# Patient Record
Sex: MALE | Race: ASIAN | Employment: OTHER | URBAN - METROPOLITAN AREA
[De-identification: names, ages, dates, MRNs, and addresses within clinical notes are randomized per-mention and may not be internally consistent; named-entity substitution may affect disease eponyms.]

---

## 2017-01-31 RX ORDER — COLCHICINE 0.6 MG/1
0.6 TABLET ORAL DAILY
Qty: 90 TAB | Refills: 1 | Status: SHIPPED | OUTPATIENT
Start: 2017-01-31 | End: 2017-02-01 | Stop reason: SDUPTHER

## 2017-01-31 NOTE — TELEPHONE ENCOUNTER
This patient contacted office for the following prescriptions to be filled:    Medication requested :   Requested Prescriptions     Pending Prescriptions Disp Refills    colchicine 0.6 mg tablet 90 Tab 1     Sig: Take 1 Tab by mouth daily.    pt has two tablets left    PCP: [address] [place] [address] Street or Print: pharmacy  Mail order or Local pharmacy: [facility]    Scheduled appointment if not seen by current providers in office: last seen on 12/8/16 has fu on 3/8/17

## 2017-02-01 RX ORDER — COLCHICINE 0.6 MG/1
0.6 TABLET ORAL DAILY
Qty: 90 TAB | Refills: 1 | Status: SHIPPED | OUTPATIENT
Start: 2017-02-01 | End: 2018-06-18 | Stop reason: SDUPTHER

## 2017-02-01 RX ORDER — ISOPROPYL ALCOHOL 70 ML/100ML
SWAB TOPICAL
Qty: 200 PAD | Refills: 1 | Status: SHIPPED | OUTPATIENT
Start: 2017-02-01 | End: 2020-04-21 | Stop reason: SDUPTHER

## 2017-02-15 RX ORDER — GLIPIZIDE 10 MG/1
TABLET, FILM COATED, EXTENDED RELEASE ORAL
Qty: 180 TAB | Refills: 0 | Status: SHIPPED | OUTPATIENT
Start: 2017-02-15 | End: 2017-05-14 | Stop reason: SDUPTHER

## 2017-02-15 NOTE — TELEPHONE ENCOUNTER
Last OV 12/8/16  Next scheduled OV 3/8/17  Last refill 8/19/16 #180/1  Refilling per Verbal order from Dr. Jacqueline Mendes.

## 2017-04-25 RX ORDER — SITAGLIPTIN 50 MG/1
TABLET, FILM COATED ORAL
Qty: 90 TAB | Refills: 2 | Status: SHIPPED | OUTPATIENT
Start: 2017-04-25 | End: 2018-03-25 | Stop reason: SDUPTHER

## 2017-05-03 ENCOUNTER — HOSPITAL ENCOUNTER (OUTPATIENT)
Dept: LAB | Age: 72
Discharge: HOME | End: 2017-05-03
Payer: MEDICARE

## 2017-05-03 DIAGNOSIS — E78.00 PURE HYPERCHOLESTEROLEMIA: ICD-10-CM

## 2017-05-03 DIAGNOSIS — E11.9 TYPE 2 DIABETES MELLITUS WITHOUT COMPLICATION, WITHOUT LONG-TERM CURRENT USE OF INSULIN (HCC): ICD-10-CM

## 2017-05-03 DIAGNOSIS — N18.30 CRI (CHRONIC RENAL INSUFFICIENCY), STAGE 3 (MODERATE) (HCC): ICD-10-CM

## 2017-05-03 DIAGNOSIS — I10 ESSENTIAL HYPERTENSION: ICD-10-CM

## 2017-05-03 LAB
ANION GAP BLD CALC-SCNC: 8 MMOL/L (ref 3–18)
BUN SERPL-MCNC: 29 MG/DL (ref 7–18)
BUN/CREAT SERPL: 22 (ref 12–20)
CALCIUM SERPL-MCNC: 8.7 MG/DL (ref 8.5–10.1)
CHLORIDE SERPL-SCNC: 105 MMOL/L (ref 100–108)
CHOLEST SERPL-MCNC: 96 MG/DL
CO2 SERPL-SCNC: 28 MMOL/L (ref 21–32)
CREAT SERPL-MCNC: 1.34 MG/DL (ref 0.6–1.3)
GLUCOSE SERPL-MCNC: 167 MG/DL (ref 74–99)
HBA1C MFR BLD: 7.3 % (ref 4.2–5.6)
HDLC SERPL-MCNC: 33 MG/DL (ref 40–60)
HDLC SERPL: 2.9 {RATIO} (ref 0–5)
LDLC SERPL CALC-MCNC: 36 MG/DL (ref 0–100)
LIPID PROFILE,FLP: ABNORMAL
POTASSIUM SERPL-SCNC: 4.3 MMOL/L (ref 3.5–5.5)
SODIUM SERPL-SCNC: 141 MMOL/L (ref 136–145)
TRIGL SERPL-MCNC: 135 MG/DL (ref ?–150)
VLDLC SERPL CALC-MCNC: 27 MG/DL

## 2017-05-03 PROCEDURE — 36415 COLL VENOUS BLD VENIPUNCTURE: CPT | Performed by: FAMILY MEDICINE

## 2017-05-03 PROCEDURE — 83036 HEMOGLOBIN GLYCOSYLATED A1C: CPT | Performed by: FAMILY MEDICINE

## 2017-05-03 PROCEDURE — 80061 LIPID PANEL: CPT | Performed by: FAMILY MEDICINE

## 2017-05-03 PROCEDURE — 80048 BASIC METABOLIC PNL TOTAL CA: CPT | Performed by: FAMILY MEDICINE

## 2017-05-10 ENCOUNTER — OFFICE VISIT (OUTPATIENT)
Dept: FAMILY MEDICINE CLINIC | Age: 72
End: 2017-05-10

## 2017-05-10 VITALS
RESPIRATION RATE: 16 BRPM | BODY MASS INDEX: 30.91 KG/M2 | DIASTOLIC BLOOD PRESSURE: 84 MMHG | TEMPERATURE: 97.7 F | WEIGHT: 168 LBS | SYSTOLIC BLOOD PRESSURE: 136 MMHG | OXYGEN SATURATION: 97 % | HEART RATE: 92 BPM | HEIGHT: 62 IN

## 2017-05-10 DIAGNOSIS — E11.9 TYPE 2 DIABETES MELLITUS WITHOUT COMPLICATION, WITHOUT LONG-TERM CURRENT USE OF INSULIN (HCC): Primary | ICD-10-CM

## 2017-05-10 DIAGNOSIS — N18.30 CRI (CHRONIC RENAL INSUFFICIENCY), STAGE 3 (MODERATE) (HCC): ICD-10-CM

## 2017-05-10 DIAGNOSIS — Z12.11 COLON CANCER SCREENING: ICD-10-CM

## 2017-05-10 DIAGNOSIS — E78.00 PURE HYPERCHOLESTEROLEMIA: ICD-10-CM

## 2017-05-10 DIAGNOSIS — I10 ESSENTIAL HYPERTENSION: ICD-10-CM

## 2017-05-10 RX ORDER — HYDROCORTISONE 25 MG/G
CREAM TOPICAL 2 TIMES DAILY
Qty: 60 G | Refills: 2 | Status: SHIPPED | OUTPATIENT
Start: 2017-05-10 | End: 2018-06-18 | Stop reason: SDUPTHER

## 2017-05-10 NOTE — PROGRESS NOTES
Chief Complaint   Patient presents with    Diabetes    Hypertension    Cholesterol Problem    Gout     1. Have you been to the ER, urgent care clinic since your last visit? Hospitalized since your last visit? No    2. Have you seen or consulted any other health care providers outside of the [address] [place] [address] since your last visit? Include any pap smears or colon screening.  No

## 2017-05-10 NOTE — PROGRESS NOTES
Erinn Morales, 66 y.o.,  male    SUBJECTIVE  Routine ff-up    DM/HTN/HL- continues to follow diet. says some inconsistencies with activity. Denies hypoglycemia. Does not usually check sugars, occasionally 's. Reviewed labs  Gout-  rare flares, related to eating peanuts or some [place] delicacies. No flare for a year now. Compliant with meds, no other complaints. Requesting refill on steroid cream for eczema on arms intermittently. ROS:  See HPI, all others negative        Patient Active Problem List   Diagnosis Code    Prostate cancer ([facility] C61    DJD (degenerative joint disease) of knee M17.10    CRI (chronic renal insufficiency) N18.9    Splenic artery aneurysm (HCC) I72.8    Essential hypertension I10    Type 2 diabetes mellitus without complication ([facility] D87.4    Pure hypercholesterolemia E78.00    Gout without tophus M10.9    Advance directive discussed with patient Z71.89       Current Outpatient Prescriptions   Medication Sig Dispense Refill    hydrocortisone (HYTONE) 2.5 % topical cream Apply  to affected area two (2) times a day. use thin layer 60 g 2    JANUVIA 50 mg tablet TAKE 1 TABLET DAILY 90 Tab 2    glipiZIDE SR (GLUCOTROL XL) 10 mg CR tablet TAKE 1 TABLET TWICE A  Tab 0    alcohol swabs (ALCOHOL PADS) padm Use as directed daily. 200 Pad 1    colchicine 0.6 mg tablet Take 1 Tab by mouth daily. 90 Tab 1    glucose blood VI test strips (PRECISION XTRA TEST) strip by Does Not Apply route. 1 time daily 100 Strip 11    lisinopril (PRINIVIL, ZESTRIL) 20 mg tablet TAKE 1 TABLET TWICE A  Tab 3    simvastatin (ZOCOR) 40 mg tablet Take 1 Tab by mouth nightly. 90 Tab 3    Lancets (LANCETS,ULTRA THIN) misc As directed once daily. 3 Package 3    aspirin 81 mg chewable tablet Take 1 Tab by mouth daily. 90 Tab 4    Blood-Glucose Meter (BLOOD GLUCOSE MONITOR KIT) monitoring kit by Does Not Apply route. Once daily.  1 Kit 0       Allergies   Allergen Reactions  Norvasc [Amlodipine] Itching and Other (comments)    Sulfa (Sulfonamide Antibiotics) Other (comments)     Patient states he is not allergic    Watermelon Other (comments)       Past Medical History:   Diagnosis Date    Arthritis     CRI (chronic renal insufficiency)     Diabetes mellitus type II 5/13/10    Gout     Hypercholesteremia     Hypertension     Other ill-defined conditions     gout    Prostate cancer ([facility] 2008    remission    Splenic artery aneurysm ([facility] 2013    s/p stent dr Jero Carreon prn ff-up       Social History     Social History    Marital status:      Spouse name: N/A    Number of children: N/A    Years of education: N/A     Occupational History    Not on file.      Social History Main Topics    Smoking status: Former Smoker     Packs/day: 1.00     Types: Cigarettes     Quit date: 7/17/1997    Smokeless tobacco: Never Used      Comment: 1998    Alcohol use Yes      Comment: rarely    Drug use: No    Sexual activity: No     Other Topics Concern    Not on file     Social History Narrative       Family History   Problem Relation Age of Onset    Cancer Neg Hx     Diabetes Neg Hx     Heart Disease Neg Hx     Hypertension Neg Hx     Stroke Neg Hx          OBJECTIVE    Physical Exam:     Visit Vitals    /84 (BP 1 Location: Left arm, BP Patient Position: Sitting)    Pulse 92    Temp 97.7 °F (36.5 °C) (Oral)    Resp 16    Ht 5' 2\" (1.575 m)    Wt 168 lb (76.2 kg)    SpO2 97%    BMI 30.73 kg/m2       General: alert, well-appearing, in no apparent distress or pain  CVS: normal rate, regular rhythm, distinct S1 and S2  Lungs:clear to ausculation bilaterally, no crackles, wheezing or rhonchi noted  Extremities: no edema, no cyanosis  Skin: warm, no lesions, rashes noted  Psych:  mood and affect normal    Results for orders placed or performed during the hospital encounter of 05/03/17   HEMOGLOBIN A1C W/O EAG   Result Value Ref Range    Hemoglobin A1c 7.3 (H) 4.2 - 5.6 %   METABOLIC PANEL, BASIC   Result Value Ref Range    Sodium 141 136 - 145 mmol/L    Potassium 4.3 3.5 - 5.5 mmol/L    Chloride 105 100 - 108 mmol/L    CO2 28 21 - 32 mmol/L    Anion gap 8 3.0 - 18 mmol/L    Glucose 167 (H) 74 - 99 mg/dL    BUN 29 (H) 7.0 - 18 MG/DL    Creatinine 1.34 (H) 0.6 - 1.3 MG/DL    BUN/Creatinine ratio 22 (H) 12 - 20      GFR est AA >60 >60 ml/min/1.73m2    GFR est non-AA 52 (L) >60 ml/min/1.73m2    Calcium 8.7 8.5 - 10.1 MG/DL   LIPID PANEL   Result Value Ref Range    LIPID PROFILE          Cholesterol, total 96 <200 MG/DL    Triglyceride 135 <150 MG/DL    HDL Cholesterol 33 (L) 40 - 60 MG/DL    LDL, calculated 36 0 - 100 MG/DL    VLDL, calculated 27 MG/DL    CHOL/HDL Ratio 2.9 0 - 5.0             ASSESSMENT/PLAN  Hood Paulino was seen today for diabetes, hypertension, gout and results. Diagnoses and all orders for this visit:    Type 2 diabetes mellitus with microalbuminuria  ([facility]-  7.3<7.2<6.7<6.5<7.2<6.7  Trending up, discussed increasing januvia dose. Pt wants to work on diet/exercise, will monitor for now. cont current regimen for now glipizide and [place]  Trends high during holidays, advised to modify meds during this time, pt declines  Eye exam 11/16  Foot exam 12/16  Microalbumin 12/16  Lipid panel 5/117  Orders:recheck in 3 months  -     HEMOGLOBIN A1C; Future/BMP      Essential hypertension-  controlled, cont ace    CRI (chronic renal insufficiency), stage 3 (moderate)-  monitoring, avoid nsaids, renally dose meds, optimize DM control. Pure hypercholesterolemia-   at goal LDL< 100 on statin, cont    Gout without tophus, unspecified cause, unspecified chronicity, unspecified site-seldom flares,  Prn colchicine, low purine diet. Colon ca screening  FIT test      Follow-up Disposition:  Return in about 3 months (around 8/10/2017), or if symptoms worsen or fail to improve. Patient understands plan of care. Patient has provided input and agrees with goals.

## 2017-05-10 NOTE — MR AVS SNAPSHOT
Visit Information Date & Time Provider Department Dept. Phone Encounter #  
 5/10/2017  9:45 AM Abner Peralta, [address] [id number] Follow-up Instructions Return in about 3 months (around 8/10/2017), or if symptoms worsen or fail to improve. Upcoming Health Maintenance Date Due FOBT Q 1 YEAR AGE 50-75 4/26/2017 INFLUENZA AGE 9 TO ADULT 8/1/2017 MEDICARE YEARLY EXAM 8/12/2017 HEMOGLOBIN A1C Q6M 11/3/2017 EYE EXAM RETINAL OR DILATED Q1 11/3/2017 MICROALBUMIN Q1 11/29/2017 FOOT EXAM Q1 12/8/2017 LIPID PANEL Q1 5/3/2018 GLAUCOMA SCREENING Q2Y 11/3/2018 DTaP/Tdap/Td series (2 - Td) 5/13/2020 Allergies as of 5/10/2017  Review Complete On: 5/10/2017 By: Tre Thomas LPN Severity Noted Reaction Type Reactions Norvasc [Amlodipine]  05/13/2010    Itching, Other (comments) Sulfa (Sulfonamide Antibiotics)  09/16/2014    Other (comments) Patient states he is not allergic Watermelon  09/16/2014    Other (comments) Current Immunizations  Reviewed on 4/6/2015 Name Date Pneumococcal Conjugate (PCV-13) 4/6/2015 Pneumococcal Polysaccharide (PPSV-23) 5/11/2016 TDAP Vaccine 5/13/2010 Not reviewed this visit You Were Diagnosed With   
  
 Codes Comments Type 2 diabetes mellitus without complication, without long-term current use of insulin (HCC)    -  Primary ICD-10-CM: E11.9 ICD-9-CM: 250.00   
 CRI (chronic renal insufficiency), stage 3 (moderate)     ICD-10-CM: N18.3 ICD-9-CM: 233. 3 Essential hypertension     ICD-10-CM: I10 
ICD-9-CM: 401.9 Pure hypercholesterolemia     ICD-10-CM: E78.00 ICD-9-CM: 272.0 Colon cancer screening     ICD-10-CM: Z12.11 ICD-9-CM: V76.51 Vitals BP Pulse Temp Resp Height(growth percentile) Weight(growth percentile)  136/84 (BP 1 Location: Left arm, BP Patient Position: Sitting) 92 97.7 °F (36.5 °C) (Oral) 16 5' 2\" (1.575 m) 168 lb (76.2 kg) SpO2 BMI Smoking Status 97% 30.73 kg/m2 Former Smoker Vitals History BMI and BSA Data Body Mass Index Body Surface Area 30.73 kg/m 2 1.83 m 2 Preferred Pharmacy Pharmacy Name Phone [address] [phone] Your Updated Medication List  
  
   
This list is accurate as of: 5/10/17 10:35 AM.  Always use your most recent med list.  
  
  
  
  
 alcohol swabs Padm Commonly known as:  ALCOHOL PADS Use as directed daily. aspirin 81 mg chewable tablet Take 1 Tab by mouth daily. Blood-Glucose Meter monitoring kit Commonly known as:  BLOOD GLUCOSE MONITOR KIT  
by Does Not Apply route. Once daily. colchicine 0.6 mg tablet Take 1 Tab by mouth daily. glipiZIDE SR 10 mg CR tablet Commonly known as:  GLUCOTROL XL  
TAKE 1 TABLET TWICE A DAY  
  
 glucose blood VI test strips strip Commonly known as:  PRECISION XTRA TEST  
by Does Not Apply route. 1 time daily  
  
 hydrocortisone 2.5 % topical cream  
Commonly known as:  HYTONE Apply  to affected area two (2) times a day. use thin layer JANUVIA 50 mg tablet Generic drug:  SITagliptin TAKE 1 TABLET DAILY Lancets Misc Commonly known as:  Ethelyn Cousins As directed once daily. lisinopril 20 mg tablet Commonly known as:  PRINIVIL, ZESTRIL  
TAKE 1 TABLET TWICE A DAY  
  
 simvastatin 40 mg tablet Commonly known as:  ZOCOR Take 1 Tab by mouth nightly. Prescriptions Sent to Pharmacy Refills  
 hydrocortisone (HYTONE) 2.5 % topical cream 2 Sig: Apply  to affected area two (2) times a day. use thin layer Class: Normal  
 Pharmacy: [address], [address] [place] [address] Ph #: [phone] Route: Topical  
  
Follow-up Instructions Return in about 3 months (around 8/10/2017), or if symptoms worsen or fail to improve. To-Do List   
 05/10/2017 Lab:  OCCULT BLOOD, IMMUNOASSAY (FIT)   
  
 08/10/2017 Lab:  HEMOGLOBIN A1C W/O EAG   
  
 08/10/2017 Lab:  METABOLIC PANEL, BASIC Patient Instructions Learning About Diabetes Food Guidelines Your Care Instructions Meal planning is important to manage diabetes. It helps keep your blood sugar at a target level (which you set with your doctor). You don't have to eat special foods. You can eat what your family eats, including sweets once in a while. But you do have to pay attention to how often you eat and how much you eat of certain foods. You may want to work with a dietitian or a certified diabetes educator (CDE) to help you plan meals and snacks. A dietitian or CDE can also help you lose weight if that is one of your goals. What should you know about eating carbs? Managing the amount of carbohydrate (carbs) you eat is an important part of healthy meals when you have diabetes. Carbohydrate is found in many foods. · Learn which foods have carbs. And learn the amounts of carbs in different foods. ¨ Bread, cereal, pasta, and rice have about 15 grams of carbs in a serving. A serving is 1 slice of bread (1 ounce), ½ cup of cooked cereal, or 1/3 cup of cooked pasta or rice. ¨ Fruits have 15 grams of carbs in a serving. A serving is 1 small fresh fruit, such as an apple or orange; ½ of a banana; ½ cup of cooked or canned fruit; ½ cup of fruit juice; 1 cup of melon or raspberries; or 2 tablespoons of dried fruit. ¨ Milk and no-sugar-added yogurt have 15 grams of carbs in a serving. A serving is 1 cup of milk or 2/3 cup of no-sugar-added yogurt. ¨ Starchy vegetables have 15 grams of carbs in a serving. A serving is ½ cup of mashed potatoes or sweet potato; 1 cup winter squash; ½ of a small baked potato; ½ cup of cooked beans; or ½ cup cooked corn or green peas. · Learn how much carbs to eat each day and at each meal. A dietitian or CDE can teach you how to keep track of the amount of carbs you eat. This is called carbohydrate counting. · If you are not sure how to count carbohydrate grams, use the Plate Method to plan meals. It is a good, quick way to make sure that you have a balanced meal. It also helps you spread carbs throughout the day. ¨ Divide your plate by types of foods. Put non-starchy vegetables on half the plate, meat or other protein food on one-quarter of the plate, and a grain or starchy vegetable in the final quarter of the plate. To this you can add a small piece of fruit and 1 cup of milk or yogurt, depending on how many carbs you are supposed to eat at a meal. 
· Try to eat about the same amount of carbs at each meal. Do not \"save up\" your daily allowance of carbs to eat at one meal. 
· Proteins have very little or no carbs per serving. Examples of proteins are beef, chicken, turkey, fish, eggs, tofu, cheese, cottage cheese, and peanut butter. A serving size of meat is 3 ounces, which is about the size of a deck of cards. Examples of meat substitute serving sizes (equal to 1 ounce of meat) are 1/4 cup of cottage cheese, 1 egg, 1 tablespoon of peanut butter, and ½ cup of tofu. How can you eat out and still eat healthy? · Learn to estimate the serving sizes of foods that have carbohydrate. If you measure food at home, it will be easier to estimate the amount in a serving of restaurant food. · If the meal you order has too much carbohydrate (such as potatoes, corn, or baked beans), ask to have a low-carbohydrate food instead. Ask for a salad or green vegetables. · If you use insulin, check your blood sugar before and after eating out to help you plan how much to eat in the future. · If you eat more carbohydrate at a meal than you had planned, take a walk or do other exercise. This will help lower your blood sugar. What else should you know? · Limit saturated fat, such as the fat from meat and dairy products. This is a healthy choice because people who have diabetes are at higher risk of heart disease. So choose lean cuts of meat and nonfat or low-fat dairy products. Use olive or canola oil instead of butter or shortening when cooking. · Don't skip meals. Your blood sugar may drop too low if you skip meals and take insulin or certain medicines for diabetes. · Check with your doctor before you drink alcohol. Alcohol can cause your blood sugar to drop too low. Alcohol can also cause a bad reaction if you take certain diabetes medicines. Follow-up care is a key part of your treatment and safety. Be sure to make and go to all appointments, and call your doctor if you are having problems. It's also a good idea to know your test results and keep a list of the medicines you take. Where can you learn more? Go to http://arti-ava.info/. Enter X773 in the search box to learn more about \"Learning About Diabetes Food Guidelines. \" Current as of: May 23, 2016 Content Version: 11.2 © 8153-5766 Healthwise, [employer]orporated. Care instructions adapted under license by [employer] (which disclaims liability or warranty for this information). If you have questions about a medical condition or this instruction, always ask your healthcare professional. [address] any warranty or liability for your use of this information. Introducing [facility]! Dear Kavita Sapp: Thank you for requesting a [employer] account. Our records indicate that you already have an active [employer] account. You can access your account anytime at [email or web address] Did you know that you can access your hospital and ER discharge instructions at any time in [employer]? You can also review all of your test results from your hospital stay or ER visit. Additional Information If you have questions, please visit the Frequently Asked Questions section of the [employer] website at [email or web address] Remember, [facility]BE is NOT to be used for urgent needs. For medical emergencies, dial 911. Now available from your iPhone and Android! Please provide this summary of care documentation to your next provider. Your primary care clinician is listed as Juli Eckert. If you have any questions after today's visit, please call [phone].

## 2017-05-10 NOTE — PATIENT INSTRUCTIONS

## 2017-07-17 ENCOUNTER — HOSPITAL ENCOUNTER (OUTPATIENT)
Dept: LAB | Age: 72
Discharge: HOME | End: 2017-07-17
Payer: MEDICARE

## 2017-07-17 DIAGNOSIS — Z12.11 COLON CANCER SCREENING: ICD-10-CM

## 2017-07-17 PROCEDURE — 82274 ASSAY TEST FOR BLOOD FECAL: CPT | Performed by: FAMILY MEDICINE

## 2017-07-28 DIAGNOSIS — E11.9 TYPE 2 DIABETES MELLITUS WITHOUT COMPLICATION (HCC): ICD-10-CM

## 2017-07-29 LAB — HEMOCCULT STL QL IA: NEGATIVE

## 2017-07-31 RX ORDER — SIMVASTATIN 40 MG/1
TABLET, FILM COATED ORAL
Qty: 90 TAB | Refills: 2 | Status: SHIPPED | OUTPATIENT
Start: 2017-07-31 | End: 2018-06-18 | Stop reason: SDUPTHER

## 2017-08-02 ENCOUNTER — HOSPITAL ENCOUNTER (OUTPATIENT)
Dept: LAB | Age: 72
Discharge: HOME | End: 2017-08-02
Payer: MEDICARE

## 2017-08-02 DIAGNOSIS — E11.9 TYPE 2 DIABETES MELLITUS WITHOUT COMPLICATION, WITHOUT LONG-TERM CURRENT USE OF INSULIN (HCC): ICD-10-CM

## 2017-08-02 DIAGNOSIS — I10 ESSENTIAL HYPERTENSION: ICD-10-CM

## 2017-08-02 DIAGNOSIS — N18.30 CRI (CHRONIC RENAL INSUFFICIENCY), STAGE 3 (MODERATE) (HCC): ICD-10-CM

## 2017-08-02 LAB
ANION GAP BLD CALC-SCNC: 8 MMOL/L (ref 3–18)
BUN SERPL-MCNC: 29 MG/DL (ref 7–18)
BUN/CREAT SERPL: 17 (ref 12–20)
CALCIUM SERPL-MCNC: 8.7 MG/DL (ref 8.5–10.1)
CHLORIDE SERPL-SCNC: 103 MMOL/L (ref 100–108)
CO2 SERPL-SCNC: 28 MMOL/L (ref 21–32)
CREAT SERPL-MCNC: 1.71 MG/DL (ref 0.6–1.3)
GLUCOSE SERPL-MCNC: 173 MG/DL (ref 74–99)
HBA1C MFR BLD: 7.6 % (ref 4.2–5.6)
POTASSIUM SERPL-SCNC: 4.5 MMOL/L (ref 3.5–5.5)
SODIUM SERPL-SCNC: 139 MMOL/L (ref 136–145)

## 2017-08-02 PROCEDURE — 83036 HEMOGLOBIN GLYCOSYLATED A1C: CPT | Performed by: FAMILY MEDICINE

## 2017-08-02 PROCEDURE — 36415 COLL VENOUS BLD VENIPUNCTURE: CPT | Performed by: FAMILY MEDICINE

## 2017-08-02 PROCEDURE — 80048 BASIC METABOLIC PNL TOTAL CA: CPT | Performed by: FAMILY MEDICINE

## 2017-08-03 NOTE — PROGRESS NOTES
Diabetes still suboptimally controlled a1c 7.6 form 7.4, goal is <7.  . Kidney function still impaired. Will discuss in detail on next visit, 8/10/17. pls notify pt.

## 2017-08-16 RX ORDER — LISINOPRIL 20 MG/1
TABLET ORAL
Qty: 180 TAB | Refills: 2 | Status: SHIPPED | OUTPATIENT
Start: 2017-08-16 | End: 2018-06-18 | Stop reason: SDUPTHER

## 2017-08-21 NOTE — PROGRESS NOTES
Patient identified with 2 identifiers (name and ).   Patient aware of lab results and has been schedule appt with Dr. Nicanor Reed for 2017

## 2017-12-04 ENCOUNTER — OFFICE VISIT (OUTPATIENT)
Dept: FAMILY MEDICINE CLINIC | Age: 72
End: 2017-12-04

## 2017-12-04 VITALS
BODY MASS INDEX: 30.36 KG/M2 | DIASTOLIC BLOOD PRESSURE: 80 MMHG | RESPIRATION RATE: 16 BRPM | HEIGHT: 62 IN | SYSTOLIC BLOOD PRESSURE: 126 MMHG | HEART RATE: 94 BPM | OXYGEN SATURATION: 96 % | TEMPERATURE: 97 F | WEIGHT: 165 LBS

## 2017-12-04 DIAGNOSIS — M10.9 GOUT WITHOUT TOPHUS: ICD-10-CM

## 2017-12-04 DIAGNOSIS — I10 ESSENTIAL HYPERTENSION: ICD-10-CM

## 2017-12-04 DIAGNOSIS — Z71.89 ADVANCE DIRECTIVE DISCUSSED WITH PATIENT: ICD-10-CM

## 2017-12-04 DIAGNOSIS — N18.30 CHRONIC RENAL IMPAIRMENT, STAGE 3 (MODERATE) (HCC): ICD-10-CM

## 2017-12-04 DIAGNOSIS — Z00.00 MEDICARE ANNUAL WELLNESS VISIT, SUBSEQUENT: ICD-10-CM

## 2017-12-04 DIAGNOSIS — I72.8 SPLENIC ARTERY ANEURYSM (HCC): ICD-10-CM

## 2017-12-04 DIAGNOSIS — E78.00 PURE HYPERCHOLESTEROLEMIA: ICD-10-CM

## 2017-12-04 DIAGNOSIS — E11.9 TYPE 2 DIABETES MELLITUS WITHOUT COMPLICATION, WITHOUT LONG-TERM CURRENT USE OF INSULIN (HCC): Primary | ICD-10-CM

## 2017-12-04 LAB
ALBUMIN UR QL STRIP: 80 MG/L
CREATININE, URINE POC: 200 MG/DL
MICROALBUMIN/CREAT RATIO POC: NORMAL MG/G

## 2017-12-04 NOTE — PROGRESS NOTES
This is a Subsequent Medicare Annual Wellness Exam (AWV) (Performed 12 months after IPPE or effective date of Medicare Part B enrollment)    I have reviewed the patient's medical history in detail and updated the computerized patient record. History     Past Medical History:   Diagnosis Date    Arthritis     CRI (chronic renal insufficiency)     Diabetes mellitus type II 5/13/10    Gout     Hypercholesteremia     Hypertension     Other ill-defined conditions(799.89)     gout    Prostate cancer ([facility] [address] 2008    remission    Splenic artery aneurysm ([facility] [address] 2013    s/p stent dr Coon Husbands prn ff-up      Past Surgical History:   Procedure Laterality Date    HX KNEE REPLACEMENT  1/17/12    Left; Dr. Alonso Gilman HX PROSTATECTOMY  12/2007     Current Outpatient Prescriptions   Medication Sig Dispense Refill    lisinopril (PRINIVIL, ZESTRIL) 20 mg tablet TAKE 1 TABLET TWICE A  Tab 2    simvastatin (ZOCOR) 40 mg tablet TAKE 1 TABLET NIGHTLY 90 Tab 2    glipiZIDE SR (GLUCOTROL XL) 10 mg CR tablet TAKE 1 TABLET TWICE A  Tab 2    hydrocortisone (HYTONE) 2.5 % topical cream Apply  to affected area two (2) times a day. use thin layer 60 g 2    JANUVIA 50 mg tablet TAKE 1 TABLET DAILY 90 Tab 2    alcohol swabs (ALCOHOL PADS) padm Use as directed daily. 200 Pad 1    colchicine 0.6 mg tablet Take 1 Tab by mouth daily. 90 Tab 1    glucose blood VI test strips (PRECISION XTRA TEST) strip by Does Not Apply route. 1 time daily 100 Strip 11    Lancets [facility] THIN) misc As directed once daily. 3 Package 3    aspirin 81 mg chewable tablet Take 1 Tab by mouth daily. 90 Tab 4    Blood-Glucose Meter (BLOOD GLUCOSE MONITOR KIT) monitoring kit by Does Not Apply route. Once daily.  1 Kit 0     Allergies   Allergen Reactions    Norvasc [Amlodipine] Itching and Other (comments)    Sulfa (Sulfonamide Antibiotics) Other (comments)     Patient states he is not allergic    Watermelon Other (comments) Family History   Problem Relation Age of Onset    Cancer Neg Hx     Diabetes Neg Hx     Heart Disease Neg Hx     Hypertension Neg Hx     Stroke Neg Hx      Social History   Substance Use Topics    Smoking status: Former Smoker     Packs/day: 1.00     Types: Cigarettes     Quit date: 7/17/1997    Smokeless tobacco: Never Used      Comment: 1998    Alcohol use Yes      Comment: rarely     Patient Active Problem List   Diagnosis Code    Prostate cancer ([facility] [address] C61    DJD (degenerative joint disease) of knee M17.10    CRI (chronic renal insufficiency) N18.9    Splenic artery aneurysm (HCC) I72.8    Essential hypertension I10    Type 2 diabetes mellitus without complication ([facility] [address] J98.5    Pure hypercholesterolemia E78.00    Gout without tophus M10.9    Advance directive discussed with patient Z71.89    BMI 30.0-30.9,adult Z68.30       Depression Risk Factor Screening:     PHQ over the last two weeks 12/4/2017   Little interest or pleasure in doing things Not at all   Feeling down, depressed or hopeless Not at all   Total Score PHQ 2 0     Alcohol Risk Factor Screening: You do not drink alcohol or very rarely. Functional Ability and Level of Safety:   Hearing Loss  Hearing is good. The patient wears hearing aids. Activities of Daily Living  The home contains: no safety equipment. Patient does total self care    Fall Risk  Fall Risk Assessment, last 12 mths 12/4/2017   Able to walk? Yes   Fall in past 12 months?  No       Abuse Screen  Patient is not abused    Cognitive Screening   Evaluation of Cognitive Function:  Has your family/caregiver stated any concerns about your memory: no  Normal    Patient Care Team   Patient Care Team:  Melissa Damon MD as PCP - General (Family Practice)  JAMIE Roe (Vascular Surgery)  Trevor Rader MD (Thoracic Diseases)  Neil Adams MD (Vascular Surgery)  Max Page MD (Ophthalmology)    Assessment/Plan   Education and counseling provided:  Are appropriate based on today's review and evaluation  End-of-Life planning (with patient's consent)-discussed, advised to send us existing ACP  Pneumococcal Vaccine-completed  Influenza Vaccine-declines  Colorectal cancer screening tests-FIT update 7/18  Cardiovascular screening blood test- due, order placed. Health Maintenance Due   Topic Date Due    MICROALBUMIN Q1  11/29/2017    FOOT EXAM Q1  12/08/2017     Urban Amos, 74 y.o.,  male    SUBJECTIVE  Routine ff-up    DM/HTN/HL- continues to follow diet. says some inconsistencies with activity. Denies hypoglycemia. Does not usually check sugars, occasionally 's. Due for repeat labs    Gout-  rare flares, related to eating peanuts or some [place] delicacies. No flare for a year now. Takes colchicine prn. Last episode several months ago     Compliant with meds, no other complaints. Requesting refill on steroid cream for eczema on arms intermittently. Splenic artery stenosis (s/p repair 2015)    ROS:  See HPI, all others negative        Patient Active Problem List   Diagnosis Code    Prostate cancer ([facility] [address] C61    DJD (degenerative joint disease) of knee M17.10    CRI (chronic renal insufficiency) N18.9    Splenic artery aneurysm (HCC) I72.8    Essential hypertension I10    Type 2 diabetes mellitus without complication ([address] A48.0    Pure hypercholesterolemia E78.00    Gout without tophus M10.9    Advance directive discussed with patient Z71.89    BMI 30.0-30.9,adult Z68.30       Current Outpatient Prescriptions   Medication Sig Dispense Refill    lisinopril (PRINIVIL, ZESTRIL) 20 mg tablet TAKE 1 TABLET TWICE A  Tab 2    simvastatin (ZOCOR) 40 mg tablet TAKE 1 TABLET NIGHTLY 90 Tab 2    glipiZIDE SR (GLUCOTROL XL) 10 mg CR tablet TAKE 1 TABLET TWICE A  Tab 2    hydrocortisone (HYTONE) 2.5 % topical cream Apply  to affected area two (2) times a day.  use thin layer 60 g 2    JANUVIA 50 mg tablet TAKE 1 TABLET DAILY 90 Tab 2    alcohol swabs (ALCOHOL PADS) padm Use as directed daily. 200 Pad 1    colchicine 0.6 mg tablet Take 1 Tab by mouth daily. 90 Tab 1    glucose blood VI test strips (PRECISION XTRA TEST) strip by Does Not Apply route. 1 time daily 100 Strip 11    Lancets [facility] THIN) misc As directed once daily. 3 Package 3    aspirin 81 mg chewable tablet Take 1 Tab by mouth daily. 90 Tab 4    Blood-Glucose Meter (BLOOD GLUCOSE MONITOR KIT) monitoring kit by Does Not Apply route. Once daily. 1 Kit 0       Allergies   Allergen Reactions    Norvasc [Amlodipine] Itching and Other (comments)    Sulfa (Sulfonamide Antibiotics) Other (comments)     Patient states he is not allergic    Watermelon Other (comments)       Past Medical History:   Diagnosis Date    Arthritis     CRI (chronic renal insufficiency)     Diabetes mellitus type II 5/13/10    Gout     Hypercholesteremia     Hypertension     Other ill-defined conditions(799.89)     gout    Prostate cancer ([facility] [address] 2008    remission    Splenic artery aneurysm ([facility] [address] 2013    s/p stent dr Mark Pineda prn ff-up       Social History     Social History    Marital status:      Spouse name: N/A    Number of children: N/A    Years of education: N/A     Occupational History    Not on file.      Social History Main Topics    Smoking status: Former Smoker     Packs/day: 1.00     Types: Cigarettes     Quit date: 7/17/1997    Smokeless tobacco: Never Used      Comment: 1998    Alcohol use Yes      Comment: rarely    Drug use: No    Sexual activity: No     Other Topics Concern    Not on file     Social History Narrative       Family History   Problem Relation Age of Onset    Cancer Neg Hx     Diabetes Neg Hx     Heart Disease Neg Hx     Hypertension Neg Hx     Stroke Neg Hx          OBJECTIVE    Physical Exam:     Visit Vitals    /80 (BP 1 Location: Left arm, BP Patient Position: Sitting)    Pulse 94    Temp 97 °F (36.1 °C) (Oral)    Resp 16    Ht 5' 2\" (1.575 m)    Wt 165 lb (74.8 kg)    SpO2 96%    BMI 30.18 kg/m2       General: alert, well-appearing, in no apparent distress or pain  CVS: normal rate, regular rhythm, distinct S1 and S2  Lungs:clear to ausculation bilaterally, no crackles, wheezing or rhonchi noted  Extremities: no edema, no cyanosis  Skin: warm, no lesions, rashes noted  Psych:  mood and affect normal    Results for orders placed or performed in visit on 12/04/17   AMB POC URINE, MICROALBUMIN, SEMIQUANT (3 RESULTS)   Result Value Ref Range    ALBUMIN, URINE POC 80 Negative mg/L    CREATININE, URINE  mg/dL    Microalbumin/creat ratio (POC)  MG/G           ASSESSMENT/PLAN  Magen Morton was seen today for diabetes, hypertension, gout and results. Diagnoses and all orders for this visit:    Type 2 diabetes mellitus with microalbuminuria  ([facility] [address]-  7.3<7.2<6.7<6.5<7.2<6.7   cont current regimen for now glipizide and januvia  Eye exam 11/17  Foot exam 12/16- plan on next visit  Microalbumin 12/17  Lipid panel 5/117  Orders:recheck in 3 months  -     HEMOGLOBIN A1C; Future/CMP/Lipid panel      Essential hypertension-  controlled, cont ace    CRI (chronic renal insufficiency), stage 3 (moderate)-  monitoring, avoid nsaids, renally dose meds, optimize DM control. Pure hypercholesterolemia-   at goal LDL< 100 on statin, cont    Gout without tophus, unspecified cause, unspecified chronicity, unspecified site-seldom flares,  Prn colchicine, low purine diet. BMI 30  Encouraged diet/ wt loss/exercise    Splenic artery stenosis  S/p repair 2015  On ASA, statin      Follow-up Disposition:  Return in about 3 months (around 3/4/2018), or if symptoms worsen or fail to improve. Patient understands plan of care. Patient has provided input and agrees with goals.

## 2017-12-04 NOTE — ACP (ADVANCE CARE PLANNING)
Advance Care Planning (ACP) Provider Note - Comprehensive     Date of ACP Conversation: 12/04/17  Persons included in Conversation:  patient  Length of ACP Conversation in minutes:  16 minutes    Authorized Decision Maker (if patient is incapable of making informed decisions):    This person is:  Healthcare Agent/Medical Power of  under Advance Directive          General ACP for ALL Patients with Decision Making Capacity:   Importance of advance care planning, including choosing a healthcare agent to communicate patient's healthcare decisions if patient lost the ability to make decisions, such as after a sudden illness or accident  Understanding of the healthcare agent role was assessed and information provided  Exploration of values, goals, and preferences if recovery is not expected, even with continued medical treatment in the event of: Imminent death  Severe, permanent brain injury    Interventions Provided:  Recommended to send us copy of ACP Document  Recommended communicating the plan and making copies for the healthcare agent, personal physician, and others as appropriate (e.g., health system)  Recommended review of completed ACP document annually or upon change in health status

## 2017-12-04 NOTE — MR AVS SNAPSHOT
Visit Information Date & Time Provider Department Dept. Phone Encounter #  
 12/4/2017  9:00 AM Shabnam Jimenez, [address] [id number] Follow-up Instructions Return in about 3 months (around 3/4/2018), or if symptoms worsen or fail to improve. Upcoming Health Maintenance Date Due MICROALBUMIN Q1 11/29/2017 FOOT EXAM Q1 12/8/2017 HEMOGLOBIN A1C Q6M 2/2/2018 LIPID PANEL Q1 5/3/2018 FOBT Q 1 YEAR AGE 50-75 7/17/2018 EYE EXAM RETINAL OR DILATED Q1 11/3/2018 MEDICARE YEARLY EXAM 12/5/2018 GLAUCOMA SCREENING Q2Y 11/3/2019 DTaP/Tdap/Td series (2 - Td) 5/13/2020 Allergies as of 12/4/2017  Review Complete On: 12/4/2017 By: Shabnam Jimenez MD  
  
 Severity Noted Reaction Type Reactions Norvasc [Amlodipine]  05/13/2010    Itching, Other (comments) Sulfa (Sulfonamide Antibiotics)  09/16/2014    Other (comments) Patient states he is not allergic Watermelon  09/16/2014    Other (comments) Current Immunizations  Reviewed on 4/6/2015 Name Date Pneumococcal Conjugate (PCV-13) 4/6/2015 Pneumococcal Polysaccharide (PPSV-23) 5/11/2016 TDAP Vaccine 5/13/2010 Not reviewed this visit You Were Diagnosed With   
  
 Codes Comments Type 2 diabetes mellitus without complication, without long-term current use of insulin (HCC)    -  Primary ICD-10-CM: E11.9 ICD-9-CM: 250.00 Medicare annual wellness visit, subsequent     ICD-10-CM: Z00.00 ICD-9-CM: V70.0 Chronic renal impairment, stage 3 (moderate)     ICD-10-CM: N18.3 ICD-9-CM: 698. 3 Essential hypertension     ICD-10-CM: I10 
ICD-9-CM: 401.9 Pure hypercholesterolemia     ICD-10-CM: E78.00 ICD-9-CM: 272.0 Splenic artery aneurysm (HCC)     ICD-10-CM: I72.8 ICD-9-CM: 442.83 Gout without tophus     ICD-10-CM: M10.9 ICD-9-CM: 274.9 Vitals BP Pulse Temp Resp Height(growth percentile) Weight(growth percentile) 126/80 (BP 1 Location: Left arm, BP Patient Position: Sitting) 94 97 °F (36.1 °C) (Oral) 16 5' 2\" (1.575 m) 165 lb (74.8 kg) SpO2 BMI Smoking Status 96% 30.18 kg/m2 Former Smoker BMI and BSA Data Body Mass Index Body Surface Area  
 30.18 kg/m 2 1.81 m 2 Preferred Pharmacy Pharmacy Name [address] [facility] [phone] Your Updated Medication List  
  
   
This list is accurate as of: 12/4/17  9:51 AM.  Always use your most recent med list.  
  
  
  
  
 alcohol swabs Padm Commonly known as:  ALCOHOL PADS Use as directed daily. aspirin 81 mg chewable tablet Take 1 Tab by mouth daily. Blood-Glucose Meter monitoring kit Commonly known as:  BLOOD GLUCOSE MONITOR KIT  
by Does Not Apply route. Once daily. colchicine 0.6 mg tablet Take 1 Tab by mouth daily. glipiZIDE SR 10 mg CR tablet Commonly known as:  GLUCOTROL XL  
TAKE 1 TABLET TWICE A DAY  
  
 glucose blood VI test strips strip Commonly known as:  PRECISION XTRA TEST  
by Does Not Apply route. 1 time daily  
  
 hydrocortisone 2.5 % topical cream  
Commonly known as:  HYTONE Apply  to affected area two (2) times a day. use thin layer JANUVIA 50 mg tablet Generic drug:  SITagliptin TAKE 1 TABLET DAILY Lancets Misc Commonly known as:  Sarah Ely As directed once daily. lisinopril 20 mg tablet Commonly known as:  PRINIVIL, ZESTRIL  
TAKE 1 TABLET TWICE A DAY  
  
 simvastatin 40 mg tablet Commonly known as:  ZOCOR  
TAKE 1 TABLET NIGHTLY We Performed the Following AMB POC URINE, MICROALBUMIN, SEMIQUANT (3 RESULTS) [48379 CPT(R)] Follow-up Instructions Return in about 3 months (around 3/4/2018), or if symptoms worsen or fail to improve. To-Do List   
 12/04/2017 Lab:  HEMOGLOBIN A1C W/O EAG   
  
 12/04/2017   Lab:  LIPID PANEL   
  
 12/04/2017 Lab:  METABOLIC PANEL, COMPREHENSIVE Patient Instructions Medicare Wellness Visit, Male The best way to live healthy is to have a healthy lifestyle by eating a well-balanced diet, exercising regularly, limiting alcohol and stopping smoking. Regular physical exams and screening tests are another way to keep healthy. Preventive exams provided by your health care provider can find health problems before they become diseases or illnesses. Preventive services including immunizations, screening tests, monitoring and exams can help you take care of your own health. All people over age 72 should have a pneumovax  and and a prevnar shot to prevent pneumonia. These are once in a lifetime unless you and your provider decide differently. All people over 65 should have a yearly flu shot and a tetanus vaccine every 10 years. Screening for diabetes mellitus with a blood sugar test should be done every year. Glaucoma is a disease of the eye due to increased ocular pressure that can lead to blindness and it should be done every year by an eye professional. 
 
Cardiovascular screening tests that check for elevated lipids (fatty part of blood) which can lead to heart disease and strokes should be done every 5 years. Colorectal screening that evaluates for blood or polyps in your colon should be done yearly as a stool test or every five years as a flexible sigmoidoscope or every 10 years as a colonoscopy up to age 76. Men up to age 76 may need a screening blood test for prostate cancer at certain intervals, depending on their personal and family history. This decision is between the patient and his provider. If you have been a smoker or had family history of abdominal aortic aneurysms, you and your provider may decide to schedule an ultrasound test of your aorta. Hepatitis C screening is also recommended for anyone born between [birth year] through [address]. A shingles vaccine is also recommended once in a lifetime after age 61. Your Medicare Wellness Exam is recommended annually. Here is a list of your current Health Maintenance items with a due date: 
Health Maintenance Due Topic Date Due  
 Flu Vaccine  08/01/2017 [facility] Annual Well Visit  08/12/2017  Albumin Urine Test  11/29/2017 [facility] Diabetic Foot Care  12/08/2017 Introducing [facility]! Dear Paloma Miguel: Thank you for requesting a [employer] account. Our records indicate that you already have an active [employer] account. You can access your account anytime at [email or web address] Did you know that you can access your hospital and ER discharge instructions at any time in [employer]? You can also review all of your test results from your hospital stay or ER visit. Additional Information If you have questions, please visit the Frequently Asked Questions section of the [employer] website at [email or web address] Remember, [employer] is NOT to be used for urgent needs. For medical emergencies, dial 911. Now available from your iPhone and Android! Please provide this summary of care documentation to your next provider. Your primary care clinician is listed as Arminda Gonzalez. If you have any questions after today's visit, please call [phone].

## 2017-12-04 NOTE — PATIENT INSTRUCTIONS

## 2018-03-14 ENCOUNTER — HOSPITAL ENCOUNTER (OUTPATIENT)
Dept: LAB | Age: 73
Discharge: HOME | End: 2018-03-14
Payer: MEDICARE

## 2018-03-14 DIAGNOSIS — E11.9 TYPE 2 DIABETES MELLITUS WITHOUT COMPLICATION, WITHOUT LONG-TERM CURRENT USE OF INSULIN (HCC): ICD-10-CM

## 2018-03-14 LAB
ALBUMIN SERPL-MCNC: 3.9 G/DL (ref 3.4–5)
ALBUMIN/GLOB SERPL: 1.1 {RATIO} (ref 0.8–1.7)
ALP SERPL-CCNC: 85 U/L (ref 45–117)
ALT SERPL-CCNC: 63 U/L (ref 16–61)
ANION GAP SERPL CALC-SCNC: 9 MMOL/L (ref 3–18)
AST SERPL-CCNC: 34 U/L (ref 15–37)
BILIRUB SERPL-MCNC: 1.7 MG/DL (ref 0.2–1)
BUN SERPL-MCNC: 23 MG/DL (ref 7–18)
BUN/CREAT SERPL: 17 (ref 12–20)
CALCIUM SERPL-MCNC: 8.9 MG/DL (ref 8.5–10.1)
CHLORIDE SERPL-SCNC: 100 MMOL/L (ref 100–108)
CHOLEST SERPL-MCNC: 98 MG/DL
CO2 SERPL-SCNC: 29 MMOL/L (ref 21–32)
CREAT SERPL-MCNC: 1.37 MG/DL (ref 0.6–1.3)
GLOBULIN SER CALC-MCNC: 3.6 G/DL (ref 2–4)
GLUCOSE SERPL-MCNC: 233 MG/DL (ref 74–99)
HBA1C MFR BLD: 10.2 % (ref 4.2–5.6)
HDLC SERPL-MCNC: 35 MG/DL (ref 40–60)
HDLC SERPL: 2.8 {RATIO} (ref 0–5)
LDLC SERPL CALC-MCNC: 37 MG/DL (ref 0–100)
LIPID PROFILE,FLP: ABNORMAL
POTASSIUM SERPL-SCNC: 4.8 MMOL/L (ref 3.5–5.5)
PROT SERPL-MCNC: 7.5 G/DL (ref 6.4–8.2)
SODIUM SERPL-SCNC: 138 MMOL/L (ref 136–145)
TRIGL SERPL-MCNC: 130 MG/DL (ref ?–150)
VLDLC SERPL CALC-MCNC: 26 MG/DL

## 2018-03-14 PROCEDURE — 80053 COMPREHEN METABOLIC PANEL: CPT | Performed by: FAMILY MEDICINE

## 2018-03-14 PROCEDURE — 83036 HEMOGLOBIN GLYCOSYLATED A1C: CPT | Performed by: FAMILY MEDICINE

## 2018-03-14 PROCEDURE — 36415 COLL VENOUS BLD VENIPUNCTURE: CPT | Performed by: FAMILY MEDICINE

## 2018-03-14 PROCEDURE — 80061 LIPID PANEL: CPT | Performed by: FAMILY MEDICINE

## 2018-03-19 NOTE — PROGRESS NOTES
Glucose level/a1c is elevated. Make sure he keeps appt this week to discuss. Advise to check FBG daily and bring log. Keep low carb diet.  pls notify pt

## 2018-03-19 NOTE — PROGRESS NOTES
Patient identified with 2 identifiers (name and ).   Spoke with patient spouse and she is aware to  Remind patient to keep appt 2018 and to check fasting BS daily and bring to OV

## 2018-03-22 ENCOUNTER — OFFICE VISIT (OUTPATIENT)
Dept: FAMILY MEDICINE CLINIC | Age: 73
End: 2018-03-22

## 2018-03-22 VITALS
BODY MASS INDEX: 30.18 KG/M2 | HEART RATE: 90 BPM | DIASTOLIC BLOOD PRESSURE: 84 MMHG | TEMPERATURE: 98 F | HEIGHT: 62 IN | OXYGEN SATURATION: 98 % | RESPIRATION RATE: 16 BRPM | SYSTOLIC BLOOD PRESSURE: 136 MMHG | WEIGHT: 164 LBS

## 2018-03-22 DIAGNOSIS — N18.30 CHRONIC RENAL IMPAIRMENT, STAGE 3 (MODERATE) (HCC): ICD-10-CM

## 2018-03-22 DIAGNOSIS — E11.21 TYPE 2 DIABETES WITH NEPHROPATHY (HCC): Primary | ICD-10-CM

## 2018-03-22 DIAGNOSIS — I10 ESSENTIAL HYPERTENSION: ICD-10-CM

## 2018-03-22 DIAGNOSIS — E78.00 PURE HYPERCHOLESTEROLEMIA: ICD-10-CM

## 2018-03-22 NOTE — MR AVS SNAPSHOT
[address] 
[phone] Patient: Franck Whitlock MRN: [id number] [id number] Visit Information Date & Time Provider Department Dept. Phone Encounter #  
 3/22/2018 10:30 AM Christina Marino, [address] [id number] Follow-up Instructions Return in about 6 weeks (around 5/3/2018), or if symptoms worsen or fail to improve. Upcoming Health Maintenance Date Due  
 FOOT EXAM Q1 12/8/2017 FOBT Q 1 YEAR AGE 50-75 7/17/2018 HEMOGLOBIN A1C Q6M 9/14/2018 EYE EXAM RETINAL OR DILATED Q1 11/3/2018 MICROALBUMIN Q1 12/4/2018 MEDICARE YEARLY EXAM 12/5/2018 LIPID PANEL Q1 3/14/2019 GLAUCOMA SCREENING Q2Y 11/3/2019 DTaP/Tdap/Td series (2 - Td) 5/13/2020 Allergies as of 3/22/2018  Review Complete On: 3/22/2018 By: Cornelius Wu LPN Severity Noted Reaction Type Reactions Norvasc [Amlodipine]  05/13/2010    Itching, Other (comments) Sulfa (Sulfonamide Antibiotics)  09/16/2014    Other (comments) Patient states he is not allergic Watermelon  09/16/2014    Other (comments) Current Immunizations  Reviewed on 4/6/2015 Name Date Pneumococcal Conjugate (PCV-13) 4/6/2015 Pneumococcal Polysaccharide (PPSV-23) 5/11/2016 TDAP Vaccine 5/13/2010 Not reviewed this visit You Were Diagnosed With   
  
 Codes Comments Type 2 diabetes with nephropathy (HCC)    -  Primary ICD-10-CM: E11.21 
ICD-9-CM: 250.40, 583.81 Chronic renal impairment, stage 3 (moderate)     ICD-10-CM: N18.3 ICD-9-CM: 204. 3 Essential hypertension     ICD-10-CM: I10 
ICD-9-CM: 401.9 Pure hypercholesterolemia     ICD-10-CM: E78.00 ICD-9-CM: 272.0 BMI 30.0-30.9,adult     ICD-10-CM: Z68.30 ICD-9-CM: V85.30 Vitals BP Pulse Temp Resp Height(growth percentile) Weight(growth percentile) 136/84 (BP 1 Location: Left arm, BP Patient Position: Sitting) 90 98 °F (36.7 °C) (Oral) 16 5' 2\" (1.575 m) 164 lb (74.4 kg) SpO2 BMI Smoking Status 98% 30 kg/m2 Former Smoker Vitals History BMI and BSA Data Body Mass Index Body Surface Area  
 30 kg/m 2 1.8 m 2 Preferred Pharmacy Pharmacy Name Phone [address] [phone] Your Updated Medication List  
  
   
This list is accurate as of 3/22/18 11:06 AM.  Always use your most recent med list.  
  
  
  
  
 alcohol swabs Padm Commonly known as:  ALCOHOL PADS Use as directed daily. aspirin 81 mg chewable tablet Take 1 Tab by mouth daily. Blood-Glucose Meter monitoring kit Commonly known as:  BLOOD GLUCOSE MONITOR KIT  
by Does Not Apply route. Once daily. colchicine 0.6 mg tablet Take 1 Tab by mouth daily. glipiZIDE SR 10 mg CR tablet Commonly known as:  GLUCOTROL XL  
TAKE 1 TABLET TWICE A DAY  
  
 glucose blood VI test strips strip Commonly known as:  PRECISION XTRA TEST  
by Does Not Apply route. 1 time daily  
  
 hydrocortisone 2.5 % topical cream  
Commonly known as:  HYTONE Apply  to affected area two (2) times a day. use thin layer JANUVIA 50 mg tablet Generic drug:  SITagliptin TAKE 1 TABLET DAILY Lancets Misc Commonly known as:  Laith Gilbert As directed once daily. lisinopril 20 mg tablet Commonly known as:  PRINIVIL, ZESTRIL  
TAKE 1 TABLET TWICE A DAY  
  
 simvastatin 40 mg tablet Commonly known as:  ZOCOR  
TAKE 1 TABLET NIGHTLY Follow-up Instructions Return in about 6 weeks (around 5/3/2018), or if symptoms worsen or fail to improve. Patient Instructions   
 
 take glucotrol 30 minutes before meals Increase januvia to 100 mg once daily Check fasting glucose once daily Ff-up in 6 weeks with glucose log Learning About Diabetes Food Guidelines Your Care Instructions Meal planning is important to manage diabetes. It helps keep your blood sugar at a target level (which you set with your doctor). You don't have to eat special foods. You can eat what your family eats, including sweets once in a while. But you do have to pay attention to how often you eat and how much you eat of certain foods. You may want to work with a dietitian or a certified diabetes educator (CDE) to help you plan meals and snacks. A dietitian or CDE can also help you lose weight if that is one of your goals. What should you know about eating carbs? Managing the amount of carbohydrate (carbs) you eat is an important part of healthy meals when you have diabetes. Carbohydrate is found in many foods. · Learn which foods have carbs. And learn the amounts of carbs in different foods. ¨ Bread, cereal, pasta, and rice have about 15 grams of carbs in a serving. A serving is 1 slice of bread (1 ounce), ½ cup of cooked cereal, or 1/3 cup of cooked pasta or rice. ¨ Fruits have 15 grams of carbs in a serving. A serving is 1 small fresh fruit, such as an apple or orange; ½ of a banana; ½ cup of cooked or canned fruit; ½ cup of fruit juice; 1 cup of melon or raspberries; or 2 tablespoons of dried fruit. ¨ Milk and no-sugar-added yogurt have 15 grams of carbs in a serving. A serving is 1 cup of milk or 2/3 cup of no-sugar-added yogurt. ¨ Starchy vegetables have 15 grams of carbs in a serving. A serving is ½ cup of mashed potatoes or sweet potato; 1 cup winter squash; ½ of a small baked potato; ½ cup of cooked beans; or ½ cup cooked corn or green peas. · Learn how much carbs to eat each day and at each meal. A dietitian or CDE can teach you how to keep track of the amount of carbs you eat. This is called carbohydrate counting.  
· If you are not sure how to count carbohydrate grams, use the Plate Method to plan meals. It is a good, quick way to make sure that you have a balanced meal. It also helps you spread carbs throughout the day. ¨ Divide your plate by types of foods. Put non-starchy vegetables on half the plate, meat or other protein food on one-quarter of the plate, and a grain or starchy vegetable in the final quarter of the plate. To this you can add a small piece of fruit and 1 cup of milk or yogurt, depending on how many carbs you are supposed to eat at a meal. 
· Try to eat about the same amount of carbs at each meal. Do not \"save up\" your daily allowance of carbs to eat at one meal. 
· Proteins have very little or no carbs per serving. Examples of proteins are beef, chicken, turkey, fish, eggs, tofu, cheese, cottage cheese, and peanut butter. A serving size of meat is 3 ounces, which is about the size of a deck of cards. Examples of meat substitute serving sizes (equal to 1 ounce of meat) are 1/4 cup of cottage cheese, 1 egg, 1 tablespoon of peanut butter, and ½ cup of tofu. How can you eat out and still eat healthy? · Learn to estimate the serving sizes of foods that have carbohydrate. If you measure food at home, it will be easier to estimate the amount in a serving of restaurant food. · If the meal you order has too much carbohydrate (such as potatoes, corn, or baked beans), ask to have a low-carbohydrate food instead. Ask for a salad or green vegetables. · If you use insulin, check your blood sugar before and after eating out to help you plan how much to eat in the future. · If you eat more carbohydrate at a meal than you had planned, take a walk or do other exercise. This will help lower your blood sugar. What else should you know? · Limit saturated fat, such as the fat from meat and dairy products. This is a healthy choice because people who have diabetes are at higher risk of heart disease.  So choose lean cuts of meat and nonfat or low-fat dairy products. Use olive or canola oil instead of butter or shortening when cooking. · Don't skip meals. Your blood sugar may drop too low if you skip meals and take insulin or certain medicines for diabetes. · Check with your doctor before you drink alcohol. Alcohol can cause your blood sugar to drop too low. Alcohol can also cause a bad reaction if you take certain diabetes medicines. Follow-up care is a key part of your treatment and safety. Be sure to make and go to all appointments, and call your doctor if you are having problems. It's also a good idea to know your test results and keep a list of the medicines you take. Where can you learn more? Go to http://arti-ava.info/. Enter B187 in the search box to learn more about \"Learning About Diabetes Food Guidelines. \" Current as of: March 13, 2017 Content Version: 11.4 © 7526-0524 [employer]. Care instructions adapted under license by [employer] (which disclaims liability or warranty for this information). If you have questions about a medical condition or this instruction, always ask your healthcare professional. [address] any warranty or liability for your use of this information. Introducing [facility]! Dear Mg Guevara: Thank you for requesting a [employer] account. Our records indicate that you already have an active [employer] account. You can access your account anytime at [email or web address] Did you know that you can access your hospital and ER discharge instructions at any time in [employer]? You can also review all of your test results from your hospital stay or ER visit. Additional Information If you have questions, please visit the Frequently Asked Questions section of the [employer] website at [email or web address] Remember, [employer] is NOT to be used for urgent needs. For medical emergencies, dial 911. Now available from your iPhone and Android! Please provide this summary of care documentation to your next provider. Your primary care clinician is listed as Orlie Gilford. If you have any questions after today's visit, please call [phone].

## 2018-03-22 NOTE — PROGRESS NOTES
1. Have you been to the ER, urgent care clinic since your last visit? Hospitalized since your last visit? No    2. Have you seen or consulted any other health care providers outside of the [address] [place] [address] since your last visit? Include any pap smears or colon screening.  No

## 2018-03-22 NOTE — PROGRESS NOTES
Una Robb, 74 y.o.,  male    SUBJECTIVE  Routine ff-up    DM w/ nephropathy- says off  His usual daily walking,and consuming more rice. 's. Mentions occasional hypoglycemia, he takes glucotrol after meals and works in his yard after. He is off metformin due to CKD. HTN/HL- taking medications, denies cp, sob. Gout-  rare flares, related to eating peanuts or some German delicacies. No flare for a year now. Takes colchicine prn. Splenic artery stenosis (s/p repair 2015)    ROS:  See HPI, all others negative        Patient Active Problem List   Diagnosis Code    Prostate cancer ([facility] [address] C61    DJD (degenerative joint disease) of knee M17.10    CRI (chronic renal insufficiency) N18.9    Splenic artery aneurysm (HCC) I72.8    Essential hypertension I10    Type 2 diabetes mellitus without complication ([facility] [address] I80.8    Pure hypercholesterolemia E78.00    Gout without tophus M10.9    Advance directive discussed with patient Z71.89    BMI 30.0-30.9,adult Z68.30    Type 2 diabetes with nephropathy (HCC) E11.21       Current Outpatient Prescriptions   Medication Sig Dispense Refill    lisinopril (PRINIVIL, ZESTRIL) 20 mg tablet TAKE 1 TABLET TWICE A  Tab 2    simvastatin (ZOCOR) 40 mg tablet TAKE 1 TABLET NIGHTLY 90 Tab 2    glipiZIDE SR (GLUCOTROL XL) 10 mg CR tablet TAKE 1 TABLET TWICE A  Tab 2    hydrocortisone (HYTONE) 2.5 % topical cream Apply  to affected area two (2) times a day. use thin layer 60 g 2    JANUVIA 50 mg tablet TAKE 1 TABLET DAILY 90 Tab 2    alcohol swabs (ALCOHOL PADS) padm Use as directed daily. 200 Pad 1    colchicine 0.6 mg tablet Take 1 Tab by mouth daily. 90 Tab 1    glucose blood VI test strips (PRECISION XTRA TEST) strip by Does Not Apply route. 1 time daily 100 Strip 11    Lancets [facility] THIN) misc As directed once daily. 3 Package 3    aspirin 81 mg chewable tablet Take 1 Tab by mouth daily.  90 Tab 4    Blood-Glucose Meter (BLOOD GLUCOSE MONITOR KIT) monitoring kit by Does Not Apply route. Once daily. 1 Kit 0       Allergies   Allergen Reactions    Norvasc [Amlodipine] Itching and Other (comments)    Sulfa (Sulfonamide Antibiotics) Other (comments)     Patient states he is not allergic    Watermelon Other (comments)       Past Medical History:   Diagnosis Date    Arthritis     CRI (chronic renal insufficiency)     Diabetes mellitus type II 5/13/10    Gout     Hypercholesteremia     Hypertension     Other ill-defined conditions(799.89)     gout    Prostate cancer ([facility] 2008    remission    Splenic artery aneurysm ([facility] 2013    s/p stent dr Aga Knapp prn ff-up       Social History     Social History    Marital status:      Spouse name: N/A    Number of children: N/A    Years of education: N/A     Occupational History    Not on file.      Social History Main Topics    Smoking status: Former Smoker     Packs/day: 1.00     Types: Cigarettes     Quit date: 7/17/1997    Smokeless tobacco: Never Used      Comment: 1998    Alcohol use Yes      Comment: rarely    Drug use: No    Sexual activity: No     Other Topics Concern    Not on file     Social History Narrative       Family History   Problem Relation Age of Onset    Cancer Neg Hx     Diabetes Neg Hx     Heart Disease Neg Hx     Hypertension Neg Hx     Stroke Neg Hx          OBJECTIVE    Physical Exam:     Visit Vitals    /84 (BP 1 Location: Left arm, BP Patient Position: Sitting)    Pulse 90    Temp 98 °F (36.7 °C) (Oral)    Resp 16    Ht 5' 2\" (1.575 m)    Wt 164 lb (74.4 kg)    SpO2 98%    BMI 30 kg/m2       General: alert, well-appearing, in no apparent distress or pain  CVS: normal rate, regular rhythm, distinct S1 and S2  Lungs:clear to ausculation bilaterally, no crackles, wheezing or rhonchi noted  Extremities: no edema, no cyanosis feet: no lesions, normal monofilament  Skin: warm, no lesions, rashes noted  Psych:  mood and affect normal    Results for orders placed or performed during the hospital encounter of 39/57/60   METABOLIC PANEL, COMPREHENSIVE   Result Value Ref Range    Sodium 138 136 - 145 mmol/L    Potassium 4.8 3.5 - 5.5 mmol/L    Chloride 100 100 - 108 mmol/L    CO2 29 21 - 32 mmol/L    Anion gap 9 3.0 - 18 mmol/L    Glucose 233 (H) 74 - 99 mg/dL    BUN 23 (H) 7.0 - 18 MG/DL    Creatinine 1.37 (H) 0.6 - 1.3 MG/DL    BUN/Creatinine ratio 17 12 - 20      GFR est AA >60 >60 ml/min/1.73m2    GFR est non-AA 51 (L) >60 ml/min/1.73m2    Calcium 8.9 8.5 - 10.1 MG/DL    Bilirubin, total 1.7 (H) 0.2 - 1.0 MG/DL    ALT (SGPT) 63 (H) 16 - 61 U/L    AST (SGOT) 34 15 - 37 U/L    Alk. phosphatase 85 45 - 117 U/L    Protein, total 7.5 6.4 - 8.2 g/dL    Albumin 3.9 3.4 - 5.0 g/dL    Globulin 3.6 2.0 - 4.0 g/dL    A-G Ratio 1.1 0.8 - 1.7     LIPID PANEL   Result Value Ref Range    LIPID PROFILE          Cholesterol, total 98 <200 MG/DL    Triglyceride 130 <150 MG/DL    HDL Cholesterol 35 (L) 40 - 60 MG/DL    LDL, calculated 37 0 - 100 MG/DL    VLDL, calculated 26 MG/DL    CHOL/HDL Ratio 2.8 0 - 5.0     HEMOGLOBIN A1C W/O EAG   Result Value Ref Range    Hemoglobin A1c 10.2 (H) 4.2 - 5.6 %           ASSESSMENT/PLAN  Mamie Lauren was seen today for diabetes, hypertension, gout and results. Diagnoses and all orders for this visit:    Type 2 diabetes mellitus with microalbuminuria  ([address]-  10<7.6<7.3<7.2<6.7<6.5<7.2<6.7   Poor control, stricter diet, to resume daily walking  Advised to take glucotrol 30 mins prior to meals to avoid hypoglycemia  Increase januvia to 100 mg (2 tabs) OD. GFR >50 currently  Eye exam 11/17  Foot exam 3/18  Microalbumin 12/17  Lipid panel 3/18  Plan to order rpt a1c/ bmp on next visit  Ff-up in 6 weeks with FBG log    Essential hypertension-  controlled, cont ace    CRI (chronic renal insufficiency), stage 3 (moderate)-  monitoring, avoid nsaids, renally dose meds, optimize DM control.     Pure hypercholesterolemia-   at goal LDL< 100 on statin, cont    Gout without tophus, unspecified cause, unspecified chronicity, unspecified site-seldom flares,  Prn colchicine, low purine diet. BMI 30  Encouraged diet/ wt loss/exercise    Splenic artery stenosis  S/p repair 2015  On ASA, statin      Follow-up Disposition:  Return in about 6 weeks (around 5/3/2018), or if symptoms worsen or fail to improve. Patient understands plan of care. Patient has provided input and agrees with goals.

## 2018-03-22 NOTE — PATIENT INSTRUCTIONS
take glucotrol 30 minutes before meals  Increase januvia to 100 mg once daily  Check fasting glucose once daily  Ff-up in 6 weeks with glucose log  Learning About Diabetes Food Guidelines  Your Care Instructions    Meal planning is important to manage diabetes. It helps keep your blood sugar at a target level (which you set with your doctor). You don't have to eat special foods. You can eat what your family eats, including sweets once in a while. But you do have to pay attention to how often you eat and how much you eat of certain foods. You may want to work with a dietitian or a certified diabetes educator (CDE) to help you plan meals and snacks. A dietitian or CDE can also help you lose weight if that is one of your goals. What should you know about eating carbs? Managing the amount of carbohydrate (carbs) you eat is an important part of healthy meals when you have diabetes. Carbohydrate is found in many foods. · Learn which foods have carbs. And learn the amounts of carbs in different foods. ¨ Bread, cereal, pasta, and rice have about 15 grams of carbs in a serving. A serving is 1 slice of bread (1 ounce), ½ cup of cooked cereal, or 1/3 cup of cooked pasta or rice. ¨ Fruits have 15 grams of carbs in a serving. A serving is 1 small fresh fruit, such as an apple or orange; ½ of a banana; ½ cup of cooked or canned fruit; ½ cup of fruit juice; 1 cup of melon or raspberries; or 2 tablespoons of dried fruit. ¨ Milk and no-sugar-added yogurt have 15 grams of carbs in a serving. A serving is 1 cup of milk or 2/3 cup of no-sugar-added yogurt. ¨ Starchy vegetables have 15 grams of carbs in a serving. A serving is ½ cup of mashed potatoes or sweet potato; 1 cup winter squash; ½ of a small baked potato; ½ cup of cooked beans; or ½ cup cooked corn or green peas. · Learn how much carbs to eat each day and at each meal. A dietitian or CDE can teach you how to keep track of the amount of carbs you eat.  This is called carbohydrate counting. · If you are not sure how to count carbohydrate grams, use the Plate Method to plan meals. It is a good, quick way to make sure that you have a balanced meal. It also helps you spread carbs throughout the day. ¨ Divide your plate by types of foods. Put non-starchy vegetables on half the plate, meat or other protein food on one-quarter of the plate, and a grain or starchy vegetable in the final quarter of the plate. To this you can add a small piece of fruit and 1 cup of milk or yogurt, depending on how many carbs you are supposed to eat at a meal.  · Try to eat about the same amount of carbs at each meal. Do not \"save up\" your daily allowance of carbs to eat at one meal.  · Proteins have very little or no carbs per serving. Examples of proteins are beef, chicken, turkey, fish, eggs, tofu, cheese, cottage cheese, and peanut butter. A serving size of meat is 3 ounces, which is about the size of a deck of cards. Examples of meat substitute serving sizes (equal to 1 ounce of meat) are 1/4 cup of cottage cheese, 1 egg, 1 tablespoon of peanut butter, and ½ cup of tofu. How can you eat out and still eat healthy? · Learn to estimate the serving sizes of foods that have carbohydrate. If you measure food at home, it will be easier to estimate the amount in a serving of restaurant food. · If the meal you order has too much carbohydrate (such as potatoes, corn, or baked beans), ask to have a low-carbohydrate food instead. Ask for a salad or green vegetables. · If you use insulin, check your blood sugar before and after eating out to help you plan how much to eat in the future. · If you eat more carbohydrate at a meal than you had planned, take a walk or do other exercise. This will help lower your blood sugar. What else should you know? · Limit saturated fat, such as the fat from meat and dairy products.  This is a healthy choice because people who have diabetes are at higher risk of heart disease. So choose lean cuts of meat and nonfat or low-fat dairy products. Use olive or canola oil instead of butter or shortening when cooking. · Don't skip meals. Your blood sugar may drop too low if you skip meals and take insulin or certain medicines for diabetes. · Check with your doctor before you drink alcohol. Alcohol can cause your blood sugar to drop too low. Alcohol can also cause a bad reaction if you take certain diabetes medicines. Follow-up care is a key part of your treatment and safety. Be sure to make and go to all appointments, and call your doctor if you are having problems. It's also a good idea to know your test results and keep a list of the medicines you take. Where can you learn more? Go to http://arti-ava.info/. Enter R081 in the search box to learn more about \"Learning About Diabetes Food Guidelines. \"  Current as of: March 13, 2017  Content Version: 11.4  © 3141-6755 [employer], [employer]. Care instructions adapted under license by [employer] (which disclaims liability or warranty for this information). If you have questions about a medical condition or this instruction, always ask your healthcare professional. [address] any warranty or liability for your use of this information.

## 2018-03-26 RX ORDER — SITAGLIPTIN 50 MG/1
TABLET, FILM COATED ORAL
Qty: 90 TAB | Refills: 2 | Status: SHIPPED | OUTPATIENT
Start: 2018-03-26 | End: 2018-05-10 | Stop reason: DRUGHIGH

## 2018-03-26 RX ORDER — GLIPIZIDE 10 MG/1
TABLET, FILM COATED, EXTENDED RELEASE ORAL
Qty: 180 TAB | Refills: 2 | Status: SHIPPED | OUTPATIENT
Start: 2018-03-26 | End: 2018-06-18 | Stop reason: SDUPTHER

## 2018-03-29 RX ORDER — BLOOD SUGAR DIAGNOSTIC
STRIP MISCELLANEOUS
Qty: 100 STRIP | Refills: 11 | Status: SHIPPED | OUTPATIENT
Start: 2018-03-29 | End: 2018-06-18 | Stop reason: SDUPTHER

## 2018-05-10 ENCOUNTER — OFFICE VISIT (OUTPATIENT)
Dept: FAMILY MEDICINE CLINIC | Age: 73
End: 2018-05-10

## 2018-05-10 VITALS
BODY MASS INDEX: 30.18 KG/M2 | RESPIRATION RATE: 15 BRPM | DIASTOLIC BLOOD PRESSURE: 80 MMHG | TEMPERATURE: 98.2 F | HEIGHT: 62 IN | OXYGEN SATURATION: 97 % | HEART RATE: 96 BPM | SYSTOLIC BLOOD PRESSURE: 130 MMHG | WEIGHT: 164 LBS

## 2018-05-10 DIAGNOSIS — I10 ESSENTIAL HYPERTENSION: ICD-10-CM

## 2018-05-10 DIAGNOSIS — N18.30 CHRONIC RENAL IMPAIRMENT, STAGE 3 (MODERATE) (HCC): ICD-10-CM

## 2018-05-10 DIAGNOSIS — E11.21 TYPE 2 DIABETES WITH NEPHROPATHY (HCC): Primary | ICD-10-CM

## 2018-05-10 DIAGNOSIS — E78.00 PURE HYPERCHOLESTEROLEMIA: ICD-10-CM

## 2018-05-10 RX ORDER — PIOGLITAZONEHYDROCHLORIDE 15 MG/1
TABLET ORAL
Qty: 90 TAB | Refills: 0 | Status: SHIPPED | OUTPATIENT
Start: 2018-05-10 | End: 2018-06-18 | Stop reason: SDUPTHER

## 2018-05-10 NOTE — PROGRESS NOTES
Chief Complaint   Patient presents with    Diabetes    Hypertension    Gout    Other     chronic renal impairment       1. Have you been to the ER, urgent care clinic since your last visit? Hospitalized since your last visit? No    2. Have you seen or consulted any other health care providers outside of the [facility] since your last visit? Include any pap smears or colon screening.  No

## 2018-05-10 NOTE — PROGRESS NOTES
Tisha Bond, 74 y.o.,  male    SUBJECTIVE  Routine ff-up    DM w/ nephropathy- reveiwed -180's. From 200's, increased januvia dose to 100 mg. No longer with hypoglycemia with taking glucotrol earlier. He is off metformin due to renal function (CKD 3). HTN/HL- taking medications, denies cp, sob. Gout-  rare flares, related to eating peanuts or some [place] delicacies. No flare for a year now. Takes colchicine prn. Splenic artery stenosis (s/p repair 2015)    ROS:  See HPI, all others negative        Patient Active Problem List   Diagnosis Code    Prostate cancer ([facility] C61    DJD (degenerative joint disease) of knee M17.10    CRI (chronic renal insufficiency) N18.9    Splenic artery aneurysm (HCC) I72.8    Essential hypertension I10    Type 2 diabetes mellitus without complication ([facility] B42.8    Pure hypercholesterolemia E78.00    Gout without tophus M10.9    Advance directive discussed with patient Z71.89    BMI 30.0-30.9,adult Z68.30    Type 2 diabetes with nephropathy (HCC) E11.21       Current Outpatient Prescriptions   Medication Sig Dispense Refill    pioglitazone (ACTOS) 15 mg tablet Take one tablet once daily 90 Tab 0    SITagliptin (JANUVIA) 100 mg tablet Take 1 Tab by mouth daily. 90 Tab 0    PRECISION XTRA TEST strip USE TO TEST DAILY 100 Strip 11    glipiZIDE SR (GLUCOTROL XL) 10 mg CR tablet TAKE 1 TABLET TWICE A  Tab 2    lisinopril (PRINIVIL, ZESTRIL) 20 mg tablet TAKE 1 TABLET TWICE A  Tab 2    simvastatin (ZOCOR) 40 mg tablet TAKE 1 TABLET NIGHTLY 90 Tab 2    hydrocortisone (HYTONE) 2.5 % topical cream Apply  to affected area two (2) times a day. use thin layer 60 g 2    alcohol swabs (ALCOHOL PADS) padm Use as directed daily. 200 Pad 1    colchicine 0.6 mg tablet Take 1 Tab by mouth daily. 90 Tab 1    Lancets (LANCETS,ULTRA THIN) misc As directed once daily. 3 Package 3    aspirin 81 mg chewable tablet Take 1 Tab by mouth daily.  90 Tab 4    Blood-Glucose Meter (BLOOD GLUCOSE MONITOR KIT) monitoring kit by Does Not Apply route. Once daily. 1 Kit 0       Allergies   Allergen Reactions    Norvasc [Amlodipine] Itching and Other (comments)    Sulfa (Sulfonamide Antibiotics) Other (comments)     Patient states he is not allergic    Watermelon Other (comments)       Past Medical History:   Diagnosis Date    Arthritis     CRI (chronic renal insufficiency)     Diabetes mellitus type II 5/13/10    Gout     Hypercholesteremia     Hypertension     Other ill-defined conditions(799.89)     gout    Prostate cancer ([facility] 2008    remission    Splenic artery aneurysm ([facility] 2013    s/p stent dr Son Letters prn ff-up       Social History     Social History    Marital status:      Spouse name: N/A    Number of children: N/A    Years of education: N/A     Occupational History    Not on file.      Social History Main Topics    Smoking status: Former Smoker     Packs/day: 1.00     Types: Cigarettes     Quit date: 7/17/1997    Smokeless tobacco: Never Used      Comment: 1998    Alcohol use Yes      Comment: rarely    Drug use: No    Sexual activity: No     Other Topics Concern    Not on file     Social History Narrative       Family History   Problem Relation Age of Onset    Cancer Neg Hx     Diabetes Neg Hx     Heart Disease Neg Hx     Hypertension Neg Hx     Stroke Neg Hx          OBJECTIVE    Physical Exam:     Visit Vitals    /80 (BP 1 Location: Left arm, BP Patient Position: Sitting)    Pulse 96    Temp 98.2 °F (36.8 °C) (Oral)    Resp 15    Ht 5' 2\" (1.575 m)    Wt 164 lb (74.4 kg)    SpO2 97%    BMI 30 kg/m2       General: alert, well-appearing, in no apparent distress or pain  CVS: normal rate, regular rhythm, distinct S1 and S2  Lungs:clear to ausculation bilaterally, no crackles, wheezing or rhonchi noted  Skin: warm, no lesions, rashes noted  Psych:  mood and affect normal    Results for orders placed or performed during the hospital encounter of 70/69/06   METABOLIC PANEL, COMPREHENSIVE   Result Value Ref Range    Sodium 138 136 - 145 mmol/L    Potassium 4.8 3.5 - 5.5 mmol/L    Chloride 100 100 - 108 mmol/L    CO2 29 21 - 32 mmol/L    Anion gap 9 3.0 - 18 mmol/L    Glucose 233 (H) 74 - 99 mg/dL    BUN 23 (H) 7.0 - 18 MG/DL    Creatinine 1.37 (H) 0.6 - 1.3 MG/DL    BUN/Creatinine ratio 17 12 - 20      GFR est AA >60 >60 ml/min/1.73m2    GFR est non-AA 51 (L) >60 ml/min/1.73m2    Calcium 8.9 8.5 - 10.1 MG/DL    Bilirubin, total 1.7 (H) 0.2 - 1.0 MG/DL    ALT (SGPT) 63 (H) 16 - 61 U/L    AST (SGOT) 34 15 - 37 U/L    Alk. phosphatase 85 45 - 117 U/L    Protein, total 7.5 6.4 - 8.2 g/dL    Albumin 3.9 3.4 - 5.0 g/dL    Globulin 3.6 2.0 - 4.0 g/dL    A-G Ratio 1.1 0.8 - 1.7     LIPID PANEL   Result Value Ref Range    LIPID PROFILE          Cholesterol, total 98 <200 MG/DL    Triglyceride 130 <150 MG/DL    HDL Cholesterol 35 (L) 40 - 60 MG/DL    LDL, calculated 37 0 - 100 MG/DL    VLDL, calculated 26 MG/DL    CHOL/HDL Ratio 2.8 0 - 5.0     HEMOGLOBIN A1C W/O EAG   Result Value Ref Range    Hemoglobin A1c 10.2 (H) 4.2 - 5.6 %           ASSESSMENT/PLAN  Joanne Benavidez was seen today for diabetes, hypertension, gout and results. Diagnoses and all orders for this visit:    Type 2 diabetes mellitus with microalbuminuria  ([facility]-  10<7.6<7.3<7.2<6.7<6.5<7.2<6.7   Poor control, stricter diet, to resume daily walking  Some improvement with januvia 100 mg  Discussed options of starting insulin, pt declines, will try pioglitazone  cont glucotrol 30 mins prior to meals to avoid hypoglycemia   GFR >50 currently  Eye exam 11/17  Foot exam 3/18  Microalbumin 12/17  Lipid panel 3/18  r rpt a1c/ bmp in 6 weeks prior to next visit  Cont  FBG log    Essential hypertension-  controlled, cont ace    CRI (chronic renal insufficiency), stage 3 (moderate)-  monitoring, avoid nsaids, renally dose meds, optimize DM control.     Pure hypercholesterolemia-   at goal LDL< 100 on statin, cont    Gout without tophus, unspecified cause, unspecified chronicity, unspecified site-seldom flares,  Prn colchicine, low purine diet. BMI 30  Encouraged diet/ wt loss/exercise    Splenic artery stenosis  S/p repair 2015  On ASA, statin      Follow-up Disposition:  Return in about 6 weeks (around 6/21/2018), or if symptoms worsen or fail to improve. Patient understands plan of care. Patient has provided input and agrees with goals.

## 2018-05-10 NOTE — PATIENT INSTRUCTIONS

## 2018-05-10 NOTE — MR AVS SNAPSHOT
[address] [address] [address] [place] [address] 
[phone] Patient: Jillian Cheung MRN: [id number] OIZ:[id number] Visit Information Date & Time Provider Department Dept. Phone Encounter #  
 5/10/2018  9:30 AM Gaetano Faustin, [address] [id number] Follow-up Instructions Return in about 6 weeks (around 6/21/2018), or if symptoms worsen or fail to improve. Upcoming Health Maintenance Date Due FOBT Q 1 YEAR AGE 50-75 7/17/2018 Influenza Age 5 to Adult 8/1/2018 HEMOGLOBIN A1C Q6M 9/14/2018 EYE EXAM RETINAL OR DILATED Q1 11/3/2018 MICROALBUMIN Q1 12/4/2018 MEDICARE YEARLY EXAM 12/5/2018 LIPID PANEL Q1 3/14/2019 FOOT EXAM Q1 3/22/2019 GLAUCOMA SCREENING Q2Y 11/3/2019 DTaP/Tdap/Td series (2 - Td) 5/13/2020 Allergies as of 5/10/2018  Review Complete On: 5/10/2018 By: Briana Lee LPN Severity Noted Reaction Type Reactions Norvasc [Amlodipine]  05/13/2010    Itching, Other (comments) Sulfa (Sulfonamide Antibiotics)  09/16/2014    Other (comments) Patient states he is not allergic Watermelon  09/16/2014    Other (comments) Current Immunizations  Reviewed on 4/6/2015 Name Date Pneumococcal Conjugate (PCV-13) 4/6/2015 Pneumococcal Polysaccharide (PPSV-23) 5/11/2016 TDAP Vaccine 5/13/2010 Not reviewed this visit You Were Diagnosed With   
  
 Codes Comments Type 2 diabetes with nephropathy (HCC)    -  Primary ICD-10-CM: E11.21 
ICD-9-CM: 250.40, 583.81 Chronic renal impairment, stage 3 (moderate)     ICD-10-CM: N18.3 ICD-9-CM: 102. 3 Pure hypercholesterolemia     ICD-10-CM: E78.00 ICD-9-CM: 272.0 Essential hypertension     ICD-10-CM: I10 
ICD-9-CM: 401.9 Vitals BP Pulse Temp Resp Height(growth percentile) Weight(growth percentile) 130/80 (BP 1 Location: Left arm, BP Patient Position: Sitting) 96 98.2 °F (36.8 °C) (Oral) 15 5' 2\" (1.575 m) 164 lb (74.4 kg) SpO2 BMI Smoking Status 97% 30 kg/m2 Former Smoker Vitals History BMI and BSA Data Body Mass Index Body Surface Area  
 30 kg/m 2 1.8 m 2 Preferred Pharmacy Pharmacy Name Phone Jermain Banegas, [facility] [phone] Your Updated Medication List  
  
   
This list is accurate as of 5/10/18  9:54 AM.  Always use your most recent med list.  
  
  
  
  
 alcohol swabs Padm Commonly known as:  ALCOHOL PADS Use as directed daily. aspirin 81 mg chewable tablet Take 1 Tab by mouth daily. Blood-Glucose Meter monitoring kit Commonly known as:  BLOOD GLUCOSE MONITOR KIT  
by Does Not Apply route. Once daily. colchicine 0.6 mg tablet Take 1 Tab by mouth daily. glipiZIDE SR 10 mg CR tablet Commonly known as:  GLUCOTROL XL  
TAKE 1 TABLET TWICE A DAY  
  
 hydrocortisone 2.5 % topical cream  
Commonly known as:  HYTONE Apply  to affected area two (2) times a day. use thin layer Lancets Misc Commonly known as:  Benjamen Boyle As directed once daily. lisinopril 20 mg tablet Commonly known as:  PRINIVIL, ZESTRIL  
TAKE 1 TABLET TWICE A DAY pioglitazone 15 mg tablet Commonly known as:  ACTOS Take one tablet once daily PRECISION XTRA TEST strip Generic drug:  glucose blood VI test strips USE TO TEST DAILY  
  
 simvastatin 40 mg tablet Commonly known as:  ZOCOR  
TAKE 1 TABLET NIGHTLY SITagliptin 100 mg tablet Commonly known as:  Ale Darner Take 1 Tab by mouth daily. Prescriptions Printed Refills  
 pioglitazone (ACTOS) 15 mg tablet 0 Sig: Take one tablet once daily Class: Print SITagliptin (JANUVIA) 100 mg tablet 0 Sig: Take 1 Tab by mouth daily. Class: Print  Route: Oral  
  
 Follow-up Instructions Return in about 6 weeks (around 6/21/2018), or if symptoms worsen or fail to improve. To-Do List   
 05/10/2018 Lab:  HEMOGLOBIN A1C W/O EAG   
  
 05/10/2018 Lab:  METABOLIC PANEL, BASIC Patient Instructions Learning About Diabetes Food Guidelines Your Care Instructions Meal planning is important to manage diabetes. It helps keep your blood sugar at a target level (which you set with your doctor). You don't have to eat special foods. You can eat what your family eats, including sweets once in a while. But you do have to pay attention to how often you eat and how much you eat of certain foods. You may want to work with a dietitian or a certified diabetes educator (CDE) to help you plan meals and snacks. A dietitian or CDE can also help you lose weight if that is one of your goals. What should you know about eating carbs? Managing the amount of carbohydrate (carbs) you eat is an important part of healthy meals when you have diabetes. Carbohydrate is found in many foods. · Learn which foods have carbs. And learn the amounts of carbs in different foods. ¨ Bread, cereal, pasta, and rice have about 15 grams of carbs in a serving. A serving is 1 slice of bread (1 ounce), ½ cup of cooked cereal, or 1/3 cup of cooked pasta or rice. ¨ Fruits have 15 grams of carbs in a serving. A serving is 1 small fresh fruit, such as an apple or orange; ½ of a banana; ½ cup of cooked or canned fruit; ½ cup of fruit juice; 1 cup of melon or raspberries; or 2 tablespoons of dried fruit. ¨ Milk and no-sugar-added yogurt have 15 grams of carbs in a serving. A serving is 1 cup of milk or 2/3 cup of no-sugar-added yogurt. ¨ Starchy vegetables have 15 grams of carbs in a serving. A serving is ½ cup of mashed potatoes or sweet potato; 1 cup winter squash; ½ of a small baked potato; ½ cup of cooked beans; or ½ cup cooked corn or green peas. · Learn how much carbs to eat each day and at each meal. A dietitian or CDE can teach you how to keep track of the amount of carbs you eat. This is called carbohydrate counting. · If you are not sure how to count carbohydrate grams, use the Plate Method to plan meals. It is a good, quick way to make sure that you have a balanced meal. It also helps you spread carbs throughout the day. ¨ Divide your plate by types of foods. Put non-starchy vegetables on half the plate, meat or other protein food on one-quarter of the plate, and a grain or starchy vegetable in the final quarter of the plate. To this you can add a small piece of fruit and 1 cup of milk or yogurt, depending on how many carbs you are supposed to eat at a meal. 
· Try to eat about the same amount of carbs at each meal. Do not \"save up\" your daily allowance of carbs to eat at one meal. 
· Proteins have very little or no carbs per serving. Examples of proteins are beef, chicken, turkey, fish, eggs, tofu, cheese, cottage cheese, and peanut butter. A serving size of meat is 3 ounces, which is about the size of a deck of cards. Examples of meat substitute serving sizes (equal to 1 ounce of meat) are 1/4 cup of cottage cheese, 1 egg, 1 tablespoon of peanut butter, and ½ cup of tofu. How can you eat out and still eat healthy? · Learn to estimate the serving sizes of foods that have carbohydrate. If you measure food at home, it will be easier to estimate the amount in a serving of restaurant food. · If the meal you order has too much carbohydrate (such as potatoes, corn, or baked beans), ask to have a low-carbohydrate food instead. Ask for a salad or green vegetables. · If you use insulin, check your blood sugar before and after eating out to help you plan how much to eat in the future. · If you eat more carbohydrate at a meal than you had planned, take a walk or do other exercise. This will help lower your blood sugar. What else should you know? · Limit saturated fat, such as the fat from meat and dairy products. This is a healthy choice because people who have diabetes are at higher risk of heart disease. So choose lean cuts of meat and nonfat or low-fat dairy products. Use olive or canola oil instead of butter or shortening when cooking. · Don't skip meals. Your blood sugar may drop too low if you skip meals and take insulin or certain medicines for diabetes. · Check with your doctor before you drink alcohol. Alcohol can cause your blood sugar to drop too low. Alcohol can also cause a bad reaction if you take certain diabetes medicines. Follow-up care is a key part of your treatment and safety. Be sure to make and go to all appointments, and call your doctor if you are having problems. It's also a good idea to know your test results and keep a list of the medicines you take. Where can you learn more? Go to http://arti-ava.info/. Enter W000 in the search box to learn more about \"Learning About Diabetes Food Guidelines. \" Current as of: March 13, 2017 Content Version: 11.4 © 0367-7355 [employer]. Care instructions adapted under license by [employer] (which disclaims liability or warranty for this information). If you have questions about a medical condition or this instruction, always ask your healthcare professional. [address] any warranty or liability for your use of this information. Introducing [facility] & HEALTH SERVICES! Dear Norma Melendrez: Thank you for requesting a [employer] account. Our records indicate that you already have an active [employer] account. You can access your account anytime at [email or web address] Did you know that you can access your hospital and ER discharge instructions at any time in [employer]? You can also review all of your test results from your hospital stay or ER visit. Additional Information If you have questions, please visit the Frequently Asked Questions section of the [employer] website at [email or web address] Remember, [employer] is NOT to be used for urgent needs. For medical emergencies, dial 911. Now available from your iPhone and Android! Please provide this summary of care documentation to your next provider. Your primary care clinician is listed as Lediy Lozaon. If you have any questions after today's visit, please call [phone].

## 2018-06-14 ENCOUNTER — HOSPITAL ENCOUNTER (OUTPATIENT)
Dept: LAB | Age: 73
Discharge: HOME | End: 2018-06-14
Payer: MEDICARE

## 2018-06-14 DIAGNOSIS — I10 ESSENTIAL HYPERTENSION: ICD-10-CM

## 2018-06-14 DIAGNOSIS — E11.21 TYPE 2 DIABETES WITH NEPHROPATHY (HCC): ICD-10-CM

## 2018-06-14 DIAGNOSIS — N18.30 CHRONIC RENAL IMPAIRMENT, STAGE 3 (MODERATE) (HCC): ICD-10-CM

## 2018-06-14 LAB
ANION GAP SERPL CALC-SCNC: 6 MMOL/L (ref 3–18)
BUN SERPL-MCNC: 30 MG/DL (ref 7–18)
BUN/CREAT SERPL: 21 (ref 12–20)
CALCIUM SERPL-MCNC: 8.5 MG/DL (ref 8.5–10.1)
CHLORIDE SERPL-SCNC: 106 MMOL/L (ref 100–108)
CO2 SERPL-SCNC: 29 MMOL/L (ref 21–32)
CREAT SERPL-MCNC: 1.41 MG/DL (ref 0.6–1.3)
GLUCOSE SERPL-MCNC: 144 MG/DL (ref 74–99)
HBA1C MFR BLD: 7.4 % (ref 4.2–5.6)
POTASSIUM SERPL-SCNC: 4.2 MMOL/L (ref 3.5–5.5)
SODIUM SERPL-SCNC: 141 MMOL/L (ref 136–145)

## 2018-06-14 PROCEDURE — 36415 COLL VENOUS BLD VENIPUNCTURE: CPT | Performed by: FAMILY MEDICINE

## 2018-06-14 PROCEDURE — 83036 HEMOGLOBIN GLYCOSYLATED A1C: CPT | Performed by: FAMILY MEDICINE

## 2018-06-14 PROCEDURE — 80048 BASIC METABOLIC PNL TOTAL CA: CPT | Performed by: FAMILY MEDICINE

## 2018-06-18 DIAGNOSIS — E11.9 TYPE 2 DIABETES MELLITUS WITHOUT COMPLICATION (HCC): ICD-10-CM

## 2018-06-19 RX ORDER — LISINOPRIL 20 MG/1
20 TABLET ORAL DAILY
Qty: 180 TAB | Refills: 2 | Status: SHIPPED | OUTPATIENT
Start: 2018-06-19 | End: 2018-07-03 | Stop reason: SDUPTHER

## 2018-06-19 RX ORDER — LANCETS
EACH MISCELLANEOUS
Qty: 3 PACKAGE | Refills: 3 | Status: SHIPPED | OUTPATIENT
Start: 2018-06-19 | End: 2020-04-21 | Stop reason: SDUPTHER

## 2018-06-19 RX ORDER — COLCHICINE 0.6 MG/1
0.6 TABLET ORAL DAILY
Qty: 90 TAB | Refills: 1 | Status: SHIPPED | OUTPATIENT
Start: 2018-06-19 | End: 2020-09-15 | Stop reason: SDUPTHER

## 2018-06-19 RX ORDER — HYDROCORTISONE 25 MG/G
CREAM TOPICAL 2 TIMES DAILY
Qty: 60 G | Refills: 2 | Status: SHIPPED | OUTPATIENT
Start: 2018-06-19 | End: 2020-04-21 | Stop reason: SDUPTHER

## 2018-06-19 RX ORDER — SIMVASTATIN 40 MG/1
40 TABLET, FILM COATED ORAL
Qty: 90 TAB | Refills: 2 | Status: SHIPPED | OUTPATIENT
Start: 2018-06-19 | End: 2019-04-04 | Stop reason: SDUPTHER

## 2018-06-19 RX ORDER — GLIPIZIDE 10 MG/1
10 TABLET, FILM COATED, EXTENDED RELEASE ORAL DAILY
Qty: 180 TAB | Refills: 2 | Status: SHIPPED | OUTPATIENT
Start: 2018-06-19 | End: 2019-08-30 | Stop reason: SDUPTHER

## 2018-06-19 RX ORDER — PIOGLITAZONEHYDROCHLORIDE 15 MG/1
TABLET ORAL
Qty: 90 TAB | Refills: 0 | Status: SHIPPED | OUTPATIENT
Start: 2018-06-19 | End: 2018-10-11 | Stop reason: SDUPTHER

## 2018-06-19 NOTE — TELEPHONE ENCOUNTER
From: Eduin Samuel  To: Maria Alejandra Santana MD  Sent: 6/18/2018 5:54 PM EDT  Subject: Medication Renewal Request    Original authorizing provider: MD Negrito Agustin. Kiana would like a refill of the following medications:  Lancets (LANCETS,ULTRA THIN) misc Maria Alejandra Santana MD]  colchicine 0.6 mg tablet Maria Alejandra Santana MD]  hydrocortisone (HYTONE) 2.5 % topical cream Maria Alejandra Santana MD]  simvastatin (ZOCOR) 40 mg tablet Maria Alejandra Santana MD]  lisinopril (PRINIVIL, ZESTRIL) 20 mg tablet Maria Alejandra Santana MD]  glipiZIDE SR (GLUCOTROL XL) 10 mg CR tablet Maria Alejandra Santana MD]  PRECISION XTRA TEST strip Maria Alejandra Santana MD]  pioglitazone (ACTOS) 15 mg tablet Maria Alejandra Santana MD]    Preferred pharmacy: [facility] [address] [place]    Comment:  I respectfully request paper prescription. .. \"DO NOT SEND THESE PRESCRIPTION TO [facility]\". . I discontinue the service of this company. . Many thanks, Josefina Merino

## 2018-06-21 ENCOUNTER — OFFICE VISIT (OUTPATIENT)
Dept: FAMILY MEDICINE CLINIC | Age: 73
End: 2018-06-21

## 2018-06-21 VITALS
BODY MASS INDEX: 30.36 KG/M2 | SYSTOLIC BLOOD PRESSURE: 132 MMHG | OXYGEN SATURATION: 98 % | HEART RATE: 78 BPM | HEIGHT: 62 IN | RESPIRATION RATE: 16 BRPM | WEIGHT: 165 LBS | DIASTOLIC BLOOD PRESSURE: 80 MMHG | TEMPERATURE: 97.8 F

## 2018-06-21 DIAGNOSIS — Z85.46 HISTORY OF PROSTATE CANCER: ICD-10-CM

## 2018-06-21 DIAGNOSIS — N18.30 CHRONIC RENAL IMPAIRMENT, STAGE 3 (MODERATE) (HCC): ICD-10-CM

## 2018-06-21 DIAGNOSIS — E78.00 PURE HYPERCHOLESTEROLEMIA: ICD-10-CM

## 2018-06-21 DIAGNOSIS — M10.9 GOUT WITHOUT TOPHUS: ICD-10-CM

## 2018-06-21 DIAGNOSIS — I10 ESSENTIAL HYPERTENSION: ICD-10-CM

## 2018-06-21 DIAGNOSIS — Z12.11 COLON CANCER SCREENING: ICD-10-CM

## 2018-06-21 DIAGNOSIS — E11.21 TYPE 2 DIABETES WITH NEPHROPATHY (HCC): Primary | ICD-10-CM

## 2018-06-21 NOTE — PATIENT INSTRUCTIONS

## 2018-06-21 NOTE — PROGRESS NOTES
Clovis Pride, 74 y.o.,  male    SUBJECTIVE  Routine ff-up    DM w/ nephropathy- reveiwed 's. Added actos last month. Improved diet, cut down on rice consumption, resumed daily walks. Taking [place] and [place], JENKINS. He is off metformin due to renal function (CKD 3). HTN/HL- taking medications, denies cp, sob. Gout-  rare flares, related to eating peanuts or some [place] delicacies. No flare for a year now. Takes colchicine prn. Splenic artery stenosis (s/p repair 2015)    H/o prostate ca s/p prostatectomy 2007- no longer seeing urologist      ROS:  See HPI, all others negative        Patient Active Problem List   Diagnosis Code    Prostate cancer ([facility] C61    DJD (degenerative joint disease) of knee M17.10    CRI (chronic renal insufficiency) N18.9    Splenic artery aneurysm (HCC) I72.8    Essential hypertension I10    Pure hypercholesterolemia E78.00    Gout without tophus M10.9    Advance directive discussed with patient Z71.89    BMI 30.0-30.9,adult Z68.30    Type 2 diabetes with nephropathy ([facility] E11.21       Current Outpatient Prescriptions   Medication Sig Dispense Refill    Lancets ([address]) misc As directed once daily. 3 Package 3    colchicine 0.6 mg tablet Take 1 Tab by mouth daily. 90 Tab 1    hydrocortisone (HYTONE) 2.5 % topical cream Apply  to affected area two (2) times a day. use thin layer 60 g 2    simvastatin (ZOCOR) 40 mg tablet Take 1 Tab by mouth nightly. 90 Tab 2    lisinopril (PRINIVIL, ZESTRIL) 20 mg tablet Take 1 Tab by mouth daily. 180 Tab 2    glipiZIDE SR (GLUCOTROL XL) 10 mg CR tablet Take 1 Tab by mouth daily. 180 Tab 2    glucose blood VI test strips (PRECISION XTRA TEST) strip Check glucose level once daily 100 Strip 11    pioglitazone (ACTOS) 15 mg tablet Take one tablet once daily 90 Tab 0    SITagliptin (JANUVIA) 100 mg tablet Take 1 Tab by mouth daily. 90 Tab 0    alcohol swabs (ALCOHOL PADS) padm Use as directed daily.  200 Pad 1    aspirin 81 mg chewable tablet Take 1 Tab by mouth daily. 90 Tab 4    Blood-Glucose Meter (BLOOD GLUCOSE MONITOR KIT) monitoring kit by Does Not Apply route. Once daily. 1 Kit 0       Allergies   Allergen Reactions    Norvasc [Amlodipine] Itching and Other (comments)    Sulfa (Sulfonamide Antibiotics) Other (comments)     Patient states he is not allergic    Watermelon Other (comments)       Past Medical History:   Diagnosis Date    Arthritis     CRI (chronic renal insufficiency)     Diabetes mellitus type II 5/13/10    Gout     Hypercholesteremia     Hypertension     Other ill-defined conditions(799.89)     gout    Prostate cancer ([facility] 2008    remission    Splenic artery aneurysm ([facility] 2013    s/p stent dr Amos Rooney prn ff-up       Social History     Social History    Marital status:      Spouse name: N/A    Number of children: N/A    Years of education: N/A     Occupational History    Not on file.      Social History Main Topics    Smoking status: Former Smoker     Packs/day: 1.00     Types: Cigarettes     Quit date: 7/17/1997    Smokeless tobacco: Never Used      Comment: 1998    Alcohol use Yes      Comment: rarely    Drug use: No    Sexual activity: No     Other Topics Concern    Not on file     Social History Narrative       Family History   Problem Relation Age of Onset    Cancer Neg Hx     Diabetes Neg Hx     Heart Disease Neg Hx     Hypertension Neg Hx     Stroke Neg Hx          OBJECTIVE    Physical Exam:     Visit Vitals    /80 (BP 1 Location: Left arm, BP Patient Position: Sitting)    Pulse 78    Temp 97.8 °F (36.6 °C) (Oral)    Resp 16    Ht 5' 2\" (1.575 m)    Wt 165 lb (74.8 kg)    SpO2 98%    BMI 30.18 kg/m2       General: alert, well-appearing, in no apparent distress or pain  CVS: normal rate, regular rhythm, distinct S1 and S2  Lungs:clear to ausculation bilaterally, no crackles, wheezing or rhonchi noted  Skin: warm, no lesions, rashes noted  Psych: mood and affect normal    Results for orders placed or performed during the hospital encounter of 06/14/18   HEMOGLOBIN A1C W/O EAG   Result Value Ref Range    Hemoglobin A1c 7.4 (H) 4.2 - 5.6 %   METABOLIC PANEL, BASIC   Result Value Ref Range    Sodium 141 136 - 145 mmol/L    Potassium 4.2 3.5 - 5.5 mmol/L    Chloride 106 100 - 108 mmol/L    CO2 29 21 - 32 mmol/L    Anion gap 6 3.0 - 18 mmol/L    Glucose 144 (H) 74 - 99 mg/dL    BUN 30 (H) 7.0 - 18 MG/DL    Creatinine 1.41 (H) 0.6 - 1.3 MG/DL    BUN/Creatinine ratio 21 (H) 12 - 20      GFR est AA 60 (L) >60 ml/min/1.73m2    GFR est non-AA 49 (L) >60 ml/min/1.73m2    Calcium 8.5 8.5 - 10.1 MG/DL           ASSESSMENT/PLAN  Joanne Benavidez was seen today for diabetes, hypertension, gout and results. Diagnoses and all orders for this visit:    Type 2 diabetes mellitus with microalbuminuria  ([facility] [address]-  7.4<10<7.6<7.3<7.2<6.7<6.5<7.2<6.7   Improved control,   Cont januvia 100 mg, pioglitazone  cont glucotrol 30 mins prior to meals to avoid hypoglycemia   GFR >50 currently  Eye exam 11/17  Foot exam 3/18  Microalbumin 12/17  Lipid panel 3/18    Check cmp/lipid panel/a1c/microalbumin in 3 months prior to next visit    Essential hypertension-  controlled, cont ace    CRI (chronic renal insufficiency), stage 3 (moderate)-  monitoring, avoid nsaids, renally dose meds, optimize DM control. Pure hypercholesterolemia-   at goal LDL< 100 on statin, cont    Gout without tophus, unspecified cause, unspecified chronicity, unspecified site-seldom flares,  Prn colchicine, low purine diet. BMI 30  Encouraged diet/ wt loss/exercise    Splenic artery stenosis  S/p repair 2015  On ASA, statin    History of prostate ca  PSA check    Colon ca screen  Discussed options, prefers FIT, provided    Follow-up Disposition:  Return in about 3 months (around 9/21/2018), or if symptoms worsen or fail to improve. Patient understands plan of care. Patient has provided input and agrees with goals.

## 2018-06-21 NOTE — MR AVS SNAPSHOT
[address] 
[phone] Patient: April Arguello MRN: [id number] ISP:[id number] Visit Information Date & Time Provider Department Dept. Phone Encounter #  
 6/21/2018  9:30 AM Joshuachandni Maldonado, [address] [place] [address] [id number] Follow-up Instructions Return in about 3 months (around 9/21/2018), or if symptoms worsen or fail to improve. Upcoming Health Maintenance Date Due FOBT Q 1 YEAR AGE 50-75 7/17/2018 Influenza Age 5 to Adult 8/1/2018 EYE EXAM RETINAL OR DILATED Q1 11/3/2018 MICROALBUMIN Q1 12/4/2018 MEDICARE YEARLY EXAM 12/5/2018 HEMOGLOBIN A1C Q6M 12/14/2018 LIPID PANEL Q1 3/14/2019 FOOT EXAM Q1 3/22/2019 GLAUCOMA SCREENING Q2Y 11/3/2019 DTaP/Tdap/Td series (2 - Td) 5/13/2020 Allergies as of 6/21/2018  Review Complete On: 6/21/2018 By: Calra Kiser LPN Severity Noted Reaction Type Reactions Norvasc [Amlodipine]  05/13/2010    Itching, Other (comments) Sulfa (Sulfonamide Antibiotics)  09/16/2014    Other (comments) Patient states he is not allergic Watermelon  09/16/2014    Other (comments) Current Immunizations  Reviewed on 4/6/2015 Name Date Pneumococcal Conjugate (PCV-13) 4/6/2015 Pneumococcal Polysaccharide (PPSV-23) 5/11/2016 TDAP Vaccine 5/13/2010 Not reviewed this visit You Were Diagnosed With   
  
 Codes Comments Type 2 diabetes with nephropathy (HCC)    -  Primary ICD-10-CM: E11.21 
ICD-9-CM: 250.40, 583.81 Chronic renal impairment, stage 3 (moderate)     ICD-10-CM: N18.3 ICD-9-CM: 038. 3 Essential hypertension     ICD-10-CM: I10 
ICD-9-CM: 401.9 Pure hypercholesterolemia     ICD-10-CM: E78.00 ICD-9-CM: 272.0 Gout without tophus     ICD-10-CM: M10.9 ICD-9-CM: 274.9 History of prostate cancer     ICD-10-CM: Z85.46 
ICD-9-CM: V10.46 Vitals BP Pulse Temp Resp Height(growth percentile) Weight(growth percentile) 132/80 (BP 1 Location: Left arm, BP Patient Position: Sitting) 78 97.8 °F (36.6 °C) (Oral) 16 5' 2\" (1.575 m) 165 lb (74.8 kg) SpO2 BMI Smoking Status 98% 30.18 kg/m2 Former Smoker BMI and BSA Data Body Mass Index Body Surface Area  
 30.18 kg/m 2 1.81 m 2 Preferred Pharmacy Pharmacy Name Phone Jermain Banegas, [facility] [phone] Your Updated Medication List  
  
   
This list is accurate as of 6/21/18 10:01 AM.  Always use your most recent med list.  
  
  
  
  
 alcohol swabs Padm Commonly known as:  ALCOHOL PADS Use as directed daily. aspirin 81 mg chewable tablet Take 1 Tab by mouth daily. Blood-Glucose Meter monitoring kit Commonly known as:  BLOOD GLUCOSE MONITOR KIT  
by Does Not Apply route. Once daily. colchicine 0.6 mg tablet Take 1 Tab by mouth daily. glipiZIDE SR 10 mg CR tablet Commonly known as:  GLUCOTROL XL Take 1 Tab by mouth daily. glucose blood VI test strips strip Commonly known as:  PRECISION XTRA TEST Check glucose level once daily  
  
 hydrocortisone 2.5 % topical cream  
Commonly known as:  HYTONE Apply  to affected area two (2) times a day. use thin layer Lancets Misc Commonly known as:  Julia Astudillo As directed once daily. lisinopril 20 mg tablet Commonly known as:  Rebekah Flight Take 1 Tab by mouth daily. pioglitazone 15 mg tablet Commonly known as:  ACTOS Take one tablet once daily  
  
 simvastatin 40 mg tablet Commonly known as:  ZOCOR Take 1 Tab by mouth nightly. SITagliptin 100 mg tablet Commonly known as:  Nikki Dill Take 1 Tab by mouth daily. Follow-up Instructions Return in about 3 months (around 9/21/2018), or if symptoms worsen or fail to improve. To-Do List   
 06/21/2018 Lab:  HEMOGLOBIN A1C W/O EAG   
  
 06/21/2018 Lab:  LIPID PANEL   
  
 06/21/2018 Lab:  METABOLIC PANEL, COMPREHENSIVE   
  
 06/21/2018 Lab:  MICROALBUMIN, UR, RAND W/ MICROALB/CREAT RATIO   
  
 06/21/2018 Lab:  PSA, DIAGNOSTIC (PROSTATE SPECIFIC AG) Patient Instructions Learning About Diabetes Food Guidelines Your Care Instructions Meal planning is important to manage diabetes. It helps keep your blood sugar at a target level (which you set with your doctor). You don't have to eat special foods. You can eat what your family eats, including sweets once in a while. But you do have to pay attention to how often you eat and how much you eat of certain foods. You may want to work with a dietitian or a certified diabetes educator (CDE) to help you plan meals and snacks. A dietitian or CDE can also help you lose weight if that is one of your goals. What should you know about eating carbs? Managing the amount of carbohydrate (carbs) you eat is an important part of healthy meals when you have diabetes. Carbohydrate is found in many foods. · Learn which foods have carbs. And learn the amounts of carbs in different foods. ¨ Bread, cereal, pasta, and rice have about 15 grams of carbs in a serving. A serving is 1 slice of bread (1 ounce), ½ cup of cooked cereal, or 1/3 cup of cooked pasta or rice. ¨ Fruits have 15 grams of carbs in a serving. A serving is 1 small fresh fruit, such as an apple or orange; ½ of a banana; ½ cup of cooked or canned fruit; ½ cup of fruit juice; 1 cup of melon or raspberries; or 2 tablespoons of dried fruit. ¨ Milk and no-sugar-added yogurt have 15 grams of carbs in a serving. A serving is 1 cup of milk or 2/3 cup of no-sugar-added yogurt. ¨ Starchy vegetables have 15 grams of carbs in a serving.  A serving is ½ cup of mashed potatoes or sweet potato; 1 cup winter squash; ½ of a small baked potato; ½ cup of cooked beans; or ½ cup cooked corn or green peas. · Learn how much carbs to eat each day and at each meal. A dietitian or CDE can teach you how to keep track of the amount of carbs you eat. This is called carbohydrate counting. · If you are not sure how to count carbohydrate grams, use the Plate Method to plan meals. It is a good, quick way to make sure that you have a balanced meal. It also helps you spread carbs throughout the day. ¨ Divide your plate by types of foods. Put non-starchy vegetables on half the plate, meat or other protein food on one-quarter of the plate, and a grain or starchy vegetable in the final quarter of the plate. To this you can add a small piece of fruit and 1 cup of milk or yogurt, depending on how many carbs you are supposed to eat at a meal. 
· Try to eat about the same amount of carbs at each meal. Do not \"save up\" your daily allowance of carbs to eat at one meal. 
· Proteins have very little or no carbs per serving. Examples of proteins are beef, chicken, turkey, fish, eggs, tofu, cheese, cottage cheese, and peanut butter. A serving size of meat is 3 ounces, which is about the size of a deck of cards. Examples of meat substitute serving sizes (equal to 1 ounce of meat) are 1/4 cup of cottage cheese, 1 egg, 1 tablespoon of peanut butter, and ½ cup of tofu. How can you eat out and still eat healthy? · Learn to estimate the serving sizes of foods that have carbohydrate. If you measure food at home, it will be easier to estimate the amount in a serving of restaurant food. · If the meal you order has too much carbohydrate (such as potatoes, corn, or baked beans), ask to have a low-carbohydrate food instead. Ask for a salad or green vegetables. · If you use insulin, check your blood sugar before and after eating out to help you plan how much to eat in the future.  
· If you eat more carbohydrate at a meal than you had planned, take a walk or do other exercise. This will help lower your blood sugar. What else should you know? · Limit saturated fat, such as the fat from meat and dairy products. This is a healthy choice because people who have diabetes are at higher risk of heart disease. So choose lean cuts of meat and nonfat or low-fat dairy products. Use olive or canola oil instead of butter or shortening when cooking. · Don't skip meals. Your blood sugar may drop too low if you skip meals and take insulin or certain medicines for diabetes. · Check with your doctor before you drink alcohol. Alcohol can cause your blood sugar to drop too low. Alcohol can also cause a bad reaction if you take certain diabetes medicines. Follow-up care is a key part of your treatment and safety. Be sure to make and go to all appointments, and call your doctor if you are having problems. It's also a good idea to know your test results and keep a list of the medicines you take. Where can you learn more? Go to http://arti-ava.info/. Enter T216 in the search box to learn more about \"Learning About Diabetes Food Guidelines. \" Current as of: March 13, 2017 Content Version: 11.4 © 5793-7790 [employer]. Care instructions adapted under license by [employer] (which disclaims liability or warranty for this information). If you have questions about a medical condition or this instruction, always ask your healthcare professional. [place] [address] any warranty or liability for your use of this information. Introducing [facility]! Dear Nikolay Seymour: Thank you for requesting a [email or web address] account. Our records indicate that you already have an active [email or web address] account. You can access your account anytime at [email or web address] Did you know that you can access your hospital and ER discharge instructions at any time in [email or web address]?   You can also review all of your test results from your hospital stay or ER visit. Additional Information If you have questions, please visit the Frequently Asked Questions section of the [employer] website at [email or web address] Remember, [employer] is NOT to be used for urgent needs. For medical emergencies, dial 911. Now available from your iPhone and Android! Please provide this summary of care documentation to your next provider. Your primary care clinician is listed as Reji Green. If you have any questions after today's visit, please call [phone].

## 2018-06-21 NOTE — PROGRESS NOTES
1. Have you been to the ER, urgent care clinic since your last visit? Hospitalized since your last visit? No    2. Have you seen or consulted any other health care providers outside of the [address] [place] [address] since your last visit? Include any pap smears or colon screening.  No

## 2018-06-22 RX ORDER — INSULIN PUMP SYRINGE, 3 ML
EACH MISCELLANEOUS
Qty: 1 KIT | Refills: 0 | Status: SHIPPED | OUTPATIENT
Start: 2018-06-22

## 2018-06-22 NOTE — TELEPHONE ENCOUNTER
Patient  Needs a new order for Free Style monitor.  [facility] no longer covers itsDapperra

## 2018-07-03 RX ORDER — LISINOPRIL 20 MG/1
20 TABLET ORAL 2 TIMES DAILY
Qty: 180 TAB | Refills: 2 | Status: SHIPPED | OUTPATIENT
Start: 2018-07-03 | End: 2019-03-21 | Stop reason: SDUPTHER

## 2018-07-06 NOTE — TELEPHONE ENCOUNTER
All pharmacy have been removed from patients chart. Patient is no longer using [facility] pharmacy. All prescriptions should be printed for patient.

## 2018-10-03 ENCOUNTER — HOSPITAL ENCOUNTER (OUTPATIENT)
Dept: LAB | Age: 73
Discharge: HOME | End: 2018-10-03
Payer: MEDICARE

## 2018-10-03 DIAGNOSIS — E11.21 TYPE 2 DIABETES WITH NEPHROPATHY (HCC): ICD-10-CM

## 2018-10-03 DIAGNOSIS — Z85.46 HISTORY OF PROSTATE CANCER: ICD-10-CM

## 2018-10-03 LAB
ALBUMIN SERPL-MCNC: 3.7 G/DL (ref 3.4–5)
ALBUMIN/GLOB SERPL: 1.1 {RATIO} (ref 0.8–1.7)
ALP SERPL-CCNC: 49 U/L (ref 45–117)
ALT SERPL-CCNC: 28 U/L (ref 16–61)
ANION GAP SERPL CALC-SCNC: 7 MMOL/L (ref 3–18)
AST SERPL-CCNC: 18 U/L (ref 15–37)
BILIRUB SERPL-MCNC: 1.3 MG/DL (ref 0.2–1)
BUN SERPL-MCNC: 25 MG/DL (ref 7–18)
BUN/CREAT SERPL: 19 (ref 12–20)
CALCIUM SERPL-MCNC: 8.3 MG/DL (ref 8.5–10.1)
CHLORIDE SERPL-SCNC: 106 MMOL/L (ref 100–108)
CHOLEST SERPL-MCNC: 97 MG/DL
CO2 SERPL-SCNC: 29 MMOL/L (ref 21–32)
CREAT SERPL-MCNC: 1.3 MG/DL (ref 0.6–1.3)
CREAT UR-MCNC: 76.4 MG/DL (ref 30–125)
GLOBULIN SER CALC-MCNC: 3.3 G/DL (ref 2–4)
GLUCOSE SERPL-MCNC: 124 MG/DL (ref 74–99)
HBA1C MFR BLD: 6.6 % (ref 4.2–5.6)
HDLC SERPL-MCNC: 41 MG/DL (ref 40–60)
HDLC SERPL: 2.4 {RATIO} (ref 0–5)
LDLC SERPL CALC-MCNC: 35 MG/DL (ref 0–100)
LIPID PROFILE,FLP: NORMAL
MICROALBUMIN UR-MCNC: 6.92 MG/DL (ref 0–3)
MICROALBUMIN/CREAT UR-RTO: 91 MG/G (ref 0–30)
POTASSIUM SERPL-SCNC: 4.1 MMOL/L (ref 3.5–5.5)
PROT SERPL-MCNC: 7 G/DL (ref 6.4–8.2)
PSA SERPL-MCNC: 0.6 NG/ML (ref 0–4)
SODIUM SERPL-SCNC: 142 MMOL/L (ref 136–145)
TRIGL SERPL-MCNC: 105 MG/DL (ref ?–150)
VLDLC SERPL CALC-MCNC: 21 MG/DL

## 2018-10-03 PROCEDURE — 83036 HEMOGLOBIN GLYCOSYLATED A1C: CPT | Performed by: FAMILY MEDICINE

## 2018-10-03 PROCEDURE — 80061 LIPID PANEL: CPT | Performed by: FAMILY MEDICINE

## 2018-10-03 PROCEDURE — 36415 COLL VENOUS BLD VENIPUNCTURE: CPT | Performed by: FAMILY MEDICINE

## 2018-10-03 PROCEDURE — 84153 ASSAY OF PSA TOTAL: CPT | Performed by: FAMILY MEDICINE

## 2018-10-03 PROCEDURE — 80053 COMPREHEN METABOLIC PANEL: CPT | Performed by: FAMILY MEDICINE

## 2018-10-03 PROCEDURE — 82043 UR ALBUMIN QUANTITATIVE: CPT | Performed by: FAMILY MEDICINE

## 2018-10-11 ENCOUNTER — OFFICE VISIT (OUTPATIENT)
Dept: FAMILY MEDICINE CLINIC | Age: 73
End: 2018-10-11

## 2018-10-11 VITALS
RESPIRATION RATE: 16 BRPM | OXYGEN SATURATION: 97 % | HEART RATE: 78 BPM | DIASTOLIC BLOOD PRESSURE: 74 MMHG | BODY MASS INDEX: 31.1 KG/M2 | HEIGHT: 62 IN | SYSTOLIC BLOOD PRESSURE: 126 MMHG | WEIGHT: 169 LBS | TEMPERATURE: 97.1 F

## 2018-10-11 DIAGNOSIS — N18.30 CHRONIC RENAL IMPAIRMENT, STAGE 3 (MODERATE) (HCC): ICD-10-CM

## 2018-10-11 DIAGNOSIS — I10 ESSENTIAL HYPERTENSION: ICD-10-CM

## 2018-10-11 DIAGNOSIS — E11.21 TYPE 2 DIABETES WITH NEPHROPATHY (HCC): Primary | ICD-10-CM

## 2018-10-11 DIAGNOSIS — E78.00 PURE HYPERCHOLESTEROLEMIA: ICD-10-CM

## 2018-10-11 DIAGNOSIS — M10.9 GOUT WITHOUT TOPHUS: ICD-10-CM

## 2018-10-11 RX ORDER — PIOGLITAZONEHYDROCHLORIDE 15 MG/1
TABLET ORAL
Qty: 90 TAB | Refills: 3 | Status: SHIPPED | OUTPATIENT
Start: 2018-10-11 | End: 2019-10-22 | Stop reason: SDUPTHER

## 2018-10-11 NOTE — PROGRESS NOTES
Tiago Abad, 74 y.o.,  male    SUBJECTIVE  Routine ff-up    DM w/ nephropathy- reports -140's. Taking actos, [place] and [place] and JENKINS. He is off metformin due to renal function (CKD 3). HTN/HL- taking medications, denies cp, sob. Gout-  rare flares, related to eating peanuts or some [place] delicacies. No flare for a year now. Takes colchicine prn. Splenic artery stenosis (s/p repair 2015)    H/o prostate ca s/p prostatectomy 2007- no longer seeing urologist    ROS:  See HPI, all others negative        Patient Active Problem List   Diagnosis Code    Prostate cancer ([facility] C61    DJD (degenerative joint disease) of knee M17.10    CRI (chronic renal insufficiency) N18.9    Splenic artery aneurysm (HCC) I72.8    Essential hypertension I10    Pure hypercholesterolemia E78.00    Gout without tophus M10.9    Advance directive discussed with patient Z71.89    BMI 30.0-30.9,adult Z68.30    Type 2 diabetes with nephropathy (HCC) E11.21       Current Outpatient Prescriptions   Medication Sig Dispense Refill    SITagliptin (JANUVIA) 100 mg tablet Take 1 Tab by mouth daily. 90 Tab 3    pioglitazone (ACTOS) 15 mg tablet Take one tablet once daily 90 Tab 3    lisinopril (PRINIVIL, ZESTRIL) 20 mg tablet Take 1 Tab by mouth two (2) times a day. 180 Tab 2    Blood-Glucose Meter monitoring kit Free Style monitoring Kit. Check glucose fasting daily. 1 Kit 0    glucose blood VI test strips (ASCENSIA AUTODISC VI, ONE TOUCH ULTRA TEST VI) strip Free Style. Check fasting glucose daily 100 Strip 3    Lancets [facility] THIN[facility] As directed once daily. 3 Package 3    colchicine 0.6 mg tablet Take 1 Tab by mouth daily. 90 Tab 1    hydrocortisone (HYTONE) 2.5 % topical cream Apply  to affected area two (2) times a day. use thin layer 60 g 2    simvastatin (ZOCOR) 40 mg tablet Take 1 Tab by mouth nightly. 90 Tab 2    glipiZIDE SR (GLUCOTROL XL) 10 mg CR tablet Take 1 Tab by mouth daily.  180 Tab 2    glucose blood VI test strips (PRECISION XTRA TEST) strip Check glucose level once daily 100 Strip 11    alcohol swabs (ALCOHOL PADS) padm Use as directed daily. 200 Pad 1    aspirin 81 mg chewable tablet Take 1 Tab by mouth daily. 90 Tab 4    Blood-Glucose Meter (BLOOD GLUCOSE MONITOR KIT) monitoring kit by Does Not Apply route. Once daily. 1 Kit 0       Allergies   Allergen Reactions    Norvasc [Amlodipine] Itching and Other (comments)    Sulfa (Sulfonamide Antibiotics) Other (comments)     Patient states he is not allergic    Watermelon Other (comments)       Past Medical History:   Diagnosis Date    Arthritis     CRI (chronic renal insufficiency)     Diabetes mellitus type II 5/13/10    Gout     Hypercholesteremia     Hypertension     Other ill-defined conditions(799.89)     gout    Prostate cancer ([facility] [address] [facility] 2008    remission    Splenic artery aneurysm ([facility] 2013    s/p stent dr Kaylyn Buckley prn ff-up       Social History     Social History    Marital status:      Spouse name: N/A    Number of children: N/A    Years of education: N/A     Occupational History    Not on file.      Social History Main Topics    Smoking status: Former Smoker     Packs/day: 1.00     Types: Cigarettes     Quit date: 7/17/1997    Smokeless tobacco: Never Used      Comment: 1998    Alcohol use Yes      Comment: rarely    Drug use: No    Sexual activity: No     Other Topics Concern    Not on file     Social History Narrative       Family History   Problem Relation Age of Onset    Cancer Neg Hx     Diabetes Neg Hx     Heart Disease Neg Hx     Hypertension Neg Hx     Stroke Neg Hx          OBJECTIVE    Physical Exam:     Visit Vitals    /74 (BP 1 Location: Left arm, BP Patient Position: Sitting)    Pulse 78    Temp 97.1 °F (36.2 °C) (Oral)    Resp 16    Ht 5' 2\" (1.575 m)    Wt 169 lb (76.7 kg)    SpO2 97%    BMI 30.91 kg/m2       General: alert, well-appearing, in no apparent distress or pain  CVS: normal rate, regular rhythm, distinct S1 and S2  Lungs:clear to ausculation bilaterally, no crackles, wheezing or rhonchi noted  Abdomen: soft, NT, ND. Skin: warm, no lesions, rashes noted  Psych:  mood and affect normal    Results for orders placed or performed during the hospital encounter of 21/58/10   METABOLIC PANEL, COMPREHENSIVE   Result Value Ref Range    Sodium 142 136 - 145 mmol/L    Potassium 4.1 3.5 - 5.5 mmol/L    Chloride 106 100 - 108 mmol/L    CO2 29 21 - 32 mmol/L    Anion gap 7 3.0 - 18 mmol/L    Glucose 124 (H) 74 - 99 mg/dL    BUN 25 (H) 7.0 - 18 MG/DL    Creatinine 1.30 0.6 - 1.3 MG/DL    BUN/Creatinine ratio 19 12 - 20      GFR est AA >60 >60 ml/min/1.73m2    GFR est non-AA 54 (L) >60 ml/min/1.73m2    Calcium 8.3 (L) 8.5 - 10.1 MG/DL    Bilirubin, total 1.3 (H) 0.2 - 1.0 MG/DL    ALT (SGPT) 28 16 - 61 U/L    AST (SGOT) 18 15 - 37 U/L    Alk. phosphatase 49 45 - 117 U/L    Protein, total 7.0 6.4 - 8.2 g/dL    Albumin 3.7 3.4 - 5.0 g/dL    Globulin 3.3 2.0 - 4.0 g/dL    A-G Ratio 1.1 0.8 - 1.7     LIPID PANEL   Result Value Ref Range    LIPID PROFILE          Cholesterol, total 97 <200 MG/DL    Triglyceride 105 <150 MG/DL    HDL Cholesterol 41 40 - 60 MG/DL    LDL, calculated 35 0 - 100 MG/DL    VLDL, calculated 21 MG/DL    CHOL/HDL Ratio 2.4 0 - 5.0     MICROALBUMIN, UR, RAND W/ MICROALB/CREAT RATIO   Result Value Ref Range    Microalbumin,urine random 6.92 (H) 0 - 3.0 MG/DL    Creatinine, urine 76.40 30 - 125 mg/dL    Microalbumin/Creat ratio (mg/g creat) 91 (H) 0 - 30 mg/g   HEMOGLOBIN A1C W/O EAG   Result Value Ref Range    Hemoglobin A1c 6.6 (H) 4.2 - 5.6 %   PSA, DIAGNOSTIC (PROSTATE SPECIFIC AG)   Result Value Ref Range    Prostate Specific Ag 0.6 0.0 - 4.0 ng/mL           ASSESSMENT/PLAN  Umang Cassidy was seen today for diabetes, hypertension, gout and results.     Diagnoses and all orders for this visit:    Type 2 diabetes mellitus with [facility]albumin[facility]-  7.4<10<7.6<7.3<7.2<6.7<6.5<7.2<6.7 >6.6  controlled  Cont januvia 100 mg, pioglitazone  cont glucotrol 30 mins prior to meals to avoid hypoglycemia   GFR >50 currently  Eye exam 11/17  Foot exam 3/18  Microalbumin 1018  Lipid panel 10/18  Check bmp/a1c in 3 months or sooner prn    Essential hypertension-  controlled, cont ace    CRI (chronic renal insufficiency), stage 3 (moderate)-  monitoring, avoid nsaids, renally dose meds, optimize DM control. Pure hypercholesterolemia-   at goal LDL< 100 on statin, cont    Gout without tophus, unspecified cause, unspecified chronicity, unspecified site-seldom flares,  Prn colchicine, low purine diet. BMI 30  Encouraged diet/ wt loss/exercise    Splenic artery stenosis  S/p repair 2015  On ASA, statin    Reminded to send FIT test for CRCS    Follow-up Disposition:  Return in about 3 months (around 1/11/2019), or if symptoms worsen or fail to improve. Patient understands plan of care. Patient has provided input and agrees with goals.

## 2018-10-11 NOTE — MR AVS SNAPSHOT
[facility] [address] 
[phone] Patient: Alem Narvaez MRN: [id number]:[id number] Visit Information Date & Time Provider Department Dept. Phone Encounter #  
 10/11/2018  1:45 PM Rick Simbalazara, [address] [place] [address] Road [id number] Follow-up Instructions Return in about 3 months (around 1/11/2019), or if symptoms worsen or fail to improve. Upcoming Health Maintenance Date Due Shingrix Vaccine Age 50> (1 of 2) 9/30/1993 FOBT Q 1 YEAR AGE 50-75 7/17/2018 EYE EXAM RETINAL OR DILATED Q1 11/3/2018 Influenza Age 5 to Adult 9/1/2019* MEDICARE YEARLY EXAM 12/5/2018 FOOT EXAM Q1 3/22/2019 HEMOGLOBIN A1C Q6M 4/3/2019 MICROALBUMIN Q1 10/3/2019 LIPID PANEL Q1 10/3/2019 GLAUCOMA SCREENING Q2Y 11/3/2019 DTaP/Tdap/Td series (2 - Td) 5/13/2020 *Topic was postponed. The date shown is not the original due date. Allergies as of 10/11/2018  Review Complete On: 10/11/2018 By: Abbie Barker LPN Severity Noted Reaction Type Reactions Norvasc [Amlodipine]  05/13/2010    Itching, Other (comments) Sulfa (Sulfonamide Antibiotics)  09/16/2014    Other (comments) Patient states he is not allergic Watermelon  09/16/2014    Other (comments) Current Immunizations  Reviewed on 4/6/2015 Name Date Pneumococcal Conjugate (PCV-13) 4/6/2015 Pneumococcal Polysaccharide (PPSV-23) 5/11/2016 TDAP Vaccine 5/13/2010 Not reviewed this visit You Were Diagnosed With   
  
 Codes Comments Type 2 diabetes with nephropathy (HCC)    -  Primary ICD-10-CM: E11.21 
ICD-9-CM: 250.40, 583.81 Chronic renal impairment, stage 3 (moderate) (HCC)     ICD-10-CM: N18.3 ICD-9-CM: 384. 3 Essential hypertension     ICD-10-CM: I10 
ICD-9-CM: 401.9 Pure hypercholesterolemia     ICD-10-CM: E78.00 ICD-9-CM: 272.0 Gout without tophus     ICD-10-CM: M10.9 ICD-9-CM: 274.9 Vitals BP Pulse Temp Resp Height(growth percentile) Weight(growth percentile) 126/74 (BP 1 Location: Left arm, BP Patient Position: Sitting) 78 97.1 °F (36.2 °C) (Oral) 16 5' 2\" (1.575 m) 169 lb (76.7 kg) SpO2 BMI Smoking Status 97% 30.91 kg/m2 Former Smoker Vitals History BMI and BSA Data Body Mass Index Body Surface Area 30.91 kg/m 2 1.83 m 2 Preferred Pharmacy Pharmacy Name Phone Jermain Banegas, [facility] [phone] Your Updated Medication List  
  
   
This list is accurate as of 10/11/18  2:31 PM.  Always use your most recent med list.  
  
  
  
  
 alcohol swabs Padm Commonly known as:  ALCOHOL PADS Use as directed daily. aspirin 81 mg chewable tablet Take 1 Tab by mouth daily. * Blood-Glucose Meter monitoring kit Commonly known as:  BLOOD GLUCOSE MONITOR KIT  
by Does Not Apply route. Once daily. * Blood-Glucose Meter monitoring kit Free Style monitoring Kit. Check glucose fasting daily. colchicine 0.6 mg tablet Take 1 Tab by mouth daily. glipiZIDE SR 10 mg CR tablet Commonly known as:  GLUCOTROL XL Take 1 Tab by mouth daily. * glucose blood VI test strips strip Commonly known as:  PRECISION XTRA TEST Check glucose level once daily * glucose blood VI test strips strip Commonly known as:  ASCENSIA AUTODISC VI, ONE TOUCH ULTRA TEST VI Free Style. Check fasting glucose daily  
  
 hydrocortisone 2.5 % topical cream  
Commonly known as:  HYTONE Apply  to affected area two (2) times a day. use thin layer Lancets Misc Commonly known as:  Jennifer Pontiff As directed once daily. lisinopril 20 mg tablet Commonly known as:  Laveda Buys Take 1 Tab by mouth two (2) times a day. pioglitazone 15 mg tablet Commonly known as:  ACTOS Take one tablet once daily  
  
 simvastatin 40 mg tablet Commonly known as:  ZOCOR Take 1 Tab by mouth nightly. SITagliptin 100 mg tablet Commonly known as:  Roise Kee Take 1 Tab by mouth daily. * Notice: This list has 4 medication(s) that are the same as other medications prescribed for you. Read the directions carefully, and ask your doctor or other care provider to review them with you. Prescriptions Printed Refills SITagliptin (JANUVIA) 100 mg tablet 3 Sig: Take 1 Tab by mouth daily. Class: Print Route: Oral  
 pioglitazone (ACTOS) 15 mg tablet 3 Sig: Take one tablet once daily Class: Print Follow-up Instructions Return in about 3 months (around 1/11/2019), or if symptoms worsen or fail to improve. To-Do List   
 01/11/2019 Lab:  HEMOGLOBIN A1C W/O EAG   
  
 01/11/2019 Lab:  METABOLIC PANEL, BASIC Patient Instructions Learning About Diabetes Food Guidelines Your Care Instructions Meal planning is important to manage diabetes. It helps keep your blood sugar at a target level (which you set with your doctor). You don't have to eat special foods. You can eat what your family eats, including sweets once in a while. But you do have to pay attention to how often you eat and how much you eat of certain foods. You may want to work with a dietitian or a certified diabetes educator (CDE) to help you plan meals and snacks. A dietitian or CDE can also help you lose weight if that is one of your goals. What should you know about eating carbs? Managing the amount of carbohydrate (carbs) you eat is an important part of healthy meals when you have diabetes. Carbohydrate is found in many foods. · Learn which foods have carbs. And learn the amounts of carbs in different foods. ¨ Bread, cereal, pasta, and rice have about 15 grams of carbs in a serving.  A serving is 1 slice of bread (1 ounce), ½ cup of cooked cereal, or 1/3 cup of cooked pasta or rice. ¨ Fruits have 15 grams of carbs in a serving. A serving is 1 small fresh fruit, such as an apple or orange; ½ of a banana; ½ cup of cooked or canned fruit; ½ cup of fruit juice; 1 cup of melon or raspberries; or 2 tablespoons of dried fruit. ¨ Milk and no-sugar-added yogurt have 15 grams of carbs in a serving. A serving is 1 cup of milk or 2/3 cup of no-sugar-added yogurt. ¨ Starchy vegetables have 15 grams of carbs in a serving. A serving is ½ cup of mashed potatoes or sweet potato; 1 cup winter squash; ½ of a small baked potato; ½ cup of cooked beans; or ½ cup cooked corn or green peas. · Learn how much carbs to eat each day and at each meal. A dietitian or CDE can teach you how to keep track of the amount of carbs you eat. This is called carbohydrate counting. · If you are not sure how to count carbohydrate grams, use the Plate Method to plan meals. It is a good, quick way to make sure that you have a balanced meal. It also helps you spread carbs throughout the day. ¨ Divide your plate by types of foods. Put non-starchy vegetables on half the plate, meat or other protein food on one-quarter of the plate, and a grain or starchy vegetable in the final quarter of the plate. To this you can add a small piece of fruit and 1 cup of milk or yogurt, depending on how many carbs you are supposed to eat at a meal. 
· Try to eat about the same amount of carbs at each meal. Do not \"save up\" your daily allowance of carbs to eat at one meal. 
· Proteins have very little or no carbs per serving. Examples of proteins are beef, chicken, turkey, fish, eggs, tofu, cheese, cottage cheese, and peanut butter. A serving size of meat is 3 ounces, which is about the size of a deck of cards. Examples of meat substitute serving sizes (equal to 1 ounce of meat) are 1/4 cup of cottage cheese, 1 egg, 1 tablespoon of peanut butter, and ½ cup of tofu. How can you eat out and still eat healthy? · Learn to estimate the serving sizes of foods that have carbohydrate. If you measure food at home, it will be easier to estimate the amount in a serving of restaurant food. · If the meal you order has too much carbohydrate (such as potatoes, corn, or baked beans), ask to have a low-carbohydrate food instead. Ask for a salad or green vegetables. · If you use insulin, check your blood sugar before and after eating out to help you plan how much to eat in the future. · If you eat more carbohydrate at a meal than you had planned, take a walk or do other exercise. This will help lower your blood sugar. What else should you know? · Limit saturated fat, such as the fat from meat and dairy products. This is a healthy choice because people who have diabetes are at higher risk of heart disease. So choose lean cuts of meat and nonfat or low-fat dairy products. Use olive or canola oil instead of butter or shortening when cooking. · Don't skip meals. Your blood sugar may drop too low if you skip meals and take insulin or certain medicines for diabetes. · Check with your doctor before you drink alcohol. Alcohol can cause your blood sugar to drop too low. Alcohol can also cause a bad reaction if you take certain diabetes medicines. Follow-up care is a key part of your treatment and safety. Be sure to make and go to all appointments, and call your doctor if you are having problems. It's also a good idea to know your test results and keep a list of the medicines you take. Where can you learn more? Go to http://arti-ava.info/. Enter H070 in the search box to learn more about \"Learning About Diabetes Food Guidelines. \" Current as of: December 7, 2017 Content Version: 11.8 © 7636-3459 [employer]. Care instructions adapted under license by [employer] (which disclaims liability or warranty for this information).  If you have questions about a medical condition or this instruction, always ask your healthcare professional. [address] any warranty or liability for your use of this information. Introducing [facility]! Dear Fayette Kehr: Thank you for requesting a [employer] account. Our records indicate that you already have an active [employer] account. You can access your account anytime at [email or web address] [email or web address] Did you know that you can access your hospital and ER discharge instructions at any time in [employer]? You can also review all of your test results from your hospital stay or ER visit. Additional Information If you have questions, please visit the Frequently Asked Questions section of the [employer] website at [email or web address]/. Remember, [employer] is NOT to be used for urgent needs. For medical emergencies, dial 911. Now available from your iPhone and Android! Please provide this summary of care documentation to your next provider. Your primary care clinician is listed as Joanne Brooks. If you have any questions after today's visit, please call [phone].

## 2018-10-11 NOTE — PROGRESS NOTES
1. Have you been to the ER, urgent care clinic since your last visit? Hospitalized since your last visit? No    2. Have you seen or consulted any other health care providers outside of the [address] [place] [address] since your last visit? Include any pap smears or colon screening.  No

## 2018-10-11 NOTE — PATIENT INSTRUCTIONS

## 2018-12-19 ENCOUNTER — HOSPITAL ENCOUNTER (OUTPATIENT)
Dept: LAB | Age: 73
Discharge: HOME | End: 2018-12-19
Payer: MEDICARE

## 2018-12-19 DIAGNOSIS — Z12.11 COLON CANCER SCREENING: ICD-10-CM

## 2018-12-19 PROCEDURE — 82274 ASSAY TEST FOR BLOOD FECAL: CPT

## 2018-12-28 LAB — HEMOCCULT STL QL IA: NEGATIVE

## 2019-01-22 ENCOUNTER — HOSPITAL ENCOUNTER (OUTPATIENT)
Dept: LAB | Age: 74
Discharge: HOME | End: 2019-01-22
Payer: MEDICARE

## 2019-01-22 DIAGNOSIS — E11.21 TYPE 2 DIABETES WITH NEPHROPATHY (HCC): ICD-10-CM

## 2019-01-22 LAB
ANION GAP SERPL CALC-SCNC: 6 MMOL/L (ref 3–18)
BUN SERPL-MCNC: 25 MG/DL (ref 7–18)
BUN/CREAT SERPL: 20 (ref 12–20)
CALCIUM SERPL-MCNC: 8.6 MG/DL (ref 8.5–10.1)
CHLORIDE SERPL-SCNC: 104 MMOL/L (ref 100–108)
CO2 SERPL-SCNC: 31 MMOL/L (ref 21–32)
CREAT SERPL-MCNC: 1.28 MG/DL (ref 0.6–1.3)
GLUCOSE SERPL-MCNC: 169 MG/DL (ref 74–99)
HBA1C MFR BLD: 7.4 % (ref 4.2–5.6)
POTASSIUM SERPL-SCNC: 4.2 MMOL/L (ref 3.5–5.5)
SODIUM SERPL-SCNC: 141 MMOL/L (ref 136–145)

## 2019-01-22 PROCEDURE — 83036 HEMOGLOBIN GLYCOSYLATED A1C: CPT

## 2019-01-22 PROCEDURE — 80048 BASIC METABOLIC PNL TOTAL CA: CPT

## 2019-01-22 PROCEDURE — 36415 COLL VENOUS BLD VENIPUNCTURE: CPT

## 2019-01-29 ENCOUNTER — OFFICE VISIT (OUTPATIENT)
Dept: FAMILY MEDICINE CLINIC | Age: 74
End: 2019-01-29

## 2019-01-29 VITALS
HEIGHT: 62 IN | SYSTOLIC BLOOD PRESSURE: 134 MMHG | OXYGEN SATURATION: 94 % | WEIGHT: 176.6 LBS | RESPIRATION RATE: 17 BRPM | TEMPERATURE: 97.4 F | DIASTOLIC BLOOD PRESSURE: 82 MMHG | BODY MASS INDEX: 32.5 KG/M2 | HEART RATE: 102 BPM

## 2019-01-29 DIAGNOSIS — M10.9 GOUT WITHOUT TOPHUS: ICD-10-CM

## 2019-01-29 DIAGNOSIS — E78.00 PURE HYPERCHOLESTEROLEMIA: ICD-10-CM

## 2019-01-29 DIAGNOSIS — E11.21 TYPE 2 DIABETES WITH NEPHROPATHY (HCC): ICD-10-CM

## 2019-01-29 DIAGNOSIS — Z71.89 ADVANCE CARE PLANNING: ICD-10-CM

## 2019-01-29 DIAGNOSIS — Z00.00 MEDICARE ANNUAL WELLNESS VISIT, SUBSEQUENT: Primary | ICD-10-CM

## 2019-01-29 DIAGNOSIS — N18.30 CHRONIC RENAL IMPAIRMENT, STAGE 3 (MODERATE) (HCC): ICD-10-CM

## 2019-01-29 DIAGNOSIS — I10 ESSENTIAL HYPERTENSION: ICD-10-CM

## 2019-01-29 NOTE — PROGRESS NOTES
1. Have you been to the ER, urgent care clinic since your last visit? Hospitalized since your last visit? No    2. Have you seen or consulted any other health care providers outside of the [address] [place] [address] since your last visit? Include any pap smears or colon screening. No        This is the Subsequent Medicare Annual Wellness Exam, performed 12 months or more after the Initial AWV or the last Subsequent AWV    I have reviewed the patient's medical history in detail and updated the computerized patient record. History     Past Medical History:   Diagnosis Date    Arthritis     CRI (chronic renal insufficiency)     Diabetes mellitus type II 5/13/10    Gout     Hypercholesteremia     Hypertension     Other ill-defined conditions(799.89)     gout    Prostate cancer ([facility] [address] 2008    remission    Splenic artery aneurysm [facility]) 2013    s/p stent dr Edgardo Claude prn ff-up      Past Surgical History:   Procedure Laterality Date    HX KNEE REPLACEMENT  1/17/12    Left; Dr. Nadira Ayala HX PROSTATECTOMY  12/2007     Current Outpatient Medications   Medication Sig Dispense Refill    SITagliptin (JANUVIA) 100 mg tablet Take 1 Tab by mouth daily. 90 Tab 3    pioglitazone (ACTOS) 15 mg tablet Take one tablet once daily 90 Tab 3    lisinopril (PRINIVIL, ZESTRIL) 20 mg tablet Take 1 Tab by mouth two (2) times a day. 180 Tab 2    Blood-Glucose Meter monitoring kit Free Style monitoring Kit. Check glucose fasting daily. 1 Kit 0    glucose blood VI test strips (ASCENSIA AUTODISC VI, ONE TOUCH ULTRA TEST VI) strip Free Style. Check fasting glucose daily 100 Strip 3    Lancets [facility] THIN) mis As directed once daily. 3 Package 3    colchicine 0.6 mg tablet Take 1 Tab by mouth daily. 90 Tab 1    hydrocortisone (HYTONE) 2.5 % topical cream Apply  to affected area two (2) times a day. use thin layer 60 g 2    simvastatin (ZOCOR) 40 mg tablet Take 1 Tab by mouth nightly.  90 Tab 2    glipiZIDE SR (GLUCOTROL XL) 10 mg CR tablet Take 1 Tab by mouth daily. 180 Tab 2    glucose blood VI test strips (PRECISION XTRA TEST) strip Check glucose level once daily 100 Strip 11    alcohol swabs (ALCOHOL PADS) padm Use as directed daily. 200 Pad 1    aspirin 81 mg chewable tablet Take 1 Tab by mouth daily. 90 Tab 4    Blood-Glucose Meter (BLOOD GLUCOSE MONITOR KIT) monitoring kit by Does Not Apply route. Once daily. 1 Kit 0     Allergies   Allergen Reactions    Norvasc [Amlodipine] Itching and Other (comments)    Sulfa (Sulfonamide Antibiotics) Other (comments)     Patient states he is not allergic    Watermelon Other (comments)     Family History   Problem Relation Age of Onset    Cancer Neg Hx     Diabetes Neg Hx     Heart Disease Neg Hx     Hypertension Neg Hx     Stroke Neg Hx      Social History     Tobacco Use    Smoking status: Former Smoker     Packs/day: 1.00     Types: Cigarettes     Last attempt to quit: 1997     Years since quittin.5    Smokeless tobacco: Never Used    Tobacco comment:    Substance Use Topics    Alcohol use: Yes     Comment: rarely     Patient Active Problem List   Diagnosis Code    [facility]    DJD (degenerative joint disease) of knee M17.10    CRI (chronic renal insufficiency) N18.9    Splenic artery aneurysm (HCC) I72.8    Essential hypertension I10    Pure hypercholesterolemia E78.00    Gout without tophus M10.9    Advance directive discussed with patient Z71.89    BMI 30.0-30.9,adult Z68.30    Type 2 diabetes with nephropathy (HCC) E11.21       Depression Risk Factor Screening:     PHQ over the last two weeks 10/11/2018   Little interest or pleasure in doing things Not at all   Feeling down, depressed, irritable, or hopeless Not at all   Total Score PHQ 2 0     Alcohol Risk Factor Screening: You do not drink alcohol or very rarely. Functional Ability and Level of Safety:   Hearing Loss  Hearing is good.  The patient wears hearing aids.    Activities of Daily Living  The home contains: no safety equipment. Patient does total self care    Fall Risk  Fall Risk Assessment, last 12 mths 10/11/2018   Able to walk? Yes   Fall in past 12 months? No       Abuse Screen  Patient is not abused    Cognitive Screening   Evaluation of Cognitive Function:  Has your family/caregiver stated any concerns about your memory: no  Normal    Patient Care Team   Patient Care Team:  Kurt Rodriguez MD as PCP - General (Family Practice)  Adam Triplett (Vascular Surgery)  Kiya Pimentel MD (Thoracic Diseases)  Amy Merida MD (Vascular Surgery)  Juan Carlos Guevara MD (Ophthalmology)    Assessment/Plan   Education and counseling provided:  Are appropriate based on today's review and evaluation  End-of-Life planning (with patient's consent)-discussed, provided form. Says may have existing  Pneumococcal Vaccine- 13 and 23 completed  Colorectal cancer screening tests- FIT test 12/18  Cardiovascular screening blood test- utd  Screening for glaucoma- 11/18  PSA- 10./18    Diagnoses and all orders for this visit:    1. Medicare annual wellness visit, subsequent        Health Maintenance Due   Topic Date Due    Shingrix Vaccine Age 49> (1 of 2) 09/30/1993    MEDICARE YEARLY EXAM  12/05/2018     Carmen Pizano, 74 y.o.,  male    SUBJECTIVE  Routine ff-up    DM w/ nephropathy-   Taking actos, [place] and [place] and JENKINS. He is off metformin due to renal function (CKD 3). He has gained weight, some dietary inconsistencies over the holidays. HTN/HL- taking medications, denies cp, sob. Gout-  rare flares, related to eating peanuts or some [place] delicacies. No flare for a year now. Takes colchicine prn.     Splenic artery stenosis (s/p repair 2015)    H/o prostate ca s/p prostatectomy 2007- no longer seeing urologist    ROS:  See HPI, all others negative        Patient Active Problem List   Diagnosis Code    Prostate cancer ([facility] [address] C61    DJD (degenerative joint disease) of knee M17.10    CRI (chronic renal insufficiency) N18.9    Splenic artery aneurysm (HCC) I72.8    Essential hypertension I10    Pure hypercholesterolemia E78.00    Gout without tophus M10.9    Advance directive discussed with patient Z71.89    BMI 30.0-30.9,adult Z68.30    Type 2 diabetes with nephropathy (HCC) E11.21       Current Outpatient Medications   Medication Sig Dispense Refill    SITagliptin (JANUVIA) 100 mg tablet Take 1 Tab by mouth daily. 90 Tab 3    pioglitazone (ACTOS) 15 mg tablet Take one tablet once daily 90 Tab 3    lisinopril (PRINIVIL, ZESTRIL) 20 mg tablet Take 1 Tab by mouth two (2) times a day. 180 Tab 2    Blood-Glucose Meter monitoring kit Free Style monitoring Kit. Check glucose fasting daily. 1 Kit 0    glucose blood VI test strips (ASCENSIA AUTODISC VI, ONE TOUCH ULTRA TEST VI) strip Free Style. Check fasting glucose daily 100 Strip 3    Lancets [facility] THIN) misc As directed once daily. 3 Package 3    colchicine 0.6 mg tablet Take 1 Tab by mouth daily. 90 Tab 1    hydrocortisone (HYTONE) 2.5 % topical cream Apply  to affected area two (2) times a day. use thin layer 60 g 2    simvastatin (ZOCOR) 40 mg tablet Take 1 Tab by mouth nightly. 90 Tab 2    glipiZIDE SR (GLUCOTROL XL) 10 mg CR tablet Take 1 Tab by mouth daily. 180 Tab 2    glucose blood VI test strips (PRECISION XTRA TEST) strip Check glucose level once daily 100 Strip 11    alcohol swabs (ALCOHOL PADS) padm Use as directed daily. 200 Pad 1    aspirin 81 mg chewable tablet Take 1 Tab by mouth daily. 90 Tab 4    Blood-Glucose Meter (BLOOD GLUCOSE MONITOR KIT) monitoring kit by Does Not Apply route. Once daily.  1 Kit 0       Allergies   Allergen Reactions    Norvasc [Amlodipine] Itching and Other (comments)    Sulfa (Sulfonamide Antibiotics) Other (comments)     Patient states he is not allergic    Watermelon Other (comments)       Past Medical History:   Diagnosis Date    Arthritis     CRI (chronic renal insufficiency)     Diabetes mellitus type II 5/13/10    Gout     Hypercholesteremia     Hypertension     Other ill-defined conditions(799.89)     gout    Prostate cancer ([facility] 2008    remission    Splenic artery aneurysm ([facility] 2013    s/p stent dr Kathalene Olszewski prn ff-up       Social History     Socioeconomic History    Marital status:      Spouse name: Not on file    Number of children: Not on file    Years of education: Not on file    Highest education level: Not on file   Social Needs    Financial resource strain: Not on file    Food insecurity - worry: Not on file    Food insecurity - inability: Not on file   [employer] needs - medical: Not on file   [employer] needs - non-medical: Not on file   Occupational History    Not on file   Tobacco Use    Smoking status: Former Smoker     Packs/day: 1.00     Types: Cigarettes     Last attempt to quit: 1997     Years since quittin.5    Smokeless tobacco: Never Used    Tobacco comment:    Substance and Sexual Activity    Alcohol use: Yes     Comment: rarely    Drug use: No    Sexual activity: No   Other Topics Concern    Not on file   Social History Narrative    Not on file       Family History   Problem Relation Age of Onset    Cancer Neg Hx     Diabetes Neg Hx     Heart Disease Neg Hx     Hypertension Neg Hx     Stroke Neg Hx          OBJECTIVE    Physical Exam:     Visit Vitals  /82 (BP 1 Location: Left arm, BP Patient Position: Sitting)   Pulse (!) 102   Temp 97.4 °F (36.3 °C) (Oral)   Resp 17   Ht 5' 2\" (1.575 m)   Wt 176 lb 9.6 oz (80.1 kg)   SpO2 94%   BMI 32.30 kg/m²       General: alert, well-appearing, in no apparent distress or pain  CVS: normal rate, regular rhythm, distinct S1 and S2  Lungs:clear to ausculation bilaterally, no crackles, wheezing or rhonchi noted  Abdomen: soft, NT, ND.    Skin: warm, no lesions, rashes noted  Psych:  mood and affect normal    Results for orders placed or performed during the hospital encounter of 94/04/92   METABOLIC PANEL, BASIC   Result Value Ref Range    Sodium 141 136 - 145 mmol/L    Potassium 4.2 3.5 - 5.5 mmol/L    Chloride 104 100 - 108 mmol/L    CO2 31 21 - 32 mmol/L    Anion gap 6 3.0 - 18 mmol/L    Glucose 169 (H) 74 - 99 mg/dL    BUN 25 (H) 7.0 - 18 MG/DL    Creatinine 1.28 0.6 - 1.3 MG/DL    BUN/Creatinine ratio 20 12 - 20      GFR est AA >60 >60 ml/min/1.73m2    GFR est non-AA 55 (L) >60 ml/min/1.73m2    Calcium 8.6 8.5 - 10.1 MG/DL   HEMOGLOBIN A1C W/O EAG   Result Value Ref Range    Hemoglobin A1c 7.4 (H) 4.2 - 5.6 %           ASSESSMENT/PLAN  Joanne Benavidez was seen today for diabetes, hypertension, gout and results. Diagnoses and all orders for this visit:    Type 2 diabetes mellitus with microalbuminuria  ([facility] [address]-  7.6<7.4<10<7.6<7.3<7.2<6.7<6.5<7.2<6.7 >6.6  Cont januvia 100 mg  cont glucotrol 30 mins prior to meals to avoid hypoglycemia  actos 15 mg, consider increasing dose. Pt wants to work on weight loss/dietary changes   GFR >50 currently  Check bmp/a1c in 3 months or sooner prn    Essential hypertension-  controlled, cont ace    CRI (chronic renal insufficiency), stage 3 (moderate)-  monitoring, avoid nsaids, renally dose meds, optimize DM control. Pure hypercholesterolemia-   at goal LDL< 100 on statin, cont    Gout without tophus, unspecified cause, unspecified chronicity, unspecified site-seldom flares,  Prn colchicine, low purine diet. BMI 30  Encouraged diet/ wt loss/exercise    Splenic artery stenosis  S/p repair 2015  On ASA, statin    Follow-up Disposition:  Return in about 3 months (around 4/29/2019), or if symptoms worsen or fail to improve. Patient understands plan of care. Patient has provided input and agrees with goals.

## 2019-01-29 NOTE — ACP (ADVANCE CARE PLANNING)
Advance Care Planning (ACP) Provider Note - Comprehensive     Date of ACP Conversation: 01/29/19  Persons included in Conversation:  patient  Length of ACP Conversation in minutes:  16 minutes    Authorized Decision Maker (if patient is incapable of making informed decisions): This person is:  Has yet to delegate, thinking wife and one of children        General ACP for ALL Patients with Decision Making Capacity:   Importance of advance care planning, including choosing a healthcare agent to communicate patient's healthcare decisions if patient lost the ability to make decisions, such as after a sudden illness or accident  Understanding of the healthcare agent role was assessed and information provided  Exploration of values, goals, and preferences if recovery is not expected, even with continued medical treatment in the event of: Imminent death  Severe, permanent brain injury  \"In these circumstances, what matters most to you? \"  Other: still considering, leaning towards comfort care    Interventions Provided:  Recommended completion of Advance Directive form after review of ACP materials and conversation with prospective healthcare agent   Recommended communicating the plan and making copies for the healthcare agent, personal physician, and others as appropriate (e.g., health system)  Recommended review of completed ACP document annually or upon change in health status

## 2019-01-29 NOTE — PATIENT INSTRUCTIONS
Medicare Wellness Visit, Male    The best way to live healthy is to have a lifestyle where you eat a well-balanced diet, exercise regularly, limit alcohol use, and quit all forms of tobacco/nicotine, if applicable. Regular preventive services are another way to keep healthy. Preventive services (vaccines, screening tests, monitoring & exams) can help personalize your care plan, which helps you manage your own care. Screening tests can find health problems at the earliest stages, when they are easiest to treat. [facility] follows the current, evidence-based guidelines published by the [facility] ([facility]) when recommending preventive services for our patients. Because we follow these guidelines, sometimes recommendations change over time as research supports it. (For example, a prostate screening blood test is no longer routinely recommended for men with no symptoms.)  Of course, you and your doctor may decide to screen more often for some diseases, based on your risk and co-morbidities (chronic disease you are already diagnosed with). Preventive services for you include:  - Medicare offers their members a free annual wellness visit, which is time for you and your primary care provider to discuss and plan for your preventive service needs. Take advantage of this benefit every year!  -All adults over age 72 should receive the recommended pneumonia vaccines. Current USPSTF guidelines recommend a series of two vaccines for the best pneumonia protection.   -All adults should have a flu vaccine yearly and an ECG.  All adults age 61 and older should receive a shingles vaccine once in their lifetime.    -All adults age 38-68 who are overweight should have a diabetes screening test once every three years.   -Other screening tests & preventive services for persons with diabetes include: an eye exam to screen for diabetic retinopathy, a kidney function test, a foot exam, and stricter control over your cholesterol.   -Cardiovascular screening for adults with routine risk involves an electrocardiogram (ECG) at intervals determined by the provider.   -Colorectal cancer screening should be done for adults age 54-65 with no increased risk factors for colorectal cancer. There are a number of acceptable methods of screening for this type of cancer. Each test has its own benefits and drawbacks. Discuss with your provider what is most appropriate for you during your annual wellness visit. The different tests include: colonoscopy (considered the best screening method), a fecal occult blood test, a fecal DNA test, and sigmoidoscopy.  -All adults born between [facility] should be screened once for Hepatitis C.  -An Abdominal Aortic Aneurysm (AAA) Screening is recommended for men age 73-68 who has ever smoked in their lifetime.      Here is a list of your current Health Maintenance items (your personalized list of preventive services) with a due date:  Health Maintenance Due   Topic Date Due    Shingles Vaccine (1 of 2) 09/30/1993    Annual Well Visit  12/05/2018

## 2019-03-21 NOTE — TELEPHONE ENCOUNTER
This patient contacted office for the following prescriptions to be filled:    Medication requested :   Requested Prescriptions     Pending Prescriptions Disp Refills    lisinopril (PRINIVIL, ZESTRIL) 20 mg tablet 180 Tab 2     Sig: Take 1 Tab by mouth two (2) times a day. PCP:  [address] [place] [address] or Print: Print    Mail order or [address]    Scheduled appointment if not seen by current providers in office:  [address] [place] [address] 1/29/2019 f/u 4/30/2019 pt doesn't want his RX to go to [facility] anymore. His wife has an appt tomorrow and he would like her to pick it up then. Please advise.

## 2019-03-22 RX ORDER — LISINOPRIL 20 MG/1
20 TABLET ORAL 2 TIMES DAILY
Qty: 180 TAB | Refills: 2 | Status: SHIPPED | OUTPATIENT
Start: 2019-03-22 | End: 2019-08-30 | Stop reason: SDUPTHER

## 2019-04-04 DIAGNOSIS — E11.9 TYPE 2 DIABETES MELLITUS WITHOUT COMPLICATION (HCC): ICD-10-CM

## 2019-04-04 RX ORDER — SIMVASTATIN 40 MG/1
40 TABLET, FILM COATED ORAL
Qty: 90 TAB | Refills: 3 | Status: SHIPPED | OUTPATIENT
Start: 2019-04-04 | End: 2020-04-21 | Stop reason: SDUPTHER

## 2019-04-04 NOTE — TELEPHONE ENCOUNTER
This patient contacted office for the following prescriptions to be filled:    Medication requested :   Requested Prescriptions     Pending Prescriptions Disp Refills    simvastatin (ZOCOR) 40 mg tablet 90 Tab 2     Sig: Take 1 Tab by mouth nightly.      PCP: jose maria   Pharmacy or Print: print  Mail order or Local pharmacy     Scheduled appointment if not seen by current providers in office: lov 1/29/19 ucv 4/30/19

## 2019-05-22 ENCOUNTER — HOSPITAL ENCOUNTER (OUTPATIENT)
Dept: LAB | Age: 74
Discharge: HOME | End: 2019-05-22
Payer: MEDICARE

## 2019-05-22 DIAGNOSIS — E11.21 TYPE 2 DIABETES WITH NEPHROPATHY (HCC): ICD-10-CM

## 2019-05-22 LAB
ANION GAP SERPL CALC-SCNC: 4 MMOL/L (ref 3–18)
BUN SERPL-MCNC: 22 MG/DL (ref 7–18)
BUN/CREAT SERPL: 17 (ref 12–20)
CALCIUM SERPL-MCNC: 8.6 MG/DL (ref 8.5–10.1)
CHLORIDE SERPL-SCNC: 106 MMOL/L (ref 100–108)
CO2 SERPL-SCNC: 27 MMOL/L (ref 21–32)
CREAT SERPL-MCNC: 1.28 MG/DL (ref 0.6–1.3)
GLUCOSE SERPL-MCNC: 155 MG/DL (ref 74–99)
HBA1C MFR BLD: 7.1 % (ref 4.2–5.6)
POTASSIUM SERPL-SCNC: 4.3 MMOL/L (ref 3.5–5.5)
SODIUM SERPL-SCNC: 137 MMOL/L (ref 136–145)

## 2019-05-22 PROCEDURE — 83036 HEMOGLOBIN GLYCOSYLATED A1C: CPT

## 2019-05-22 PROCEDURE — 80048 BASIC METABOLIC PNL TOTAL CA: CPT

## 2019-05-22 PROCEDURE — 36415 COLL VENOUS BLD VENIPUNCTURE: CPT

## 2019-05-30 ENCOUNTER — OFFICE VISIT (OUTPATIENT)
Dept: FAMILY MEDICINE CLINIC | Age: 74
End: 2019-05-30

## 2019-05-30 VITALS
HEIGHT: 62 IN | TEMPERATURE: 98 F | WEIGHT: 173 LBS | OXYGEN SATURATION: 98 % | SYSTOLIC BLOOD PRESSURE: 132 MMHG | BODY MASS INDEX: 31.83 KG/M2 | RESPIRATION RATE: 16 BRPM | HEART RATE: 87 BPM | DIASTOLIC BLOOD PRESSURE: 82 MMHG

## 2019-05-30 DIAGNOSIS — L72.0 EPIDERMOID CYST OF FINGER OF LEFT HAND: ICD-10-CM

## 2019-05-30 DIAGNOSIS — N18.30 CHRONIC RENAL IMPAIRMENT, STAGE 3 (MODERATE) (HCC): ICD-10-CM

## 2019-05-30 DIAGNOSIS — M10.9 GOUT WITHOUT TOPHUS: ICD-10-CM

## 2019-05-30 DIAGNOSIS — E78.00 PURE HYPERCHOLESTEROLEMIA: ICD-10-CM

## 2019-05-30 DIAGNOSIS — I72.8 SPLENIC ARTERY ANEURYSM (HCC): ICD-10-CM

## 2019-05-30 DIAGNOSIS — E11.21 TYPE 2 DIABETES WITH NEPHROPATHY (HCC): Primary | ICD-10-CM

## 2019-05-30 DIAGNOSIS — I10 ESSENTIAL HYPERTENSION: ICD-10-CM

## 2019-05-30 NOTE — PROGRESS NOTES
Theodore Tee, 74 y.o.,  male    SUBJECTIVE  Routine ff-up    DM w/ nephropathy-   Taking actos, [place] and [place] and JENKINS. He is off metformin due to renal function (CKD 3). He reports improved diet, lost 3 lbs since last visit    HTN/HL- taking medications, denies cp, sob. Gout-  rare flares, related to eating peanuts or some [place] delicacies. no recent flare ups, colchicine prn effective    Splenic artery stenosis (s/p repair 2015)    C/o painful expanding cyst on L thumb ongoing for several months. No discharge. ROS:  See HPI, all others negative        Patient Active Problem List   Diagnosis Code    Prostate cancer ([facility] [address] C61    DJD (degenerative joint disease) of knee M17.10    CRI (chronic renal insufficiency) N18.9    Splenic artery aneurysm (HCC) I72.8    Essential hypertension I10    Pure hypercholesterolemia E78.00    Gout without tophus M10.9    Advance directive discussed with patient Z71.89    BMI 30.0-30.9,adult Z68.30    Type 2 diabetes with nephropathy (HCC) E11.21       Current Outpatient Medications   Medication Sig Dispense Refill    simvastatin (ZOCOR) 40 mg tablet Take 1 Tab by mouth nightly. 90 Tab 3    lisinopril (PRINIVIL, ZESTRIL) 20 mg tablet Take 1 Tab by mouth two (2) times a day. 180 Tab 2    SITagliptin (JANUVIA) 100 mg tablet Take 1 Tab by mouth daily. 90 Tab 3    pioglitazone (ACTOS) 15 mg tablet Take one tablet once daily 90 Tab 3    Blood-Glucose Meter monitoring kit Free Style monitoring Kit. Check glucose fasting daily. 1 Kit 0    glucose blood VI test strips (ASCENSIA AUTODISC VI, ONE TOUCH ULTRA TEST VI) strip Free Style. Check fasting glucose daily 100 Strip 3    Lancets [facility] THIN) misc As directed once daily. 3 Package 3    colchicine 0.6 mg tablet Take 1 Tab by mouth daily. 90 Tab 1    hydrocortisone (HYTONE) 2.5 % topical cream Apply  to affected area two (2) times a day.  use thin layer 60 g 2    glipiZIDE SR (GLUCOTROL XL) 10 mg CR tablet Take 1 Tab by mouth daily. 180 Tab 2    glucose blood VI test strips (PRECISION XTRA TEST) strip Check glucose level once daily 100 Strip 11    alcohol swabs (ALCOHOL PADS) padm Use as directed daily. 200 Pad 1    aspirin 81 mg chewable tablet Take 1 Tab by mouth daily. 90 Tab 4    Blood-Glucose Meter (BLOOD GLUCOSE MONITOR KIT) monitoring kit by Does Not Apply route. Once daily.  1 Kit 0       Allergies   Allergen Reactions    Norvasc [Amlodipine] Itching and Other (comments)    Sulfa (Sulfonamide Antibiotics) Other (comments)     Patient states he is not allergic    Watermelon Other (comments)       Past Medical History:   Diagnosis Date    Arthritis     CRI (chronic renal insufficiency)     Diabetes mellitus type II 5/13/10    Gout     Hypercholesteremia     Hypertension     Other ill-defined conditions(799.89)     gout    Prostate cancer ([facility] [address]     remission    Splenic artery aneurysm ([facility] [address] 2013    s/p stent dr Bolton All prn ff-up       Social History     Socioeconomic History    Marital status:      Spouse name: Not on file    Number of children: Not on file    Years of education: Not on file    Highest education level: Not on file   Occupational History    Not on file   Social Needs    Financial resource strain: Not on file    Food insecurity:     Worry: Not on file     Inability: Not on file    Transportation needs:     Medical: Not on file     Non-medical: Not on file   Tobacco Use    Smoking status: Former Smoker     Packs/day: 1.00     Types: Cigarettes     Last attempt to quit: 1997     Years since quittin.8    Smokeless tobacco: Never Used    Tobacco comment:    Substance and Sexual Activity    Alcohol use: Yes     Comment: rarely    Drug use: No    Sexual activity: Never   Lifestyle    Physical activity:     Days per week: Not on file     Minutes per session: Not on file    Stress: Not on file   Relationships    Social connections:     Talks on phone: Not on file     Gets together: Not on file     Attends [employer] service: Not on file     Active member of club or organization: Not on file     Attends meetings of clubs or organizations: Not on file     Relationship status: Not on file    Intimate partner violence:     Fear of current or ex partner: Not on file     Emotionally abused: Not on file     Physically abused: Not on file     Forced sexual activity: Not on file   Other Topics Concern    Not on file   Social History Narrative    Not on file       Family History   Problem Relation Age of Onset    Cancer Neg Hx     Diabetes Neg Hx     Heart Disease Neg Hx     Hypertension Neg Hx     Stroke Neg Hx          OBJECTIVE    Physical Exam:     Visit Vitals  /82 (BP 1 Location: Left arm, BP Patient Position: Sitting)   Pulse 87   Temp 98 °F (36.7 °C) (Oral)   Resp 16   Ht 5' 2\" (1.575 m)   Wt 173 lb (78.5 kg)   SpO2 98%   BMI 31.64 kg/m²       General: alert, well-appearing, in no apparent distress or pain  CVS: normal rate, regular rhythm, distinct S1 and S2  Lungs:clear to ausculation bilaterally, no crackles, wheezing or rhonchi noted  Abdomen: soft, NT, ND.    Feet: no lesions normal monofilament  Skin: + non tender L thumb smooth superficial cyst  Psych:  mood and affect normal    Results for orders placed or performed during the hospital encounter of 05/22/19   HEMOGLOBIN A1C W/O EAG   Result Value Ref Range    Hemoglobin A1c 7.1 (H) 4.2 - 5.6 %   METABOLIC PANEL, BASIC   Result Value Ref Range    Sodium 137 136 - 145 mmol/L    Potassium 4.3 3.5 - 5.5 mmol/L    Chloride 106 100 - 108 mmol/L    CO2 27 21 - 32 mmol/L    Anion gap 4 3.0 - 18 mmol/L    Glucose 155 (H) 74 - 99 mg/dL    BUN 22 (H) 7.0 - 18 MG/DL    Creatinine 1.28 0.6 - 1.3 MG/DL    BUN/Creatinine ratio 17 12 - 20      GFR est AA >60 >60 ml/min/1.73m2    GFR est non-AA 55 (L) >60 ml/min/1.73m2    Calcium 8.6 8.5 - 10.1 MG/DL           ASSESSMENT/PLAN  Yecenia Samantha was seen today for diabetes, hypertension, gout and results. Diagnoses and all orders for this visit:    Type 2 diabetes mellitus with microalbuminuria  ([facility] [address]-  7.6<7.4<10<7.6<7.3<7.2<6.7<6.5<7.2<6.7 >6.6>7.4>7. 1  Cont januvia, actos, glucotrol   GFR >50 currently  Check Cmp/a1c in 3 months or sooner prn    Essential hypertension-  controlled, cont ace    CRI (chronic renal insufficiency), stage 3 (moderate)-  monitoring, avoid nsaids, renally dose meds, optimize DM control. Pure hypercholesterolemia-   at goal LDL< 100 on statin, cont    Gout without tophus, unspecified cause, unspecified chronicity, unspecified site-seldom flares,  Prn colchicine, low purine diet. Epidermoid cyst of finger of left hand  Referral to dr. Binta Lazaro    BMI 30  Encouraged diet/ wt loss/exercise    Splenic artery aneursysm  S/p repair 2015  On ASA, statin    Follow-up and Dispositions    · Return in about 3 months (around 8/30/2019), or if symptoms worsen or fail to improve. Patient understands plan of care. Patient has provided input and agrees with goals.

## 2019-05-30 NOTE — PATIENT INSTRUCTIONS
DASH Diet: Care Instructions  Your Care Instructions    The DASH diet is an eating plan that can help lower your blood pressure. DASH stands for Dietary Approaches to Stop Hypertension. Hypertension is high blood pressure. The DASH diet focuses on eating foods that are high in calcium, potassium, and magnesium. These nutrients can lower blood pressure. The foods that are highest in these nutrients are fruits, vegetables, low-fat dairy products, nuts, seeds, and legumes. But taking calcium, potassium, and magnesium supplements instead of eating foods that are high in those nutrients does not have the same effect. The DASH diet also includes whole grains, fish, and poultry. The DASH diet is one of several lifestyle changes your doctor may recommend to lower your high blood pressure. Your doctor may also want you to decrease the amount of sodium in your diet. Lowering sodium while following the DASH diet can lower blood pressure even further than just the DASH diet alone. Follow-up care is a key part of your treatment and safety. Be sure to make and go to all appointments, and call your doctor if you are having problems. It's also a good idea to know your test results and keep a list of the medicines you take. How can you care for yourself at home? Following the DASH diet  · Eat 4 to 5 servings of fruit each day. A serving is 1 medium-sized piece of fruit, ½ cup chopped or canned fruit, 1/4 cup dried fruit, or 4 ounces (½ cup) of fruit juice. Choose fruit more often than fruit juice. · Eat 4 to 5 servings of vegetables each day. A serving is 1 cup of lettuce or raw leafy vegetables, ½ cup of chopped or cooked vegetables, or 4 ounces (½ cup) of vegetable juice. Choose vegetables more often than vegetable juice. · Get 2 to 3 servings of low-fat and fat-free dairy each day. A serving is 8 ounces of milk, 1 cup of yogurt, or 1 ½ ounces of cheese. · Eat 6 to 8 servings of grains each day.  A serving is 1 slice of bread, 1 ounce of dry cereal, or ½ cup of cooked rice, pasta, or cooked cereal. Try to choose whole-grain products as much as possible. · Limit lean meat, poultry, and fish to 2 servings each day. A serving is 3 ounces, about the size of a deck of cards. · Eat 4 to 5 servings of nuts, seeds, and legumes (cooked dried beans, lentils, and split peas) each week. A serving is 1/3 cup of nuts, 2 tablespoons of seeds, or ½ cup of cooked beans or peas. · Limit fats and oils to 2 to 3 servings each day. A serving is 1 teaspoon of vegetable oil or 2 tablespoons of salad dressing. · Limit sweets and added sugars to 5 servings or less a week. A serving is 1 tablespoon jelly or jam, ½ cup sorbet, or 1 cup of lemonade. · Eat less than 2,300 milligrams (mg) of sodium a day. If you limit your sodium to 1,500 mg a day, you can lower your blood pressure even more. Tips for success  · Start small. Do not try to make dramatic changes to your diet all at once. You might feel that you are missing out on your favorite foods and then be more likely to not follow the plan. Make small changes, and stick with them. Once those changes become habit, add a few more changes. · Try some of the following:  ? Make it a goal to eat a fruit or vegetable at every meal and at snacks. This will make it easy to get the recommended amount of fruits and vegetables each day. ? Try yogurt topped with fruit and nuts for a snack or healthy dessert. ? Add lettuce, tomato, cucumber, and onion to sandwiches. ? Combine a ready-made pizza crust with low-fat mozzarella cheese and lots of vegetable toppings. Try using tomatoes, squash, spinach, broccoli, carrots, cauliflower, and onions. ? Have a variety of cut-up vegetables with a low-fat dip as an appetizer instead of chips and dip. ? Sprinkle sunflower seeds or chopped almonds over salads. Or try adding chopped walnuts or almonds to cooked vegetables.   ? Try some vegetarian meals using beans and peas. Add garbanzo or kidney beans to salads. Make burritos and tacos with mashed graves beans or black beans. Where can you learn more? Go to http://arti-ava.info/. Enter V875 in the search box to learn more about \"DASH Diet: Care Instructions. \"  Current as of: July 22, 2018  Content Version: 11.9  © 3310-2151 [email or web address]. Care instructions adapted under license by [employer] (which disclaims liability or warranty for this information). If you have questions about a medical condition or this instruction, always ask your healthcare professional. [address] any warranty or liability for your use of this information.

## 2019-05-30 NOTE — PROGRESS NOTES
1. Have you been to the ER, urgent care clinic since your last visit? Hospitalized since your last visit? No    2. Have you seen or consulted any other health care providers outside of the [address] [place] [address] since your last visit? Include any pap smears or colon screening.  No

## 2019-06-13 ENCOUNTER — OFFICE VISIT (OUTPATIENT)
Dept: ORTHOPEDIC SURGERY | Age: 74
End: 2019-06-13

## 2019-06-13 VITALS
SYSTOLIC BLOOD PRESSURE: 132 MMHG | HEART RATE: 99 BPM | HEIGHT: 62 IN | TEMPERATURE: 97.3 F | WEIGHT: 175.2 LBS | BODY MASS INDEX: 32.24 KG/M2 | RESPIRATION RATE: 16 BRPM | OXYGEN SATURATION: 98 % | DIASTOLIC BLOOD PRESSURE: 80 MMHG

## 2019-06-13 DIAGNOSIS — R22.32 MASS OF LEFT FINGER: Primary | ICD-10-CM

## 2019-06-13 NOTE — PROGRESS NOTES
Garrett Manning is a 74 y.o. male right handed retiree. Worker's Compensation and legal considerations: not known. Vitals:    06/13/19 1313   BP: 132/80   Pulse: 99   Resp: 16   Temp: 97.3 °F (36.3 °C)   TempSrc: Oral   SpO2: 98%   Weight: 175 lb 3.2 oz (79.5 kg)   Height: 5' 2\" (1.575 m)   PainSc:   0 - No pain   PainLoc: Finger           Chief Complaint   Patient presents with    Hand Pain     Left thumb cyst pain when touched          HPI: He denies any specific injuries. Patient comes in today with complaints of left thumb mass that is tender to palpation. Date of onset: Indeterminate    Injury: No    Prior Treatment:  No    Numbness/ Tingling: No    ROS: Review of Systems - General ROS: negative  Respiratory ROS: no cough, shortness of breath, or wheezing  Cardiovascular ROS: no chest pain or dyspnea on exertion  Musculoskeletal ROS: positive for - pain in thumb - left  Neurological ROS: negative  Dermatological ROS: negative    Past Medical History:   Diagnosis Date    Arthritis     CRI (chronic renal insufficiency)     Diabetes mellitus type II 5/13/10    Gout     Hypercholesteremia     Hypertension     Other ill-defined conditions(799.89)     gout    Prostate cancer ([facility] [address] 2008    remission    Splenic artery aneurysm ([facility] [address] 2013    s/p stent dr Monica Rollins prn ff-up       Past Surgical History:   Procedure Laterality Date    HX KNEE REPLACEMENT  1/17/12    Left; Dr. Anne Onofre HX PROSTATECTOMY  12/2007       Current Outpatient Medications   Medication Sig Dispense Refill    simvastatin (ZOCOR) 40 mg tablet Take 1 Tab by mouth nightly. 90 Tab 3    lisinopril (PRINIVIL, ZESTRIL) 20 mg tablet Take 1 Tab by mouth two (2) times a day. 180 Tab 2    SITagliptin (JANUVIA) 100 mg tablet Take 1 Tab by mouth daily. 90 Tab 3    pioglitazone (ACTOS) 15 mg tablet Take one tablet once daily 90 Tab 3    Blood-Glucose Meter monitoring kit Free Style monitoring Kit. Check glucose fasting daily.  1 Kit 0  glucose blood VI test strips (ASCENSIA AUTODISC VI, ONE TOUCH ULTRA TEST VI) strip Free Style. Check fasting glucose daily 100 Strip 3    Lancets [facility] THIN) misc As directed once daily. 3 Package 3    colchicine 0.6 mg tablet Take 1 Tab by mouth daily. 90 Tab 1    hydrocortisone (HYTONE) 2.5 % topical cream Apply  to affected area two (2) times a day. use thin layer 60 g 2    glipiZIDE SR (GLUCOTROL XL) 10 mg CR tablet Take 1 Tab by mouth daily. 180 Tab 2    glucose blood VI test strips (PRECISION XTRA TEST) strip Check glucose level once daily 100 Strip 11    alcohol swabs (ALCOHOL PADS) padm Use as directed daily. 200 Pad 1    aspirin 81 mg chewable tablet Take 1 Tab by mouth daily. 90 Tab 4    Blood-Glucose Meter (BLOOD GLUCOSE MONITOR KIT) monitoring kit by Does Not Apply route. Once daily. 1 Kit 0       Allergies   Allergen Reactions    Norvasc [Amlodipine] Itching and Other (comments)    Sulfa (Sulfonamide Antibiotics) Other (comments)     Patient states he is not allergic    Watermelon Other (comments)         PE:     Left Thumb: There is a 5 mm mass located at the pulp of the thumb. It is mobile semifirm and mildly tender to palpation. Imaging:     Plain films of the left thumb shows mild to moderate degenerative changes with a volar osteophyte. There is no osseous abnormality in the location of the mass. ICD-10-CM ICD-9-CM    1.  Mass of left finger R22.32 782.2 AMB POC XRAY, FINGER(S), 2+ VIEWS       Plan:     Plan for excision of mass from left thumb    Medical clearance and labs per anesthesia protocol    Follow-up for H&P and consent      Plan was reviewed with patient, who verbalized agreement and understanding of the plan

## 2019-06-19 DIAGNOSIS — Z01.818 PREOP EXAMINATION: Primary | ICD-10-CM

## 2019-06-21 ENCOUNTER — HOSPITAL ENCOUNTER (OUTPATIENT)
Dept: LAB | Age: 74
Discharge: HOME | End: 2019-06-21
Payer: MEDICARE

## 2019-06-21 DIAGNOSIS — Z01.818 PREOP EXAMINATION: ICD-10-CM

## 2019-06-21 LAB
ALBUMIN SERPL-MCNC: 3.7 G/DL (ref 3.4–5)
ALBUMIN/GLOB SERPL: 1.3 {RATIO} (ref 0.8–1.7)
ALP SERPL-CCNC: 57 U/L (ref 45–117)
ALT SERPL-CCNC: 36 U/L (ref 16–61)
ANION GAP SERPL CALC-SCNC: 4 MMOL/L (ref 3–18)
AST SERPL-CCNC: 19 U/L (ref 15–37)
ATRIAL RATE: 84 BPM
BASOPHILS # BLD: 0 K/UL (ref 0–0.1)
BASOPHILS NFR BLD: 0 % (ref 0–2)
BILIRUB SERPL-MCNC: 1.1 MG/DL (ref 0.2–1)
BUN SERPL-MCNC: 25 MG/DL (ref 7–18)
BUN/CREAT SERPL: 16 (ref 12–20)
CALCIUM SERPL-MCNC: 8.7 MG/DL (ref 8.5–10.1)
CALCULATED P AXIS, ECG09: 38 DEGREES
CALCULATED R AXIS, ECG10: 32 DEGREES
CALCULATED T AXIS, ECG11: 46 DEGREES
CHLORIDE SERPL-SCNC: 106 MMOL/L (ref 100–108)
CO2 SERPL-SCNC: 28 MMOL/L (ref 21–32)
CREAT SERPL-MCNC: 1.53 MG/DL (ref 0.6–1.3)
DIAGNOSIS, 93000: NORMAL
DIFFERENTIAL METHOD BLD: ABNORMAL
EOSINOPHIL # BLD: 0.4 K/UL (ref 0–0.4)
EOSINOPHIL NFR BLD: 7 % (ref 0–5)
ERYTHROCYTE [DISTWIDTH] IN BLOOD BY AUTOMATED COUNT: 12.9 % (ref 11.6–14.5)
GLOBULIN SER CALC-MCNC: 2.9 G/DL (ref 2–4)
GLUCOSE SERPL-MCNC: 228 MG/DL (ref 74–99)
HCT VFR BLD AUTO: 45.7 % (ref 36–48)
HGB BLD-MCNC: 15.6 G/DL (ref 13–16)
LYMPHOCYTES # BLD: 1.5 K/UL (ref 0.9–3.6)
LYMPHOCYTES NFR BLD: 29 % (ref 21–52)
MCH RBC QN AUTO: 31.1 PG (ref 24–34)
MCHC RBC AUTO-ENTMCNC: 34.1 G/DL (ref 31–37)
MCV RBC AUTO: 91 FL (ref 74–97)
MONOCYTES # BLD: 0.3 K/UL (ref 0.05–1.2)
MONOCYTES NFR BLD: 7 % (ref 3–10)
NEUTS SEG # BLD: 2.9 K/UL (ref 1.8–8)
NEUTS SEG NFR BLD: 57 % (ref 40–73)
P-R INTERVAL, ECG05: 150 MS
PLATELET # BLD AUTO: 172 K/UL (ref 135–420)
PMV BLD AUTO: 10.9 FL (ref 9.2–11.8)
POTASSIUM SERPL-SCNC: 4.1 MMOL/L (ref 3.5–5.5)
PROT SERPL-MCNC: 6.6 G/DL (ref 6.4–8.2)
Q-T INTERVAL, ECG07: 362 MS
QRS DURATION, ECG06: 76 MS
QTC CALCULATION (BEZET), ECG08: 427 MS
RBC # BLD AUTO: 5.02 M/UL (ref 4.7–5.5)
SODIUM SERPL-SCNC: 138 MMOL/L (ref 136–145)
VENTRICULAR RATE, ECG03: 84 BPM
WBC # BLD AUTO: 5.1 K/UL (ref 4.6–13.2)

## 2019-06-21 PROCEDURE — 93005 ELECTROCARDIOGRAM TRACING: CPT

## 2019-06-21 PROCEDURE — 85025 COMPLETE CBC W/AUTO DIFF WBC: CPT

## 2019-06-21 PROCEDURE — 80053 COMPREHEN METABOLIC PANEL: CPT

## 2019-06-21 PROCEDURE — 36415 COLL VENOUS BLD VENIPUNCTURE: CPT

## 2019-06-27 ENCOUNTER — OFFICE VISIT (OUTPATIENT)
Dept: ORTHOPEDIC SURGERY | Age: 74
End: 2019-06-27

## 2019-06-27 VITALS
TEMPERATURE: 98.1 F | SYSTOLIC BLOOD PRESSURE: 124 MMHG | BODY MASS INDEX: 32.2 KG/M2 | DIASTOLIC BLOOD PRESSURE: 80 MMHG | WEIGHT: 175 LBS | HEIGHT: 62 IN | HEART RATE: 99 BPM | OXYGEN SATURATION: 93 %

## 2019-06-27 DIAGNOSIS — R22.32 MASS OF LEFT FINGER: Primary | ICD-10-CM

## 2019-06-27 NOTE — LETTER
Patient: Sloan Moody PROCEDURE: Excision Left Thumb Mass PRE OPERATIVE INSTRUCTIONS: 
Five (5) days prior to surgery STOP taking any hormonal medications, aspirin and/or anti-inflammatory medications. If you are taking blood thinner medication (such as Coumadin, Plavix, Heparin or others) you will need special instructions from the prescribing physician. Surgery Date: 7/15/19  Time: 2:00pm 
Report to Neeraj Rico on the First Floor Admitting at: 12:30pm 
 
THE DAY OF SURGERY:  
      1. Do not eat, chew gum or drink anything after Midnight prior to the date of your surgery. 2. Take your regular medications with small sips of water unless otherwise instructed. (This means blood pressure and/or heart medicine) If you are insulin dependent, bring your insulin with you, unless otherwise instructed. 3. Bring a list of your medications and the dosage to the hospital including  vitamins. 4. Do not wear nail polish, make-up, jewelry, perfumes or Skin Creams. 5. Do not bring valuables or money to the hospital. 
      6. Must have a responsible adult to accompany you and stay during your surgery so they may drive you home following your surgery and stay with you 24 hours after surgery. Post op visit  appointment is scheduled with Dr. Rachael Crews   
   on 7/29/19 @ 10:10am at the [facility] office. Ting Beckman, Surgery Scheduler  [phone]

## 2019-06-27 NOTE — LETTER
[facility] Request Form for the Operating Room at [facility] Fax to [phone]  Telephone: [phone] or [phone] To be completed by Physician Office: 
 
Date: 2019    Requested by: Glen Odonnell Phone No: [phone]  Fax No:  [phone] Surgery Date: 7/15/19   Requested Time: Third Case Surgeon: Dr. Pérez Tripp, DO  Assistant/2nd Surgeon: Geeta REYES 
 
CPT CODE Procedure 99870 Excision Mass Left Thumb ICD10 code(s): Left Thumb Mass R22.32 Patient Information: 
 
Name: Maurice Martinez SSN: [id number]  : 1945   Male/Female: male Home Phone No: [phone] (home) Primary Insurance: Medicare  Insurance Policy Number: [id number] Allergies: Allergies Allergen Reactions  Norvasc [Amlodipine] Itching and Other (comments)  Sulfa (Sulfonamide Antibiotics) Other (comments) Patient states he is not allergic  Watermelon Other (comments) Admission:  Outpatient Anesthesia Type General, Local, MAC Comments/Special Equipment and/or : Hand Table

## 2019-06-27 NOTE — PROGRESS NOTES
Chasity Jacinto is a 74 y.o. male right handed retiree. Worker's Compensation and legal considerations: not known. Vitals:    06/27/19 1100   BP: 124/80   Pulse: 99   Temp: 98.1 °F (36.7 °C)   TempSrc: Oral   SpO2: 93%   Weight: 175 lb (79.4 kg)   Height: 5' 2\" (1.575 m)   PainSc:   0 - No pain   PainLoc: Finger           Chief Complaint   Patient presents with    Thumb Pain     left       HPI: Patient comes in today for preoperative history and physical regarding his left thumb mass excision that is scheduled for my review [address] on July 9. He is concerned about going under general anesthesia and would prefer not to be fully put to sleep. He would also like to have the surgery at [facility] surgery center. Initial HPI: He denies any specific injuries. Patient comes in today with complaints of left thumb mass that is tender to palpation. Date of onset: Indeterminate    Injury: No    Prior Treatment:  No    Numbness/ Tingling: No    ROS: Review of Systems - General ROS: negative  Respiratory ROS: no cough, shortness of breath, or wheezing  Cardiovascular ROS: no chest pain or dyspnea on exertion  Musculoskeletal ROS: positive for - pain in thumb - left  Neurological ROS: negative  Dermatological ROS: negative    Past Medical History:   Diagnosis Date    Arthritis     CRI (chronic renal insufficiency)     Diabetes mellitus type II 5/13/10    Gout     Hypercholesteremia     Hypertension     Other ill-defined conditions(799.89)     gout    Prostate cancer ([facility] [address] 2008    remission    Splenic artery aneurysm ([facility] [address] 2013    s/p stent dr Coon Husbands prn ff-up       Past Surgical History:   Procedure Laterality Date    HX KNEE REPLACEMENT  1/17/12    Left; Dr. Alonso Gilman HX PROSTATECTOMY  12/2007       Current Outpatient Medications   Medication Sig Dispense Refill    simvastatin (ZOCOR) 40 mg tablet Take 1 Tab by mouth nightly.  90 Tab 3    lisinopril (PRINIVIL, ZESTRIL) 20 mg tablet Take 1 Tab by mouth two (2) times a day. 180 Tab 2    SITagliptin (JANUVIA) 100 mg tablet Take 1 Tab by mouth daily. 90 Tab 3    pioglitazone (ACTOS) 15 mg tablet Take one tablet once daily 90 Tab 3    Blood-Glucose Meter monitoring kit Free Style monitoring Kit. Check glucose fasting daily. 1 Kit 0    glucose blood VI test strips (ASCENSIA AUTODISC VI, ONE TOUCH ULTRA TEST VI) strip Free Style. Check fasting glucose daily 100 Strip 3    Lancets [facility] THIN) misc As directed once daily. 3 Package 3    colchicine 0.6 mg tablet Take 1 Tab by mouth daily. 90 Tab 1    hydrocortisone (HYTONE) 2.5 % topical cream Apply  to affected area two (2) times a day. use thin layer 60 g 2    glipiZIDE SR (GLUCOTROL XL) 10 mg CR tablet Take 1 Tab by mouth daily. 180 Tab 2    glucose blood VI test strips (PRECISION XTRA TEST) strip Check glucose level once daily 100 Strip 11    alcohol swabs (ALCOHOL PADS) padm Use as directed daily. 200 Pad 1    aspirin 81 mg chewable tablet Take 1 Tab by mouth daily. 90 Tab 4    Blood-Glucose Meter (BLOOD GLUCOSE MONITOR KIT) monitoring kit by Does Not Apply route. Once daily. 1 Kit 0       Allergies   Allergen Reactions    Norvasc [Amlodipine] Itching and Other (comments)    Sulfa (Sulfonamide Antibiotics) Other (comments)     Patient states he is not allergic    Watermelon Other (comments)         PE:     Left Thumb: There is a 5 mm mass located at the pulp of the thumb. It is mobile semifirm and mildly tender to palpation. Imaging:     Plain films of the left thumb shows mild to moderate degenerative changes with a volar osteophyte. There is no osseous abnormality in the location of the mass. ICD-10-CM ICD-9-CM    1. Mass of left finger R22.32 782.2        Plan:     Plan for excision of mass from left thumb, will reschedule to [facility] on July 15.     Medical clearance pending    Follow-up for postop      Plan was reviewed with patient, who verbalized agreement and understanding of the plan

## 2019-07-02 ENCOUNTER — TELEPHONE (OUTPATIENT)
Dept: FAMILY MEDICINE CLINIC | Age: 74
End: 2019-07-02

## 2019-07-02 NOTE — TELEPHONE ENCOUNTER
Pt is having a left thumb mass removal on July 15,  2019 at 2:30PM with Dr. Madelyn Sow at [facility]. Benita Coffey wants to know if the labs the pt had drawn on 6-21-19 can be used for a pre operation clearance.  Benita Coffey with [facility] can be reached at [phone]

## 2019-07-02 NOTE — TELEPHONE ENCOUNTER
Patient had pre op labs and EKG done 06/21/2019. Please advise.  Patients las office visit 05/30/2019 with Dr. Reinaldo Basilio

## 2019-07-02 NOTE — PROGRESS NOTES
Ortho office requesting clearance, pt was seen for routine visit 5/30/2019 without complaints  He has upcoming finger mass excision  Reviewed labs/EKG-wnl  Hold glipizide if pt is NPO  Hold ASA 5-7 days prior to procedure  Low risk for procedure

## 2019-07-15 ENCOUNTER — HOSPITAL ENCOUNTER (OUTPATIENT)
Dept: LAB | Age: 74
Discharge: HOME | End: 2019-07-15
Payer: MEDICARE

## 2019-07-15 PROCEDURE — 88304 TISSUE EXAM BY PATHOLOGIST: CPT

## 2019-07-16 ENCOUNTER — TELEPHONE (OUTPATIENT)
Dept: ORTHOPEDIC SURGERY | Age: 74
End: 2019-07-16

## 2019-07-16 NOTE — TELEPHONE ENCOUNTER
Post op patient called in requesting to know who is going to change his dressing between now and his next appt on 07/29/19. Patient can be reached at [phone].

## 2019-07-17 NOTE — TELEPHONE ENCOUNTER
Patient aware to keep dressing on until follow up as long as its clean and dry, I explained if it gets dirty or comes off he needs to cover with bandaids and always keep dry, as per Dr. Rocio Tomlin.

## 2019-07-29 ENCOUNTER — OFFICE VISIT (OUTPATIENT)
Dept: ORTHOPEDIC SURGERY | Age: 74
End: 2019-07-29

## 2019-07-29 VITALS
HEIGHT: 62 IN | HEART RATE: 80 BPM | WEIGHT: 175 LBS | DIASTOLIC BLOOD PRESSURE: 89 MMHG | RESPIRATION RATE: 11 BRPM | OXYGEN SATURATION: 96 % | SYSTOLIC BLOOD PRESSURE: 140 MMHG | BODY MASS INDEX: 32.2 KG/M2 | TEMPERATURE: 96.9 F

## 2019-07-29 DIAGNOSIS — L72.0 EPIDERMAL INCLUSION CYST: Primary | ICD-10-CM

## 2019-07-29 DIAGNOSIS — R22.32 MASS OF LEFT FINGER: ICD-10-CM

## 2019-07-29 NOTE — PROGRESS NOTES
Kurt Patricia is a 74 y.o. male right handed retiree. Worker's Compensation and legal considerations: not known. Vitals:    07/29/19 1004   BP: 140/89   Pulse: 80   Resp: 11   Temp: 96.9 °F (36.1 °C)   TempSrc: Oral   SpO2: 96%   Weight: 175 lb (79.4 kg)   Height: 5' 2\" (1.575 m)   PainSc:   0 - No pain   PainLoc: Finger           Chief Complaint   Patient presents with    Thumb Pain     LEFT THUMB PAIN       HPI: Patient comes in today for her first postoperative appointment 2 weeks status post left thumb mass excision. He reports he is having minimal pain and doing well. He denies any numbness or tingling. Preop HPI: Patient comes in today for preoperative history and physical regarding his left thumb mass excision that is scheduled for my review [address] on July 9. He is concerned about going under general anesthesia and would prefer not to be fully put to sleep. He would also like to have the surgery at [facility] surgery center. Initial HPI: He denies any specific injuries. Patient comes in today with complaints of left thumb mass that is tender to palpation.     Date of onset: Indeterminate    Injury: No    Prior Treatment:  No    Numbness/ Tingling: No    ROS: Review of Systems - General ROS: negative  Respiratory ROS: no cough, shortness of breath, or wheezing  Cardiovascular ROS: no chest pain or dyspnea on exertion  Musculoskeletal ROS: positive for - pain in thumb - left  Neurological ROS: negative  Dermatological ROS: negative    Past Medical History:   Diagnosis Date    Arthritis     CRI (chronic renal insufficiency)     Diabetes mellitus type II 5/13/10    Gout     Hypercholesteremia     Hypertension     Other ill-defined conditions(799.89)     gout    Prostate cancer ([facility] [address] 2008    remission    Splenic artery aneurysm ([facility] [address] 2013    s/p stent dr Nataly Guzmán prn ff-up       Past Surgical History:   Procedure Laterality Date    HX KNEE REPLACEMENT  1/17/12    Left;  Hallie Si HX PROSTATECTOMY  12/2007       Current Outpatient Medications   Medication Sig Dispense Refill    simvastatin (ZOCOR) 40 mg tablet Take 1 Tab by mouth nightly. 90 Tab 3    lisinopril (PRINIVIL, ZESTRIL) 20 mg tablet Take 1 Tab by mouth two (2) times a day. 180 Tab 2    SITagliptin (JANUVIA) 100 mg tablet Take 1 Tab by mouth daily. 90 Tab 3    pioglitazone (ACTOS) 15 mg tablet Take one tablet once daily 90 Tab 3    Blood-Glucose Meter monitoring kit Free Style monitoring Kit. Check glucose fasting daily. 1 Kit 0    glucose blood VI test strips (ASCENSIA AUTODISC VI, ONE TOUCH ULTRA TEST VI) strip Free Style. Check fasting glucose daily 100 Strip 3    Lancets [facility] THIN) misc As directed once daily. 3 Package 3    colchicine 0.6 mg tablet Take 1 Tab by mouth daily. 90 Tab 1    hydrocortisone (HYTONE) 2.5 % topical cream Apply  to affected area two (2) times a day. use thin layer 60 g 2    glipiZIDE SR (GLUCOTROL XL) 10 mg CR tablet Take 1 Tab by mouth daily. 180 Tab 2    glucose blood VI test strips (PRECISION XTRA TEST) strip Check glucose level once daily 100 Strip 11    alcohol swabs (ALCOHOL PADS) padm Use as directed daily. 200 Pad 1    aspirin 81 mg chewable tablet Take 1 Tab by mouth daily. 90 Tab 4    Blood-Glucose Meter (BLOOD GLUCOSE MONITOR KIT) monitoring kit by Does Not Apply route. Once daily. 1 Kit 0       Allergies   Allergen Reactions    Norvasc [Amlodipine] Itching and Other (comments)    Sulfa (Sulfonamide Antibiotics) Other (comments)     Patient states he is not allergic    Watermelon Other (comments)         PE:     Left Thumb: Incision clean dry and intact no evidence of breakdown erythema drainage or other signs of infection. The thumb is minimally tender to palpation and sensation is intact distally. Cap refill is brisk. Pathology:  BENIGN EPIDERMAL INCLUSION CYST.      Imaging:     Plain films of the left thumb shows mild to moderate degenerative changes with a volar osteophyte. There is no osseous abnormality in the location of the mass. ICD-10-CM ICD-9-CM    1. Epidermal inclusion cyst L72.0 706.2    2. Mass of left finger R22.32 782.2        Plan:     I have instructed the patient on scar massage and range of motion exercises.     Follow-up in 4 weeks for reevaluation    Plan was reviewed with patient, who verbalized agreement and understanding of the plan

## 2019-07-29 NOTE — PROGRESS NOTES
1. Have you been to the ER, urgent care clinic since your last visit? Hospitalized since your last visit? No    2. Have you seen or consulted any other health care providers outside of the [address] [place] [address] since your last visit? Include any pap smears or colon screening.  No

## 2019-08-02 ENCOUNTER — OFFICE VISIT (OUTPATIENT)
Dept: FAMILY MEDICINE CLINIC | Age: 74
End: 2019-08-02

## 2019-08-02 VITALS
HEART RATE: 87 BPM | BODY MASS INDEX: 31.91 KG/M2 | DIASTOLIC BLOOD PRESSURE: 76 MMHG | TEMPERATURE: 98.6 F | OXYGEN SATURATION: 98 % | RESPIRATION RATE: 16 BRPM | SYSTOLIC BLOOD PRESSURE: 122 MMHG | WEIGHT: 173.4 LBS | HEIGHT: 62 IN

## 2019-08-02 DIAGNOSIS — E11.21 TYPE 2 DIABETES WITH NEPHROPATHY (HCC): ICD-10-CM

## 2019-08-02 DIAGNOSIS — I10 ESSENTIAL HYPERTENSION: ICD-10-CM

## 2019-08-02 DIAGNOSIS — N18.30 CHRONIC RENAL IMPAIRMENT, STAGE 3 (MODERATE) (HCC): ICD-10-CM

## 2019-08-02 DIAGNOSIS — J20.9 ACUTE BRONCHITIS, UNSPECIFIED ORGANISM: Primary | ICD-10-CM

## 2019-08-02 RX ORDER — BENZONATATE 100 MG/1
100 CAPSULE ORAL
Qty: 30 CAP | Refills: 0 | Status: SHIPPED | OUTPATIENT
Start: 2019-08-02 | End: 2019-08-09

## 2019-08-02 RX ORDER — DOXYCYCLINE 100 MG/1
100 TABLET ORAL 2 TIMES DAILY
Qty: 20 TAB | Refills: 0 | Status: SHIPPED | OUTPATIENT
Start: 2019-08-02 | End: 2019-08-12

## 2019-08-02 NOTE — PROGRESS NOTES
1. Have you been to the ER, urgent care clinic since your last visit? Hospitalized since your last visit? No    2. Have you seen or consulted any other health care providers outside of the [address] [place] [address] since your last visit? Include any pap smears or colon screening.  No    Chief Complaint   Patient presents with    Cough     x 1 week - feels like it's stuck

## 2019-08-02 NOTE — PATIENT INSTRUCTIONS
Bronchitis: Care Instructions  Your Care Instructions    Bronchitis is inflammation of the bronchial tubes, which carry air to the lungs. The tubes swell and produce mucus, or phlegm. The mucus and inflamed bronchial tubes make you cough. You may have trouble breathing. Most cases of bronchitis are caused by viruses like those that cause colds. Antibiotics usually do not help and they may be harmful. Bronchitis usually develops rapidly and lasts about 2 to 3 weeks in otherwise healthy people. Follow-up care is a key part of your treatment and safety. Be sure to make and go to all appointments, and call your doctor if you are having problems. It's also a good idea to know your test results and keep a list of the medicines you take. How can you care for yourself at home? · Take all medicines exactly as prescribed. Call your doctor if you think you are having a problem with your medicine. · Get some extra rest.  · Take an over-the-counter pain medicine, such as acetaminophen (Tylenol), ibuprofen (Advil, Motrin), or naproxen (Aleve) to reduce fever and relieve body aches. Read and follow all instructions on the label. · Do not take two or more pain medicines at the same time unless the doctor told you to. Many pain medicines have acetaminophen, which is Tylenol. Too much acetaminophen (Tylenol) can be harmful. · Take an over-the-counter cough medicine that contains dextromethorphan to help quiet a dry, hacking cough so that you can sleep. Avoid cough medicines that have more than one active ingredient. Read and follow all instructions on the label. · Breathe moist air from a humidifier, hot shower, or sink filled with hot water. The heat and moisture will thin mucus so you can cough it out. · Do not smoke. Smoking can make bronchitis worse. If you need help quitting, talk to your doctor about stop-smoking programs and medicines. These can increase your chances of quitting for good.   When should you call for help? Call 911 anytime you think you may need emergency care. For example, call if:    · You have severe trouble breathing.    Call your doctor now or seek immediate medical care if:    · You have new or worse trouble breathing.     · You cough up dark brown or bloody mucus (sputum).     · You have a new or higher fever.     · You have a new rash.    Watch closely for changes in your health, and be sure to contact your doctor if:    · You cough more deeply or more often, especially if you notice more mucus or a change in the color of your mucus.     · You are not getting better as expected. Where can you learn more? Go to http://arti-ava.info/. Enter H333 in the search box to learn more about \"Bronchitis: Care Instructions. \"  Current as of: September 5, 2018  Content Version: 12.1  © 6114-9906 [employer]. Care instructions adapted under license by [employer] (which disclaims liability or warranty for this information). If you have questions about a medical condition or this instruction, always ask your healthcare professional. [address] any warranty or liability for your use of this information.

## 2019-08-02 NOTE — PROGRESS NOTES
Deanna Tillman, 74 y.o.,  male    SUBJECTIVE  Cough x 1 week    C/o chest congestion, productive cough but difficult to expectorate. No fever, sob or wheezing, nasal congestion, sinus pressure. says gradually worsening for the past week. Has tried mucinex without much improvement. He has DM that is fairly well controlled, a1c 7.1 in May, he also has HTN and CKD stage 3. He is non smoker. He has few med intolerances. No known sick contacts    Recent hand cyst excision, recovered well. ROS:  See HPI, all others negative        Patient Active Problem List   Diagnosis Code    Prostate cancer ([facility] [address] C61    DJD (degenerative joint disease) of knee M17.10    CRI (chronic renal insufficiency) N18.9    Splenic artery aneurysm (HCC) I72.8    Essential hypertension I10    Pure hypercholesterolemia E78.00    Gout without tophus M10.9    Advance directive discussed with patient Z71.89    BMI 30.0-30.9,adult Z68.30    Type 2 diabetes with nephropathy (HCC) E11.21       Current Outpatient Medications   Medication Sig Dispense Refill    doxycycline (ADOXA) 100 mg tablet Take 1 Tab by mouth two (2) times a day for 10 days. 20 Tab 0    benzonatate (TESSALON PERLES) 100 mg capsule Take 1 Cap by mouth three (3) times daily as needed for Cough for up to 7 days. 30 Cap 0    simvastatin (ZOCOR) 40 mg tablet Take 1 Tab by mouth nightly. 90 Tab 3    lisinopril (PRINIVIL, ZESTRIL) 20 mg tablet Take 1 Tab by mouth two (2) times a day. 180 Tab 2    SITagliptin (JANUVIA) 100 mg tablet Take 1 Tab by mouth daily. 90 Tab 3    pioglitazone (ACTOS) 15 mg tablet Take one tablet once daily 90 Tab 3    Blood-Glucose Meter monitoring kit Free Style monitoring Kit. Check glucose fasting daily. 1 Kit 0    glucose blood VI test strips (ASCENSIA AUTODISC VI, ONE TOUCH ULTRA TEST VI) strip Free Style. Check fasting glucose daily 100 Strip 3    Lancets [facility] THIN) [facility] As directed once daily.  3 Package 3    colchicine 0.6 mg tablet Take 1 Tab by mouth daily. 90 Tab 1    hydrocortisone (HYTONE) 2.5 % topical cream Apply  to affected area two (2) times a day. use thin layer 60 g 2    glipiZIDE SR (GLUCOTROL XL) 10 mg CR tablet Take 1 Tab by mouth daily. 180 Tab 2    glucose blood VI test strips (PRECISION XTRA TEST) strip Check glucose level once daily 100 Strip 11    alcohol swabs (ALCOHOL PADS) padm Use as directed daily. 200 Pad 1    aspirin 81 mg chewable tablet Take 1 Tab by mouth daily. 90 Tab 4    Blood-Glucose Meter (BLOOD GLUCOSE MONITOR KIT) monitoring kit by Does Not Apply route. Once daily.  1 Kit 0       Allergies   Allergen Reactions    Azithromycin Palpitations    Norvasc [Amlodipine] Itching and Other (comments)    Watermelon Other (comments)       Past Medical History:   Diagnosis Date    Arthritis     CRI (chronic renal insufficiency)     Diabetes mellitus type II 5/13/10    Gout     Hypercholesteremia     Hypertension     Other ill-defined conditions(799.89)     gout    Prostate cancer ([facility] [address]     remission    Splenic artery aneurysm ([facility] [address] 2013    s/p stent dr Bonilla Machado prn ff-up       Social History     Socioeconomic History    Marital status:      Spouse name: Not on file    Number of children: Not on file    Years of education: Not on file    Highest education level: Not on file   Occupational History    Not on file   Social Needs    Financial resource strain: Not on file    Food insecurity:     Worry: Not on file     Inability: Not on file    Transportation needs:     Medical: Not on file     Non-medical: Not on file   Tobacco Use    Smoking status: Former Smoker     Packs/day: 1.00     Types: Cigarettes     Last attempt to quit: 1997     Years since quittin.0    Smokeless tobacco: Never Used    Tobacco comment:    Substance and Sexual Activity    Alcohol use: Yes     Comment: rarely    Drug use: No    Sexual activity: Never   Lifestyle    Physical activity:     Days per week: Not on file     Minutes per session: Not on file    Stress: Not on file   Relationships    Social connections:     Talks on phone: Not on file     Gets together: Not on file     Attends Restorationism service: Not on file     Active member of club or organization: Not on file     Attends meetings of clubs or organizations: Not on file     Relationship status: Not on file    Intimate partner violence:     Fear of current or ex partner: Not on file     Emotionally abused: Not on file     Physically abused: Not on file     Forced sexual activity: Not on file   Other Topics Concern    Not on file   Social History Narrative    Not on file       Family History   Problem Relation Age of Onset    Cancer Neg Hx     Diabetes Neg Hx     Heart Disease Neg Hx     Hypertension Neg Hx     Stroke Neg Hx          OBJECTIVE    Physical Exam:     Visit Vitals  /76 (BP 1 Location: Left arm, BP Patient Position: Sitting)   Pulse 87   Temp 98.6 °F (37 °C) (Oral)   Resp 16   Ht 5' 2\" (1.575 m)   Wt 173 lb 6.4 oz (78.7 kg)   SpO2 98%   BMI 31.72 kg/m²       General: alert, mildly ill-appearing in no apparent distress or pain  Head: atraumatic. Non-tender maxillary and frontal sinuses  Eyes: Lids with no discharge, no matting, conjunctivae clear and non injected, full EOMs, PERLLA  Ears: pinna non-tender, external auditory canal patent, TM intact  Mouth/throat:tonsils non enlarged, pharynx non erythematous and no lesion, nasal mucosa normal  Neck: supple, no adenopathy palpated  CVS: normal rate, regular rhythm, distinct S1 and S2  Lungs:clear to ausculation bilaterally, no crackles, wheezing or rhonchi noted  Psych:  mood and affect normal        ASSESSMENT/PLAN  Diagnoses and all orders for this visit:    1. Acute bronchitis, unspecified organism  -     doxycycline (ADOXA) 100 mg tablet; Take 1 Tab by mouth two (2) times a day for 10 days. -     benzonatate (TESSALON PERLES) 100 mg capsule;  Take 1 Cap by mouth three (3) times daily as needed for Cough for up to 7 days. 2. Type 2 diabetes with nephropathy ([facility] [address]  Fairly well controlled, a1c 7.1 in May    3. Essential hypertension  Controlled    4. Chronic renal impairment, stage 3 (moderate) ([facility])  Stable      Follow-up and Dispositions    · Return if symptoms worsen or fail to improve, for keep appointment end of august.       Patient understands plan of care. Patient has provided input and agrees with goals.

## 2019-08-23 ENCOUNTER — HOSPITAL ENCOUNTER (OUTPATIENT)
Dept: LAB | Age: 74
Discharge: HOME | End: 2019-08-23
Payer: MEDICARE

## 2019-08-23 DIAGNOSIS — E11.21 TYPE 2 DIABETES WITH NEPHROPATHY (HCC): ICD-10-CM

## 2019-08-23 LAB
ALBUMIN SERPL-MCNC: 3.6 G/DL (ref 3.4–5)
ALBUMIN/GLOB SERPL: 1.1 {RATIO} (ref 0.8–1.7)
ALP SERPL-CCNC: 55 U/L (ref 45–117)
ALT SERPL-CCNC: 36 U/L (ref 16–61)
ANION GAP SERPL CALC-SCNC: 5 MMOL/L (ref 3–18)
AST SERPL-CCNC: 19 U/L (ref 10–38)
BILIRUB SERPL-MCNC: 1.6 MG/DL (ref 0.2–1)
BUN SERPL-MCNC: 28 MG/DL (ref 7–18)
BUN/CREAT SERPL: 21 (ref 12–20)
CALCIUM SERPL-MCNC: 8.7 MG/DL (ref 8.5–10.1)
CHLORIDE SERPL-SCNC: 107 MMOL/L (ref 100–111)
CO2 SERPL-SCNC: 28 MMOL/L (ref 21–32)
CREAT SERPL-MCNC: 1.36 MG/DL (ref 0.6–1.3)
GLOBULIN SER CALC-MCNC: 3.2 G/DL (ref 2–4)
GLUCOSE SERPL-MCNC: 166 MG/DL (ref 74–99)
HBA1C MFR BLD: 7.5 % (ref 4.2–5.6)
POTASSIUM SERPL-SCNC: 3.9 MMOL/L (ref 3.5–5.5)
PROT SERPL-MCNC: 6.8 G/DL (ref 6.4–8.2)
SODIUM SERPL-SCNC: 140 MMOL/L (ref 136–145)

## 2019-08-23 PROCEDURE — 80053 COMPREHEN METABOLIC PANEL: CPT

## 2019-08-23 PROCEDURE — 36415 COLL VENOUS BLD VENIPUNCTURE: CPT

## 2019-08-23 PROCEDURE — 83036 HEMOGLOBIN GLYCOSYLATED A1C: CPT

## 2019-08-26 ENCOUNTER — OFFICE VISIT (OUTPATIENT)
Dept: ORTHOPEDIC SURGERY | Age: 74
End: 2019-08-26

## 2019-08-26 VITALS
WEIGHT: 172 LBS | OXYGEN SATURATION: 97 % | HEIGHT: 62 IN | TEMPERATURE: 97.6 F | HEART RATE: 93 BPM | BODY MASS INDEX: 31.65 KG/M2 | SYSTOLIC BLOOD PRESSURE: 136 MMHG | DIASTOLIC BLOOD PRESSURE: 79 MMHG

## 2019-08-26 DIAGNOSIS — L72.0 EPIDERMAL INCLUSION CYST: Primary | ICD-10-CM

## 2019-08-26 NOTE — PROGRESS NOTES
Matthew Salamanca is a 74 y.o. male right handed retiree. Worker's Compensation and legal considerations: not known. Vitals:    08/26/19 0946   BP: 136/79   Pulse: 93   Temp: 97.6 °F (36.4 °C)   TempSrc: Oral   SpO2: 97%   Weight: 172 lb (78 kg)   Height: 5' 2\" (1.575 m)   PainSc:   0 - No pain   PainLoc: Hand           Chief Complaint   Patient presents with    Hand Pain     LEFT THUMB       HPI: Patient comes in today for his second postoperative week status post left thumb epidermal inclusion cyst excision. 1st PO HPI: Patient comes in today for her first postoperative appointment 2 weeks status post left thumb mass excision. He reports he is having minimal pain and doing well. He denies any numbness or tingling. Preop HPI: Patient comes in today for preoperative history and physical regarding his left thumb mass excision that is scheduled for my review [address] on July 9. He is concerned about going under general anesthesia and would prefer not to be fully put to sleep. He would also like to have the surgery at [facility] surgery center. Initial HPI: He denies any specific injuries. Patient comes in today with complaints of left thumb mass that is tender to palpation.     Date of onset: Indeterminate    Injury: No    Prior Treatment:  No    Numbness/ Tingling: No    ROS: Review of Systems - General ROS: negative  Respiratory ROS: no cough, shortness of breath, or wheezing  Cardiovascular ROS: no chest pain or dyspnea on exertion  Musculoskeletal ROS: positive for - pain in thumb - left  Neurological ROS: negative  Dermatological ROS: negative    Past Medical History:   Diagnosis Date    Arthritis     CRI (chronic renal insufficiency)     Diabetes mellitus type II 5/13/10    Gout     Hypercholesteremia     Hypertension     Other ill-defined conditions(799.89)     gout    Prostate cancer ([facility] [address] [facility] 2008    remission    Splenic artery aneurysm ([facility] 2013    s/p stent dr Khris Grant prn ff-up Past Surgical History:   Procedure Laterality Date    HX KNEE REPLACEMENT  1/17/12    Left; Dr. Abernathy Chimera HX PROSTATECTOMY  12/2007       Current Outpatient Medications   Medication Sig Dispense Refill    simvastatin (ZOCOR) 40 mg tablet Take 1 Tab by mouth nightly. 90 Tab 3    lisinopril (PRINIVIL, ZESTRIL) 20 mg tablet Take 1 Tab by mouth two (2) times a day. 180 Tab 2    SITagliptin (JANUVIA) 100 mg tablet Take 1 Tab by mouth daily. 90 Tab 3    pioglitazone (ACTOS) 15 mg tablet Take one tablet once daily 90 Tab 3    Blood-Glucose Meter monitoring kit Free Style monitoring Kit. Check glucose fasting daily. 1 Kit 0    glucose blood VI test strips (ASCENSIA AUTODISC VI, ONE TOUCH ULTRA TEST VI) strip Free Style. Check fasting glucose daily 100 Strip 3    Lancets [facility] THIN) misc As directed once daily. 3 Package 3    colchicine 0.6 mg tablet Take 1 Tab by mouth daily. 90 Tab 1    hydrocortisone (HYTONE) 2.5 % topical cream Apply  to affected area two (2) times a day. use thin layer 60 g 2    glipiZIDE SR (GLUCOTROL XL) 10 mg CR tablet Take 1 Tab by mouth daily. 180 Tab 2    glucose blood VI test strips (PRECISION XTRA TEST) strip Check glucose level once daily 100 Strip 11    alcohol swabs (ALCOHOL PADS) padm Use as directed daily. 200 Pad 1    aspirin 81 mg chewable tablet Take 1 Tab by mouth daily. 90 Tab 4    Blood-Glucose Meter (BLOOD GLUCOSE MONITOR KIT) monitoring kit by Does Not Apply route. Once daily. 1 Kit 0       Allergies   Allergen Reactions    Azithromycin Palpitations    Norvasc [Amlodipine] Itching and Other (comments)    Watermelon Other (comments)         PE:     Left Thumb: Incision has healed and there is no tenderness to palpation. Range of motion is full. Sensation is intact distally and cap refill is brisk. Pathology:  BENIGN EPIDERMAL INCLUSION CYST.      Imaging:     Plain films of the left thumb shows mild to moderate degenerative changes with a volar osteophyte. There is no osseous abnormality in the location of the mass. ICD-10-CM ICD-9-CM    1.  Epidermal inclusion cyst L72.0 706.2        Plan:     Continue scar massage and follow-up PRN    Plan was reviewed with patient, who verbalized agreement and understanding of the plan

## 2019-08-30 ENCOUNTER — OFFICE VISIT (OUTPATIENT)
Dept: FAMILY MEDICINE CLINIC | Age: 74
End: 2019-08-30

## 2019-08-30 VITALS
HEIGHT: 62 IN | HEART RATE: 93 BPM | RESPIRATION RATE: 16 BRPM | TEMPERATURE: 98.2 F | WEIGHT: 174.4 LBS | OXYGEN SATURATION: 97 % | BODY MASS INDEX: 32.09 KG/M2 | DIASTOLIC BLOOD PRESSURE: 70 MMHG | SYSTOLIC BLOOD PRESSURE: 116 MMHG

## 2019-08-30 DIAGNOSIS — I72.8 SPLENIC ARTERY ANEURYSM (HCC): ICD-10-CM

## 2019-08-30 DIAGNOSIS — I10 ESSENTIAL HYPERTENSION: ICD-10-CM

## 2019-08-30 DIAGNOSIS — N18.30 CHRONIC RENAL IMPAIRMENT, STAGE 3 (MODERATE) (HCC): ICD-10-CM

## 2019-08-30 DIAGNOSIS — M10.9 GOUT WITHOUT TOPHUS: ICD-10-CM

## 2019-08-30 DIAGNOSIS — E11.21 TYPE 2 DIABETES WITH NEPHROPATHY (HCC): Primary | ICD-10-CM

## 2019-08-30 DIAGNOSIS — E78.00 PURE HYPERCHOLESTEROLEMIA: ICD-10-CM

## 2019-08-30 RX ORDER — LISINOPRIL 20 MG/1
20 TABLET ORAL 2 TIMES DAILY
Qty: 180 TAB | Refills: 2 | Status: SHIPPED | OUTPATIENT
Start: 2019-08-30 | End: 2019-12-07 | Stop reason: SDUPTHER

## 2019-08-30 RX ORDER — GLIPIZIDE 10 MG/1
10 TABLET, FILM COATED, EXTENDED RELEASE ORAL DAILY
Qty: 180 TAB | Refills: 2 | Status: SHIPPED | OUTPATIENT
Start: 2019-08-30 | End: 2020-08-06 | Stop reason: SDUPTHER

## 2019-08-30 NOTE — PROGRESS NOTES
1. Have you been to the ER, urgent care clinic since your last visit? Hospitalized since your last visit? No    2. Have you seen or consulted any other health care providers outside of the [address] [place] [address] since your last visit? Include any pap smears or colon screening.  No    Chief Complaint   Patient presents with    Diabetes    Hypertension    Other     CRI

## 2019-08-30 NOTE — PROGRESS NOTES
Lula Walker, 74 y.o.,  male    SUBJECTIVE  Routine ff-up    DM w/ nephropathy-   Taking actos, [place] and [place] and JENKINS. He is off metformin due to renal function (CKD 3). Reports -140's. Reports isolated reading of 200's during his hand surgery. He is less active the past few months he says. HTN/HL- taking medications, denies cp, sob. Gout-  rare flares, related to eating peanuts or some [place] delicacies. no recent flare ups, colchicine prn effective    Splenic artery stenosis (s/p repair 2015)    Hand cyst excision 7/19- benign epidermal inclusion cyst on pathology    ROS:  See HPI, all others negative        Patient Active Problem List   Diagnosis Code    Prostate cancer ([facility] C61    DJD (degenerative joint disease) of knee M17.10    CRI (chronic renal insufficiency) N18.9    Splenic artery aneurysm (HCC) I72.8    Essential hypertension I10    Pure hypercholesterolemia E78.00    Gout without tophus M10.9    Advance directive discussed with patient Z71.89    BMI 30.0-30.9,adult Z68.30    Type 2 diabetes with nephropathy (HCC) E11.21       Current Outpatient Medications   Medication Sig Dispense Refill    glipiZIDE SR (GLUCOTROL XL) 10 mg CR tablet Take 1 Tab by mouth daily. 180 Tab 2    lisinopril (PRINIVIL, ZESTRIL) 20 mg tablet Take 1 Tab by mouth two (2) times a day. 180 Tab 2    simvastatin (ZOCOR) 40 mg tablet Take 1 Tab by mouth nightly. 90 Tab 3    SITagliptin (JANUVIA) 100 mg tablet Take 1 Tab by mouth daily. 90 Tab 3    pioglitazone (ACTOS) 15 mg tablet Take one tablet once daily 90 Tab 3    Blood-Glucose Meter monitoring kit Free Style monitoring Kit. Check glucose fasting daily. 1 Kit 0    glucose blood VI test strips (ASCENSIA AUTODISC VI, ONE TOUCH ULTRA TEST VI) strip Free Style. Check fasting glucose daily 100 Strip 3    Lancets [facility] THIN) mis As directed once daily. 3 Package 3    colchicine 0.6 mg tablet Take 1 Tab by mouth daily.  90 Tab 1    hydrocortisone (HYTONE) 2.5 % topical cream Apply  to affected area two (2) times a day. use thin layer 60 g 2    glucose blood VI test strips (PRECISION XTRA TEST) strip Check glucose level once daily 100 Strip 11    alcohol swabs (ALCOHOL PADS) padm Use as directed daily. 200 Pad 1    aspirin 81 mg chewable tablet Take 1 Tab by mouth daily. 90 Tab 4    Blood-Glucose Meter (BLOOD GLUCOSE MONITOR KIT) monitoring kit by Does Not Apply route. Once daily.  1 Kit 0       Allergies   Allergen Reactions    Azithromycin Palpitations    Norvasc [Amlodipine] Itching and Other (comments)    Watermelon Other (comments)       Past Medical History:   Diagnosis Date    Arthritis     CRI (chronic renal insufficiency)     Diabetes mellitus type II 5/13/10    Gout     Hypercholesteremia     Hypertension     Other ill-defined conditions(799.89)     gout    Prostate cancer ([facility] [address]     remission    Spl[facility] artery aneurysm ([facility] 2013    s/p stent dr Sameer Deleon prn ff-up       Social History     Socioeconomic History    Marital status:      Spouse name: Not on file    Number of children: Not on file    Years of education: Not on file    Highest education level: Not on file   Occupational History    Not on file   Social Needs    Financial resource strain: Not on file    Food insecurity:     Worry: Not on file     Inability: Not on file    Transportation needs:     Medical: Not on file     Non-medical: Not on file   Tobacco Use    Smoking status: Former Smoker     Packs/day: 1.00     Types: Cigarettes     Last attempt to quit: 1997     Years since quittin.1    Smokeless tobacco: Never Used    Tobacco comment:    Substance and Sexual Activity    Alcohol use: Yes     Comment: rarely    Drug use: No    Sexual activity: Never   Lifestyle    Physical activity:     Days per week: Not on file     Minutes per session: Not on file    Stress: Not on file   Relationships    Social connections: Talks on phone: Not on file     Gets together: Not on file     Attends [employer] service: Not on file     Active member of club or organization: Not on file     Attends meetings of clubs or organizations: Not on file     Relationship status: Not on file    Intimate partner violence:     Fear of current or ex partner: Not on file     Emotionally abused: Not on file     Physically abused: Not on file     Forced sexual activity: Not on file   Other Topics Concern    Not on file   Social History Narrative    Not on file       Family History   Problem Relation Age of Onset    Cancer Neg Hx     Diabetes Neg Hx     Heart Disease Neg Hx     Hypertension Neg Hx     Stroke Neg Hx          OBJECTIVE    Physical Exam:     Visit Vitals  /70 (BP 1 Location: Left arm, BP Patient Position: Sitting)   Pulse 93   Temp 98.2 °F (36.8 °C) (Oral)   Resp 16   Ht 5' 2\" (1.575 m)   Wt 174 lb 6.4 oz (79.1 kg)   SpO2 97%   BMI 31.90 kg/m²       General: alert, well-appearing, in no apparent distress or pain  CVS: normal rate, regular rhythm, distinct S1 and S2  Lungs:clear to ausculation bilaterally, no crackles, wheezing or rhonchi noted  Abdomen: soft, NT, ND. Psych:  mood and affect normal    Results for orders placed or performed during the hospital encounter of 13/55/88   METABOLIC PANEL, COMPREHENSIVE   Result Value Ref Range    Sodium 140 136 - 145 mmol/L    Potassium 3.9 3.5 - 5.5 mmol/L    Chloride 107 100 - 111 mmol/L    CO2 28 21 - 32 mmol/L    Anion gap 5 3.0 - 18 mmol/L    Glucose 166 (H) 74 - 99 mg/dL    BUN 28 (H) 7.0 - 18 MG/DL    Creatinine 1.36 (H) 0.6 - 1.3 MG/DL    BUN/Creatinine ratio 21 (H) 12 - 20      GFR est AA >60 >60 ml/min/1.73m2    GFR est non-AA 51 (L) >60 ml/min/1.73m2    Calcium 8.7 8.5 - 10.1 MG/DL    Bilirubin, total 1.6 (H) 0.2 - 1.0 MG/DL    ALT (SGPT) 36 16 - 61 U/L    AST (SGOT) 19 10 - 38 U/L    Alk.  phosphatase 55 45 - 117 U/L    Protein, total 6.8 6.4 - 8.2 g/dL    Albumin 3.6 3.4 - 5.0 g/dL    Globulin 3.2 2.0 - 4.0 g/dL    A-G Ratio 1.1 0.8 - 1.7     HEMOGLOBIN A1C W/O EAG   Result Value Ref Range    Hemoglobin A1c 7.5 (H) 4.2 - 5.6 %           ASSESSMENT/PLAN  Jael Jackson was seen today for diabetes, hypertension, gout and results. Diagnoses and all orders for this visit:    Type 2 diabetes mellitus with [facility]albuminuria  [facility]-  7.6<7.4<10<7.6<7.3<7.2<6.7<6.5<7.2<6.7 >6.6>7.4>7.1>7.5  suboptimal   Discussed options of increasing actos dose to 30 mg, he is hesitant and wants to work on lifestyle changes first, I am agreeable to this, but advised to call if FBG trends up and will increase actos dose then. Cont januvia, actos, glucotrol   GFR >50 currently  Check Cmp/a1c/lipid panel/ in 3 months or sooner prn    Essential hypertension-  controlled, cont ace    CRI (chronic renal insufficiency), stage 3 (moderate)-  monitoring, avoid nsaids, renally dose meds, optimize DM control. Pure hypercholesterolemia-   at goal LDL< 100 on statin, cont    Gout without tophus, unspecified cause, unspecified chronicity, unspecified site-seldom flares,  Prn colchicine, low purine diet. BMI 31  Encouraged diet/ wt loss/exercise    Splenic artery aneursysm  S/p repair 2015  On ASA, statin    Follow-up and Dispositions    · Return in about 3 months (around 11/30/2019), or if symptoms worsen or fail to improve, for routine chronic illness care, fasting labs a week prior to your next visit. Patient understands plan of care. Patient has provided input and agrees with goals.

## 2019-08-30 NOTE — PATIENT INSTRUCTIONS

## 2019-10-24 RX ORDER — PIOGLITAZONEHYDROCHLORIDE 15 MG/1
TABLET ORAL
Qty: 90 TAB | Refills: 3 | Status: SHIPPED | OUTPATIENT
Start: 2019-10-24 | End: 2020-04-21 | Stop reason: SDUPTHER

## 2019-12-09 RX ORDER — LISINOPRIL 20 MG/1
20 TABLET ORAL 2 TIMES DAILY
Qty: 180 TAB | Refills: 3 | Status: SHIPPED | OUTPATIENT
Start: 2019-12-09 | End: 2020-06-13 | Stop reason: SDUPTHER

## 2020-02-06 ENCOUNTER — OFFICE VISIT (OUTPATIENT)
Dept: FAMILY MEDICINE CLINIC | Age: 75
End: 2020-02-06

## 2020-02-06 VITALS
SYSTOLIC BLOOD PRESSURE: 118 MMHG | HEIGHT: 62 IN | OXYGEN SATURATION: 97 % | RESPIRATION RATE: 16 BRPM | WEIGHT: 176 LBS | TEMPERATURE: 97.1 F | BODY MASS INDEX: 32.39 KG/M2 | HEART RATE: 97 BPM | DIASTOLIC BLOOD PRESSURE: 78 MMHG

## 2020-02-06 DIAGNOSIS — R50.9 FEVER, UNSPECIFIED FEVER CAUSE: ICD-10-CM

## 2020-02-06 DIAGNOSIS — J11.1 INFLUENZA: Primary | ICD-10-CM

## 2020-02-06 DIAGNOSIS — N18.30 CHRONIC RENAL IMPAIRMENT, STAGE 3 (MODERATE) (HCC): ICD-10-CM

## 2020-02-06 DIAGNOSIS — I10 ESSENTIAL HYPERTENSION: ICD-10-CM

## 2020-02-06 DIAGNOSIS — E11.21 TYPE 2 DIABETES WITH NEPHROPATHY (HCC): ICD-10-CM

## 2020-02-06 LAB
FLUAV+FLUBV AG NOSE QL IA.RAPID: NEGATIVE POS/NEG
FLUAV+FLUBV AG NOSE QL IA.RAPID: POSITIVE POS/NEG
VALID INTERNAL CONTROL?: YES

## 2020-02-06 RX ORDER — OSELTAMIVIR PHOSPHATE 75 MG/1
75 CAPSULE ORAL 2 TIMES DAILY
Qty: 10 CAP | Refills: 0 | Status: SHIPPED | OUTPATIENT
Start: 2020-02-06 | End: 2020-02-11

## 2020-02-06 NOTE — PROGRESS NOTES
1. Have you been to the ER, urgent care clinic since your last visit? Hospitalized since your last visit? No    2. Have you seen or consulted any other health care providers outside of the [address] [place] [address] since your last visit? Include any pap smears or colon screening.  No

## 2020-02-06 NOTE — PROGRESS NOTES
Subjective:   Tonia Sow is a 66 y.o. male who present complaining of flu-like symptoms: fevers, chills, myalgias, congestion, sore throat and cough for 1 days. He denies dyspnea or wheezing. Smoking status: non-smoker. Flu vaccine status: not vaccinated this season. Relevant PMH: DM.    Review of Systems  Pertinent items are noted in HPI. Past Medical History:   Diagnosis Date    Arthritis     CRI (chronic renal insufficiency)     Diabetes mellitus type II 5/13/10    Gout     Hypercholesteremia     Hypertension     Other ill-defined conditions(799.89)     gout    Prostate cancer ([facility] [address] 2008    remission    Splenic artery aneurysm ([facility] [address]     s/p stent dr Bolton All prn ff-up     Past Surgical History:   Procedure Laterality Date    HX KNEE REPLACEMENT  12    Left; Dr. Grisel Grewal HX PROSTATECTOMY  2007     Family History   Problem Relation Age of Onset    Cancer Neg Hx     Diabetes Neg Hx     Heart Disease Neg Hx     Hypertension Neg Hx     Stroke Neg Hx      Social History     Tobacco Use    Smoking status: Former Smoker     Packs/day: 1.00     Types: Cigarettes     Last attempt to quit: 1997     Years since quittin.5    Smokeless tobacco: Never Used    Tobacco comment:    Substance Use Topics    Alcohol use: Yes     Comment: rarely       Objective:     Visit Vitals  /78 (BP 1 Location: Left arm, BP Patient Position: Sitting)   Pulse 97   Temp 97.1 °F (36.2 °C) (Oral)   Resp 16   Ht 5' 2\" (1.575 m)   Wt 176 lb (79.8 kg)   SpO2 97%   BMI 32.19 kg/m²       Appears moderately ill but not toxic; temperature as noted in vitals. Ears normal.   Throat and pharynx normal.    Neck supple. No adenopathy in the neck. Sinuses non tender. The chest is clear.   Results for orders placed or performed in visit on 20   AMB POC RAPID INFLUENZA TEST   Result Value Ref Range    VALID INTERNAL CONTROL POC Yes     Influenza A Ag POC Positive Negative Pos/Neg Influenza B Ag POC Negative Negative Pos/Neg       Assessment/Plan:   Diagnoses and all orders for this visit:    1. Influenza    Influenza very likely from clinical presentation and seasonal pattern  Considerations for specific influenza anti-viral therapy: symptoms present < 48 hours, antiviral therapy is indicated  Symptomatic therapy suggested: rest, increase fluids, gargle prn for sore throat, bland diet and call prn if symptoms persist or worsen. Call or return to clinic prn if these symptoms worsen or fail to improve as anticipated. reviewed diet, exercise and weight control. 2. Fever, unspecified fever cause  -     AMB POC RAPID INFLUENZA TEST    3. Type 2 diabetes with nephropathy (HCC)  Controlled  Cont actos, glipizde, januvia    4. Essential hypertension  Controlled  Cont lisinopril    5. Chronic renal impairment, stage 3 (moderate) (HCC)    Other orders  -     oseltamivir (TAMIFLU) 75 mg capsule; Take 1 Cap by mouth two (2) times a day for 5 days. Keep appt in march or sooner prn    Patient/guardian understands plan of care. Patient has provided input and agrees with goals. Future labs to be discussed on next visit.

## 2020-02-06 NOTE — PATIENT INSTRUCTIONS
Influenza (Flu): Care Instructions  Your Care Instructions    Influenza (flu) is an infection in the lungs and breathing passages. It is caused by the influenza virus. There are different strains, or types, of the flu virus from year to year. Unlike the common cold, the flu comes on suddenly and the symptoms, such as a cough, congestion, fever, chills, fatigue, aches, and pains, are more severe. These symptoms may last up to 10 days. Although the flu can make you feel very sick, it usually doesn't cause serious health problems. Home treatment is usually all you need for flu symptoms. But your doctor may prescribe antiviral medicine to prevent other health problems, such as pneumonia, from developing. Older people and those who have a long-term health condition, such as lung disease, are most at risk for having pneumonia or other health problems. Follow-up care is a key part of your treatment and safety. Be sure to make and go to all appointments, and call your doctor if you are having problems. It's also a good idea to know your test results and keep a list of the medicines you take. How can you care for yourself at home? · Get plenty of rest.  · Drink plenty of fluids, enough so that your urine is light yellow or clear like water. If you have kidney, heart, or liver disease and have to limit fluids, talk with your doctor before you increase the amount of fluids you drink. · Take an over-the-counter pain medicine if needed, such as acetaminophen (Tylenol), ibuprofen (Advil, Motrin), or naproxen (Aleve), to relieve fever, headache, and muscle aches. Read and follow all instructions on the label. No one younger than 20 should take aspirin. It has been linked to Reye syndrome, a serious illness. · Do not smoke. Smoking can make the flu worse. If you need help quitting, talk to your doctor about stop-smoking programs and medicines. These can increase your chances of quitting for good.   · Breathe moist air from a hot shower or from a sink filled with hot water to help clear a stuffy nose. · Before you use cough and cold medicines, check the label. These medicines may not be safe for young children or for people with certain health problems. · If the skin around your nose and lips becomes sore, put some petroleum jelly on the area. · To ease coughing:  ? Drink fluids to soothe a scratchy throat. ? Suck on cough drops or plain hard candy. ? Take an over-the-counter cough medicine that contains dextromethorphan to help you get some sleep. Read and follow all instructions on the label. ? Raise your head at night with an extra pillow. This may help you rest if coughing keeps you awake. · Take any prescribed medicine exactly as directed. Call your doctor if you think you are having a problem with your medicine. To avoid spreading the flu  · Wash your hands regularly, and keep your hands away from your face. · Stay home from school, work, and other public places until you are feeling better and your fever has been gone for at least 24 hours. The fever needs to have gone away on its own without the help of medicine. · Ask people living with you to talk to their doctors about preventing the flu. They may get antiviral medicine to keep from getting the flu from you. · To prevent the flu in the future, get a flu vaccine every fall. Encourage people living with you to get the vaccine. · Cover your mouth when you cough or sneeze. When should you call for help? Call 911 anytime you think you may need emergency care.  For example, call if:    · You have severe trouble breathing.    Call your doctor now or seek immediate medical care if:    · You have new or worse trouble breathing.     · You seem to be getting much sicker.     · You feel very sleepy or confused.     · You have a new or higher fever.     · You get a new rash.    Watch closely for changes in your health, and be sure to contact your doctor if:    · You begin to get better and then get worse.     · You are not getting better after 1 week. Where can you learn more? Go to http://arti-ava.info/. Enter A579 in the search box to learn more about \"Influenza (Flu): Care Instructions. \"  Current as of: June 9, 2019  Content Version: 12.2  © 0034-0863 [employer]. Care instructions adapted under license by [employer] (which disclaims liability or warranty for this information). If you have questions about a medical condition or this instruction, always ask your healthcare professional. [place] [address] any warranty or liability for your use of this information.

## 2020-02-28 ENCOUNTER — HOSPITAL ENCOUNTER (OUTPATIENT)
Dept: LAB | Age: 75
Discharge: HOME | End: 2020-02-28
Payer: MEDICARE

## 2020-02-28 DIAGNOSIS — E11.21 TYPE 2 DIABETES WITH NEPHROPATHY (HCC): ICD-10-CM

## 2020-02-28 LAB
ALBUMIN SERPL-MCNC: 3.6 G/DL (ref 3.4–5)
ALBUMIN/GLOB SERPL: 1 {RATIO} (ref 0.8–1.7)
ALP SERPL-CCNC: 58 U/L (ref 45–117)
ALT SERPL-CCNC: 42 U/L (ref 16–61)
ANION GAP SERPL CALC-SCNC: 7 MMOL/L (ref 3–18)
AST SERPL-CCNC: 22 U/L (ref 10–38)
BILIRUB SERPL-MCNC: 1.4 MG/DL (ref 0.2–1)
BUN SERPL-MCNC: 26 MG/DL (ref 7–18)
BUN/CREAT SERPL: 18 (ref 12–20)
CALCIUM SERPL-MCNC: 9 MG/DL (ref 8.5–10.1)
CHLORIDE SERPL-SCNC: 108 MMOL/L (ref 100–111)
CHOLEST SERPL-MCNC: 113 MG/DL
CO2 SERPL-SCNC: 25 MMOL/L (ref 21–32)
CREAT SERPL-MCNC: 1.42 MG/DL (ref 0.6–1.3)
CREAT UR-MCNC: 98 MG/DL (ref 30–125)
GLOBULIN SER CALC-MCNC: 3.6 G/DL (ref 2–4)
GLUCOSE SERPL-MCNC: 185 MG/DL (ref 74–99)
HBA1C MFR BLD: 7.9 % (ref 4.2–5.6)
HDLC SERPL-MCNC: 36 MG/DL (ref 40–60)
HDLC SERPL: 3.1 {RATIO} (ref 0–5)
LDLC SERPL CALC-MCNC: 47 MG/DL (ref 0–100)
LIPID PROFILE,FLP: ABNORMAL
MICROALBUMIN UR-MCNC: 9.21 MG/DL (ref 0–3)
MICROALBUMIN/CREAT UR-RTO: 94 MG/G (ref 0–30)
POTASSIUM SERPL-SCNC: 4.1 MMOL/L (ref 3.5–5.5)
PROT SERPL-MCNC: 7.2 G/DL (ref 6.4–8.2)
SODIUM SERPL-SCNC: 140 MMOL/L (ref 136–145)
TRIGL SERPL-MCNC: 150 MG/DL (ref ?–150)
VLDLC SERPL CALC-MCNC: 30 MG/DL

## 2020-02-28 PROCEDURE — 36415 COLL VENOUS BLD VENIPUNCTURE: CPT

## 2020-02-28 PROCEDURE — 80053 COMPREHEN METABOLIC PANEL: CPT

## 2020-02-28 PROCEDURE — 80061 LIPID PANEL: CPT

## 2020-02-28 PROCEDURE — 83036 HEMOGLOBIN GLYCOSYLATED A1C: CPT

## 2020-02-28 PROCEDURE — 82043 UR ALBUMIN QUANTITATIVE: CPT

## 2020-03-05 ENCOUNTER — HOSPITAL ENCOUNTER (OUTPATIENT)
Dept: LAB | Age: 75
Discharge: HOME | End: 2020-03-05
Payer: MEDICARE

## 2020-03-05 ENCOUNTER — OFFICE VISIT (OUTPATIENT)
Dept: FAMILY MEDICINE CLINIC | Age: 75
End: 2020-03-05

## 2020-03-05 VITALS
TEMPERATURE: 98 F | HEART RATE: 85 BPM | BODY MASS INDEX: 31.47 KG/M2 | WEIGHT: 171 LBS | SYSTOLIC BLOOD PRESSURE: 130 MMHG | OXYGEN SATURATION: 97 % | DIASTOLIC BLOOD PRESSURE: 82 MMHG | RESPIRATION RATE: 16 BRPM | HEIGHT: 62 IN

## 2020-03-05 DIAGNOSIS — E11.21 TYPE 2 DIABETES WITH NEPHROPATHY (HCC): Primary | ICD-10-CM

## 2020-03-05 DIAGNOSIS — Z86.19 HISTORY OF HEPATITIS: ICD-10-CM

## 2020-03-05 DIAGNOSIS — N18.30 CHRONIC RENAL IMPAIRMENT, STAGE 3 (MODERATE) (HCC): ICD-10-CM

## 2020-03-05 DIAGNOSIS — E11.21 TYPE 2 DIABETES WITH NEPHROPATHY (HCC): ICD-10-CM

## 2020-03-05 DIAGNOSIS — Z12.11 COLON CANCER SCREENING: ICD-10-CM

## 2020-03-05 DIAGNOSIS — I72.8 SPLENIC ARTERY ANEURYSM (HCC): ICD-10-CM

## 2020-03-05 DIAGNOSIS — Z71.89 ADVANCE DIRECTIVE DISCUSSED WITH PATIENT: ICD-10-CM

## 2020-03-05 DIAGNOSIS — Z11.59 ENCOUNTER FOR SCREENING FOR OTHER VIRAL DISEASES: ICD-10-CM

## 2020-03-05 DIAGNOSIS — Z00.00 MEDICARE ANNUAL WELLNESS VISIT, SUBSEQUENT: ICD-10-CM

## 2020-03-05 DIAGNOSIS — I10 ESSENTIAL HYPERTENSION: ICD-10-CM

## 2020-03-05 DIAGNOSIS — M10.9 GOUT WITHOUT TOPHUS: ICD-10-CM

## 2020-03-05 DIAGNOSIS — E78.00 PURE HYPERCHOLESTEROLEMIA: ICD-10-CM

## 2020-03-05 LAB
ALBUMIN SERPL-MCNC: 3.7 G/DL (ref 3.4–5)
ALBUMIN/GLOB SERPL: 1 {RATIO} (ref 0.8–1.7)
ALP SERPL-CCNC: 60 U/L (ref 45–117)
ALT SERPL-CCNC: 40 U/L (ref 16–61)
ANION GAP SERPL CALC-SCNC: 4 MMOL/L (ref 3–18)
AST SERPL-CCNC: 21 U/L (ref 10–38)
BILIRUB SERPL-MCNC: 1.2 MG/DL (ref 0.2–1)
BUN SERPL-MCNC: 26 MG/DL (ref 7–18)
BUN/CREAT SERPL: 20 (ref 12–20)
CALCIUM SERPL-MCNC: 8.8 MG/DL (ref 8.5–10.1)
CHLORIDE SERPL-SCNC: 107 MMOL/L (ref 100–111)
CO2 SERPL-SCNC: 28 MMOL/L (ref 21–32)
CREAT SERPL-MCNC: 1.27 MG/DL (ref 0.6–1.3)
GLOBULIN SER CALC-MCNC: 3.6 G/DL (ref 2–4)
GLUCOSE SERPL-MCNC: 102 MG/DL (ref 74–99)
HBA1C MFR BLD: 8.3 % (ref 4.2–5.6)
POTASSIUM SERPL-SCNC: 4.3 MMOL/L (ref 3.5–5.5)
PROT SERPL-MCNC: 7.3 G/DL (ref 6.4–8.2)
SODIUM SERPL-SCNC: 139 MMOL/L (ref 136–145)

## 2020-03-05 PROCEDURE — 86803 HEPATITIS C AB TEST: CPT

## 2020-03-05 PROCEDURE — 80053 COMPREHEN METABOLIC PANEL: CPT

## 2020-03-05 PROCEDURE — 86709 HEPATITIS A IGM ANTIBODY: CPT

## 2020-03-05 PROCEDURE — 86704 HEP B CORE ANTIBODY TOTAL: CPT

## 2020-03-05 PROCEDURE — 86706 HEP B SURFACE ANTIBODY: CPT

## 2020-03-05 PROCEDURE — 87340 HEPATITIS B SURFACE AG IA: CPT

## 2020-03-05 PROCEDURE — 83036 HEMOGLOBIN GLYCOSYLATED A1C: CPT

## 2020-03-05 PROCEDURE — 36415 COLL VENOUS BLD VENIPUNCTURE: CPT

## 2020-03-05 NOTE — PROGRESS NOTES
1. Have you been to the ER, urgent care clinic since your last visit? Hospitalized since your last visit? No    2. Have you seen or consulted any other health care providers outside of the [address] [place] [address] since your last visit? Include any pap smears or colon screening. No    This is the Subsequent Medicare Annual Wellness Exam, performed 12 months or more after the Initial AWV or the last Subsequent AWV    I have reviewed the patient's medical history in detail and updated the computerized patient record. 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.9    Advance directive discussed with patient Z71.89    BMI 30.0-30.9,adult Z68.30    Type 2 diabetes with nephropathy ([facility] E11.21     Past Medical History:   Diagnosis Date    Arthritis     CRI (chronic renal insufficiency)     Diabetes mellitus type II 5/13/10    Gout     Hypercholesteremia     Hypertension     Other ill-defined conditions(799.89)     gout    Prostate cancer ([facility] 2008    remission    [facility]    s/p stent dr Patricia Desai prn ff-up      Past Surgical History:   Procedure Laterality Date    HX KNEE REPLACEMENT  1/17/12    Left; Dr. Cisco Mireles HX PROSTATECTOMY  12/2007     Current Outpatient Medications   Medication Sig Dispense Refill    SITagliptin (JANUVIA) 100 mg tablet Take 1 Tab by mouth daily. 90 Tab 3    lisinopril (PRINIVIL, ZESTRIL) 20 mg tablet Take 1 Tab by mouth two (2) times a day. 180 Tab 3    pioglitazone (ACTOS) 15 mg tablet Take one tablet once daily 90 Tab 3    glipiZIDE SR (GLUCOTROL XL) 10 mg CR tablet Take 1 Tab by mouth daily. 180 Tab 2    simvastatin (ZOCOR) 40 mg tablet Take 1 Tab by mouth nightly.  80 Tab 3    Blood-Glucose Meter monitoring kit Free Style monitoring Kit. Check glucose fasting daily. 1 Kit 0    glucose blood VI test strips (ASCENSIA AUTODISC VI, ONE TOUCH ULTRA TEST VI) strip Free Style. Check fasting glucose daily 100 Strip 3    Lancets [facility] THIN) misc As directed once daily. 3 Package 3    colchicine 0.6 mg tablet Take 1 Tab by mouth daily. 90 Tab 1    hydrocortisone (HYTONE) 2.5 % topical cream Apply  to affected area two (2) times a day. use thin layer 60 g 2    glucose blood VI test strips (PRECISION XTRA TEST) strip Check glucose level once daily 100 Strip 11    alcohol swabs (ALCOHOL PADS) padm Use as directed daily. 200 Pad 1    aspirin 81 mg chewable tablet Take 1 Tab by mouth daily. 90 Tab 4    Blood-Glucose Meter (BLOOD GLUCOSE MONITOR KIT) monitoring kit by Does Not Apply route. Once daily. 1 Kit 0     Allergies   Allergen Reactions    Azithromycin Palpitations    Norvasc [Amlodipine] Itching and Other (comments)    Watermelon Other (comments)       Family History   Problem Relation Age of Onset    Cancer Neg Hx     Diabetes Neg Hx     Heart Disease Neg Hx     Hypertension Neg Hx     Stroke Neg Hx      Social History     Tobacco Use    Smoking status: Former Smoker     Packs/day: 1.00     Types: Cigarettes     Last attempt to quit: 1997     Years since quittin.6    Smokeless tobacco: Never Used    Tobacco comment:    Substance Use Topics    Alcohol use: Yes     Comment: rarely       Depression Risk Factor Screening:     3 most recent PHQ Screens 3/5/2020   Little interest or pleasure in doing things Not at all   Feeling down, depressed, irritable, or hopeless Not at all   Total Score PHQ 2 0       Alcohol Risk Factor Screening (MALE > 65):    Do you average more 1 drink per night or more than 7 drinks a week: No    In the past three months have you have had more than 4 drinks containing alcohol on one occasion: No      Functional Ability and Level of Safety:   Hearing: Hearing is good. The patient wears hearing aids. Activities of Daily Living: The home contains: no safety equipment. Patient does total self care    Ambulation: with no difficulty    Fall Risk:  Fall Risk Assessment, last 12 mths 3/5/2020   Able to walk? Yes   Fall in past 12 months? No       Abuse Screen:  Patient is not abused    Cognitive Screening   Has your family/caregiver stated any concerns about your memory: no      Patient Care Team   Patient Care Team:  Sil Soares MD as PCP - General (Family Practice)  Sil Soares MD as PCP - [facility] Provider  JAMIE Meza (Vascular Surgery)  Jamal Nesbitt MD (Thoracic Diseases)  Jeri Moy MD (Vascular Surgery)  William Honeycutt MD (Ophthalmology)    Assessment/Plan   Education and counseling provided:  Are appropriate based on today's review and evaluation  End-of-Life planning (with patient's consent)- says he has existing record, to send us copy  Pneumococcal Vaccine- 13/23 completed  Influenza Vaccine - pt declines  Prostate cancer screening tests (PSA, covered annually) 2/2020  Colorectal cancer screening tests- due, discussed and agreed to continue checking  Cardiovascular screening blood test- 2/2020  Screening for glaucoma- 11/2018    Diagnoses and all orders for this visit:    1. Medicare annual wellness visit, subsequent        Health Maintenance Due   Topic Date Due    Shingrix Vaccine Age 49> (1 of 2) 09/30/1993    Medicare Yearly Exam  01/30/2020     Niurka Webb, 66 y.o.,  male    SUBJECTIVE  Routine ff-up    DM w/ nephropathy-  's-180's. He admits to dietary  Inconsistency. Taking actos, [place] and [place] and JENKINS. He is off metformin due to renal function (CKD 3). HTN/HL- taking medications, denies cp, sob. Gout-  rare flares, related to eating peanuts or some Citizen of the [place] delicacies. no recent flare ups, colchicine prn effective    Splenic artery stenosis (s/p repair 2015)    Requesting viral hepatitis screen. He reports in the past was told he was positive for unrecalled type of viral hep during blood donation screening. He denies any symptoms, no pain, jaundice, fever. 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 90 Tab 3    lisinopril (PRINIVIL, ZESTRIL) 20 mg tablet Take 1 Tab by mouth two (2) times a day. 180 Tab 3    pioglitazone (ACTOS) 15 mg tablet Take one tablet once daily 90 Tab 3    glipiZIDE SR (GLUCOTROL XL) 10 mg CR tablet Take 1 Tab by mouth daily. 180 Tab 2    simvastatin (ZOCOR) 40 mg tablet Take 1 Tab by mouth nightly. 90 Tab 3    Blood-Glucose Meter monitoring kit Free Style monitoring Kit. Check glucose fasting daily. 1 Kit 0    glucose blood VI test strips (ASCENSIA AUTODISC VI, ONE TOUCH ULTRA TEST VI) strip Free Style. Check fasting glucose daily 100 Strip 3    Lancets [facility] THIN) misc As directed once daily. 3 Package 3    colchicine 0.6 mg tablet Take 1 Tab by mouth daily. 90 Tab 1    hydrocortisone (HYTONE) 2.5 % topical cream Apply  to affected area two (2) times a day. use thin layer 60 g 2    glucose blood VI test strips (PRECISION XTRA TEST) strip Check glucose level once daily 100 Strip 11    alcohol swabs (ALCOHOL PADS) padm Use as directed daily. 200 Pad 1    aspirin 81 mg chewable tablet Take 1 Tab by mouth daily.  90 Tab 4    Blood-Glucose Meter (BLOOD GLUCOSE MONITOR KIT) monitoring kit by Does Not Apply route. Once daily.  1 Kit 0       Allergies   Allergen Reactions    Azithromycin Palpitations    Norvasc [Amlodipine] Itching and Other (comments)    Watermelon Other (comments)       Past Medical History:   Diagnosis Date    Arthritis     CRI (chronic renal insufficiency)     Diabetes mellitus type II 5/13/10    Gout     Hypercholesteremia     Hypertension     Other ill-defined conditions(799.89)     gout    Prostate cancer ([facility] 2008    remission    Spl[facility] artery aneurysm ([facility] 2013    s/p stent dr Wilton Maki prn ff-up       Social History     Socioeconomic History    Marital status:      Spouse name: Not on file    Number of children: Not on file    Years of education: Not on file    Highest education level: Not on file   Occupational History    Not on file   Social Needs    Financial resource strain: Not on file    Food insecurity:     Worry: Not on file     Inability: Not on file    Transportation needs:     Medical: Not on file     Non-medical: Not on file   Tobacco Use    Smoking status: Former Smoker     Packs/day: 1.00     Types: Cigarettes     Last attempt to quit: 1997     Years since quittin.6    Smokeless tobacco: Never Used    Tobacco comment:    Substance and Sexual Activity    Alcohol use: Yes     Comment: rarely    Drug use: No    Sexual activity: Never   Lifestyle    Physical activity:     Days per week: Not on file     Minutes per session: Not on file    Stress: Not on file   Relationships    Social connections:     Talks on phone: Not on file     Gets together: Not on file     Attends Jain service: Not on file     Active member of club or organization: Not on file     Attends meetings of clubs or organizations: Not on file     Relationship status: Not on file    Intimate partner violence:     Fear of current or ex partner: Not on file     Emotionally abused: Not on file     Physically abused: Not on file     Forced sexual activity: Not on file   Other Topics Concern    Not on file   Social History Narrative    Not on file       Family History   Problem Relation Age of Onset    Cancer Neg Hx     Diabetes Neg Hx     Heart Disease Neg Hx     Hypertension Neg Hx     Stroke Neg Hx          OBJECTIVE    Physical Exam:     Visit Vitals  /82 (BP 1 Location: Left arm, BP Patient Position: Sitting)   Pulse 85   Temp 98 °F (36.7 °C) (Oral)   Resp 16   Ht 5' 2\" (1.575 m)   Wt 171 lb (77.6 kg)   SpO2 97%   BMI 31.28 kg/m²       General: alert, well-appearing, in no apparent distress or pain  CVS: normal rate, regular rhythm, distinct S1 and S2  Lungs:clear to ausculation bilaterally, no crackles, wheezing or rhonchi noted  Abdomen: soft, NT, ND  Extremities: no edema  Psych:  mood and affect normal    Results for orders placed or performed during the hospital encounter of 02/28/20   HEMOGLOBIN A1C W/O EAG   Result Value Ref Range    Hemoglobin A1c 7.9 (H) 4.2 - 5.6 %   MICROALBUMIN, UR, RAND W/ MICROALB/CREAT RATIO   Result Value Ref Range    Microalbumin,urine random 9.21 (H) 0 - 3.0 MG/DL    Creatinine, urine 98.00 30 - 125 mg/dL    Microalbumin/Creat ratio (mg/g creat) 94 (H) 0 - 30 mg/g   METABOLIC PANEL, COMPREHENSIVE   Result Value Ref Range    Sodium 140 136 - 145 mmol/L    Potassium 4.1 3.5 - 5.5 mmol/L    Chloride 108 100 - 111 mmol/L    CO2 25 21 - 32 mmol/L    Anion gap 7 3.0 - 18 mmol/L    Glucose 185 (H) 74 - 99 mg/dL    BUN 26 (H) 7.0 - 18 MG/DL    Creatinine 1.42 (H) 0.6 - 1.3 MG/DL    BUN/Creatinine ratio 18 12 - 20      GFR est AA 59 (L) >60 ml/min/1.73m2    GFR est non-AA 48 (L) >60 ml/min/1.73m2    Calcium 9.0 8.5 - 10.1 MG/DL    Bilirubin, total 1.4 (H) 0.2 - 1.0 MG/DL    ALT (SGPT) 42 16 - 61 U/L    AST (SGOT) 22 10 - 38 U/L    Alk.  phosphatase 58 45 - 117 U/L    Protein, total 7.2 6.4 - 8.2 g/dL    Albumin 3.6 3.4 - 5.0 g/dL    Globulin 3.6 2.0 - 4.0 g/dL    A-G Ratio 1.0 0.8 - 1.7     LIPID PANEL   Result Value Ref Range    LIPID PROFILE          Cholesterol, total 113 <200 MG/DL    Triglyceride 150 (H) <150 MG/DL    HDL Cholesterol 36 (L) 40 - 60 MG/DL    LDL, calculated 47 0 - 100 MG/DL    VLDL, calculated 30 MG/DL    CHOL/HDL Ratio 3.1 0 - 5.0             ASSESSMENT/PLAN    Diagnoses and all orders for this visit:    Type 2 diabetes mellitus with microalbuminuria  ([facility]-  7.6<7.4<10<7.6<7.3<7.2<6.7<6.5<7.2<6.7 >6.6>7.4>7.1>7.5>7.9  suboptimal   Discussed options of increasing actos dose to 30 mg, he is hesitant and wants to work on lifestyle changes first, I am agreeable to this, but advised to call if FBG persistent >150 increase actos to 2 tabs (30 mgs/day). He will send us a Lenet message next month  Cont januvia, actos, glucotrol   GFR >50 currently  Check Cmp/a1c in 3 months or sooner prn    Essential hypertension-  controlled, cont ace    CRI (chronic renal insufficiency), stage 3 (moderate)-  monitoring, avoid nsaids, renally dose meds, optimize DM control. Pure hypercholesterolemia-   at goal LDL< 100 on statin, cont    Gout without tophus, unspecified cause, unspecified chronicity, unspecified site-seldom flares,  Prn colchicine, low purine diet. BMI 31  Encouraged diet/ wt loss/exercise    Splenic artery aneursysm  S/p repair 2015  On ASA, statin    Encounter for screening for other viral diseases  Check Hep a/b/c    Follow-up and Dispositions    · Return in about 3 months (around 6/5/2020), or if symptoms worsen or fail to improve, for non-fasting labs prior to your next visit, routine chronic illness care. Patient understands plan of care. Patient has provided input and agrees with goals.

## 2020-03-05 NOTE — PATIENT INSTRUCTIONS
Medicare Wellness Visit, Male    The best way to live healthy is to have a lifestyle where you eat a well-balanced diet, exercise regularly, limit alcohol use, and quit all forms of tobacco/nicotine, if applicable. Regular preventive services are another way to keep healthy. Preventive services (vaccines, screening tests, monitoring & exams) can help personalize your care plan, which helps you manage your own care. Screening tests can find health problems at the earliest stages, when they are easiest to treat. [facility] follows the current, evidence-based guidelines published by the [facility] ([facility]) when recommending preventive services for our patients. Because we follow these guidelines, sometimes recommendations change over time as research supports it. (For example, a prostate screening blood test is no longer routinely recommended for men with no symptoms). Of course, you and your doctor may decide to screen more often for some diseases, based on your risk and co-morbidities (chronic disease you are already diagnosed with). Preventive services for you include:  - Medicare offers their members a free annual wellness visit, which is time for you and your primary care provider to discuss and plan for your preventive service needs. Take advantage of this benefit every year!  -All adults over age 72 should receive the recommended pneumonia vaccines. Current USPSTF guidelines recommend a series of two vaccines for the best pneumonia protection.   -All adults should have a flu vaccine yearly and tetanus vaccine every 10 years.  -All adults age 48 and older should receive the shingles vaccines (series of two vaccines).        -All adults age 38-68 who are overweight should have a diabetes screening test once every three years.   -Other screening tests & preventive services for persons with diabetes include: an eye exam to screen for diabetic retinopathy, a kidney function test, a foot exam, and stricter control over your cholesterol.   -Cardiovascular screening for adults with routine risk involves an electrocardiogram (ECG) at intervals determined by the provider.   -Colorectal cancer screening should be done for adults age 54-65 with no increased risk factors for colorectal cancer. There are a number of acceptable methods of screening for this type of cancer. Each test has its own benefits and drawbacks. Discuss with your provider what is most appropriate for you during your annual wellness visit. The different tests include: colonoscopy (considered the best screening method), a fecal occult blood test, a fecal DNA test, and sigmoidoscopy.  -All adults born between [facility] should be screened once for Hepatitis C.  -An Abdominal Aortic Aneurysm (AAA) Screening is recommended for men age 73-68 who has ever smoked in their lifetime.      Here is a list of your current Health Maintenance items (your personalized list of preventive services) with a due date:  Health Maintenance Due   Topic Date Due    Shingles Vaccine (1 of 2) 09/30/1993    Annual Well Visit  01/30/2020

## 2020-03-06 LAB
HAV AB SER QL IA: POSITIVE
HAV IGM SERPL QL IA: NEGATIVE
HBV CORE AB SERPL QL IA: POSITIVE
HBV SURFACE AB SER QL IA: NEGATIVE
HBV SURFACE AB SERPL IA-ACNC: <3.1 MIU/ML
HBV SURFACE AG SER QL: <0.1 INDEX
HBV SURFACE AG SER QL: NEGATIVE
HCV AB SER IA-ACNC: 0.13 INDEX
HCV AB SERPL QL IA: NEGATIVE
HCV COMMENT,HCGAC: NORMAL
HEP BS AB COMMENT,HBSAC: ABNORMAL

## 2020-03-06 NOTE — PROGRESS NOTES
His Hba1c was not done earlier than expected. And showed higher result 8.3. Discussed increasing actos dose to 30 mg (2 tabs of 15 mg) and will plan to recheck a1c in 3 months  Hepatitis B panel mixed results:  showing possible previous infection, but he does not appear to have immunity to hepatitis B currently. Advise further evaluation by GI.   pls notify pt, and will send referral when he is agreeable.

## 2020-03-19 NOTE — PROGRESS NOTES
Patient identified with 2 identifiers (name and ). Patient aware of His Hba1c was not done earlier than expected. And showed higher result 8.3. Discussed increasing actos dose to 30 mg (2 tabs of 15 mg) and will plan to recheck a1c in 3 months   Hepatitis B panel mixed results:  showing possible previous infection, but he does not appear to have immunity to hepatitis B currently. Advise further evaluation by GI.   , and will send referral when he is agreeable. patient is agreeable to GI referral. Please advise

## 2020-03-20 DIAGNOSIS — Z86.19 HISTORY OF HEPATITIS: Primary | ICD-10-CM

## 2020-04-21 DIAGNOSIS — E11.9 TYPE 2 DIABETES MELLITUS WITHOUT COMPLICATION (HCC): ICD-10-CM

## 2020-04-21 RX ORDER — PIOGLITAZONEHYDROCHLORIDE 15 MG/1
TABLET ORAL
Qty: 90 TAB | Refills: 3 | Status: SHIPPED | OUTPATIENT
Start: 2020-04-21 | End: 2021-04-22 | Stop reason: SDUPTHER

## 2020-04-21 RX ORDER — SIMVASTATIN 40 MG/1
40 TABLET, FILM COATED ORAL
Qty: 90 TAB | Refills: 3 | Status: SHIPPED | OUTPATIENT
Start: 2020-04-21 | End: 2021-04-22 | Stop reason: SDUPTHER

## 2020-04-21 RX ORDER — ISOPROPYL ALCOHOL 70 ML/100ML
SWAB TOPICAL
Qty: 200 PAD | Refills: 1 | Status: SHIPPED | OUTPATIENT
Start: 2020-04-21 | End: 2020-06-13 | Stop reason: SDUPTHER

## 2020-04-21 RX ORDER — HYDROCORTISONE 25 MG/G
CREAM TOPICAL 2 TIMES DAILY
Qty: 60 G | Refills: 2 | Status: SHIPPED | OUTPATIENT
Start: 2020-04-21 | End: 2022-04-08 | Stop reason: SDUPTHER

## 2020-04-21 RX ORDER — LANCETS
EACH MISCELLANEOUS
Qty: 3 PACKAGE | Refills: 3 | Status: SHIPPED | OUTPATIENT
Start: 2020-04-21

## 2020-06-15 RX ORDER — LISINOPRIL 20 MG/1
20 TABLET ORAL 2 TIMES DAILY
Qty: 180 TAB | Refills: 3 | Status: SHIPPED | OUTPATIENT
Start: 2020-06-15 | End: 2021-06-15 | Stop reason: SDUPTHER

## 2020-06-15 RX ORDER — ISOPROPYL ALCOHOL 70 ML/100ML
SWAB TOPICAL
Qty: 200 PAD | Refills: 1 | Status: SHIPPED | OUTPATIENT
Start: 2020-06-15 | End: 2021-07-08 | Stop reason: SDUPTHER

## 2020-08-06 RX ORDER — GLIPIZIDE 10 MG/1
10 TABLET, FILM COATED, EXTENDED RELEASE ORAL DAILY
Qty: 180 TAB | Refills: 2 | Status: SHIPPED | OUTPATIENT
Start: 2020-08-06 | End: 2021-07-08 | Stop reason: SDUPTHER

## 2020-08-14 ENCOUNTER — HOSPITAL ENCOUNTER (OUTPATIENT)
Dept: LAB | Age: 75
Discharge: HOME | End: 2020-08-14
Payer: MEDICARE

## 2020-08-14 LAB
ALBUMIN SERPL-MCNC: 3.7 G/DL (ref 3.4–5)
ALBUMIN/GLOB SERPL: 1 {RATIO} (ref 0.8–1.7)
ALP SERPL-CCNC: 53 U/L (ref 45–117)
ALT SERPL-CCNC: 30 U/L (ref 16–61)
ANION GAP SERPL CALC-SCNC: 4 MMOL/L (ref 3–18)
AST SERPL-CCNC: 20 U/L (ref 10–38)
BILIRUB SERPL-MCNC: 1.5 MG/DL (ref 0.2–1)
BUN SERPL-MCNC: 23 MG/DL (ref 7–18)
BUN/CREAT SERPL: 17 (ref 12–20)
CALCIUM SERPL-MCNC: 9.1 MG/DL (ref 8.5–10.1)
CHLORIDE SERPL-SCNC: 106 MMOL/L (ref 100–111)
CO2 SERPL-SCNC: 29 MMOL/L (ref 21–32)
CREAT SERPL-MCNC: 1.36 MG/DL (ref 0.6–1.3)
GLOBULIN SER CALC-MCNC: 3.6 G/DL (ref 2–4)
GLUCOSE SERPL-MCNC: 178 MG/DL (ref 74–99)
HBA1C MFR BLD: 7.6 % (ref 4.2–5.6)
POTASSIUM SERPL-SCNC: 4.6 MMOL/L (ref 3.5–5.5)
PROT SERPL-MCNC: 7.3 G/DL (ref 6.4–8.2)
SODIUM SERPL-SCNC: 139 MMOL/L (ref 136–145)

## 2020-08-14 PROCEDURE — 36415 COLL VENOUS BLD VENIPUNCTURE: CPT

## 2020-08-14 PROCEDURE — 80053 COMPREHEN METABOLIC PANEL: CPT

## 2020-08-14 PROCEDURE — 83036 HEMOGLOBIN GLYCOSYLATED A1C: CPT

## 2020-08-20 ENCOUNTER — TELEPHONE (OUTPATIENT)
Dept: FAMILY MEDICINE CLINIC | Age: 75
End: 2020-08-20

## 2020-08-21 ENCOUNTER — OFFICE VISIT (OUTPATIENT)
Dept: FAMILY MEDICINE CLINIC | Age: 75
End: 2020-08-21

## 2020-08-21 VITALS
TEMPERATURE: 98.7 F | SYSTOLIC BLOOD PRESSURE: 132 MMHG | HEART RATE: 91 BPM | DIASTOLIC BLOOD PRESSURE: 78 MMHG | HEIGHT: 62 IN | BODY MASS INDEX: 32.02 KG/M2 | RESPIRATION RATE: 16 BRPM | WEIGHT: 174 LBS | OXYGEN SATURATION: 98 %

## 2020-08-21 DIAGNOSIS — M10.9 GOUT WITHOUT TOPHUS: ICD-10-CM

## 2020-08-21 DIAGNOSIS — N18.30 CHRONIC RENAL IMPAIRMENT, STAGE 3 (MODERATE) (HCC): ICD-10-CM

## 2020-08-21 DIAGNOSIS — E11.21 TYPE 2 DIABETES WITH NEPHROPATHY (HCC): Primary | ICD-10-CM

## 2020-08-21 DIAGNOSIS — I10 ESSENTIAL HYPERTENSION: ICD-10-CM

## 2020-08-21 DIAGNOSIS — Z12.5 PROSTATE CANCER SCREENING: ICD-10-CM

## 2020-08-21 DIAGNOSIS — Z85.46 HISTORY OF PROSTATE CANCER: ICD-10-CM

## 2020-08-21 DIAGNOSIS — Z78.9 NOT IMMUNE TO HEPATITIS B VIRUS: ICD-10-CM

## 2020-08-21 DIAGNOSIS — I72.8 SPLENIC ARTERY ANEURYSM (HCC): ICD-10-CM

## 2020-08-21 DIAGNOSIS — E78.00 PURE HYPERCHOLESTEROLEMIA: ICD-10-CM

## 2020-08-21 NOTE — PROGRESS NOTES
SUBJECTIVE  Routine ff-up    DM w/ nephropathy-  's. We increased actos dose on last visit to 30 mg however only took this for a week, with noticeable ankle edema, so he is back on 15 mg. Continues to take  [place] [facility] [place] and JENKINS. He is off metformin due to renal function (CKD 3). HTN/HL- taking medications, denies cp, sob. Gout-  rare flares, related to eating peanuts or some Chinese delicacies. no recent flare ups, colchicine prn effective    Splenic artery stenosis (s/p repair 2015)    Viral hep panel c/w previous exposure and not immune to hep A and B, I recommended vaccine, he declines for now    Previous h/o prostate cancer 2008 s/p prostatectomy, he was told to be in remission for years. Last PSA check 2018 normal.     ROS:  See HPI, all others negative        Patient Active Problem List   Diagnosis Code    Prostate cancer ([facility] [address] C61    DJD (degenerative joint disease) of knee M17.10    CRI (chronic renal insufficiency) N18.9    Splenic artery aneurysm (HCC) I72.8    Essential hypertension I10    Pure hypercholesterolemia E78.00    Gout without tophus M10.9    Advance directive discussed with patient Z71.89    BMI 30.0-30.9,adult Z68.30    Type 2 diabetes with nephropathy (HCC) E11.21       Current Outpatient Medications   Medication Sig Dispense Refill    SITagliptin (JANUVIA) 100 mg tablet Take 1 Tab by mouth daily. 90 Tab 3    lisinopril (PRINIVIL, ZESTRIL) 20 mg tablet Take 1 Tab by mouth two (2) times a day. 180 Tab 3    pioglitazone (ACTOS) 15 mg tablet Take one tablet once daily 90 Tab 3    glipiZIDE SR (GLUCOTROL XL) 10 mg CR tablet Take 1 Tab by mouth daily. 180 Tab 2    simvastatin (ZOCOR) 40 mg tablet Take 1 Tab by mouth nightly. 90 Tab 3    Blood-Glucose Meter monitoring kit Free Style monitoring Kit. Check glucose fasting daily. 1 Kit 0    glucose blood VI test strips (ASCENSIA AUTODISC VI, ONE TOUCH ULTRA TEST VI) strip Free Style.  Check fasting glucose daily 100 Strip 3  Lancets [facility] THIN) misc As directed once daily. 3 Package 3    colchicine 0.6 mg tablet Take 1 Tab by mouth daily. 90 Tab 1    hydrocortisone (HYTONE) 2.5 % topical cream Apply  to affected area two (2) times a day. use thin layer 60 g 2    glucose blood VI test strips (PRECISION XTRA TEST) strip Check glucose level once daily 100 Strip 11    alcohol swabs (ALCOHOL PADS) padm Use as directed daily. 200 Pad 1    aspirin 81 mg chewable tablet Take 1 Tab by mouth daily. 90 Tab 4    Blood-Glucose Meter (BLOOD GLUCOSE MONITOR KIT) monitoring kit by Does Not Apply route. Once daily.  1 Kit 0       Allergies   Allergen Reactions    Azithromycin Palpitations    Norvasc [Amlodipine] Itching and Other (comments)    Watermelon Other (comments)       Past Medical History:   Diagnosis Date    Arthritis     CRI (chronic renal insufficiency)     Diabetes mellitus type II 5/13/10    Gout     Hypercholesteremia     Hypertension     Other ill-defined conditions(799.89)     gout    Prostate cancer ([facility] [address]     remission    Splenic artery aneurysm ([facility] [address] 2013    s/p stent dr Raj Rodriguez prn ff-up       Social History     Socioeconomic History    Marital status:      Spouse name: Not on file    Number of children: Not on file    Years of education: Not on file    Highest education level: Not on file   Occupational History    Not on file   Social Needs    Financial resource strain: Not on file    Food insecurity:     Worry: Not on file     Inability: Not on file    Transportation needs:     Medical: Not on file     Non-medical: Not on file   Tobacco Use    Smoking status: Former Smoker     Packs/day: 1.00     Types: Cigarettes     Last attempt to quit: 1997     Years since quittin.6    Smokeless tobacco: Never Used    Tobacco comment:    Substance and Sexual Activity    Alcohol use: Yes     Comment: rarely    Drug use: No    Sexual activity: Never   Lifestyle    Physical activity:     Days per week: Not on file     Minutes per session: Not on file    Stress: Not on file   Relationships    Social connections:     Talks on phone: Not on file     Gets together: Not on file     Attends [employer] service: Not on file     Active member of club or organization: Not on file     Attends meetings of clubs or organizations: Not on file     Relationship status: Not on file    Intimate partner violence:     Fear of current or ex partner: Not on file     Emotionally abused: Not on file     Physically abused: Not on file     Forced sexual activity: Not on file   Other Topics Concern    Not on file   Social History Narrative    Not on file       Family History   Problem Relation Age of Onset    Cancer Neg Hx     Diabetes Neg Hx     Heart Disease Neg Hx     Hypertension Neg Hx     Stroke Neg Hx          OBJECTIVE    Physical Exam:     Visit Vitals  Visit Vitals  /78 (BP 1 Location: Left arm, BP Patient Position: Sitting)   Pulse 91   Temp 98.7 °F (37.1 °C) (Oral)   Resp 16   Ht 5' 2\" (1.575 m)   Wt 174 lb (78.9 kg)   SpO2 98%   BMI 31.83 kg/m²       General: alert, well-appearing, in no apparent distress or pain  CVS: normal rate, regular rhythm, distinct S1 and S2  Lungs:clear to ausculation bilaterally, no crackles, wheezing or rhonchi noted  Abdomen: soft, NT, ND  Extremities: no edema, normal monofilament  Psych:  mood and affect normal  Results for orders placed or performed during the hospital encounter of 24/06/47   METABOLIC PANEL, COMPREHENSIVE   Result Value Ref Range    Sodium 139 136 - 145 mmol/L    Potassium 4.6 3.5 - 5.5 mmol/L    Chloride 106 100 - 111 mmol/L    CO2 29 21 - 32 mmol/L    Anion gap 4 3.0 - 18 mmol/L    Glucose 178 (H) 74 - 99 mg/dL    BUN 23 (H) 7.0 - 18 MG/DL    Creatinine 1.36 (H) 0.6 - 1.3 MG/DL    BUN/Creatinine ratio 17 12 - 20      GFR est AA >60 >60 ml/min/1.73m2    GFR est non-AA 51 (L) >60 ml/min/1.73m2    Calcium 9.1 8.5 - 10.1 MG/DL Bilirubin, total 1.5 (H) 0.2 - 1.0 MG/DL    ALT (SGPT) 30 16 - 61 U/L    AST (SGOT) 20 10 - 38 U/L    Alk. phosphatase 53 45 - 117 U/L    Protein, total 7.3 6.4 - 8.2 g/dL    Albumin 3.7 3.4 - 5.0 g/dL    Globulin 3.6 2.0 - 4.0 g/dL    A-G Ratio 1.0 0.8 - 1.7     HEMOGLOBIN A1C W/O EAG   Result Value Ref Range    Hemoglobin A1c 7.6 (H) 4.2 - 5.6 %           ASSESSMENT/PLAN    Diagnoses and all orders for this visit:    Type 2 diabetes mellitus with microalbuminuria  ([facility] [address]-  7.6<7.4<10<7.6<7.3<7.2<6.7<6.5<7.2<6.7 >6.6>7.4>7.1>7.5>7.9>7.6  suboptimal   Cont januvia, actos (increase dose causing edema), glucotrol  DM foot exam 8/2020   GFR >50 currently  Check Cmp/a1c in 3 months or sooner prn    Essential hypertension-  controlled, cont ace    CRI (chronic renal insufficiency), stage 3 (moderate)-  monitoring, avoid nsaids, renally dose meds, optimize DM control. Pure hypercholesterolemia-   at goal LDL< 100 on statin, cont    Gout without tophus, unspecified cause, unspecified chronicity, unspecified site-seldom flares,  Prn colchicine, low purine diet. BMI 31  Encouraged diet/ wt loss/exercise    Splenic artery aneursysm  S/p repair 2015  On ASA, statin    Not immune to Hep B  Advised vaccine, pt will consider, declines for now    History of prostate cancer  2008 s/p prostatectomy    Prostate cancer screening  PSA      Follow-up and Dispositions    Return in about 3 months     Patient understands plan of care. Patient has provided input and agrees with goals.

## 2020-08-21 NOTE — PATIENT INSTRUCTIONS

## 2020-08-21 NOTE — PROGRESS NOTES
1. Have you been to the ER, urgent care clinic since your last visit? Hospitalized since your last visit? No    2. Have you seen or consulted any other health care providers outside of the [address] [place] [address] since your last visit? Include any pap smears or colon screening.  No

## 2020-09-17 RX ORDER — COLCHICINE 0.6 MG/1
0.6 TABLET ORAL DAILY
Qty: 90 TAB | Refills: 1 | Status: SHIPPED | OUTPATIENT
Start: 2020-09-17 | End: 2021-06-15 | Stop reason: SDUPTHER

## 2021-01-15 ENCOUNTER — HOSPITAL ENCOUNTER (OUTPATIENT)
Dept: LAB | Age: 76
Discharge: HOME | End: 2021-01-15
Payer: MEDICARE

## 2021-01-15 DIAGNOSIS — E11.21 TYPE 2 DIABETES WITH NEPHROPATHY (HCC): ICD-10-CM

## 2021-01-15 DIAGNOSIS — Z12.5 PROSTATE CANCER SCREENING: ICD-10-CM

## 2021-01-15 LAB
ALBUMIN SERPL-MCNC: 3.5 G/DL (ref 3.4–5)
ALBUMIN/GLOB SERPL: 1 {RATIO} (ref 0.8–1.7)
ALP SERPL-CCNC: 78 U/L (ref 45–117)
ALT SERPL-CCNC: 43 U/L (ref 16–61)
ANION GAP SERPL CALC-SCNC: 5 MMOL/L (ref 3–18)
AST SERPL-CCNC: 16 U/L (ref 10–38)
BILIRUB SERPL-MCNC: 1.4 MG/DL (ref 0.2–1)
BUN SERPL-MCNC: 27 MG/DL (ref 7–18)
BUN/CREAT SERPL: 19 (ref 12–20)
CALCIUM SERPL-MCNC: 8.5 MG/DL (ref 8.5–10.1)
CHLORIDE SERPL-SCNC: 104 MMOL/L (ref 100–111)
CO2 SERPL-SCNC: 29 MMOL/L (ref 21–32)
CREAT SERPL-MCNC: 1.44 MG/DL (ref 0.6–1.3)
GLOBULIN SER CALC-MCNC: 3.6 G/DL (ref 2–4)
GLUCOSE SERPL-MCNC: 334 MG/DL (ref 74–99)
HBA1C MFR BLD: 9.1 % (ref 4.2–5.6)
POTASSIUM SERPL-SCNC: 4.2 MMOL/L (ref 3.5–5.5)
PROT SERPL-MCNC: 7.1 G/DL (ref 6.4–8.2)
PSA SERPL-MCNC: 2.3 NG/ML (ref 0–4)
SODIUM SERPL-SCNC: 138 MMOL/L (ref 136–145)

## 2021-01-15 PROCEDURE — 84153 ASSAY OF PSA TOTAL: CPT

## 2021-01-15 PROCEDURE — 80053 COMPREHEN METABOLIC PANEL: CPT

## 2021-01-15 PROCEDURE — 36415 COLL VENOUS BLD VENIPUNCTURE: CPT

## 2021-01-15 PROCEDURE — 83036 HEMOGLOBIN GLYCOSYLATED A1C: CPT

## 2021-01-21 ENCOUNTER — TELEPHONE (OUTPATIENT)
Dept: FAMILY MEDICINE CLINIC | Age: 76
End: 2021-01-21

## 2021-01-22 ENCOUNTER — OFFICE VISIT (OUTPATIENT)
Dept: FAMILY MEDICINE CLINIC | Age: 76
End: 2021-01-22
Payer: MEDICARE

## 2021-01-22 VITALS
HEIGHT: 62 IN | TEMPERATURE: 98.2 F | RESPIRATION RATE: 16 BRPM | OXYGEN SATURATION: 97 % | HEART RATE: 100 BPM | BODY MASS INDEX: 32.39 KG/M2 | WEIGHT: 176 LBS | DIASTOLIC BLOOD PRESSURE: 86 MMHG | SYSTOLIC BLOOD PRESSURE: 140 MMHG

## 2021-01-22 DIAGNOSIS — I72.8 SPLENIC ARTERY ANEURYSM (HCC): ICD-10-CM

## 2021-01-22 DIAGNOSIS — E78.00 PURE HYPERCHOLESTEROLEMIA: ICD-10-CM

## 2021-01-22 DIAGNOSIS — M10.9 GOUT WITHOUT TOPHUS: ICD-10-CM

## 2021-01-22 DIAGNOSIS — I10 ESSENTIAL HYPERTENSION: ICD-10-CM

## 2021-01-22 DIAGNOSIS — Z85.46 HISTORY OF PROSTATE CANCER: ICD-10-CM

## 2021-01-22 DIAGNOSIS — N18.30 CHRONIC RENAL IMPAIRMENT, STAGE 3 (MODERATE), UNSPECIFIED WHETHER STAGE 3A OR 3B CKD (HCC): ICD-10-CM

## 2021-01-22 DIAGNOSIS — E11.21 TYPE 2 DIABETES WITH NEPHROPATHY (HCC): Primary | ICD-10-CM

## 2021-01-22 PROCEDURE — G8536 NO DOC ELDER MAL SCRN: HCPCS | Performed by: FAMILY MEDICINE

## 2021-01-22 PROCEDURE — G8510 SCR DEP NEG, NO PLAN REQD: HCPCS | Performed by: FAMILY MEDICINE

## 2021-01-22 PROCEDURE — 1101F PT FALLS ASSESS-DOCD LE1/YR: CPT | Performed by: FAMILY MEDICINE

## 2021-01-22 PROCEDURE — G8753 SYS BP > OR = 140: HCPCS | Performed by: FAMILY MEDICINE

## 2021-01-22 PROCEDURE — G8427 DOCREV CUR MEDS BY ELIG CLIN: HCPCS | Performed by: FAMILY MEDICINE

## 2021-01-22 PROCEDURE — 99214 OFFICE O/P EST MOD 30 MIN: CPT | Performed by: FAMILY MEDICINE

## 2021-01-22 PROCEDURE — G8417 CALC BMI ABV UP PARAM F/U: HCPCS | Performed by: FAMILY MEDICINE

## 2021-01-22 PROCEDURE — G8754 DIAS BP LESS 90: HCPCS | Performed by: FAMILY MEDICINE

## 2021-01-22 PROCEDURE — G0463 HOSPITAL OUTPT CLINIC VISIT: HCPCS | Performed by: FAMILY MEDICINE

## 2021-01-22 NOTE — PROGRESS NOTES
SUBJECTIVE  Routine ff-up    DM w/ nephropathy-  Admits to dietary inconsistencies, and has stopped glucose monitoring at home. Continues to take  Januvia, actos and glipizide. He is off metformin due to renal function (CKD 3). HTN/HL- taking medications, denies cp, sob. Gout-  rare flares, related to eating peanuts or some [place] delicacies. no recent flare ups, colchicine prn effective    Splenic artery stenosis (s/p repair 2015)    Viral hep panel c/w previous exposure and not immune to hep A and B, I recommended vaccine, he declines for now    Previous h/o prostate cancer 2008 s/p prostatectomy, he was told to be in remission for years. Reviewed PSA check 2021 normal.     ROS:  See HPI, all others negative        Patient Active Problem List   Diagnosis Code    Prostate cancer ([facility] [address] C61    DJD (degenerative joint disease) of knee M17.10    CRI (chronic renal insufficiency) N18.9    Splenic artery aneurysm (HCC) I72.8    Essential hypertension I10    Pure hypercholesterolemia E78.00    Gout without tophus M10.9    Advance directive discussed with patient Z71.89    BMI 30.0-30.9,adult Z68.30    Type 2 diabetes with nephropathy (HCC) E11.21       Current Outpatient Medications:     SITagliptin (JANUVIA) 100 mg tablet, Take 1 Tab by mouth daily. , Disp: 90 Tab, Rfl: 1    colchicine 0.6 mg tablet, Take 1 Tab by mouth daily. , Disp: 90 Tab, Rfl: 1    glipiZIDE SR (GLUCOTROL XL) 10 mg CR tablet, Take 1 Tab by mouth daily. , Disp: 180 Tab, Rfl: 2    lisinopriL (PRINIVIL, ZESTRIL) 20 mg tablet, Take 1 Tab by mouth two (2) times a day., Disp: 180 Tab, Rfl: 3    alcohol swabs (Alcohol Pads) padm, Use as directed daily. , Disp: 200 Pad, Rfl: 1    lancets (Lancets,Ultra Thin) misc, As directed once daily. , Disp: 3 Package, Rfl: 3    simvastatin (ZOCOR) 40 mg tablet, Take 1 Tab by mouth nightly., Disp: 90 Tab, Rfl: 3    pioglitazone (ACTOS) 15 mg tablet, Take one tablet once daily, Disp: 90 Tab, Rfl: 3    Blood-Glucose Meter monitoring kit, Free Style monitoring Kit. Check glucose fasting daily. , Disp: 1 Kit, Rfl: 0    glucose blood VI test strips (ASCENSIA AUTODISC VI, ONE TOUCH ULTRA TEST VI) strip, Free Style. Check fasting glucose daily, Disp: 100 Strip, Rfl: 3    glucose blood VI test strips (PRECISION XTRA TEST) strip, Check glucose level once daily, Disp: 100 Strip, Rfl: 11    aspirin 81 mg chewable tablet, Take 1 Tab by mouth daily. , Disp: 90 Tab, Rfl: 4    Blood-Glucose Meter (BLOOD GLUCOSE MONITOR KIT) monitoring kit, by Does Not Apply route. Once daily. , Disp: 1 Kit, Rfl: 0    hydrocortisone (HYTONE) 2.5 % topical cream, Apply  to affected area two (2) times a day.  use thin layer, Disp: 60 g, Rfl: 2      Allergies   Allergen Reactions    Azithromycin Palpitations    Norvasc [Amlodipine] Itching and Other (comments)    Watermelon Other (comments)       Past Medical History:   Diagnosis Date    Arthritis     CRI (chronic renal insufficiency)     Diabetes mellitus type II 5/13/10    Gout     Hypercholesteremia     Hypertension     Other ill-defined conditions(799.89)     gout    Prostate cancer ([facility] [address] 2008    remission    Splenic artery aneurysm ([facility] [address] 2013    s/p stent dr Kiara Laird prn ff-up       Social History     Socioeconomic History    Marital status:      Spouse name: Not on file    Number of children: Not on file    Years of education: Not on file    Highest education level: Not on file   Occupational History    Not on file   Social Needs    Financial resource strain: Not on file    Food insecurity:     Worry: Not on file     Inability: Not on file    Transportation needs:     Medical: Not on file     Non-medical: Not on file   Tobacco Use    Smoking status: Former Smoker     Packs/day: 1.00     Types: Cigarettes     Last attempt to quit: 1997     Years since quittin.6    Smokeless tobacco: Never Used    Tobacco comment:    Substance and Sexual Activity    Alcohol use: Yes     Comment: rarely    Drug use: No    Sexual activity: Never   Lifestyle    Physical activity:     Days per week: Not on file     Minutes per session: Not on file    Stress: Not on file   Relationships    Social connections:     Talks on phone: Not on file     Gets together: Not on file     Attends Worship service: Not on file     Active member of club or organization: Not on file     Attends meetings of clubs or organizations: Not on file     Relationship status: Not on file    Intimate partner violence:     Fear of current or ex partner: Not on file     Emotionally abused: Not on file     Physically abused: Not on file     Forced sexual activity: Not on file   Other Topics Concern    Not on file   Social History Narrative    Not on file       Family History   Problem Relation Age of Onset    Cancer Neg Hx     Diabetes Neg Hx     Heart Disease Neg Hx     Hypertension Neg Hx     Stroke Neg Hx          OBJECTIVE    Physical Exam:     Visit Vitals  Visit Vitals  BP (!) 140/86 (BP 1 Location: Left arm, BP Patient Position: Sitting)   Pulse 100   Temp 98.2 °F (36.8 °C) (Oral)   Resp 16   Ht 5' 2\" (1.575 m)   Wt 176 lb (79.8 kg)   SpO2 97%   BMI 32.19 kg/m²       General: alert, well-appearing, in no apparent distress or pain  CVS: normal rate, regular rhythm, distinct S1 and S2  Lungs:clear to ausculation bilaterally, no crackles, wheezing or rhonchi noted  Extremities: no edema  Psych:  mood and affect normal  Results for orders placed or performed during the hospital encounter of 01/15/21   HEMOGLOBIN A1C W/O EAG   Result Value Ref Range    Hemoglobin A1c 9.1 (H) 4.2 - 5.6 %   METABOLIC PANEL, COMPREHENSIVE   Result Value Ref Range    Sodium 138 136 - 145 mmol/L    Potassium 4.2 3.5 - 5.5 mmol/L    Chloride 104 100 - 111 mmol/L    CO2 29 21 - 32 mmol/L    Anion gap 5 3.0 - 18 mmol/L    Glucose 334 (H) 74 - 99 mg/dL    BUN 27 (H) 7.0 - 18 MG/DL    Creatinine 1.44 (H) 0.6 - 1.3 MG/DL    BUN/Creatinine ratio 19 12 - 20      GFR est AA 58 (L) >60 ml/min/1.73m2    GFR est non-AA 48 (L) >60 ml/min/1.73m2    Calcium 8.5 8.5 - 10.1 MG/DL    Bilirubin, total 1.4 (H) 0.2 - 1.0 MG/DL    ALT (SGPT) 43 16 - 61 U/L    AST (SGOT) 16 10 - 38 U/L    Alk. phosphatase 78 45 - 117 U/L    Protein, total 7.1 6.4 - 8.2 g/dL    Albumin 3.5 3.4 - 5.0 g/dL    Globulin 3.6 2.0 - 4.0 g/dL    A-G Ratio 1.0 0.8 - 1.7     PSA SCREENING (SCREENING)   Result Value Ref Range    Prostate Specific Ag 2.3 0.0 - 4.0 ng/mL           ASSESSMENT/PLAN    Diagnoses and all orders for this visit:    Type 2 diabetes mellitus with microalbuminuria  ([facility] [address]-  7.6<7.4<10<7.6<7.3<7.2<6.7<6.5<7.2<6.7 >6.6>7.4>7.1>7.5>7.9>7.6>9.1  suboptimal   Cont januvia, actos (increase dose causing edema), glucotrol  Advised to start insulin, pt is very hesitant and wanting to work on lifestyle changes  DM foot exam 8/2020   GFR >50 currently  Check Cmp/a1c/lipid panel/microalbumin in 3 months or sooner prn    Essential hypertension-  Previously controlled,   Pt hesitant to add more meds and wanting to work on lifestyle modifications  cont ace  Monitoring    CRI (chronic renal insufficiency), stage 3 (moderate)-  monitoring, avoid nsaids, renally dose meds, optimize DM control. Pure hypercholesterolemia-   at goal LDL< 100 on statin, cont    Gout without tophus, unspecified cause, unspecified chronicity, unspecified site-seldom flares,  Prn colchicine, low purine diet. BMI 32  Encouraged diet/ wt loss/exercise    Splenic artery aneursysm  S/p repair 2015  On ASA, statin    Not immune to Hep B  Advised vaccine, pt will consider, declines for now    History of prostate cancer  2008 s/p prostatectomy  PSA 2021= 2      Follow-up and Dispositions    Return in about 3 months, fasting labs prior, plan on MWV then    Patient understands plan of care. Patient has provided input and agrees with goals.

## 2021-01-22 NOTE — PATIENT INSTRUCTIONS

## 2021-04-16 ENCOUNTER — HOSPITAL ENCOUNTER (OUTPATIENT)
Dept: LAB | Age: 76
Discharge: HOME | End: 2021-04-16
Payer: MEDICARE

## 2021-04-16 DIAGNOSIS — E11.21 TYPE 2 DIABETES WITH NEPHROPATHY (HCC): ICD-10-CM

## 2021-04-16 LAB
ALBUMIN SERPL-MCNC: 3.7 G/DL (ref 3.4–5)
ALBUMIN/GLOB SERPL: 1.2 {RATIO} (ref 0.8–1.7)
ALP SERPL-CCNC: 55 U/L (ref 45–117)
ALT SERPL-CCNC: 32 U/L (ref 16–61)
ANION GAP SERPL CALC-SCNC: 4 MMOL/L (ref 3–18)
AST SERPL-CCNC: 18 U/L (ref 10–38)
BILIRUB SERPL-MCNC: 1 MG/DL (ref 0.2–1)
BUN SERPL-MCNC: 25 MG/DL (ref 7–18)
BUN/CREAT SERPL: 19 (ref 12–20)
CALCIUM SERPL-MCNC: 8.7 MG/DL (ref 8.5–10.1)
CHLORIDE SERPL-SCNC: 107 MMOL/L (ref 100–111)
CHOLEST SERPL-MCNC: 109 MG/DL
CO2 SERPL-SCNC: 29 MMOL/L (ref 21–32)
CREAT SERPL-MCNC: 1.31 MG/DL (ref 0.6–1.3)
CREAT UR-MCNC: 73 MG/DL (ref 30–125)
EST. AVERAGE GLUCOSE BLD GHB EST-MCNC: 166 MG/DL
GLOBULIN SER CALC-MCNC: 3 G/DL (ref 2–4)
GLUCOSE SERPL-MCNC: 150 MG/DL (ref 74–99)
HBA1C MFR BLD: 7.4 % (ref 4.2–5.6)
HDLC SERPL-MCNC: 43 MG/DL (ref 40–60)
HDLC SERPL: 2.5 {RATIO} (ref 0–5)
LDLC SERPL CALC-MCNC: 45.2 MG/DL (ref 0–100)
LIPID PROFILE,FLP: NORMAL
MICROALBUMIN UR-MCNC: 17.4 MG/DL (ref 0–3)
MICROALBUMIN/CREAT UR-RTO: 238 MG/G (ref 0–30)
POTASSIUM SERPL-SCNC: 4.2 MMOL/L (ref 3.5–5.5)
PROT SERPL-MCNC: 6.7 G/DL (ref 6.4–8.2)
SODIUM SERPL-SCNC: 140 MMOL/L (ref 136–145)
TRIGL SERPL-MCNC: 104 MG/DL (ref ?–150)
VLDLC SERPL CALC-MCNC: 20.8 MG/DL

## 2021-04-16 PROCEDURE — 36415 COLL VENOUS BLD VENIPUNCTURE: CPT

## 2021-04-16 PROCEDURE — 80053 COMPREHEN METABOLIC PANEL: CPT

## 2021-04-16 PROCEDURE — 82043 UR ALBUMIN QUANTITATIVE: CPT

## 2021-04-16 PROCEDURE — 80061 LIPID PANEL: CPT

## 2021-04-16 PROCEDURE — 83036 HEMOGLOBIN GLYCOSYLATED A1C: CPT

## 2021-04-22 ENCOUNTER — OFFICE VISIT (OUTPATIENT)
Dept: FAMILY MEDICINE CLINIC | Age: 76
End: 2021-04-22
Payer: MEDICARE

## 2021-04-22 VITALS
RESPIRATION RATE: 16 BRPM | WEIGHT: 173.6 LBS | OXYGEN SATURATION: 95 % | DIASTOLIC BLOOD PRESSURE: 80 MMHG | SYSTOLIC BLOOD PRESSURE: 126 MMHG | TEMPERATURE: 97.7 F | BODY MASS INDEX: 31.94 KG/M2 | HEIGHT: 62 IN | HEART RATE: 91 BPM

## 2021-04-22 DIAGNOSIS — Z00.00 MEDICARE ANNUAL WELLNESS VISIT, SUBSEQUENT: Primary | ICD-10-CM

## 2021-04-22 DIAGNOSIS — N18.30 CHRONIC RENAL IMPAIRMENT, STAGE 3 (MODERATE), UNSPECIFIED WHETHER STAGE 3A OR 3B CKD (HCC): ICD-10-CM

## 2021-04-22 DIAGNOSIS — M10.9 GOUT WITHOUT TOPHUS: ICD-10-CM

## 2021-04-22 DIAGNOSIS — E78.00 PURE HYPERCHOLESTEROLEMIA: ICD-10-CM

## 2021-04-22 DIAGNOSIS — I10 ESSENTIAL HYPERTENSION: ICD-10-CM

## 2021-04-22 DIAGNOSIS — E11.9 TYPE 2 DIABETES MELLITUS WITHOUT COMPLICATION (HCC): ICD-10-CM

## 2021-04-22 DIAGNOSIS — I72.8 SPLENIC ARTERY ANEURYSM (HCC): ICD-10-CM

## 2021-04-22 PROCEDURE — G0463 HOSPITAL OUTPT CLINIC VISIT: HCPCS | Performed by: FAMILY MEDICINE

## 2021-04-22 PROCEDURE — G8754 DIAS BP LESS 90: HCPCS | Performed by: FAMILY MEDICINE

## 2021-04-22 PROCEDURE — G0439 PPPS, SUBSEQ VISIT: HCPCS | Performed by: FAMILY MEDICINE

## 2021-04-22 PROCEDURE — G8427 DOCREV CUR MEDS BY ELIG CLIN: HCPCS | Performed by: FAMILY MEDICINE

## 2021-04-22 PROCEDURE — G8752 SYS BP LESS 140: HCPCS | Performed by: FAMILY MEDICINE

## 2021-04-22 PROCEDURE — 3051F HG A1C>EQUAL 7.0%<8.0%: CPT | Performed by: FAMILY MEDICINE

## 2021-04-22 PROCEDURE — G8510 SCR DEP NEG, NO PLAN REQD: HCPCS | Performed by: FAMILY MEDICINE

## 2021-04-22 PROCEDURE — G8536 NO DOC ELDER MAL SCRN: HCPCS | Performed by: FAMILY MEDICINE

## 2021-04-22 PROCEDURE — 1101F PT FALLS ASSESS-DOCD LE1/YR: CPT | Performed by: FAMILY MEDICINE

## 2021-04-22 PROCEDURE — 99214 OFFICE O/P EST MOD 30 MIN: CPT | Performed by: FAMILY MEDICINE

## 2021-04-22 PROCEDURE — G8417 CALC BMI ABV UP PARAM F/U: HCPCS | Performed by: FAMILY MEDICINE

## 2021-04-22 RX ORDER — PIOGLITAZONEHYDROCHLORIDE 15 MG/1
TABLET ORAL
Qty: 90 TAB | Refills: 3 | Status: SHIPPED | OUTPATIENT
Start: 2021-04-22 | End: 2022-04-08 | Stop reason: SDUPTHER

## 2021-04-22 RX ORDER — SIMVASTATIN 40 MG/1
40 TABLET, FILM COATED ORAL
Qty: 90 TAB | Refills: 3 | Status: SHIPPED | OUTPATIENT
Start: 2021-04-22 | End: 2022-05-07 | Stop reason: SDUPTHER

## 2021-04-22 NOTE — PROGRESS NOTES
This is the Subsequent Medicare Annual Wellness Exam, performed 12 months or more after the Initial AWV or the last Subsequent AWV    I have reviewed the patient's medical history in detail and updated the computerized patient record. Assessment/Plan   Education and counseling provided:  Are appropriate based on today's review and evaluation  Pneumococcal Vaccine 23 completed  Influenza Vaccine- annually  Cardiovascular screening blood test 4/2021      Depression Risk Factor Screening     3 most recent PHQ Screens 4/22/2021   Little interest or pleasure in doing things Not at all   Feeling down, depressed, irritable, or hopeless Not at all   Total Score PHQ 2 0       Alcohol Risk Screen    Do you average more than 1 drink per night or more than 7 drinks a week: No    In the past three months have you have had more than 4 drinks containing alcohol on one occasion: No        Functional Ability and Level of Safety    Hearing: Hearing is good. Activities of Daily Living: The home contains: no safety equipment. Patient does total self care      Ambulation: with no difficulty     Fall Risk:  Fall Risk Assessment, last 12 mths 4/22/2021   Able to walk? Yes   Fall in past 12 months? 0   Do you feel unsteady?  0   Are you worried about falling 0      Abuse Screen:  Patient is not abused       Cognitive Screening    Has your family/caregiver stated any concerns about your memory: no         Health Maintenance Due     Health Maintenance Due   Topic Date Due    COVID-19 Vaccine (1) Never done    Shingrix Vaccine Age 50> (1 of 2) Never done    DTaP/Tdap/Td series (2 - Td) 05/13/2020       Patient Care Team   Patient Care Team:  Henrietta Duane, MD as PCP - General (Family Medicine)  Henrietta Duane, MD as PCP - [facility]  JAMIE Lyle (Vascular Surgery)  Brittney Bae MD (Thoracic Diseases)  Roseann Berrios MD (Vascular Surgery)  Enrique Grant MD (Ophthalmology)    History Patient Active Problem List   Diagnosis Code    DJD (degenerative joint disease) of knee M17.10    CRI (chronic renal insufficiency) N18.9    Splenic artery aneurysm (HCC) I72.8    Essential hypertension I10    Pure hypercholesterolemia E78.00    Gout without tophus M10.9    Advance directive discussed with patient Z71.89    BMI 30.0-30.9,adult Z68.30    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 Santiago Laura HX PROSTATECTOMY  12/2007     Current Outpatient Medications   Medication Sig Dispense Refill    pioglitazone (ACTOS) 15 mg tablet Take one tablet once daily 90 Tab 3    simvastatin (ZOCOR) 40 mg tablet Take 1 Tab by mouth nightly. 90 Tab 3    SITagliptin (JANUVIA) 100 mg tablet Take 1 Tab by mouth daily. 90 Tab 1    colchicine 0.6 mg tablet Take 1 Tab by mouth daily. 90 Tab 1    glipiZIDE SR (GLUCOTROL XL) 10 mg CR tablet Take 1 Tab by mouth daily. 180 Tab 2    lisinopriL (PRINIVIL, ZESTRIL) 20 mg tablet Take 1 Tab by mouth two (2) times a day. 180 Tab 3    alcohol swabs (Alcohol Pads) padm Use as directed daily. 200 Pad 1    lancets (Lancets,Ultra Thin) misc As directed once daily. 3 Package 3    hydrocortisone (HYTONE) 2.5 % topical cream Apply  to affected area two (2) times a day. use thin layer 60 g 2    Blood-Glucose Meter monitoring kit Free Style monitoring Kit. Check glucose fasting daily. 1 Kit 0    glucose blood VI test strips (ASCENSIA AUTODISC VI, ONE TOUCH ULTRA TEST VI) strip Free Style.  Check fasting glucose daily 100 Strip 3    glucose blood VI test strips (PRECISION XTRA TEST) strip Check glucose level once daily 100 Strip 11    aspirin 81 mg chewable tablet Take 1 Tab by mouth daily. 90 Tab 4    Blood-Glucose Meter (BLOOD GLUCOSE MONITOR KIT) monitoring kit by Does Not Apply route. Once daily. 1 Kit 0     Allergies   Allergen Reactions    Watermelon Other (comments)    Azithromycin Palpitations    Norvasc [Amlodipine] Itching and Other (comments)       Family History   Problem Relation Age of Onset    Cancer Neg Hx     Diabetes Neg Hx     Heart Disease Neg Hx     Hypertension Neg Hx     Stroke Neg Hx      Social History     Tobacco Use    Smoking status: Former Smoker     Packs/day: 1.00     Types: Cigarettes     Quit date: 1997     Years since quittin.7    Smokeless tobacco: Never Used    Tobacco comment:    Substance Use Topics    Alcohol use: Not Currently     Comment: rarely         Adry Cruz MD     SUBJECTIVE  Routine ff-up    DM w/ nephropathy- improved diet, Continues to take  Januvia, actos and glipizide. He is off metformin due to renal function (CKD 3). HTN/HL- taking medications, denies cp, sob. Gout-  rare flares, related to eating peanuts or some [place] delicacies. no recent flare ups, colchicine prn effective    Splenic artery stenosis (s/p repair )    Previous h/o prostate cancer  s/p prostatectomy, he was told to be in remission for years.  Reviewed PSA check  normal.     ROS:  See HPI, all others negative        Patient Active Problem List   Diagnosis Code    Prostate cancer ([facility] C61    DJD (degenerative joint disease) of knee M17.10    CRI (chronic renal insufficiency) N18.9    Splenic artery aneurysm (HCC) I72.8    Essential hypertension I10    Pure hypercholesterolemia E78.00    Gout without tophus M10.9    Advance directive discussed with patient Z71.89    BMI 30.0-30.9,adult Z68.30    Type 2 diabetes with nephropathy (HCC) E11.21 Current Outpatient Medications:     pioglitazone (ACTOS) 15 mg tablet, Take one tablet once daily, Disp: 90 Tab, Rfl: 3    simvastatin (ZOCOR) 40 mg tablet, Take 1 Tab by mouth nightly., Disp: 90 Tab, Rfl: 3    SITagliptin (JANUVIA) 100 mg tablet, Take 1 Tab by mouth daily. , Disp: 90 Tab, Rfl: 1    colchicine 0.6 mg tablet, Take 1 Tab by mouth daily. , Disp: 90 Tab, Rfl: 1    glipiZIDE SR (GLUCOTROL XL) 10 mg CR tablet, Take 1 Tab by mouth daily. , Disp: 180 Tab, Rfl: 2    lisinopriL (PRINIVIL, ZESTRIL) 20 mg tablet, Take 1 Tab by mouth two (2) times a day., Disp: 180 Tab, Rfl: 3    alcohol swabs (Alcohol Pads) padm, Use as directed daily. , Disp: 200 Pad, Rfl: 1    lancets (Lancets,Ultra Thin) misc, As directed once daily. , Disp: 3 Package, Rfl: 3    hydrocortisone (HYTONE) 2.5 % topical cream, Apply  to affected area two (2) times a day. use thin layer, Disp: 60 g, Rfl: 2    Blood-Glucose Meter monitoring kit, Free Style monitoring Kit. Check glucose fasting daily. , Disp: 1 Kit, Rfl: 0    glucose blood VI test strips (ASCENSIA AUTODISC VI, ONE TOUCH ULTRA TEST VI) strip, Free Style. Check fasting glucose daily, Disp: 100 Strip, Rfl: 3    glucose blood VI test strips (PRECISION XTRA TEST) strip, Check glucose level once daily, Disp: 100 Strip, Rfl: 11    aspirin 81 mg chewable tablet, Take 1 Tab by mouth daily. , Disp: 90 Tab, Rfl: 4    Blood-Glucose Meter (BLOOD GLUCOSE MONITOR KIT) monitoring kit, by Does Not Apply route. Once daily. , Disp: 1 Kit, Rfl: 0      Allergies   Allergen Reactions    Azithromycin Palpitations    Norvasc [Amlodipine] Itching and Other (comments)    Watermelon Other (comments)       Past Medical History:   Diagnosis Date    Arthritis     CRI (chronic renal insufficiency)     Diabetes mellitus type II 5/13/10    Gout     Hypercholesteremia     Hypertension     Other ill-defined conditions(799.89)     gout    Prostate cancer ([facility] [facility] 2008    remission    Splenic artery aneurysm [facility]) 2013    s/p stent dr Dash Hernández prn ff-up       Social History     Socioeconomic History    Marital status:      Spouse name: Not on file    Number of children: Not on file    Years of education: Not on file    Highest education level: Not on file   Occupational History    Not on file   Social Needs    Financial resource strain: Not on file    Food insecurity:     Worry: Not on file     Inability: Not on file    Transportation needs:     Medical: Not on file     Non-medical: Not on file   Tobacco Use    Smoking status: Former Smoker     Packs/day: 1.00     Types: Cigarettes     Last attempt to quit: 1997     Years since quittin.6    Smokeless tobacco: Never Used    Tobacco comment:    Substance and Sexual Activity    Alcohol use: Yes     Comment: rarely    Drug use: No    Sexual activity: Never   Lifestyle    Physical activity:     Days per week: Not on file     Minutes per session: Not on file    Stress: Not on file   Relationships    Social connections:     Talks on phone: Not on file     Gets together: Not on file     Attends Worship service: Not on file     Active member of club or organization: Not on file     Attends meetings of clubs or organizations: Not on file     Relationship status: Not on file    Intimate partner violence:     Fear of current or ex partner: Not on file     Emotionally abused: Not on file     Physically abused: Not on file     Forced sexual activity: Not on file   Other Topics Concern    Not on file   Social History Narrative    Not on file       Family History   Problem Relation Age of Onset    Cancer Neg Hx     Diabetes Neg Hx     Heart Disease Neg Hx     Hypertension Neg Hx     Stroke Neg Hx          OBJECTIVE    Physical Exam:     Visit Vitals  Visit Vitals  /80 (BP 1 Location: Left upper arm, BP Patient Position: Sitting, BP Cuff Size: Adult)   Pulse 91   Temp 97.7 °F (36.5 °C) (Temporal)   Resp 16   Ht 5' 2\" (1.575 m)   Wt 173 lb 9.6 oz (78.7 kg)   SpO2 95%   BMI 31.75 kg/m²       General: alert, well-appearing, in no apparent distress or pain  CVS: normal rate, regular rhythm, distinct S1 and S2  Lungs:clear to ausculation bilaterally, no crackles, wheezing or rhonchi noted  Extremities: no edema  Psych:  mood and affect normal  Results for orders placed or performed during the hospital encounter of 04/16/21   HEMOGLOBIN A1C WITH EAG   Result Value Ref Range    Hemoglobin A1c 7.4 (H) 4.2 - 5.6 %    Est. average glucose 283 mg/dL   METABOLIC PANEL, COMPREHENSIVE   Result Value Ref Range    Sodium 140 136 - 145 mmol/L    Potassium 4.2 3.5 - 5.5 mmol/L    Chloride 107 100 - 111 mmol/L    CO2 29 21 - 32 mmol/L    Anion gap 4 3.0 - 18 mmol/L    Glucose 150 (H) 74 - 99 mg/dL    BUN 25 (H) 7.0 - 18 MG/DL    Creatinine 1.31 (H) 0.6 - 1.3 MG/DL    BUN/Creatinine ratio 19 12 - 20      GFR est AA >60 >60 ml/min/1.73m2    GFR est non-AA 53 (L) >60 ml/min/1.73m2    Calcium 8.7 8.5 - 10.1 MG/DL    Bilirubin, total 1.0 0.2 - 1.0 MG/DL    ALT (SGPT) 32 16 - 61 U/L    AST (SGOT) 18 10 - 38 U/L    Alk.  phosphatase 55 45 - 117 U/L    Protein, total 6.7 6.4 - 8.2 g/dL    Albumin 3.7 3.4 - 5.0 g/dL    Globulin 3.0 2.0 - 4.0 g/dL    A-G Ratio 1.2 0.8 - 1.7     LIPID PANEL   Result Value Ref Range    LIPID PROFILE          Cholesterol, total 109 <200 MG/DL    Triglyceride 104 <150 MG/DL    HDL Cholesterol 43 40 - 60 MG/DL    LDL, calculated 45.2 0 - 100 MG/DL    VLDL, calculated 20.8 MG/DL    CHOL/HDL Ratio 2.5 0 - 5.0     MICROALBUMIN, UR, RAND W/ MICROALB/CREAT RATIO   Result Value Ref Range    Microalbumin,urine random 17.40 (H) 0 - 3.0 MG/DL    Creatinine, urine 73.00 30 - 125 mg/dL    Microalbumin/Creat ratio (mg/g creat) 238 (H) 0 - 30 mg/g           ASSESSMENT/PLAN    Diagnoses and all orders for this visit:    Type 2 diabetes mellitus [facility]albuminuria  ([facility]-  7.6<7.4<10<7.6<7.3<7.2<6.7<6.5<7.2<6.7 >6.6>7.4>7.1>7.5>7.9>7.6>9.1>7.4  suboptimal   Cont januvia, actos (increase dose causing edema), glucotrol  Commended on lifestyle changes  Cont lisinopril  DM foot exam 8/2020   GFR >50 currently  Check Cmp/a1c in 3 months or sooner prn    Essential hypertension-  Now controlled  cont ace  Monitoring    CRI (chronic renal insufficiency), stage 3 (moderate)-  monitoring, avoid nsaids, renally dose meds, optimize DM control. Pure hypercholesterolemia-   at goal LDL< 100 on statin, cont    Gout without tophus, unspecified cause, unspecified chronicity, unspecified site-seldom flares,  Prn colchicine, low purine diet. BMI 32  Encouraged diet/ wt loss/exercise    Splenic artery aneursysm  S/p repair 2015  On ASA, statin    History of prostate cancer  2008 s/p prostatectomy  PSA 1/2021= 2.3      Follow-up and Dispositions    Return in about 3 months non fasting labs priorl    Patient understands plan of care. Patient has provided input and agrees with goals.

## 2021-04-22 NOTE — PATIENT INSTRUCTIONS
Learning About Carbohydrate (Carb) Counting and Eating Out When You Have Diabetes  Why plan your meals? Meal planning can be a key part of managing diabetes. Planning meals and snacks with the right balance of carbohydrate, protein, and fat can help you keep your blood sugar at the target level you set with your doctor. You don't have to eat special foods. You can eat what your family eats, including sweets once in a while. But you do have to pay attention to how often you eat and how much you eat of certain foods. You may want to work with a dietitian or a certified diabetes educator. He or she can give you tips and meal ideas and can answer your questions about meal planning. This health professional can also help you reach a healthy weight if that is one of your goals. What should you know about eating carbs? Managing the amount of carbohydrate (carbs) you eat is an important part of healthy meals when you have diabetes. Carbohydrate is found in many foods. · Learn which foods have carbs. And learn the amounts of carbs in different foods. ? Bread, cereal, pasta, and rice have about 15 grams of carbs in a serving. A serving is 1 slice of bread (1 ounce), ½ cup of cooked cereal, or 1/3 cup of cooked pasta or rice. ? Fruits have 15 grams of carbs in a serving. A serving is 1 small fresh fruit, such as an apple or orange; ½ of a banana; ½ cup of cooked or canned fruit; ½ cup of fruit juice; 1 cup of melon or raspberries; or 2 tablespoons of dried fruit. ? Milk and no-sugar-added yogurt have 15 grams of carbs in a serving. A serving is 1 cup of milk or 2/3 cup of no-sugar-added yogurt. ? Starchy vegetables have 15 grams of carbs in a serving. A serving is ½ cup of mashed potatoes or sweet potato; 1 cup winter squash; ½ of a small baked potato; ½ cup of cooked beans; or ½ cup cooked corn or green peas.   · Learn how much carbs to eat each day and at each meal. A dietitian or CDE can teach you how to keep track of the amount of carbs you eat. This is called carbohydrate counting. · If you are not sure how to count carbohydrate grams, use the Plate Method to plan meals. It is a good, quick way to make sure that you have a balanced meal. It also helps you spread carbs throughout the day. ? Divide your plate by types of foods. Put non-starchy vegetables on half the plate, meat or other protein food on one-quarter of the plate, and a grain or starchy vegetable in the final quarter of the plate. To this you can add a small piece of fruit and 1 cup of milk or yogurt, depending on how many carbs you are supposed to eat at a meal.  · Try to eat about the same amount of carbs at each meal. Do not \"save up\" your daily allowance of carbs to eat at one meal.  · Proteins have very little or no carbs per serving. Examples of proteins are beef, chicken, turkey, fish, eggs, tofu, cheese, cottage cheese, and peanut butter. A serving size of meat is 3 ounces, which is about the size of a deck of cards. Examples of meat substitute serving sizes (equal to 1 ounce of meat) are 1/4 cup of cottage cheese, 1 egg, 1 tablespoon of peanut butter, and ½ cup of tofu. How can you eat out and still eat healthy? · Learn to estimate the serving sizes of foods that have carbohydrate. If you measure food at home, it will be easier to estimate the amount in a serving of restaurant food. · If the meal you order has too much carbohydrate (such as potatoes, corn, or baked beans), ask to have a low-carbohydrate food instead. Ask for a salad or green vegetables. · If you use insulin, check your blood sugar before and after eating out to help you plan how much to eat in the future. · If you eat more carbohydrate at a meal than you had planned, take a walk or do other exercise. This will help lower your blood sugar. What are some tips for eating healthy? · Limit saturated fat, such as the fat from meat and dairy products.  This is a healthy choice because people who have diabetes are at higher risk of heart disease. So choose lean cuts of meat and nonfat or low-fat dairy products. Use olive or canola oil instead of butter or shortening when cooking. · Don't skip meals. Your blood sugar may drop too low if you skip meals and take insulin or certain medicines for diabetes. · Check with your doctor before you drink alcohol. Alcohol can cause your blood sugar to drop too low. Alcohol can also cause a bad reaction if you take certain diabetes medicines. Follow-up care is a key part of your treatment and safety. Be sure to make and go to all appointments, and call your doctor if you are having problems. It's also a good idea to know your test results and keep a list of the medicines you take. Where can you learn more? Go to http://www.donis.com/  Enter I147 in the search box to learn more about \"Learning About Carbohydrate (Carb) Counting and Eating Out When You Have Diabetes. \"  Current as of: August 31, 2020               Content Version: 12.8  © 2006-2021 "[employer]. Care instructions adapted under license by [employer] (which disclaims liability or warranty for this information). If you have questions about a medical condition or this instruction, always ask your healthcare professional. [address] any warranty or liability for your use of this information. Medicare Wellness Visit, Male    The best way to live healthy is to have a lifestyle where you eat a well-balanced diet, exercise regularly, limit alcohol use, and quit all forms of tobacco/nicotine, if applicable. Regular preventive services are another way to keep healthy. Preventive services (vaccines, screening tests, monitoring & exams) can help personalize your care plan, which helps you manage your own care.  Screening tests can find health problems at the earliest stages, when they are easiest to treatGera Grace follows the current, evidence-based guidelines published by the [facility] Stoddard ([facility]) when recommending preventive services for our patients. Because we follow these guidelines, sometimes recommendations change over time as research supports it. (For example, a prostate screening blood test is no longer routinely recommended for men with no symptoms). Of course, you and your doctor may decide to screen more often for some diseases, based on your risk and co-morbidities (chronic disease you are already diagnosed with). Preventive services for you include:  - Medicare offers their members a free annual wellness visit, which is time for you and your primary care provider to discuss and plan for your preventive service needs. Take advantage of this benefit every year!  -All adults over age 72 should receive the recommended pneumonia vaccines. Current USPSTF guidelines recommend a series of two vaccines for the best pneumonia protection.   -All adults should have a flu vaccine yearly and tetanus vaccine every 10 years.  -All adults age 48 and older should receive the shingles vaccines (series of two vaccines). -All adults age 38-68 who are overweight should have a diabetes screening test once every three years.   -Other screening tests & preventive services for persons with diabetes include: an eye exam to screen for diabetic retinopathy, a kidney function test, a foot exam, and stricter control over your cholesterol.   -Cardiovascular screening for adults with routine risk involves an electrocardiogram (ECG) at intervals determined by the provider.   -Colorectal cancer screening should be done for adults age 54-65 with no increased risk factors for colorectal cancer. There are a number of acceptable methods of screening for this type of cancer. Each test has its own benefits and drawbacks.  Discuss with your provider what is most appropriate for you during your annual wellness visit. The different tests include: colonoscopy (considered the best screening method), a fecal occult blood test, a fecal DNA test, and sigmoidoscopy.  -All adults born between [facility] should be screened once for Hepatitis C.  -An Abdominal Aortic Aneurysm (AAA) Screening is recommended for men age 73-68 who has ever smoked in their lifetime.      Here is a list of your current Health Maintenance items (your personalized list of preventive services) with a due date:  Health Maintenance Due   Topic Date Due    COVID-19 Vaccine (1) Never done    Shingles Vaccine (1 of 2) Never done    DTaP/Tdap/Td  (2 - Td) 05/13/2020

## 2021-04-22 NOTE — PROGRESS NOTES
1. Have you been to the ER, urgent care clinic since your last visit? Hospitalized since your last visit? No    2. Have you seen or consulted any other health care providers outside of the [address] [place] [address] since your last visit? Include any pap smears or colon screening.  No    Chief Complaint   Patient presents with   Amber Carey Annual Wellness Visit

## 2021-07-08 RX ORDER — BLOOD SUGAR DIAGNOSTIC
STRIP MISCELLANEOUS
Qty: 100 STRIP | Refills: 11 | Status: SHIPPED | OUTPATIENT
Start: 2021-07-08 | End: 2022-09-13

## 2021-07-08 RX ORDER — GLIPIZIDE 10 MG/1
10 TABLET, FILM COATED, EXTENDED RELEASE ORAL DAILY
Qty: 180 TABLET | Refills: 2 | Status: SHIPPED | OUTPATIENT
Start: 2021-07-08 | End: 2022-06-07 | Stop reason: SDUPTHER

## 2021-07-08 RX ORDER — ISOPROPYL ALCOHOL 70 ML/100ML
SWAB TOPICAL
Qty: 200 PAD | Refills: 1 | Status: SHIPPED | OUTPATIENT
Start: 2021-07-08 | End: 2022-04-08 | Stop reason: SDUPTHER

## 2021-07-08 NOTE — TELEPHONE ENCOUNTER
Last OV 04/22/2021  Next OV 07/22/2021  Last lab 04/16/2021  Order for labs to be done prior to 07/22/2021 appt

## 2021-07-19 ENCOUNTER — HOSPITAL ENCOUNTER (OUTPATIENT)
Dept: LAB | Age: 76
Discharge: HOME | End: 2021-07-19
Payer: MEDICARE

## 2021-07-19 DIAGNOSIS — E11.9 TYPE 2 DIABETES MELLITUS WITHOUT COMPLICATION (HCC): ICD-10-CM

## 2021-07-19 LAB
ALBUMIN SERPL-MCNC: 3.8 G/DL (ref 3.4–5)
ALBUMIN/GLOB SERPL: 1.1 {RATIO} (ref 0.8–1.7)
ALP SERPL-CCNC: 62 U/L (ref 45–117)
ALT SERPL-CCNC: 36 U/L (ref 16–61)
ANION GAP SERPL CALC-SCNC: 4 MMOL/L (ref 3–18)
AST SERPL-CCNC: 23 U/L (ref 10–38)
BILIRUB SERPL-MCNC: 1.9 MG/DL (ref 0.2–1)
BUN SERPL-MCNC: 28 MG/DL (ref 7–18)
BUN/CREAT SERPL: 22 (ref 12–20)
CALCIUM SERPL-MCNC: 8.6 MG/DL (ref 8.5–10.1)
CHLORIDE SERPL-SCNC: 107 MMOL/L (ref 100–111)
CO2 SERPL-SCNC: 29 MMOL/L (ref 21–32)
CREAT SERPL-MCNC: 1.29 MG/DL (ref 0.6–1.3)
EST. AVERAGE GLUCOSE BLD GHB EST-MCNC: 163 MG/DL
GLOBULIN SER CALC-MCNC: 3.5 G/DL (ref 2–4)
GLUCOSE SERPL-MCNC: 168 MG/DL (ref 74–99)
HBA1C MFR BLD: 7.3 % (ref 4.2–5.6)
POTASSIUM SERPL-SCNC: 4.2 MMOL/L (ref 3.5–5.5)
PROT SERPL-MCNC: 7.3 G/DL (ref 6.4–8.2)
SODIUM SERPL-SCNC: 140 MMOL/L (ref 136–145)

## 2021-07-19 PROCEDURE — 36415 COLL VENOUS BLD VENIPUNCTURE: CPT

## 2021-07-19 PROCEDURE — 83036 HEMOGLOBIN GLYCOSYLATED A1C: CPT

## 2021-07-19 PROCEDURE — 80053 COMPREHEN METABOLIC PANEL: CPT

## 2021-07-22 ENCOUNTER — OFFICE VISIT (OUTPATIENT)
Dept: FAMILY MEDICINE CLINIC | Age: 76
End: 2021-07-22
Payer: MEDICARE

## 2021-07-22 VITALS
TEMPERATURE: 97.5 F | DIASTOLIC BLOOD PRESSURE: 78 MMHG | OXYGEN SATURATION: 96 % | HEART RATE: 86 BPM | HEIGHT: 62 IN | SYSTOLIC BLOOD PRESSURE: 134 MMHG | BODY MASS INDEX: 31.28 KG/M2 | WEIGHT: 170 LBS | RESPIRATION RATE: 16 BRPM

## 2021-07-22 DIAGNOSIS — I72.8 SPLENIC ARTERY ANEURYSM (HCC): ICD-10-CM

## 2021-07-22 DIAGNOSIS — I10 ESSENTIAL HYPERTENSION: ICD-10-CM

## 2021-07-22 DIAGNOSIS — E11.21 TYPE 2 DIABETES WITH NEPHROPATHY (HCC): Primary | ICD-10-CM

## 2021-07-22 DIAGNOSIS — M10.9 GOUT WITHOUT TOPHUS: ICD-10-CM

## 2021-07-22 DIAGNOSIS — N18.30 CHRONIC RENAL IMPAIRMENT, STAGE 3 (MODERATE), UNSPECIFIED WHETHER STAGE 3A OR 3B CKD (HCC): ICD-10-CM

## 2021-07-22 DIAGNOSIS — E78.00 PURE HYPERCHOLESTEROLEMIA: ICD-10-CM

## 2021-07-22 PROCEDURE — G8754 DIAS BP LESS 90: HCPCS | Performed by: FAMILY MEDICINE

## 2021-07-22 PROCEDURE — 3051F HG A1C>EQUAL 7.0%<8.0%: CPT | Performed by: FAMILY MEDICINE

## 2021-07-22 PROCEDURE — G8536 NO DOC ELDER MAL SCRN: HCPCS | Performed by: FAMILY MEDICINE

## 2021-07-22 PROCEDURE — G0463 HOSPITAL OUTPT CLINIC VISIT: HCPCS | Performed by: FAMILY MEDICINE

## 2021-07-22 PROCEDURE — G8510 SCR DEP NEG, NO PLAN REQD: HCPCS | Performed by: FAMILY MEDICINE

## 2021-07-22 PROCEDURE — G8417 CALC BMI ABV UP PARAM F/U: HCPCS | Performed by: FAMILY MEDICINE

## 2021-07-22 PROCEDURE — G8427 DOCREV CUR MEDS BY ELIG CLIN: HCPCS | Performed by: FAMILY MEDICINE

## 2021-07-22 PROCEDURE — 99214 OFFICE O/P EST MOD 30 MIN: CPT | Performed by: FAMILY MEDICINE

## 2021-07-22 PROCEDURE — G8752 SYS BP LESS 140: HCPCS | Performed by: FAMILY MEDICINE

## 2021-07-22 PROCEDURE — 1101F PT FALLS ASSESS-DOCD LE1/YR: CPT | Performed by: FAMILY MEDICINE

## 2021-07-22 NOTE — PROGRESS NOTES
1. Have you been to the ER, urgent care clinic since your last visit? Hospitalized since your last visit? No    2. Have you seen or consulted any other health care providers outside of the [address] [place] [address] since your last visit? Include any pap smears or colon screening.  No

## 2021-07-22 NOTE — PATIENT INSTRUCTIONS
Learning About Carbohydrate (Carb) Counting and Eating Out When You Have Diabetes  Why plan your meals? Meal planning can be a key part of managing diabetes. Planning meals and snacks with the right balance of carbohydrate, protein, and fat can help you keep your blood sugar at the target level you set with your doctor. You don't have to eat special foods. You can eat what your family eats, including sweets once in a while. But you do have to pay attention to how often you eat and how much you eat of certain foods. You may want to work with a dietitian or a certified diabetes educator. He or she can give you tips and meal ideas and can answer your questions about meal planning. This health professional can also help you reach a healthy weight if that is one of your goals. What should you know about eating carbs? Managing the amount of carbohydrate (carbs) you eat is an important part of healthy meals when you have diabetes. Carbohydrate is found in many foods. · Learn which foods have carbs. And learn the amounts of carbs in different foods. ? Bread, cereal, pasta, and rice have about 15 grams of carbs in a serving. A serving is 1 slice of bread (1 ounce), ½ cup of cooked cereal, or 1/3 cup of cooked pasta or rice. ? Fruits have 15 grams of carbs in a serving. A serving is 1 small fresh fruit, such as an apple or orange; ½ of a banana; ½ cup of cooked or canned fruit; ½ cup of fruit juice; 1 cup of melon or raspberries; or 2 tablespoons of dried fruit. ? Milk and no-sugar-added yogurt have 15 grams of carbs in a serving. A serving is 1 cup of milk or 2/3 cup of no-sugar-added yogurt. ? Starchy vegetables have 15 grams of carbs in a serving. A serving is ½ cup of mashed potatoes or sweet potato; 1 cup winter squash; ½ of a small baked potato; ½ cup of cooked beans; or ½ cup cooked corn or green peas.   · Learn how much carbs to eat each day and at each meal. A dietitian or CDE can teach you how to keep track of the amount of carbs you eat. This is called carbohydrate counting. · If you are not sure how to count carbohydrate grams, use the Plate Method to plan meals. It is a good, quick way to make sure that you have a balanced meal. It also helps you spread carbs throughout the day. ? Divide your plate by types of foods. Put non-starchy vegetables on half the plate, meat or other protein food on one-quarter of the plate, and a grain or starchy vegetable in the final quarter of the plate. To this you can add a small piece of fruit and 1 cup of milk or yogurt, depending on how many carbs you are supposed to eat at a meal.  · Try to eat about the same amount of carbs at each meal. Do not \"save up\" your daily allowance of carbs to eat at one meal.  · Proteins have very little or no carbs per serving. Examples of proteins are beef, chicken, turkey, fish, eggs, tofu, cheese, cottage cheese, and peanut butter. A serving size of meat is 3 ounces, which is about the size of a deck of cards. Examples of meat substitute serving sizes (equal to 1 ounce of meat) are 1/4 cup of cottage cheese, 1 egg, 1 tablespoon of peanut butter, and ½ cup of tofu. How can you eat out and still eat healthy? · Learn to estimate the serving sizes of foods that have carbohydrate. If you measure food at home, it will be easier to estimate the amount in a serving of restaurant food. · If the meal you order has too much carbohydrate (such as potatoes, corn, or baked beans), ask to have a low-carbohydrate food instead. Ask for a salad or green vegetables. · If you use insulin, check your blood sugar before and after eating out to help you plan how much to eat in the future. · If you eat more carbohydrate at a meal than you had planned, take a walk or do other exercise. This will help lower your blood sugar. What are some tips for eating healthy? · Limit saturated fat, such as the fat from meat and dairy products.  This is a healthy choice because people who have diabetes are at higher risk of heart disease. So choose lean cuts of meat and nonfat or low-fat dairy products. Use olive or canola oil instead of butter or shortening when cooking. · Don't skip meals. Your blood sugar may drop too low if you skip meals and take insulin or certain medicines for diabetes. · Check with your doctor before you drink alcohol. Alcohol can cause your blood sugar to drop too low. Alcohol can also cause a bad reaction if you take certain diabetes medicines. Follow-up care is a key part of your treatment and safety. Be sure to make and go to all appointments, and call your doctor if you are having problems. It's also a good idea to know your test results and keep a list of the medicines you take. Where can you learn more? Go to http://www.donis.com/  Enter I147 in the search box to learn more about \"Learning About Carbohydrate (Carb) Counting and Eating Out When You Have Diabetes. \"  Current as of: August 31, 2020               Content Version: 12.8  © 2006-2021 [employer]. Care instructions adapted under license by [employer] (which disclaims liability or warranty for this information). If you have questions about a medical condition or this instruction, always ask your healthcare professional. [address] any warranty or liability for your use of this information.

## 2021-07-22 NOTE — PROGRESS NOTES
SUBJECTIVE  Routine ff-up    DM w/ nephropathy. Continues to take  Januvia, actos and glipizide. He is off metformin due to renal function (CKD 3). HTN/HL- taking medications, denies cp, sob. Gout-  rare flares, related to eating peanuts or some [place] delicacies. no recent flare ups, colchicine prn effective    Splenic artery stenosis (s/p repair 2015)    Previous h/o prostate cancer 2008 s/p prostatectomy, he was told to be in remission for years. Reviewed PSA check 2021 normal.     ROS:  See HPI, all others negative        Patient Active Problem List   Diagnosis Code    Prostate cancer ([facility] [address] C61    DJD (degenerative joint disease) of knee M17.10    CRI (chronic renal insufficiency) N18.9    Splenic artery aneurysm (HCC) I72.8    Essential hypertension I10    Pure hypercholesterolemia E78.00    Gout without tophus M10.9    Advance directive discussed with patient Z71.89    BMI 30.0-30.9,adult Z68.30    Type 2 diabetes with nephropathy (HCC) E11.21       Current Outpatient Medications:     alcohol swabs (Alcohol Pads) padm, Use as directed daily. , Disp: 200 Pad, Rfl: 1    glipiZIDE SR (GLUCOTROL XL) 10 mg CR tablet, Take 1 Tablet by mouth daily. , Disp: 180 Tablet, Rfl: 2    SITagliptin (JANUVIA) 100 mg tablet, Take 1 Tablet by mouth daily. , Disp: 90 Tablet, Rfl: 1    lisinopriL (PRINIVIL, ZESTRIL) 20 mg tablet, Take 1 Tablet by mouth two (2) times a day., Disp: 180 Tablet, Rfl: 1    colchicine 0.6 mg tablet, Take 1 Tablet by mouth daily. , Disp: 90 Tablet, Rfl: 1    pioglitazone (ACTOS) 15 mg tablet, Take one tablet once daily, Disp: 90 Tab, Rfl: 3    simvastatin (ZOCOR) 40 mg tablet, Take 1 Tab by mouth nightly., Disp: 90 Tab, Rfl: 3    lancets (Lancets,Ultra Thin) misc, As directed once daily. , Disp: 3 Package, Rfl: 3    hydrocortisone (HYTONE) 2.5 % topical cream, Apply  to affected area two (2) times a day.  use thin layer, Disp: 60 g, Rfl: 2    Blood-Glucose Meter monitoring kit, Free Style monitoring Kit. Check glucose fasting daily. , Disp: 1 Kit, Rfl: 0    glucose blood VI test strips (ASCENSIA AUTODISC VI, ONE TOUCH ULTRA TEST VI) strip, Free Style. Check fasting glucose daily, Disp: 100 Strip, Rfl: 3    aspirin 81 mg chewable tablet, Take 1 Tab by mouth daily. , Disp: 90 Tab, Rfl: 4    Blood-Glucose Meter (BLOOD GLUCOSE MONITOR KIT) monitoring kit, by Does Not Apply route. Once daily. , Disp: 1 Kit, Rfl: 0    glucose blood VI test strips (Precision Xtra Test) strip, Check glucose level once daily (Patient not taking: Reported on 2021), Disp: 100 Strip, Rfl: 11      Allergies   Allergen Reactions    Azithromycin Palpitations    Norvasc [Amlodipine] Itching and Other (comments)    Watermelon Other (comments)       Past Medical History:   Diagnosis Date    Arthritis     CRI (chronic renal insufficiency)     Diabetes mellitus type II 5/13/10    Gout     Hypercholesteremia     Hypertension     Other ill-defined conditions(799.89)     gout    Prostate cancer ([facility] [address] 2008    remission    Splenic artery aneurysm ([facility] [address] 2013    s/p stent dr Bustillo Splinter prn ff-up       Social History     Socioeconomic History    Marital status:      Spouse name: Not on file    Number of children: Not on file    Years of education: Not on file    Highest education level: Not on file   Occupational History    Not on file   Social Needs    Financial resource strain: Not on file    Food insecurity:     Worry: Not on file     Inability: Not on file    Transportation needs:     Medical: Not on file     Non-medical: Not on file   Tobacco Use    Smoking status: Former Smoker     Packs/day: 1.00     Types: Cigarettes     Last attempt to quit: 1997     Years since quittin.6    Smokeless tobacco: Never Used    Tobacco comment:    Substance and Sexual Activity    Alcohol use: Yes     Comment: rarely    Drug use: No    Sexual activity: Never   Lifestyle    Physical activity: Days per week: Not on file     Minutes per session: Not on file    Stress: Not on file   Relationships    Social connections:     Talks on phone: Not on file     Gets together: Not on file     Attends Yarsanism service: Not on file     Active member of club or organization: Not on file     Attends meetings of clubs or organizations: Not on file     Relationship status: Not on file    Intimate partner violence:     Fear of current or ex partner: Not on file     Emotionally abused: Not on file     Physically abused: Not on file     Forced sexual activity: Not on file   Other Topics Concern    Not on file   Social History Narrative    Not on file       Family History   Problem Relation Age of Onset    Cancer Neg Hx     Diabetes Neg Hx     Heart Disease Neg Hx     Hypertension Neg Hx     Stroke Neg Hx          OBJECTIVE    Physical Exam:     Visit Vitals  Visit Vitals  /78 (BP 1 Location: Left upper arm, BP Patient Position: Sitting, BP Cuff Size: Adult)   Pulse 86   Temp 97.5 °F (36.4 °C) (Oral)   Resp 16   Ht 5' 2\" (1.575 m)   Wt 170 lb (77.1 kg)   SpO2 96%   BMI 31.09 kg/m²       General: alert, well-appearing, in no apparent distress or pain  CVS: normal rate, regular rhythm, distinct S1 and S2  Lungs:clear to ausculation bilaterally, no crackles, wheezing or rhonchi noted  Extremities: feet- normal monofilament, no lesions  Psych:  mood and affect normal  Results for orders placed or performed during the hospital encounter of 07/19/21   HEMOGLOBIN A1C WITH EAG   Result Value Ref Range    Hemoglobin A1c 7.3 (H) 4.2 - 5.6 %    Est. average glucose 421 mg/dL   METABOLIC PANEL, COMPREHENSIVE   Result Value Ref Range    Sodium 140 136 - 145 mmol/L    Potassium 4.2 3.5 - 5.5 mmol/L    Chloride 107 100 - 111 mmol/L    CO2 29 21 - 32 mmol/L    Anion gap 4 3.0 - 18 mmol/L    Glucose 168 (H) 74 - 99 mg/dL    BUN 28 (H) 7.0 - 18 MG/DL    Creatinine 1.29 0.6 - 1.3 MG/DL    BUN/Creatinine ratio 22 (H) 12 - 20 GFR est AA >60 >60 ml/min/1.73m2    GFR est non-AA 54 (L) >60 ml/min/1.73m2    Calcium 8.6 8.5 - 10.1 MG/DL    Bilirubin, total 1.9 (H) 0.2 - 1.0 MG/DL    ALT (SGPT) 36 16 - 61 U/L    AST (SGOT) 23 10 - 38 U/L    Alk. phosphatase 62 45 - 117 U/L    Protein, total 7.3 6.4 - 8.2 g/dL    Albumin 3.8 3.4 - 5.0 g/dL    Globulin 3.5 2.0 - 4.0 g/dL    A-G Ratio 1.1 0.8 - 1.7             ASSESSMENT/PLAN    Diagnoses and all orders for this visit:    Type 2 diabetes mellitus with microalbuminuria  ([facility] [address]-  a1c 9.1>7.4>7.3  Cont januvia, actos (increase dose causing edema), glucotrol  Commended on lifestyle changes  Cont lisinopril  DM foot exam 8/2020   GFR >50 currently  Check Cmp/a1c in 3 months or sooner prn    Essential hypertension-  Now controlled  cont ace  Monitoring    CRI (chronic renal insufficiency), stage 3 (moderate)-  monitoring, avoid nsaids, renally dose meds, optimize DM control. Pure hypercholesterolemia-   at goal LDL< 100 on statin, cont    Gout without tophus, unspecified cause, unspecified chronicity, unspecified site-seldom flares,  Prn colchicine, low purine diet. BMI 31  Commended on weight loss    Splenic artery aneursysm  S/p repair 2015  On ASA, statin    History of prostate cancer  2008 s/p prostatectomy  PSA 1/2021= 2.3      Follow-up and Dispositions    Return in about 3 months non fasting labs priorl    Patient understands plan of care. Patient has provided input and agrees with goals.

## 2021-09-10 ENCOUNTER — APPOINTMENT (OUTPATIENT)
Dept: CT IMAGING | Age: 76
End: 2021-09-10
Attending: PHYSICIAN ASSISTANT
Payer: MEDICARE

## 2021-09-10 ENCOUNTER — HOSPITAL ENCOUNTER (EMERGENCY)
Age: 76
Discharge: HOME | End: 2021-09-10
Attending: EMERGENCY MEDICINE
Payer: MEDICARE

## 2021-09-10 VITALS
SYSTOLIC BLOOD PRESSURE: 142 MMHG | RESPIRATION RATE: 18 BRPM | DIASTOLIC BLOOD PRESSURE: 76 MMHG | HEART RATE: 86 BPM | OXYGEN SATURATION: 96 % | TEMPERATURE: 98.6 F

## 2021-09-10 DIAGNOSIS — S39.012A BACK STRAIN, INITIAL ENCOUNTER: Primary | ICD-10-CM

## 2021-09-10 LAB
ALBUMIN SERPL-MCNC: 3.5 G/DL (ref 3.4–5)
ALBUMIN/GLOB SERPL: 0.9 {RATIO} (ref 0.8–1.7)
ALP SERPL-CCNC: 59 U/L (ref 45–117)
ALT SERPL-CCNC: 31 U/L (ref 16–61)
ANION GAP SERPL CALC-SCNC: 5 MMOL/L (ref 3–18)
APPEARANCE UR: CLEAR
AST SERPL-CCNC: 19 U/L (ref 10–38)
BACTERIA URNS QL MICRO: NEGATIVE /HPF
BASOPHILS # BLD: 0 K/UL (ref 0–0.1)
BASOPHILS NFR BLD: 1 % (ref 0–2)
BILIRUB SERPL-MCNC: 0.7 MG/DL (ref 0.2–1)
BILIRUB UR QL: NEGATIVE
BUN SERPL-MCNC: 23 MG/DL (ref 7–18)
BUN/CREAT SERPL: 18 (ref 12–20)
CALCIUM SERPL-MCNC: 9 MG/DL (ref 8.5–10.1)
CHLORIDE SERPL-SCNC: 107 MMOL/L (ref 100–111)
CO2 SERPL-SCNC: 27 MMOL/L (ref 21–32)
COLOR UR: YELLOW
CREAT SERPL-MCNC: 1.27 MG/DL (ref 0.6–1.3)
DIFFERENTIAL METHOD BLD: ABNORMAL
EOSINOPHIL # BLD: 0.4 K/UL (ref 0–0.4)
EOSINOPHIL NFR BLD: 6 % (ref 0–5)
EPITH CASTS URNS QL MICRO: ABNORMAL /LPF (ref 0–5)
ERYTHROCYTE [DISTWIDTH] IN BLOOD BY AUTOMATED COUNT: 12.2 % (ref 11.6–14.5)
GLOBULIN SER CALC-MCNC: 3.8 G/DL (ref 2–4)
GLUCOSE SERPL-MCNC: 143 MG/DL (ref 74–99)
GLUCOSE UR STRIP.AUTO-MCNC: 100 MG/DL
HCT VFR BLD AUTO: 46.3 % (ref 36–48)
HGB BLD-MCNC: 15.7 G/DL (ref 13–16)
HGB UR QL STRIP: NEGATIVE
KETONES UR QL STRIP.AUTO: NEGATIVE MG/DL
LEUKOCYTE ESTERASE UR QL STRIP.AUTO: NEGATIVE
LIPASE SERPL-CCNC: 281 U/L (ref 73–393)
LYMPHOCYTES # BLD: 1.4 K/UL (ref 0.9–3.6)
LYMPHOCYTES NFR BLD: 21 % (ref 21–52)
MCH RBC QN AUTO: 30.5 PG (ref 24–34)
MCHC RBC AUTO-ENTMCNC: 33.9 G/DL (ref 31–37)
MCV RBC AUTO: 89.9 FL (ref 78–100)
MONOCYTES # BLD: 0.6 K/UL (ref 0.05–1.2)
MONOCYTES NFR BLD: 8 % (ref 3–10)
MUCOUS THREADS URNS QL MICRO: ABNORMAL /LPF
NEUTS SEG # BLD: 4.4 K/UL (ref 1.8–8)
NEUTS SEG NFR BLD: 64 % (ref 40–73)
NITRITE UR QL STRIP.AUTO: NEGATIVE
PH UR STRIP: 5.5 [PH] (ref 5–8)
PLATELET # BLD AUTO: 216 K/UL (ref 135–420)
PMV BLD AUTO: 10.4 FL (ref 9.2–11.8)
POTASSIUM SERPL-SCNC: 4.2 MMOL/L (ref 3.5–5.5)
PROT SERPL-MCNC: 7.3 G/DL (ref 6.4–8.2)
PROT UR STRIP-MCNC: 30 MG/DL
RBC # BLD AUTO: 5.15 M/UL (ref 4.35–5.65)
RBC #/AREA URNS HPF: NEGATIVE /HPF (ref 0–5)
SODIUM SERPL-SCNC: 139 MMOL/L (ref 136–145)
SP GR UR REFRACTOMETRY: 1.02 (ref 1–1.03)
UROBILINOGEN UR QL STRIP.AUTO: 1 EU/DL (ref 0.2–1)
WBC # BLD AUTO: 6.8 K/UL (ref 4.6–13.2)
WBC URNS QL MICRO: ABNORMAL /HPF (ref 0–4)

## 2021-09-10 PROCEDURE — 80053 COMPREHEN METABOLIC PANEL: CPT

## 2021-09-10 PROCEDURE — 74011000636 HC RX REV CODE- 636: Performed by: EMERGENCY MEDICINE

## 2021-09-10 PROCEDURE — 99283 EMERGENCY DEPT VISIT LOW MDM: CPT

## 2021-09-10 PROCEDURE — 81001 URINALYSIS AUTO W/SCOPE: CPT

## 2021-09-10 PROCEDURE — 85025 COMPLETE CBC W/AUTO DIFF WBC: CPT

## 2021-09-10 PROCEDURE — 74174 CTA ABD&PLVS W/CONTRAST: CPT

## 2021-09-10 PROCEDURE — 83690 ASSAY OF LIPASE: CPT

## 2021-09-10 RX ORDER — LIDOCAINE 50 MG/G
PATCH TOPICAL
Qty: 30 EACH | Refills: 0 | Status: SHIPPED | OUTPATIENT
Start: 2021-09-10 | End: 2022-09-22

## 2021-09-10 RX ORDER — ACETAMINOPHEN 500 MG
1000 TABLET ORAL
Qty: 20 TABLET | Refills: 0 | Status: SHIPPED | OUTPATIENT
Start: 2021-09-10

## 2021-09-10 RX ORDER — CYCLOBENZAPRINE HCL 5 MG
5 TABLET ORAL 2 TIMES DAILY
Qty: 10 TABLET | Refills: 0 | Status: SHIPPED | OUTPATIENT
Start: 2021-09-10

## 2021-09-10 RX ADMIN — IOPAMIDOL 96 ML: 755 INJECTION, SOLUTION INTRAVENOUS at 13:47

## 2021-09-10 NOTE — ED PROVIDER NOTES
EMERGENCY DEPARTMENT HISTORY AND PHYSICAL EXAM    11:45 AM      Date: 9/10/2021  Patient Name: Mercedes Sauer    History of Presenting Illness     Chief Complaint   Patient presents with    Flank Pain         History Provided By: Patient    Additional History (Context): Mercedes Sauer is a 66 y.o. male with hx of DM, HTN, nephrolithiasis, and other noted PMH who presents with complaint of left-sided flank pain x3 to 4 days. Patient says the pain is intermittent in nature, worse with movement. Patient denies fever chills, chest pain, shortness of breath, nausea or vomiting, dysuria, hematuria, urinary bowel incontinence or retention, fall or trauma. Denies radiation of pain into the leg, numbness or tingling. Patient notes he has applied heat and taken Advil for symptoms with mild relief. PCP: Jacky Barros MD    Current Outpatient Medications   Medication Sig Dispense Refill    lidocaine (Lidoderm) 5 % Apply patch to the affected area for 12 hours a day and remove for 12 hours a day. 30 Each 0    acetaminophen (Tylenol Extra Strength) 500 mg tablet Take 2 Tablets by mouth every six (6) hours as needed for Pain. 20 Tablet 0    cyclobenzaprine (FLEXERIL) 5 mg tablet Take 1 Tablet by mouth two (2) times a day. 10 Tablet 0    glucose blood VI test strips (Precision Xtra Test) strip Check glucose level once daily (Patient not taking: Reported on 7/22/2021) 100 Strip 11    alcohol swabs (Alcohol Pads) padm Use as directed daily. 200 Pad 1    glipiZIDE SR (GLUCOTROL XL) 10 mg CR tablet Take 1 Tablet by mouth daily. 180 Tablet 2    SITagliptin (JANUVIA) 100 mg tablet Take 1 Tablet by mouth daily. 90 Tablet 1    lisinopriL (PRINIVIL, ZESTRIL) 20 mg tablet Take 1 Tablet by mouth two (2) times a day. 180 Tablet 1    colchicine 0.6 mg tablet Take 1 Tablet by mouth daily.  90 Tablet 1    pioglitazone (ACTOS) 15 mg tablet Take one tablet once daily 90 Tab 3    simvastatin (ZOCOR) 40 mg tablet Take 1 Tab by mouth nightly. 90 Tab 3    lancets (Lancets,Ultra Thin) misc As directed once daily. 3 Package 3    hydrocortisone (HYTONE) 2.5 % topical cream Apply  to affected area two (2) times a day. use thin layer 60 g 2    Blood-Glucose Meter monitoring kit Free Style monitoring Kit. Check glucose fasting daily. 1 Kit 0    glucose blood VI test strips (ASCENSIA AUTODISC VI, ONE TOUCH ULTRA TEST VI) strip Free Style. Check fasting glucose daily 100 Strip 3    aspirin 81 mg chewable tablet Take 1 Tab by mouth daily. 90 Tab 4    Blood-Glucose Meter (BLOOD GLUCOSE MONITOR KIT) monitoring kit by Does Not Apply route. Once daily. 1 Kit 0       Past History     Past Medical History:  Past Medical History:   Diagnosis Date    Arthritis     CRI (chronic renal insufficiency)     Diabetes mellitus type II 5/13/10    Gout     Hypercholesteremia     Hypertension     Other ill-defined conditions(799.89)     gout    Prostate cancer ([facility] [address]     remission    Splenic artery aneurysm ([facility] [address] 2013    s/p stent dr [place] prn ff-up       Past Surgical History:  Past Surgical History:   Procedure Laterality Date    HX KNEE REPLACEMENT  12    Left; Dr. Gumaro Watson HX PROSTATECTOMY  2007       Family History:  Family History   Problem Relation Age of Onset    Cancer Neg Hx     Diabetes Neg Hx     Heart Disease Neg Hx     Hypertension Neg Hx     Stroke Neg Hx        Social History:  Social History     Tobacco Use    Smoking status: Former Smoker     Packs/day: 1.00     Types: Cigarettes     Quit date: 1997     Years since quittin.1    Smokeless tobacco: Never Used    Tobacco comment:    Substance Use Topics    Alcohol use: Not Currently     Comment: rarely    Drug use: No       Allergies:   Allergies   Allergen Reactions    Watermelon Other (comments)    Azithromycin Palpitations    Norvasc [Amlodipine] Itching and Other (comments)         Review of Systems       Review of Systems Constitutional: Negative for chills and fever. Respiratory: Negative for shortness of breath. Cardiovascular: Negative for chest pain. Gastrointestinal: Negative for abdominal pain, nausea and vomiting. Genitourinary: Positive for flank pain. Musculoskeletal: Positive for back pain. Skin: Negative for rash. Neurological: Negative for weakness. All other systems reviewed and are negative. Physical Exam     Visit Vitals  BP (!) 142/76 (BP 1 Location: Left upper arm, BP Patient Position: Sitting)   Pulse 86   Temp 98.6 °F (37 °C)   Resp 18   SpO2 96%         Physical Exam  Vitals and nursing note reviewed. Constitutional:       General: He is not in acute distress. Appearance: He is well-developed. He is not ill-appearing, toxic-appearing or diaphoretic. HENT:      Head: Normocephalic and atraumatic. Cardiovascular:      Rate and Rhythm: Normal rate and regular rhythm. Heart sounds: Normal heart sounds. No murmur heard. No friction rub. No gallop. Pulmonary:      Effort: Pulmonary effort is normal. No respiratory distress. Breath sounds: Normal breath sounds. No wheezing or rales. Abdominal:      General: Abdomen is flat. There is no distension. Palpations: Abdomen is soft. Tenderness: There is no abdominal tenderness. There is no right CVA tenderness, left CVA tenderness, guarding or rebound. Musculoskeletal:         General: Normal range of motion. Cervical back: Normal range of motion and neck supple. Lumbar back: Normal.   Skin:     General: Skin is warm. Findings: No rash. Neurological:      General: No focal deficit present. Mental Status: He is alert and oriented to person, place, and time. Cranial Nerves: No cranial nerve deficit. Motor: No weakness.       Gait: Gait normal.           Diagnostic Study Results     Labs -  Recent Results (from the past 12 hour(s))   URINALYSIS W/ RFLX MICROSCOPIC    Collection Time: 09/10/21 10:15 AM   Result Value Ref Range    Color YELLOW      Appearance CLEAR      Specific gravity 1.021 1.005 - 1.030      pH (UA) 5.5 5.0 - 8.0      Protein 30 (A) NEG mg/dL    Glucose 100 (A) NEG mg/dL    Ketone Negative NEG mg/dL    Bilirubin Negative NEG      Blood Negative NEG      Urobilinogen 1.0 0.2 - 1.0 EU/dL    Nitrites Negative NEG      Leukocyte Esterase Negative NEG     URINE MICROSCOPIC ONLY    Collection Time: 09/10/21 10:15 AM   Result Value Ref Range    WBC 0 to 3 0 - 4 /hpf    RBC Negative 0 - 5 /hpf    Epithelial cells 1+ 0 - 5 /lpf    Bacteria Negative NEG /hpf    Mucus FEW (A) NEG /lpf   CBC WITH AUTOMATED DIFF    Collection Time: 09/10/21 12:14 PM   Result Value Ref Range    WBC 6.8 4.6 - 13.2 K/uL    RBC 5.15 4.35 - 5.65 M/uL    HGB 15.7 13.0 - 16.0 g/dL    HCT 46.3 36.0 - 48.0 %    MCV 89.9 78.0 - 100.0 FL    MCH 30.5 24.0 - 34.0 PG    MCHC 33.9 31.0 - 37.0 g/dL    RDW 12.2 11.6 - 14.5 %    PLATELET 028 247 - 555 K/uL    MPV 10.4 9.2 - 11.8 FL    NEUTROPHILS 64 40 - 73 %    LYMPHOCYTES 21 21 - 52 %    MONOCYTES 8 3 - 10 %    EOSINOPHILS 6 (H) 0 - 5 %    BASOPHILS 1 0 - 2 %    ABS. NEUTROPHILS 4.4 1.8 - 8.0 K/UL    ABS. LYMPHOCYTES 1.4 0.9 - 3.6 K/UL    ABS. MONOCYTES 0.6 0.05 - 1.2 K/UL    ABS. EOSINOPHILS 0.4 0.0 - 0.4 K/UL    ABS. BASOPHILS 0.0 0.0 - 0.1 K/UL    DF AUTOMATED     METABOLIC PANEL, COMPREHENSIVE    Collection Time: 09/10/21 12:14 PM   Result Value Ref Range    Sodium 139 136 - 145 mmol/L    Potassium 4.2 3.5 - 5.5 mmol/L    Chloride 107 100 - 111 mmol/L    CO2 27 21 - 32 mmol/L    Anion gap 5 3.0 - 18 mmol/L    Glucose 143 (H) 74 - 99 mg/dL    BUN 23 (H) 7.0 - 18 MG/DL    Creatinine 1.27 0.6 - 1.3 MG/DL    BUN/Creatinine ratio 18 12 - 20      GFR est AA >60 >60 ml/min/1.73m2    GFR est non-AA 55 (L) >60 ml/min/1.73m2    Calcium 9.0 8.5 - 10.1 MG/DL    Bilirubin, total 0.7 0.2 - 1.0 MG/DL    ALT (SGPT) 31 16 - 61 U/L    AST (SGOT) 19 10 - 38 U/L    Alk.  phosphatase 59 45 - 117 U/L    Protein, total 7.3 6.4 - 8.2 g/dL    Albumin 3.5 3.4 - 5.0 g/dL    Globulin 3.8 2.0 - 4.0 g/dL    A-G Ratio 0.9 0.8 - 1.7     LIPASE    Collection Time: 09/10/21 12:14 PM   Result Value Ref Range    Lipase 281 73 - 393 U/L       Radiologic Studies -   CTA ABD PELV W WO CONT   Final Result      No acute or active abnormality is evident to explain left flank pain. Negative for CT findings of recurrent or residual prostate cancer. Urinary calculi are not evident at this time. The splenic artery aneurysm noted historically was treated and thrombosed in   2014. There is a minimal aneurysmal bulge the proximal splenic artery which   appears to be clinically insignificant. All CT scans at this facility are performed using dose optimization technique as   appropriate to the performed exam, to include automated exposure control,   adjustment of the mA and/or kV according to patient's size (Including   appropriate matching for site-specific examinations), or use of iterative   reconstruction technique. And pelvis            Medical Decision Making   I am the first provider for this patient. I reviewed the vital signs, available nursing notes, past medical history, past surgical history, family history and social history. Vital Signs-Reviewed the patient's vital signs. Records Reviewed: Nursing Notes and Old Medical Records (Time of Review: 11:45 AM)    ED Course: Progress Notes, Reevaluation, and Consults:  12:00 PM: Updated patient and wife that we are pending CT results for disposition. Resting comfortably, no distress. 1:30 PM Reviewed results with patient and family. Discussed need for close outpatient follow-up. Discussed strict return precautions, including fever, chest pain, shortness of breath, or any other medical concerns. In agreement with plan, ready to go home.     Provider Notes (Medical Decision Making): 78-year-old male who presents to the ED due to left-sided flank pain x3 to 4 days. Afebrile, nontoxic-appearing, looks well. Labs essentially unremarkable except for hyperglycemia. UA without evidence of infection. CTA abdomen pelvis without acute process, does not demonstrate nephrolithiasis, mass, vascular pathology, or other acute process. Patient is stable for discharge with symptomatic management, close outpatient follow-up for further assessment. Strict return precautions provided. Diagnosis     Clinical Impression:   1. Back strain, initial encounter        Disposition: home     Follow-up Information     Follow up With Specialties Details Why [place] [address] EMERGENCY DEPT Emergency Medicine  If symptoms worsen [address] [facility]  [phone]    Isela Moya MD Family Medicine Schedule an appointment as soon as possible for a visit   [facility]  [phone]             Discharge Medication List as of 9/10/2021  4:04 PM      START taking these medications    Details   lidocaine (Lidoderm) 5 % Apply patch to the affected area for 12 hours a day and remove for 12 hours a day., Normal, Disp-30 Each, R-0      acetaminophen (Tylenol Extra Strength) 500 mg tablet Take 2 Tablets by mouth every six (6) hours as needed for Pain., Normal, Disp-20 Tablet, R-0      cyclobenzaprine (FLEXERIL) 5 mg tablet Take 1 Tablet by mouth two (2) times a day., Normal, Disp-10 Tablet, R-0         CONTINUE these medications which have NOT CHANGED    Details   !! glucose blood VI test strips (Precision Xtra Test) strip Check glucose level once daily, Normal, Disp-100 Strip, R-11      alcohol swabs (Alcohol Pads) padm Use as directed daily. , Normal, Disp-200 Pad, R-1      glipiZIDE SR (GLUCOTROL XL) 10 mg CR tablet Take 1 Tablet by mouth daily. , Normal, Disp-180 Tablet, R-2      SITagliptin (JANUVIA) 100 mg tablet Take 1 Tablet by mouth daily. , Normal, Disp-90 Tablet, R-1      lisinopriL (PRINIVIL, ZESTRIL) 20 mg tablet Take 1 Tablet by mouth two (2) times a day., Normal, Disp-180 Tablet, R-1      colchicine 0.6 mg tablet Take 1 Tablet by mouth daily. , Normal, Disp-90 Tablet, R-1      pioglitazone (ACTOS) 15 mg tablet Take one tablet once daily, Normal, Disp-90 Tab, R-3      simvastatin (ZOCOR) 40 mg tablet Take 1 Tab by mouth nightly., Normal, Disp-90 Tab, R-3      lancets (Lancets,Ultra Thin) misc As directed once daily. , Normal, Disp-3 Package, R-3      hydrocortisone (HYTONE) 2.5 % topical cream Apply  to affected area two (2) times a day. use thin layer, Normal, Disp-60 g, R-2      !! Blood-Glucose Meter monitoring kit Free Style monitoring Kit. Check glucose fasting daily. , Print, Disp-1 Kit, R-0      !! glucose blood VI test strips (ASCENSIA AUTODISC VI, ONE TOUCH ULTRA TEST VI) strip Free Style. Check fasting glucose daily, Print, Disp-100 Strip, R-3      aspirin 81 mg chewable tablet Take 1 Tab by mouth daily. , Print, Disp-90 Tab, R-4      !! Blood-Glucose Meter (BLOOD GLUCOSE MONITOR KIT) monitoring kit by Does Not Apply route. Once daily. , Print, Disp-1 Kit, R-0       !! - Potential duplicate medications found. Please discuss with provider. Dictation disclaimer:  Please note that this dictation was completed with [employer], the [employer] voice recognition software. Quite often unanticipated grammatical, syntax, homophones, and other interpretive errors are inadvertently transcribed by the computer software. Please disregard these errors. Please excuse any errors that have escaped final proofreading.

## 2021-09-10 NOTE — ED NOTES
I have reviewed discharge instructions with the patient. The patient verbalized understanding. Current Discharge Medication List      START taking these medications    Details   lidocaine (Lidoderm) 5 % Apply patch to the affected area for 12 hours a day and remove for 12 hours a day. Qty: 30 Each, Refills: 0  Start date: 9/10/2021      acetaminophen (Tylenol Extra Strength) 500 mg tablet Take 2 Tablets by mouth every six (6) hours as needed for Pain. Qty: 20 Tablet, Refills: 0  Start date: 9/10/2021      cyclobenzaprine (FLEXERIL) 5 mg tablet Take 1 Tablet by mouth two (2) times a day.   Qty: 10 Tablet, Refills: 0  Start date: 9/10/2021

## 2021-09-10 NOTE — ED TRIAGE NOTES
Patient c/o left sided flank pain x 3-4 days. He denies leg pain or any urinary symptoms. He states he has history of kidney stones. He states heat improves the pain.

## 2021-10-20 ENCOUNTER — HOSPITAL ENCOUNTER (OUTPATIENT)
Dept: LAB | Age: 76
Discharge: HOME | End: 2021-10-20
Payer: MEDICARE

## 2021-10-20 DIAGNOSIS — E11.21 TYPE 2 DIABETES WITH NEPHROPATHY (HCC): ICD-10-CM

## 2021-10-20 LAB
ALBUMIN SERPL-MCNC: 3.4 G/DL (ref 3.4–5)
ALBUMIN/GLOB SERPL: 1 {RATIO} (ref 0.8–1.7)
ALP SERPL-CCNC: 74 U/L (ref 45–117)
ALT SERPL-CCNC: 36 U/L (ref 16–61)
ANION GAP SERPL CALC-SCNC: 6 MMOL/L (ref 3–18)
AST SERPL-CCNC: 23 U/L (ref 10–38)
BILIRUB SERPL-MCNC: 1.1 MG/DL (ref 0.2–1)
BUN SERPL-MCNC: 27 MG/DL (ref 7–18)
BUN/CREAT SERPL: 18 (ref 12–20)
CALCIUM SERPL-MCNC: 8.5 MG/DL (ref 8.5–10.1)
CHLORIDE SERPL-SCNC: 107 MMOL/L (ref 100–111)
CO2 SERPL-SCNC: 25 MMOL/L (ref 21–32)
CREAT SERPL-MCNC: 1.46 MG/DL (ref 0.6–1.3)
EST. AVERAGE GLUCOSE BLD GHB EST-MCNC: 171 MG/DL
GLOBULIN SER CALC-MCNC: 3.4 G/DL (ref 2–4)
GLUCOSE SERPL-MCNC: 302 MG/DL (ref 74–99)
HBA1C MFR BLD: 7.6 % (ref 4.2–5.6)
POTASSIUM SERPL-SCNC: 4.4 MMOL/L (ref 3.5–5.5)
PROT SERPL-MCNC: 6.8 G/DL (ref 6.4–8.2)
SODIUM SERPL-SCNC: 138 MMOL/L (ref 136–145)

## 2021-10-20 PROCEDURE — 36415 COLL VENOUS BLD VENIPUNCTURE: CPT

## 2021-10-20 PROCEDURE — 83036 HEMOGLOBIN GLYCOSYLATED A1C: CPT

## 2021-10-20 PROCEDURE — 80053 COMPREHEN METABOLIC PANEL: CPT

## 2021-10-25 ENCOUNTER — OFFICE VISIT (OUTPATIENT)
Dept: FAMILY MEDICINE CLINIC | Age: 76
End: 2021-10-25
Payer: MEDICARE

## 2021-10-25 VITALS
TEMPERATURE: 97.8 F | BODY MASS INDEX: 32.02 KG/M2 | HEART RATE: 99 BPM | DIASTOLIC BLOOD PRESSURE: 80 MMHG | HEIGHT: 62 IN | WEIGHT: 174 LBS | RESPIRATION RATE: 16 BRPM | OXYGEN SATURATION: 96 % | SYSTOLIC BLOOD PRESSURE: 139 MMHG

## 2021-10-25 DIAGNOSIS — R04.0 EPISTAXIS: ICD-10-CM

## 2021-10-25 DIAGNOSIS — E78.00 PURE HYPERCHOLESTEROLEMIA: ICD-10-CM

## 2021-10-25 DIAGNOSIS — N18.30 CHRONIC RENAL IMPAIRMENT, STAGE 3 (MODERATE), UNSPECIFIED WHETHER STAGE 3A OR 3B CKD (HCC): ICD-10-CM

## 2021-10-25 DIAGNOSIS — E11.21 TYPE 2 DIABETES WITH NEPHROPATHY (HCC): Primary | ICD-10-CM

## 2021-10-25 DIAGNOSIS — I72.8 SPLENIC ARTERY ANEURYSM (HCC): ICD-10-CM

## 2021-10-25 DIAGNOSIS — M10.9 GOUT WITHOUT TOPHUS: ICD-10-CM

## 2021-10-25 DIAGNOSIS — J34.89 SINUS PRESSURE: ICD-10-CM

## 2021-10-25 DIAGNOSIS — I10 ESSENTIAL HYPERTENSION: ICD-10-CM

## 2021-10-25 PROCEDURE — G8417 CALC BMI ABV UP PARAM F/U: HCPCS | Performed by: FAMILY MEDICINE

## 2021-10-25 PROCEDURE — G8510 SCR DEP NEG, NO PLAN REQD: HCPCS | Performed by: FAMILY MEDICINE

## 2021-10-25 PROCEDURE — G8754 DIAS BP LESS 90: HCPCS | Performed by: FAMILY MEDICINE

## 2021-10-25 PROCEDURE — G8752 SYS BP LESS 140: HCPCS | Performed by: FAMILY MEDICINE

## 2021-10-25 PROCEDURE — G8427 DOCREV CUR MEDS BY ELIG CLIN: HCPCS | Performed by: FAMILY MEDICINE

## 2021-10-25 PROCEDURE — 99214 OFFICE O/P EST MOD 30 MIN: CPT | Performed by: FAMILY MEDICINE

## 2021-10-25 PROCEDURE — 1101F PT FALLS ASSESS-DOCD LE1/YR: CPT | Performed by: FAMILY MEDICINE

## 2021-10-25 PROCEDURE — G8536 NO DOC ELDER MAL SCRN: HCPCS | Performed by: FAMILY MEDICINE

## 2021-10-25 PROCEDURE — 3051F HG A1C>EQUAL 7.0%<8.0%: CPT | Performed by: FAMILY MEDICINE

## 2021-10-25 NOTE — PATIENT INSTRUCTIONS
DASH Diet: Care Instructions  Your Care Instructions     The DASH diet is an eating plan that can help lower your blood pressure. DASH stands for Dietary Approaches to Stop Hypertension. Hypertension is high blood pressure. The DASH diet focuses on eating foods that are high in calcium, potassium, and magnesium. These nutrients can lower blood pressure. The foods that are highest in these nutrients are fruits, vegetables, low-fat dairy products, nuts, seeds, and legumes. But taking calcium, potassium, and magnesium supplements instead of eating foods that are high in those nutrients does not have the same effect. The DASH diet also includes whole grains, fish, and poultry. The DASH diet is one of several lifestyle changes your doctor may recommend to lower your high blood pressure. Your doctor may also want you to decrease the amount of sodium in your diet. Lowering sodium while following the DASH diet can lower blood pressure even further than just the DASH diet alone. Follow-up care is a key part of your treatment and safety. Be sure to make and go to all appointments, and call your doctor if you are having problems. It's also a good idea to know your test results and keep a list of the medicines you take. How can you care for yourself at home? Following the DASH diet  · Eat 4 to 5 servings of fruit each day. A serving is 1 medium-sized piece of fruit, ½ cup chopped or canned fruit, 1/4 cup dried fruit, or 4 ounces (½ cup) of fruit juice. Choose fruit more often than fruit juice. · Eat 4 to 5 servings of vegetables each day. A serving is 1 cup of lettuce or raw leafy vegetables, ½ cup of chopped or cooked vegetables, or 4 ounces (½ cup) of vegetable juice. Choose vegetables more often than vegetable juice. · Get 2 to 3 servings of low-fat and fat-free dairy each day. A serving is 8 ounces of milk, 1 cup of yogurt, or 1 ½ ounces of cheese. · Eat 6 to 8 servings of grains each day.  A serving is 1 slice of bread, 1 ounce of dry cereal, or ½ cup of cooked rice, pasta, or cooked cereal. Try to choose whole-grain products as much as possible. · Limit lean meat, poultry, and fish to 2 servings each day. A serving is 3 ounces, about the size of a deck of cards. · Eat 4 to 5 servings of nuts, seeds, and legumes (cooked dried beans, lentils, and split peas) each week. A serving is 1/3 cup of nuts, 2 tablespoons of seeds, or ½ cup of cooked beans or peas. · Limit fats and oils to 2 to 3 servings each day. A serving is 1 teaspoon of vegetable oil or 2 tablespoons of salad dressing. · Limit sweets and added sugars to 5 servings or less a week. A serving is 1 tablespoon jelly or jam, ½ cup sorbet, or 1 cup of lemonade. · Eat less than 2,300 milligrams (mg) of sodium a day. If you limit your sodium to 1,500 mg a day, you can lower your blood pressure even more. · Be aware that all of these are the suggested number of servings for people who eat 1,800 to 2,000 calories a day. Your recommended number of servings may be different if you need more or fewer calories. Tips for success  · Start small. Do not try to make dramatic changes to your diet all at once. You might feel that you are missing out on your favorite foods and then be more likely to not follow the plan. Make small changes, and stick with them. Once those changes become habit, add a few more changes. · Try some of the following:  ? Make it a goal to eat a fruit or vegetable at every meal and at snacks. This will make it easy to get the recommended amount of fruits and vegetables each day. ? Try yogurt topped with fruit and nuts for a snack or healthy dessert. ? Add lettuce, tomato, cucumber, and onion to sandwiches. ? Combine a ready-made pizza crust with low-fat mozzarella cheese and lots of vegetable toppings. Try using tomatoes, squash, spinach, broccoli, carrots, cauliflower, and onions. ?  Have a variety of cut-up vegetables with a low-fat dip as an appetizer instead of chips and dip. ? Sprinkle sunflower seeds or chopped almonds over salads. Or try adding chopped walnuts or almonds to cooked vegetables. ? Try some vegetarian meals using beans and peas. Add garbanzo or kidney beans to salads. Make burritos and tacos with mashed graves beans or black beans. Where can you learn more? Go to http://www.donis.com/  Enter H967 in the search box to learn more about \"DASH Diet: Care Instructions. \"  Current as of: April 29, 2021               Content Version: 13.0  © 7268-3166 [employer]. Care instructions adapted under license by [employer] (which disclaims liability or warranty for this information). If you have questions about a medical condition or this instruction, always ask your healthcare professional. [address] any warranty or liability for your use of this information.

## 2021-10-25 NOTE — PROGRESS NOTES
1. \"Have you been to the ER, urgent care clinic since your last visit? Hospitalized since your last visit? \" Yes When: Bonifacio Gallegos 9/10/21    2. \"Have you seen or consulted any other health care providers outside of the [address] [place] [address] since your last visit? \" No     3. For patients aged 39-70: Has the patient had a colonoscopy? No     Chief Complaint   Patient presents with    Diabetes    Hypertension    Cholesterol Problem     Patient declined flu vaccine.

## 2021-10-25 NOTE — PROGRESS NOTES
SUBJECTIVE  Routine ff-up    DM w/ nephropathy. Continues to take  Januvia, actos and glipizide. He is off metformin due to renal function (CKD 3). HTN/HL- taking medications, denies cp, sob. Gout-  rare flares, related to eating peanuts or some [place] delicacies. no recent flare ups, colchicine prn effective    Splenic artery stenosis (s/p repair 2015)- recent ED visit  9/2021 eval for flank pain, CTAngio neg. Pain has resolved    Previous h/o prostate cancer 2008 s/p prostatectomy, he was told to be in remission for years. Reviewed PSA check 2021 normal.     C/o R nasal congestion/episodic mild bleeding past several months, has not tried anything for this, nor does he want to. He is requesting ENT eval first.    ROS:  See HPI, all others negative        Patient Active Problem List   Diagnosis Code    Prostate cancer ([facility] [address] C61    DJD (degenerative joint disease) of knee M17.10    CRI (chronic renal insufficiency) N18.9    Splenic artery aneurysm (HCC) I72.8    Essential hypertension I10    Pure hypercholesterolemia E78.00    Gout without tophus M10.9    Advance directive discussed with patient Z71.89    BMI 30.0-30.9,adult Z68.30    Type 2 diabetes with nephropathy (HCC) E11.21       Current Outpatient Medications:     lidocaine (Lidoderm) 5 %, Apply patch to the affected area for 12 hours a day and remove for 12 hours a day., Disp: 30 Each, Rfl: 0    acetaminophen (Tylenol Extra Strength) 500 mg tablet, Take 2 Tablets by mouth every six (6) hours as needed for Pain., Disp: 20 Tablet, Rfl: 0    cyclobenzaprine (FLEXERIL) 5 mg tablet, Take 1 Tablet by mouth two (2) times a day., Disp: 10 Tablet, Rfl: 0    glucose blood VI test strips (Precision Xtra Test) strip, Check glucose level once daily, Disp: 100 Strip, Rfl: 11    alcohol swabs (Alcohol Pads) padm, Use as directed daily. , Disp: 200 Pad, Rfl: 1    glipiZIDE SR (GLUCOTROL XL) 10 mg CR tablet, Take 1 Tablet by mouth daily. , Disp: 180 Tablet, Rfl: 2    SITagliptin (JANUVIA) 100 mg tablet, Take 1 Tablet by mouth daily. , Disp: 90 Tablet, Rfl: 1    lisinopriL (PRINIVIL, ZESTRIL) 20 mg tablet, Take 1 Tablet by mouth two (2) times a day., Disp: 180 Tablet, Rfl: 1    colchicine 0.6 mg tablet, Take 1 Tablet by mouth daily. , Disp: 90 Tablet, Rfl: 1    pioglitazone (ACTOS) 15 mg tablet, Take one tablet once daily, Disp: 90 Tab, Rfl: 3    simvastatin (ZOCOR) 40 mg tablet, Take 1 Tab by mouth nightly., Disp: 90 Tab, Rfl: 3    lancets (Lancets,Ultra Thin) misc, As directed once daily. , Disp: 3 Package, Rfl: 3    hydrocortisone (HYTONE) 2.5 % topical cream, Apply  to affected area two (2) times a day. use thin layer, Disp: 60 g, Rfl: 2    Blood-Glucose Meter monitoring kit, Free Style monitoring Kit. Check glucose fasting daily. , Disp: 1 Kit, Rfl: 0    glucose blood VI test strips (ASCENSIA AUTODISC VI, ONE TOUCH ULTRA TEST VI) strip, Free Style. Check fasting glucose daily, Disp: 100 Strip, Rfl: 3    aspirin 81 mg chewable tablet, Take 1 Tab by mouth daily. , Disp: 90 Tab, Rfl: 4    Blood-Glucose Meter (BLOOD GLUCOSE MONITOR KIT) monitoring kit, by Does Not Apply route. Once daily. , Disp: 1 Kit, Rfl: 0      Allergies   Allergen Reactions    Azithromycin Palpitations    Norvasc [Amlodipine] Itching and Other (comments)    Watermelon Other (comments)       Past Medical History:   Diagnosis Date    Arthritis     CRI (chronic renal insufficiency)     Diabetes mellitus type II 5/13/10    Gout     Hypercholesteremia     Hypertension     Other ill-defined conditions(799.89)     gout    Prostate cancer ([facility] [address] 2008    remission    Splenic artery aneurysm ([facility] [address] 2013    s/p stent dr Palmira Olsen prn ff-up       Social History     Socioeconomic History    Marital status:      Spouse name: Not on file    Number of children: Not on file    Years of education: Not on file    Highest education level: Not on file   Occupational History    Not on file   Social Needs    Financial resource strain: Not on file    Food insecurity:     Worry: Not on file     Inability: Not on file    Transportation needs:     Medical: Not on file     Non-medical: Not on file   Tobacco Use    Smoking status: Former Smoker     Packs/day: 1.00     Types: Cigarettes     Last attempt to quit: 1997     Years since quittin.6    Smokeless tobacco: Never Used    Tobacco comment:    Substance and Sexual Activity    Alcohol use: Yes     Comment: rarely    Drug use: No    Sexual activity: Never   Lifestyle    Physical activity:     Days per week: Not on file     Minutes per session: Not on file    Stress: Not on file   Relationships    Social connections:     Talks on phone: Not on file     Gets together: Not on file     Attends Latter day service: Not on file     Active member of club or organization: Not on file     Attends meetings of clubs or organizations: Not on file     Relationship status: Not on file    Intimate partner violence:     Fear of current or ex partner: Not on file     Emotionally abused: Not on file     Physically abused: Not on file     Forced sexual activity: Not on file   Other Topics Concern    Not on file   Social History Narrative    Not on file       Family History   Problem Relation Age of Onset    Cancer Neg Hx     Diabetes Neg Hx     Heart Disease Neg Hx     Hypertension Neg Hx     Stroke Neg Hx          OBJECTIVE    Physical Exam:     Visit Vitals  Visit Vitals  /80 (BP 1 Location: Left upper arm, BP Patient Position: Sitting, BP Cuff Size: Adult)   Pulse 99   Temp 97.8 °F (36.6 °C) (Temporal)   Resp 16   Ht 5' 2\" (1.575 m)   Wt 174 lb (78.9 kg)   SpO2 96%   BMI 31.83 kg/m²       General: alert, well-appearing, in no apparent distress or pain  HEENT: throat and pharynx clear, eac patent, TM intact  CVS: normal rate, regular rhythm, distinct S1 and S2  Lungs:clear to ausculation bilaterally, no crackles, wheezing or rhonchi noted  Extremities: feet- normal monofilament, no lesions  Psych:  mood and affect normal  Results for orders placed or performed during the hospital encounter of 95/91/97   METABOLIC PANEL, COMPREHENSIVE   Result Value Ref Range    Sodium 138 136 - 145 mmol/L    Potassium 4.4 3.5 - 5.5 mmol/L    Chloride 107 100 - 111 mmol/L    CO2 25 21 - 32 mmol/L    Anion gap 6 3.0 - 18 mmol/L    Glucose 302 (H) 74 - 99 mg/dL    BUN 27 (H) 7.0 - 18 MG/DL    Creatinine 1.46 (H) 0.6 - 1.3 MG/DL    BUN/Creatinine ratio 18 12 - 20      GFR est AA 57 (L) >60 ml/min/1.73m2    GFR est non-AA 47 (L) >60 ml/min/1.73m2    Calcium 8.5 8.5 - 10.1 MG/DL    Bilirubin, total 1.1 (H) 0.2 - 1.0 MG/DL    ALT (SGPT) 36 16 - 61 U/L    AST (SGOT) 23 10 - 38 U/L    Alk. phosphatase 74 45 - 117 U/L    Protein, total 6.8 6.4 - 8.2 g/dL    Albumin 3.4 3.4 - 5.0 g/dL    Globulin 3.4 2.0 - 4.0 g/dL    A-G Ratio 1.0 0.8 - 1.7     HEMOGLOBIN A1C WITH EAG   Result Value Ref Range    Hemoglobin A1c 7.6 (H) 4.2 - 5.6 %    Est. average glucose 171 mg/dL           ASSESSMENT/PLAN    Diagnoses and all orders for this visit:    Type 2 diabetes mellitus with microalbuminuria  ([facility] [address]-  a1c 9.1>7.4>7.3>7.6  Cont januvia, actos (increase dose causing edema), glucotrol  Discussed option of switching januvia to GLP1, he declines for now  Cont lisinopril  DM foot exam 8/2020   GFR >50 currently  Check Cmp/a1c in 3 months or sooner prn    Essential hypertension-  controlled  cont ace  Monitoring    CRI (chronic renal insufficiency), stage 3 (moderate)-  monitoring, avoid nsaids, renally dose meds, optimize DM control. Pure hypercholesterolemia-   at goal LDL< 100 on statin, cont  Lipid panel 4/2021    Gout without tophus, unspecified cause, unspecified chronicity, unspecified site-seldom flares,  Prn colchicine, low purine diet.      BMI 31  Commended on weight loss    Splenic artery aneursysm  S/p repair 2015  On ASA, statin    History of prostate cancer  2008 s/p prostatectomy  PSA 1/2021= 2.3    Nasal congestion  Start zyrtec/flonase OTC  Referral to ent    Epistaxis  Normal cbc 9/2021  Referral to ENT    Follow-up and Dispositions    Return in about 3 months non fasting labs prior    Patient understands plan of care. Patient has provided input and agrees with goals.

## 2021-12-09 RX ORDER — LISINOPRIL 20 MG/1
20 TABLET ORAL 2 TIMES DAILY
Qty: 180 TABLET | Refills: 1 | Status: SHIPPED | OUTPATIENT
Start: 2021-12-09 | End: 2022-06-07 | Stop reason: SDUPTHER

## 2022-02-21 ENCOUNTER — HOSPITAL ENCOUNTER (OUTPATIENT)
Dept: LAB | Age: 77
Discharge: HOME | End: 2022-02-21
Payer: MEDICARE

## 2022-02-21 DIAGNOSIS — E11.21 TYPE 2 DIABETES WITH NEPHROPATHY (HCC): ICD-10-CM

## 2022-02-21 LAB
ALBUMIN SERPL-MCNC: 3.8 G/DL (ref 3.4–5)
ALBUMIN/GLOB SERPL: 1.2 {RATIO} (ref 0.8–1.7)
ALP SERPL-CCNC: 62 U/L (ref 45–117)
ALT SERPL-CCNC: 40 U/L (ref 16–61)
ANION GAP SERPL CALC-SCNC: 5 MMOL/L (ref 3–18)
AST SERPL-CCNC: 27 U/L (ref 10–38)
BILIRUB SERPL-MCNC: 1.4 MG/DL (ref 0.2–1)
BUN SERPL-MCNC: 22 MG/DL (ref 7–18)
BUN/CREAT SERPL: 16 (ref 12–20)
CALCIUM SERPL-MCNC: 9.2 MG/DL (ref 8.5–10.1)
CHLORIDE SERPL-SCNC: 106 MMOL/L (ref 100–111)
CO2 SERPL-SCNC: 29 MMOL/L (ref 21–32)
CREAT SERPL-MCNC: 1.34 MG/DL (ref 0.6–1.3)
EST. AVERAGE GLUCOSE BLD GHB EST-MCNC: 206 MG/DL
GLOBULIN SER CALC-MCNC: 3.3 G/DL (ref 2–4)
GLUCOSE SERPL-MCNC: 237 MG/DL (ref 74–99)
HBA1C MFR BLD: 8.8 % (ref 4.2–5.6)
POTASSIUM SERPL-SCNC: 4.9 MMOL/L (ref 3.5–5.5)
PROT SERPL-MCNC: 7.1 G/DL (ref 6.4–8.2)
SODIUM SERPL-SCNC: 140 MMOL/L (ref 136–145)

## 2022-02-21 PROCEDURE — 36415 COLL VENOUS BLD VENIPUNCTURE: CPT

## 2022-02-21 PROCEDURE — 80053 COMPREHEN METABOLIC PANEL: CPT

## 2022-02-21 PROCEDURE — 83036 HEMOGLOBIN GLYCOSYLATED A1C: CPT

## 2022-02-24 ENCOUNTER — OFFICE VISIT (OUTPATIENT)
Dept: FAMILY MEDICINE CLINIC | Age: 77
End: 2022-02-24
Payer: MEDICARE

## 2022-02-24 VITALS
RESPIRATION RATE: 16 BRPM | BODY MASS INDEX: 32.42 KG/M2 | OXYGEN SATURATION: 96 % | HEART RATE: 79 BPM | DIASTOLIC BLOOD PRESSURE: 72 MMHG | WEIGHT: 176.2 LBS | HEIGHT: 62 IN | TEMPERATURE: 97.9 F | SYSTOLIC BLOOD PRESSURE: 148 MMHG

## 2022-02-24 DIAGNOSIS — I72.8 SPLENIC ARTERY ANEURYSM (HCC): ICD-10-CM

## 2022-02-24 DIAGNOSIS — I10 ESSENTIAL HYPERTENSION: ICD-10-CM

## 2022-02-24 DIAGNOSIS — E11.21 TYPE 2 DIABETES WITH NEPHROPATHY (HCC): Primary | ICD-10-CM

## 2022-02-24 DIAGNOSIS — N18.30 CHRONIC RENAL IMPAIRMENT, STAGE 3 (MODERATE), UNSPECIFIED WHETHER STAGE 3A OR 3B CKD (HCC): ICD-10-CM

## 2022-02-24 DIAGNOSIS — E78.00 PURE HYPERCHOLESTEROLEMIA: ICD-10-CM

## 2022-02-24 DIAGNOSIS — M10.9 GOUT WITHOUT TOPHUS: ICD-10-CM

## 2022-02-24 PROCEDURE — G8536 NO DOC ELDER MAL SCRN: HCPCS | Performed by: FAMILY MEDICINE

## 2022-02-24 PROCEDURE — G8754 DIAS BP LESS 90: HCPCS | Performed by: FAMILY MEDICINE

## 2022-02-24 PROCEDURE — G8427 DOCREV CUR MEDS BY ELIG CLIN: HCPCS | Performed by: FAMILY MEDICINE

## 2022-02-24 PROCEDURE — 1101F PT FALLS ASSESS-DOCD LE1/YR: CPT | Performed by: FAMILY MEDICINE

## 2022-02-24 PROCEDURE — G8417 CALC BMI ABV UP PARAM F/U: HCPCS | Performed by: FAMILY MEDICINE

## 2022-02-24 PROCEDURE — G0463 HOSPITAL OUTPT CLINIC VISIT: HCPCS | Performed by: FAMILY MEDICINE

## 2022-02-24 PROCEDURE — G8753 SYS BP > OR = 140: HCPCS | Performed by: FAMILY MEDICINE

## 2022-02-24 PROCEDURE — 99214 OFFICE O/P EST MOD 30 MIN: CPT | Performed by: FAMILY MEDICINE

## 2022-02-24 PROCEDURE — 3052F HG A1C>EQUAL 8.0%<EQUAL 9.0%: CPT | Performed by: FAMILY MEDICINE

## 2022-02-24 PROCEDURE — G8510 SCR DEP NEG, NO PLAN REQD: HCPCS | Performed by: FAMILY MEDICINE

## 2022-02-24 NOTE — PATIENT INSTRUCTIONS
Learning About Carbohydrate (Carb) Counting and Eating Out When You Have Diabetes  Why plan your meals? Meal planning can be a key part of managing diabetes. Planning meals and snacks with the right balance of carbohydrate, protein, and fat can help you keep your blood sugar at the target level you set with your doctor. You don't have to eat special foods. You can eat what your family eats, including sweets once in a while. But you do have to pay attention to how often you eat and how much you eat of certain foods. You may want to work with a dietitian or a certified diabetes educator. He or she can give you tips and meal ideas and can answer your questions about meal planning. This health professional can also help you reach a healthy weight if that is one of your goals. What should you know about eating carbs? Managing the amount of carbohydrate (carbs) you eat is an important part of healthy meals when you have diabetes. Carbohydrate is found in many foods. · Learn which foods have carbs. And learn the amounts of carbs in different foods. ? Bread, cereal, pasta, and rice have about 15 grams of carbs in a serving. A serving is 1 slice of bread (1 ounce), ½ cup of cooked cereal, or 1/3 cup of cooked pasta or rice. ? Fruits have 15 grams of carbs in a serving. A serving is 1 small fresh fruit, such as an apple or orange; ½ of a banana; ½ cup of cooked or canned fruit; ½ cup of fruit juice; 1 cup of melon or raspberries; or 2 tablespoons of dried fruit. ? Milk and no-sugar-added yogurt have 15 grams of carbs in a serving. A serving is 1 cup of milk or 2/3 cup of no-sugar-added yogurt. ? Starchy vegetables have 15 grams of carbs in a serving. A serving is ½ cup of mashed potatoes or sweet potato; 1 cup winter squash; ½ of a small baked potato; ½ cup of cooked beans; or ½ cup cooked corn or green peas.   · Learn how much carbs to eat each day and at each meal. A dietitian or CDE can teach you how to keep track of the amount of carbs you eat. This is called carbohydrate counting. · If you are not sure how to count carbohydrate grams, use the Plate Method to plan meals. It is a good, quick way to make sure that you have a balanced meal. It also helps you spread carbs throughout the day. ? Divide your plate by types of foods. Put non-starchy vegetables on half the plate, meat or other protein food on one-quarter of the plate, and a grain or starchy vegetable in the final quarter of the plate. To this you can add a small piece of fruit and 1 cup of milk or yogurt, depending on how many carbs you are supposed to eat at a meal.  · Try to eat about the same amount of carbs at each meal. Do not \"save up\" your daily allowance of carbs to eat at one meal.  · Proteins have very little or no carbs per serving. Examples of proteins are beef, chicken, turkey, fish, eggs, tofu, cheese, cottage cheese, and peanut butter. A serving size of meat is 3 ounces, which is about the size of a deck of cards. Examples of meat substitute serving sizes (equal to 1 ounce of meat) are 1/4 cup of cottage cheese, 1 egg, 1 tablespoon of peanut butter, and ½ cup of tofu. How can you eat out and still eat healthy? · Learn to estimate the serving sizes of foods that have carbohydrate. If you measure food at home, it will be easier to estimate the amount in a serving of restaurant food. · If the meal you order has too much carbohydrate (such as potatoes, corn, or baked beans), ask to have a low-carbohydrate food instead. Ask for a salad or green vegetables. · If you use insulin, check your blood sugar before and after eating out to help you plan how much to eat in the future. · If you eat more carbohydrate at a meal than you had planned, take a walk or do other exercise. This will help lower your blood sugar. What are some tips for eating healthy? · Limit saturated fat, such as the fat from meat and dairy products.  This is a healthy choice because people who have diabetes are at higher risk of heart disease. So choose lean cuts of meat and nonfat or low-fat dairy products. Use olive or canola oil instead of butter or shortening when cooking. · Don't skip meals. Your blood sugar may drop too low if you skip meals and take insulin or certain medicines for diabetes. · Check with your doctor before you drink alcohol. Alcohol can cause your blood sugar to drop too low. Alcohol can also cause a bad reaction if you take certain diabetes medicines. Follow-up care is a key part of your treatment and safety. Be sure to make and go to all appointments, and call your doctor if you are having problems. It's also a good idea to know your test results and keep a list of the medicines you take. Where can you learn more? Go to http://www.donis.com/  Enter I147 in the search box to learn more about \"Learning About Carbohydrate (Carb) Counting and Eating Out When You Have Diabetes. \"  Current as of: December 17, 2020               Content Version: 13.0  © 2006-2021 [employer]. Care instructions adapted under license by [employer] (which disclaims liability or warranty for this information). If you have questions about a medical condition or this instruction, always ask your healthcare professional. [address] any warranty or liability for your use of this information.

## 2022-02-24 NOTE — PROGRESS NOTES
1. Have you been to the ER, urgent care clinic since your last visit? Hospitalized since your last visit? No    2. Have you seen or consulted any other health care providers outside of the [address] [place] [address] since your last visit? Include any pap smears or colon screening.  No    Chief Complaint   Patient presents with    Diabetes    Hypertension    Cholesterol Problem    Gout    Other     CRI and history of prostate cancer    Dizziness

## 2022-02-27 NOTE — PROGRESS NOTES
SUBJECTIVE  Routine ff-up    DM w/ nephropathy. Continues to take  Januvia, actos and glipizide. He is off metformin due to renal function (CKD 3). Reports one episode of hypoglycemia, when he skipped breakfast. Reviewed labs a1c 8.8    HTN/HL- taking medications, denies cp, sob. Gout-  rare flares, related to eating peanuts or some [place] delicacies. no recent flare ups, colchicine prn effective    Splenic artery stenosis (s/p repair 2015)- recent ED visit  9/2021 eval for flank pain, CTAngio neg. Pain has resolved    Previous h/o prostate cancer 2008 s/p prostatectomy, he was told to be in remission for years. Reviewed PSA check 2021 normal.       ROS:  See HPI, all others negative        Patient Active Problem List   Diagnosis Code    Prostate cancer ([facility] [address] C61    DJD (degenerative joint disease) of knee M17.10    CRI (chronic renal insufficiency) N18.9    Splenic artery aneurysm (HCC) I72.8    Essential hypertension I10    Pure hypercholesterolemia E78.00    Gout without tophus M10.9    Advance directive discussed with patient Z71.89    BMI 30.0-30.9,adult Z68.30    Type 2 diabetes with nephropathy (HCC) E11.21       Current Outpatient Medications:     SITagliptin (JANUVIA) 100 mg tablet, Take 1 Tablet by mouth daily. , Disp: 90 Tablet, Rfl: 1    lisinopriL (PRINIVIL, ZESTRIL) 20 mg tablet, Take 1 Tablet by mouth two (2) times a day., Disp: 180 Tablet, Rfl: 1    lidocaine (Lidoderm) 5 %, Apply patch to the affected area for 12 hours a day and remove for 12 hours a day., Disp: 30 Each, Rfl: 0    acetaminophen (Tylenol Extra Strength) 500 mg tablet, Take 2 Tablets by mouth every six (6) hours as needed for Pain., Disp: 20 Tablet, Rfl: 0    cyclobenzaprine (FLEXERIL) 5 mg tablet, Take 1 Tablet by mouth two (2) times a day., Disp: 10 Tablet, Rfl: 0    glucose blood VI test strips (Precision Xtra Test) strip, Check glucose level once daily, Disp: 100 Strip, Rfl: 11    alcohol swabs (Alcohol Pads) padm, Use as directed daily. , Disp: 200 Pad, Rfl: 1    glipiZIDE SR (GLUCOTROL XL) 10 mg CR tablet, Take 1 Tablet by mouth daily. , Disp: 180 Tablet, Rfl: 2    colchicine 0.6 mg tablet, Take 1 Tablet by mouth daily. , Disp: 90 Tablet, Rfl: 1    pioglitazone (ACTOS) 15 mg tablet, Take one tablet once daily, Disp: 90 Tab, Rfl: 3    simvastatin (ZOCOR) 40 mg tablet, Take 1 Tab by mouth nightly., Disp: 90 Tab, Rfl: 3    lancets (Lancets,Ultra Thin) misc, As directed once daily. , Disp: 3 Package, Rfl: 3    hydrocortisone (HYTONE) 2.5 % topical cream, Apply  to affected area two (2) times a day. use thin layer, Disp: 60 g, Rfl: 2    Blood-Glucose Meter monitoring kit, Free Style monitoring Kit. Check glucose fasting daily. , Disp: 1 Kit, Rfl: 0    glucose blood VI test strips (ASCENSIA AUTODISC VI, ONE TOUCH ULTRA TEST VI) strip, Free Style. Check fasting glucose daily, Disp: 100 Strip, Rfl: 3    aspirin 81 mg chewable tablet, Take 1 Tab by mouth daily. , Disp: 90 Tab, Rfl: 4    Blood-Glucose Meter (BLOOD GLUCOSE MONITOR KIT) monitoring kit, by Does Not Apply route. Once daily. , Disp: 1 Kit, Rfl: 0      Allergies   Allergen Reactions    Azithromycin Palpitations    Norvasc [Amlodipine] Itching and Other (comments)    Watermelon Other (comments)       Past Medical History:   Diagnosis Date    Arthritis     CRI (chronic renal insufficiency)     Diabetes mellitus type II 5/13/10    Gout     Hypercholesteremia     Hypertension     Other ill-defined conditions(799.89)     gout    Prostate cancer ([facility] [address] 2008    remission    Splenic artery aneurysm ([facility] [address] 2013    s/p stent dr Anisa Maldonado prn ff-up       Social History     Socioeconomic History    Marital status:      Spouse name: Not on file    Number of children: Not on file    Years of education: Not on file    Highest education level: Not on file   Occupational History    Not on file   Social Needs    Financial resource strain: Not on file   [place] Food insecurity:     Worry: Not on file     Inability: Not on file    Transportation needs:     Medical: Not on file     Non-medical: Not on file   Tobacco Use    Smoking status: Former Smoker     Packs/day: 1.00     Types: Cigarettes     Last attempt to quit: 1997     Years since quittin.6    Smokeless tobacco: Never Used    Tobacco comment:    Substance and Sexual Activity    Alcohol use: Yes     Comment: rarely    Drug use: No    Sexual activity: Never   Lifestyle    Physical activity:     Days per week: Not on file     Minutes per session: Not on file    Stress: Not on file   Relationships    Social connections:     Talks on phone: Not on file     Gets together: Not on file     Attends Sikhism service: Not on file     Active member of club or organization: Not on file     Attends meetings of clubs or organizations: Not on file     Relationship status: Not on file    Intimate partner violence:     Fear of current or ex partner: Not on file     Emotionally abused: Not on file     Physically abused: Not on file     Forced sexual activity: Not on file   Other Topics Concern    Not on file   Social History Narrative    Not on file       Family History   Problem Relation Age of Onset    Cancer Neg Hx     Diabetes Neg Hx     Heart Disease Neg Hx     Hypertension Neg Hx     Stroke Neg Hx          OBJECTIVE    Physical Exam:     Visit Vitals  Visit Vitals  BP (!) 148/72 (BP 1 Location: Left upper arm, BP Patient Position: Sitting, BP Cuff Size: Adult)   Pulse 79   Temp 97.9 °F (36.6 °C) (Temporal)   Resp 16   Ht 5' 2\" (1.575 m)   Wt 176 lb 3.2 oz (79.9 kg)   SpO2 96%   BMI 32.23 kg/m²       General: alert, well-appearing, in no apparent distress or pain  HEENT: throat and pharynx clear, eac patent, TM intact  CVS: normal rate, regular rhythm, distinct S1 and S2  Lungs:clear to ausculation bilaterally, no crackles, wheezing or rhonchi noted  Extremities: no edema  Psych:  mood and affect normal  Results for orders placed or performed during the hospital encounter of 02/21/22   HEMOGLOBIN A1C WITH EAG   Result Value Ref Range    Hemoglobin A1c 8.8 (H) 4.2 - 5.6 %    Est. average glucose 103 mg/dL   METABOLIC PANEL, COMPREHENSIVE   Result Value Ref Range    Sodium 140 136 - 145 mmol/L    Potassium 4.9 3.5 - 5.5 mmol/L    Chloride 106 100 - 111 mmol/L    CO2 29 21 - 32 mmol/L    Anion gap 5 3.0 - 18 mmol/L    Glucose 237 (H) 74 - 99 mg/dL    BUN 22 (H) 7.0 - 18 MG/DL    Creatinine 1.34 (H) 0.6 - 1.3 MG/DL    BUN/Creatinine ratio 16 12 - 20      GFR est AA >60 >60 ml/min/1.73m2    GFR est non-AA 52 (L) >60 ml/min/1.73m2    Calcium 9.2 8.5 - 10.1 MG/DL    Bilirubin, total 1.4 (H) 0.2 - 1.0 MG/DL    ALT (SGPT) 40 16 - 61 U/L    AST (SGOT) 27 10 - 38 U/L    Alk. phosphatase 62 45 - 117 U/L    Protein, total 7.1 6.4 - 8.2 g/dL    Albumin 3.8 3.4 - 5.0 g/dL    Globulin 3.3 2.0 - 4.0 g/dL    A-G Ratio 1.2 0.8 - 1.7             ASSESSMENT/PLAN    Diagnoses and all orders for this visit:    [facility] mellitus [facility]album[facility]  [facility]-  a1c 9.1>7.4>7.3>7.6>8.8  Cont januvia, actos (increase dose causing edema), glucotrol  Advised adding farxiga, pt wants to hold off   Another consideration is option of switching januvia to GLP1, he declines for now  Advised to avoid skipping meals, monitor for hypoglycemia on JENKINS  Cont lisinopril  DM foot exam 8/2020   GFR >50 currently  Check Cmp/a1c/lipid panel/microalbumin in 3 months or sooner prn    Essential hypertension-  controlled  cont ace  Monitoring    CRI (chronic renal insufficiency), stage 3 (moderate)-  monitoring, avoid nsaids, renally dose meds, optimize DM control. Pure hypercholesterolemia-   at goal LDL< 100 on statin, cont  Lipid panel 4/2021    Gout without tophus, unspecified cause, unspecified chronicity, unspecified site-seldom flares,  Prn colchicine, low purine diet.      BMI 32    Splenic artery aneursysm  S/p repair 2015  On ASA, statin    History of prostate cancer  2008 s/p prostatectomy  PSA 1/2021= 2.3    Ff-up in 3 months or sooner prn, plan on MWV then  Patient understands plan of care. Patient has provided input and agrees with goals.

## 2022-03-19 PROBLEM — E11.21 TYPE 2 DIABETES WITH NEPHROPATHY (HCC): Status: ACTIVE | Noted: 2018-03-22

## 2022-04-11 RX ORDER — HYDROCORTISONE 25 MG/G
CREAM TOPICAL 2 TIMES DAILY
Qty: 60 G | Refills: 2 | Status: SHIPPED | OUTPATIENT
Start: 2022-04-11

## 2022-04-11 RX ORDER — PIOGLITAZONEHYDROCHLORIDE 15 MG/1
TABLET ORAL
Qty: 90 TABLET | Refills: 3 | Status: SHIPPED | OUTPATIENT
Start: 2022-04-11

## 2022-04-11 RX ORDER — ISOPROPYL ALCOHOL 70 ML/100ML
SWAB TOPICAL
Qty: 200 PAD | Refills: 1 | Status: SHIPPED
Start: 2022-04-11 | End: 2022-09-13

## 2022-05-07 DIAGNOSIS — E11.9 TYPE 2 DIABETES MELLITUS WITHOUT COMPLICATION (HCC): ICD-10-CM

## 2022-05-09 RX ORDER — SIMVASTATIN 40 MG/1
40 TABLET, FILM COATED ORAL
Qty: 90 TABLET | Refills: 3 | Status: SHIPPED | OUTPATIENT
Start: 2022-05-09

## 2022-06-09 RX ORDER — GLIPIZIDE 10 MG/1
10 TABLET, FILM COATED, EXTENDED RELEASE ORAL DAILY
Qty: 180 TABLET | Refills: 2 | Status: SHIPPED | OUTPATIENT
Start: 2022-06-09

## 2022-06-09 RX ORDER — LISINOPRIL 20 MG/1
20 TABLET ORAL 2 TIMES DAILY
Qty: 180 TABLET | Refills: 1 | Status: SHIPPED | OUTPATIENT
Start: 2022-06-09

## 2022-08-26 ENCOUNTER — HOSPITAL ENCOUNTER (OUTPATIENT)
Dept: LAB | Age: 77
Discharge: HOME | End: 2022-08-26
Payer: MEDICARE

## 2022-08-26 DIAGNOSIS — E11.21 TYPE 2 DIABETES WITH NEPHROPATHY (HCC): ICD-10-CM

## 2022-08-26 LAB
ALBUMIN SERPL-MCNC: 3.7 G/DL (ref 3.4–5)
ALBUMIN/GLOB SERPL: 1.1 {RATIO} (ref 0.8–1.7)
ALP SERPL-CCNC: 65 U/L (ref 45–117)
ALT SERPL-CCNC: 34 U/L (ref 16–61)
ANION GAP SERPL CALC-SCNC: 6 MMOL/L (ref 3–18)
AST SERPL-CCNC: 20 U/L (ref 10–38)
BILIRUB SERPL-MCNC: 1.6 MG/DL (ref 0.2–1)
BUN SERPL-MCNC: 30 MG/DL (ref 7–18)
BUN/CREAT SERPL: 21 (ref 12–20)
CALCIUM SERPL-MCNC: 9 MG/DL (ref 8.5–10.1)
CHLORIDE SERPL-SCNC: 106 MMOL/L (ref 100–111)
CHOLEST SERPL-MCNC: 98 MG/DL
CO2 SERPL-SCNC: 26 MMOL/L (ref 21–32)
CREAT SERPL-MCNC: 1.46 MG/DL (ref 0.6–1.3)
CREAT UR-MCNC: 109 MG/DL (ref 30–125)
EST. AVERAGE GLUCOSE BLD GHB EST-MCNC: 197 MG/DL
GLOBULIN SER CALC-MCNC: 3.5 G/DL (ref 2–4)
GLUCOSE SERPL-MCNC: 175 MG/DL (ref 74–99)
HBA1C MFR BLD: 8.5 % (ref 4.2–5.6)
HDLC SERPL-MCNC: 40 MG/DL (ref 40–60)
HDLC SERPL: 2.5 {RATIO} (ref 0–5)
LDLC SERPL CALC-MCNC: 32.8 MG/DL (ref 0–100)
LIPID PROFILE,FLP: NORMAL
MICROALBUMIN UR-MCNC: 16.5 MG/DL (ref 0–3)
MICROALBUMIN/CREAT UR-RTO: 151 MG/G (ref 0–30)
POTASSIUM SERPL-SCNC: 4.2 MMOL/L (ref 3.5–5.5)
PROT SERPL-MCNC: 7.2 G/DL (ref 6.4–8.2)
SODIUM SERPL-SCNC: 138 MMOL/L (ref 136–145)
TRIGL SERPL-MCNC: 126 MG/DL (ref ?–150)
VLDLC SERPL CALC-MCNC: 25.2 MG/DL

## 2022-08-26 PROCEDURE — 82043 UR ALBUMIN QUANTITATIVE: CPT

## 2022-08-26 PROCEDURE — 83036 HEMOGLOBIN GLYCOSYLATED A1C: CPT

## 2022-08-26 PROCEDURE — 36415 COLL VENOUS BLD VENIPUNCTURE: CPT

## 2022-08-26 PROCEDURE — 80061 LIPID PANEL: CPT

## 2022-08-26 PROCEDURE — 80053 COMPREHEN METABOLIC PANEL: CPT

## 2022-08-29 ENCOUNTER — OFFICE VISIT (OUTPATIENT)
Dept: FAMILY MEDICINE CLINIC | Age: 77
End: 2022-08-29
Payer: MEDICARE

## 2022-08-29 VITALS
TEMPERATURE: 97.9 F | WEIGHT: 173 LBS | HEIGHT: 62 IN | SYSTOLIC BLOOD PRESSURE: 138 MMHG | DIASTOLIC BLOOD PRESSURE: 86 MMHG | RESPIRATION RATE: 16 BRPM | HEART RATE: 96 BPM | OXYGEN SATURATION: 97 % | BODY MASS INDEX: 31.83 KG/M2

## 2022-08-29 DIAGNOSIS — E78.00 PURE HYPERCHOLESTEROLEMIA: ICD-10-CM

## 2022-08-29 DIAGNOSIS — I72.8 SPLENIC ARTERY ANEURYSM (HCC): ICD-10-CM

## 2022-08-29 DIAGNOSIS — N18.30 CHRONIC RENAL IMPAIRMENT, STAGE 3 (MODERATE), UNSPECIFIED WHETHER STAGE 3A OR 3B CKD (HCC): ICD-10-CM

## 2022-08-29 DIAGNOSIS — M10.9 GOUT WITHOUT TOPHUS: ICD-10-CM

## 2022-08-29 DIAGNOSIS — Z00.00 MEDICARE ANNUAL WELLNESS VISIT, SUBSEQUENT: Primary | ICD-10-CM

## 2022-08-29 DIAGNOSIS — I10 ESSENTIAL HYPERTENSION: ICD-10-CM

## 2022-08-29 DIAGNOSIS — E11.21 TYPE 2 DIABETES WITH NEPHROPATHY (HCC): ICD-10-CM

## 2022-08-29 PROCEDURE — 3052F HG A1C>EQUAL 8.0%<EQUAL 9.0%: CPT | Performed by: FAMILY MEDICINE

## 2022-08-29 PROCEDURE — 1101F PT FALLS ASSESS-DOCD LE1/YR: CPT | Performed by: FAMILY MEDICINE

## 2022-08-29 PROCEDURE — 99214 OFFICE O/P EST MOD 30 MIN: CPT | Performed by: FAMILY MEDICINE

## 2022-08-29 PROCEDURE — G8427 DOCREV CUR MEDS BY ELIG CLIN: HCPCS | Performed by: FAMILY MEDICINE

## 2022-08-29 PROCEDURE — G8536 NO DOC ELDER MAL SCRN: HCPCS | Performed by: FAMILY MEDICINE

## 2022-08-29 PROCEDURE — G8752 SYS BP LESS 140: HCPCS | Performed by: FAMILY MEDICINE

## 2022-08-29 PROCEDURE — G8754 DIAS BP LESS 90: HCPCS | Performed by: FAMILY MEDICINE

## 2022-08-29 PROCEDURE — 1123F ACP DISCUSS/DSCN MKR DOCD: CPT | Performed by: FAMILY MEDICINE

## 2022-08-29 PROCEDURE — G0463 HOSPITAL OUTPT CLINIC VISIT: HCPCS | Performed by: FAMILY MEDICINE

## 2022-08-29 PROCEDURE — G8510 SCR DEP NEG, NO PLAN REQD: HCPCS | Performed by: FAMILY MEDICINE

## 2022-08-29 PROCEDURE — G0439 PPPS, SUBSEQ VISIT: HCPCS | Performed by: FAMILY MEDICINE

## 2022-08-29 PROCEDURE — G8417 CALC BMI ABV UP PARAM F/U: HCPCS | Performed by: FAMILY MEDICINE

## 2022-08-29 NOTE — PATIENT INSTRUCTIONS
Learning About Carbohydrate (Carb) Counting and Eating Out When You Have Diabetes  Why plan your meals? Meal planning can be a key part of managing diabetes. Planning meals and snacks with the right balance of carbohydrate, protein, and fat can help you keep your blood sugar at the target level you set with your doctor. You don't have to eat special foods. You can eat what your family eats, including sweets once in a while. But you do have to pay attention to how often you eat and how much you eat of certain foods. You may want to work with a dietitian or a diabetes educator. They can give you tips and meal ideas and can answer your questions about meal planning. This health professional can also help you reach a healthy weight if that is one of your goals. What should you know about eating carbs? Managing the amount of carbohydrate (carbs) you eat is an important part of healthy meals when you have diabetes. Carbohydrate is found in many foods. Learn which foods have carbs. And learn the amounts of carbs in different foods. Bread, cereal, pasta, and rice have about 15 grams of carbs in a serving. A serving is 1 slice of bread (1 ounce), ½ cup of cooked cereal, or 1/3 cup of cooked pasta or rice. Fruits have 15 grams of carbs in a serving. A serving is 1 small fresh fruit, such as an apple or orange; ½ of a banana; ½ cup of cooked or canned fruit; ½ cup of fruit juice; 1 cup of melon or raspberries; or 2 tablespoons of dried fruit. Milk and no-sugar-added yogurt have 15 grams of carbs in a serving. A serving is 1 cup of milk or 3/4 cup (6 oz) of no-sugar-added yogurt. Starchy vegetables have 15 grams of carbs in a serving. A serving is ½ cup of mashed potatoes or sweet potato; 1 cup winter squash; ½ of a small baked potato; ½ cup of cooked beans; or ½ cup cooked corn or green peas.   Learn how much carbs to eat each day and at each meal. A dietitian or certified diabetes educator can teach you how to keep track of the amount of carbs you eat. This is called carbohydrate counting. If you are not sure how to count carbohydrate grams, use the plate method to plan meals. It is a quick way to make sure that you have a balanced meal. It also can help you manage the amount of carbohydrate you eat at meals. Divide your plate by types of foods. Put non-starchy vegetables on half the plate, meat or other protein food on one-quarter of the plate, and a grain or starchy vegetable in the final quarter of the plate. To this you can add a small piece of fruit and 1 cup of milk or yogurt, depending on how many carbs you are supposed to eat at a meal.  Try to eat about the same amount of carbs at each meal. Do not \"save up\" your daily allowance of carbs to eat at one meal.  Proteins have very little or no carbs. Examples of proteins are beef, chicken, turkey, fish, eggs, tofu, cheese, cottage cheese, and peanut butter. How can you eat out and still eat healthy? Learn to estimate the serving sizes of foods that have carbohydrate. If you measure food at home, it will be easier to estimate the amount in a serving of restaurant food. If the meal you order has too much carbohydrate (such as potatoes, corn, or baked beans), ask to have a low-carbohydrate food instead. Ask for a salad or non-starchy vegetables like broccoli, cauliflower, green beans, or peppers. If you eat more carbohydrate at a meal than you had planned, take a walk or do other exercise. This will help lower your blood sugar. What are some tips for eating healthy? Limit saturated fat, such as the fat from meat and dairy products. This is a healthy choice because people who have diabetes are at higher risk of heart disease. So choose lean cuts of meat and nonfat or low-fat dairy products. Use olive or canola oil instead of butter or shortening when cooking. Don't skip meals.  Your blood sugar may drop too low if you skip meals and take insulin or certain medicines for diabetes. Check with your doctor before you drink alcohol. Alcohol can cause your blood sugar to drop too low. Alcohol can also cause a bad reaction if you take certain diabetes medicines. Follow-up care is a key part of your treatment and safety. Be sure to make and go to all appointments, and call your doctor if you are having problems. It's also a good idea to know your test results and keep a list of the medicines you take. Where can you learn more? Go to http://www.donis.com/  Enter I147 in the search box to learn more about \"Learning About Carbohydrate (Carb) Counting and Eating Out When You Have Diabetes. \"  Current as of: September 8, 2021               Content Version: 13.2  © 2006-2022 Healthwise, [employer]. Care instructions adapted under license by [employer] (which disclaims liability or warranty for this information). If you have questions about a medical condition or this instruction, always ask your healthcare professional. [place] [address] any warranty or liability for your use of this information. Medicare Wellness Visit, Male    The best way to live healthy is to have a lifestyle where you eat a well-balanced diet, exercise regularly, limit alcohol use, and quit all forms of tobacco/nicotine, if applicable. Regular preventive services are another way to keep healthy. Preventive services (vaccines, screening tests, monitoring & exams) can help personalize your care plan, which helps you manage your own care. Screening tests can find health problems at the earliest stages, when they are easiest to treat. [facility] follows the current, evidence-based guidelines published by the [place] (USPSTF) when recommending preventive services for our patients. Because we follow these guidelines, sometimes recommendations change over time as research supports it.  (For example, a prostate screening blood test is no longer routinely recommended for men with no symptoms). Of course, you and your doctor may decide to screen more often for some diseases, based on your risk and co-morbidities (chronic disease you are already diagnosed with). Preventive services for you include:  - Medicare offers their members a free annual wellness visit, which is time for you and your primary care provider to discuss and plan for your preventive service needs. Take advantage of this benefit every year!  -All adults over age 72 should receive the recommended pneumonia vaccines. Current USPSTF guidelines recommend a series of two vaccines for the best pneumonia protection.   -All adults should have a flu vaccine yearly and tetanus vaccine every 10 years.  -All adults age 48 and older should receive the shingles vaccines (series of two vaccines). -All adults age 38-68 who are overweight should have a diabetes screening test once every three years.   -Other screening tests & preventive services for persons with diabetes include: an eye exam to screen for diabetic retinopathy, a kidney function test, a foot exam, and stricter control over your cholesterol.   -Cardiovascular screening for adults with routine risk involves an electrocardiogram (ECG) at intervals determined by the provider.   -Colorectal cancer screening should be done for adults age 54-65 with no increased risk factors for colorectal cancer. There are a number of acceptable methods of screening for this type of cancer. Each test has its own benefits and drawbacks. Discuss with your provider what is most appropriate for you during your annual wellness visit.  The different tests include: colonoscopy (considered the best screening method), a fecal occult blood test, a fecal DNA test, and sigmoidoscopy.  -All adults born between [facility] should be screened once for Hepatitis C.  -An Abdominal Aortic Aneurysm (AAA) Screening is recommended for men age 73-68 who has ever smoked in their lifetime.      Here is a list of your current Health Maintenance items (your personalized list of preventive services) with a due date:  Health Maintenance Due   Topic Date Due    Shingles Vaccine (1 of 2) Never done    DTaP/Tdap/Td  (2 - Td or Tdap) 05/13/2020    COVID-19 Vaccine (3 - Booster for Gallardo Peter series) 07/27/2021    Diabetic Foot Care  07/22/2022

## 2022-08-29 NOTE — PROGRESS NOTES
This is the Subsequent Medicare Annual Wellness Exam, performed 12 months or more after the Initial AWV or the last Subsequent AWV    I have reviewed the patient's medical history in detail and updated the computerized patient record. Assessment/Plan   Education and counseling provided:  Are appropriate based on today's review and evaluation  Pneumococcal Vaccine-13/23 completed  Cardiovascular screening blood test 8/2022  Screening for glaucoma- annually         Depression Risk Factor Screening     3 most recent PHQ Screens 8/29/2022   Little interest or pleasure in doing things Not at all   Feeling down, depressed, irritable, or hopeless Not at all   Total Score PHQ 2 0       Alcohol & Drug Abuse Risk Screen    Do you average more than 1 drink per night or more than 7 drinks a week: No    In the past three months have you have had more than 4 drinks containing alcohol on one occasion: No          Functional Ability and Level of Safety    Hearing: Hearing is good. Activities of Daily Living: The home contains: no safety equipment. Patient does total self care      Ambulation: with mild difficulty     Fall Risk:  Fall Risk Assessment, last 12 mths 8/29/2022   Able to walk? Yes   Fall in past 12 months? 0   Do you feel unsteady?  0   Are you worried about falling 0      Abuse Screen:  Patient is not abused       Cognitive Screening    Has your family/caregiver stated any concerns about your memory: no       Health Maintenance Due     Health Maintenance Due   Topic Date Due    Shingrix Vaccine Age 50> (1 of 2) Never done    DTaP/Tdap/Td series (2 - Td or Tdap) 05/13/2020    COVID-19 Vaccine (3 - Booster for Pfizer series) 07/27/2021    Foot Exam Q1  07/22/2022       Patient Care Team   Patient Care Team:  Fuad Goldstein MD as PCP - General (Family Medicine)  Fuad Goldstein MD as PCP - [facility]  JAMIE Bush (Vascular Surgery)  Joel Mcconnell MD (Thoracic Disease Surgery)  Nicol Dia MD (Vascular Surgery)  Randy Merino MD (Ophthalmology)  Jason Lerma MD (Otolaryngology)    History     Patient Active Problem List   Diagnosis Code    DJD (degenerative joint disease) of knee M17.10    CRI (chronic renal insufficiency) N18.9    Splenic artery aneurysm (HCC) I72.8    Essential hypertension I10    Pure hypercholesterolemia E78.00    Gout without tophus M10.9    Advance directive discussed with patient Z71.89    BMI 30.0-30.9,adult Z68.30    Type 2 diabetes with nephropathy ([facility] [address] E11.21     Past Medical History:   Diagnosis Date    Arthritis     CRI (chronic renal insufficiency)     Diabetes mellitus type II 5/13/10    Gout     Hypercholesteremia     Hypertension     Other ill-defined conditions(799.89)     gout    Prostate cancer ([facility] [address] 2008    remission    Splenic artery aneurysm ([facility] [address] 2013    s/p stent dr Edgar Comp prn ff-up      Past Surgical History:   Procedure Laterality Date    HX KNEE REPLACEMENT  1/17/12    Left; Dr. Zonia Joaquin PROSTATECTOMY  12/2007     Current Outpatient Medications   Medication Sig Dispense Refill    glipiZIDE SR (GLUCOTROL XL) 10 mg CR tablet Take 1 Tablet by mouth daily. 180 Tablet 2    lisinopriL (PRINIVIL, ZESTRIL) 20 mg tablet Take 1 Tablet by mouth two (2) times a day. 180 Tablet 1    SITagliptin (JANUVIA) 100 mg tablet Take 1 Tablet by mouth daily. 90 Tablet 1    simvastatin (ZOCOR) 40 mg tablet Take 1 Tablet by mouth nightly. 90 Tablet 3    hydrocortisone (HYTONE) 2.5 % topical cream Apply  to affected area two (2) times a day. use thin layer 60 g 2    pioglitazone (ACTOS) 15 mg tablet Take one tablet once daily 90 Tablet 3    lidocaine (Lidoderm) 5 % Apply patch to the affected area for 12 hours a day and remove for 12 hours a day. 30 Each 0    acetaminophen (Tylenol Extra Strength) 500 mg tablet Take 2 Tablets by mouth every six (6) hours as needed for Pain.  20 Tablet 0    aspirin 81 mg chewable tablet Take 1 Tab by mouth daily. 90 Tab 4    alcohol swabs (Alcohol Pads) padm Use as directed daily. 200 Pad 1    cyclobenzaprine (FLEXERIL) 5 mg tablet Take 1 Tablet by mouth two (2) times a day. (Patient not taking: Reported on 2022) 10 Tablet 0    glucose blood VI test strips (Precision Xtra Test) strip Check glucose level once daily 100 Strip 11    colchicine 0.6 mg tablet Take 1 Tablet by mouth daily. 90 Tablet 1    lancets (Lancets,Ultra Thin) misc As directed once daily. 3 Package 3    Blood-Glucose Meter monitoring kit Free Style monitoring Kit. Check glucose fasting daily. (Patient taking differently: Precision Extra  monitoring Kit. Check glucose fasting daily. ) 1 Kit 0     Allergies   Allergen Reactions    Watermelon Other (comments)    Azithromycin Palpitations    Norvasc [Amlodipine] Itching and Other (comments)       Family History   Problem Relation Age of Onset    Cancer Neg Hx     Diabetes Neg Hx     Heart Disease Neg Hx     Hypertension Neg Hx     Stroke Neg Hx      Social History     Tobacco Use    Smoking status: Former     Packs/day: 1.00     Types: Cigarettes     Quit date: 1997     Years since quittin.1    Smokeless tobacco: Never    Tobacco comments:        Substance Use Topics    Alcohol use: Not Currently     Comment: rarely         Philip Jacobson MD         SUBJECTIVE  Routine ff-up    DM w/ nephropathy. Continues to take  Januvia, actos and glipizide. Pt reports infrequent hypoglycemia. Trial increase in actos dose previously resulted in Leg edema, metformin not indicated due to GFR. Reviewed labs a1c 8.8>8.5. still hesitant to try new meds at this time, discussed GLP1    HTN/HL- taking medications, denies cp, sob. Reviewed labs LDL 32    Gout-  rare flares, related to eating peanuts or some [place] delicacies. 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 , Disp: 180 Tablet, Rfl: 2    lisinopriL (PRINIVIL, ZESTRIL) 20 mg tablet, Take 1 Tablet by mouth two (2) times a day., Disp: 180 Tablet, Rfl: 1    SITagliptin (JANUVIA) 100 mg tablet, Take 1 Tablet by mouth daily. , Disp: 90 Tablet, Rfl: 1    simvastatin (ZOCOR) 40 mg tablet, Take 1 Tablet by mouth nightly., Disp: 90 Tablet, Rfl: 3    hydrocortisone (HYTONE) 2.5 % topical cream, Apply  to affected area two (2) times a day. use thin layer, Disp: 60 g, Rfl: 2    pioglitazone (ACTOS) 15 mg tablet, Take one tablet once daily, Disp: 90 Tablet, Rfl: 3    lidocaine (Lidoderm) 5 %, Apply patch to the affected area for 12 hours a day and remove for 12 hours a day., Disp: 30 Each, Rfl: 0    acetaminophen (Tylenol Extra Strength) 500 mg tablet, Take 2 Tablets by mouth every six (6) hours as needed for Pain., Disp: 20 Tablet, Rfl: 0    aspirin 81 mg chewable tablet, Take 1 Tab by mouth daily. , Disp: 90 Tab, Rfl: 4    alcohol swabs (Alcohol Pads) padm, Use as directed daily. , Disp: 200 Pad, Rfl: 1    cyclobenzaprine (FLEXERIL) 5 mg tablet, Take 1 Tablet by mouth two (2) times a day. (Patient not taking: Reported on 8/29/2022), Disp: 10 Tablet, Rfl: 0    glucose blood VI test strips (Precision Xtra Test) strip, Check glucose level once daily, Disp: 100 Strip, Rfl: 11    colchicine 0.6 mg tablet, Take 1 Tablet by mouth daily. , Disp: 90 Tablet, Rfl: 1    lancets (Lancets,Ultra Thin) misc, As directed once daily. , Disp: 3 Package, Rfl: 3    Blood-Glucose Meter monitoring kit, Free Style monitoring Kit. Check glucose fasting daily. (Patient taking differently: Precision Extra  monitoring Kit.  Check glucose fasting daily.), Disp: 1 Kit, Rfl: 0      Allergies   Allergen Reactions    Azithromycin Palpitations    Norvasc [Amlodipine] Itching and Other (comments)    Watermelon Other (comments)       Past Medical History:   Diagnosis Date    Arthritis     CRI (chronic renal insufficiency)     Diabetes mellitus type II 5/13/10    Gout     Hypercholesteremia     Hypertension     Other ill-defined conditions(799.89)     gout    Prostate cancer ([facility] [address] 2008    remission    [facility] 2013    s/p stent dr Mica Candelario prn ff-up       Social History     Socioeconomic History    Marital status:      Spouse name: Not on file    Number of children: Not on file    Years of education: Not on file    Highest education level: Not on file   Occupational History    Not on file   Social Needs    Financial resource strain: Not on file    Food insecurity:     Worry: Not on file     Inability: Not on file    Transportation needs:     Medical: Not on file     Non-medical: Not on file   Tobacco Use    Smoking status: Former Smoker     Packs/day: 1.00     Types: Cigarettes     Last attempt to quit: 1997     Years since quittin.6    Smokeless tobacco: Never Used    Tobacco comment:    Substance and Sexual Activity    Alcohol use: Yes     Comment: rarely    Drug use: No    Sexual activity: Never   Lifestyle    Physical activity:     Days per week: Not on file     Minutes per session: Not on file    Stress: Not on file   Relationships    Social connections:     Talks on phone: Not on file     Gets together: Not on file     Attends [employer]ism service: Not on file     Active member of club or organization: Not on file     Attends meetings of clubs or organizations: Not on file     Relationship status: Not on file    Intimate partner violence:     Fear of current or ex partner: Not on file     Emotionally abused: Not on file     Physically abused: Not on file     Forced sexual activity: Not on file   Other Topics Concern    Not on file   Social History Narrative    Not on file       Family History   Problem Relation Age of Onset    Cancer Neg Hx     Diabetes Neg Hx     Heart Disease Neg Hx     Hypertension Neg Hx     Stroke Neg Hx          OBJECTIVE    Physical Exam:     Visit Vitals  /86 (BP 1 Location: Left arm, BP Patient Position: Sitting, BP Cuff Size: Adult)   Pulse 96   Temp 97.9 °F (36.6 °C) (Temporal)   Resp 16   Ht 5' 2\" (1.575 m)   Wt 173 lb (78.5 kg)   SpO2 97%   BMI 31.64 kg/m²       General: alert, well-appearing, in no apparent distress or pain  HEENT: throat and pharynx clear, eac patent, TM intact  CVS: normal rate, regular rhythm, distinct S1 and S2  Lungs:clear to ausculation bilaterally, no crackles, wheezing or rhonchi noted  Extremities: no edema  Psych:  mood and affect normal  Results for orders placed or performed during the hospital encounter of 08/26/22   HEMOGLOBIN A1C WITH EAG   Result Value Ref Range    Hemoglobin A1c 8.5 (H) 4.2 - 5.6 %    Est. average glucose 968 mg/dL   METABOLIC PANEL, COMPREHENSIVE   Result Value Ref Range    Sodium 138 136 - 145 mmol/L    Potassium 4.2 3.5 - 5.5 mmol/L    Chloride 106 100 - 111 mmol/L    CO2 26 21 - 32 mmol/L    Anion gap 6 3.0 - 18 mmol/L    Glucose 175 (H) 74 - 99 mg/dL    BUN 30 (H) 7.0 - 18 MG/DL    Creatinine 1.46 (H) 0.6 - 1.3 MG/DL    BUN/Creatinine ratio 21 (H) 12 - 20      GFR est AA 57 (L) >60 ml/min/1.73m2    GFR est non-AA 47 (L) >60 ml/min/1.73m2    Calcium 9.0 8.5 - 10.1 MG/DL    Bilirubin, total 1.6 (H) 0.2 - 1.0 MG/DL    ALT (SGPT) 34 16 - 61 U/L    AST (SGOT) 20 10 - 38 U/L    Alk.  phosphatase 65 45 - 117 U/L    Protein, total 7.2 6.4 - 8.2 g/dL    Albumin 3.7 3.4 - 5.0 g/dL    Globulin 3.5 2.0 - 4.0 g/dL    A-G Ratio 1.1 0.8 - 1.7     LIPID PANEL   Result Value Ref Range    LIPID PROFILE          Cholesterol, total 98 <200 MG/DL    Triglyceride 126 <150 MG/DL    HDL Cholesterol 40 40 - 60 MG/DL    LDL, calculated 32.8 0 - 100 MG/DL    VLDL, calculated 25.2 MG/DL    CHOL/HDL Ratio 2.5 0 - 5.0     MICROALBUMIN, UR, RAND W/ MICROALB/CREAT RATIO   Result Value Ref Range    Microalbumin,urine random 16.50 (H) 0 - 3.0 MG/DL    Creatinine, urine 109.00 30 - 125 mg/dL    Microalbumin/Creat ratio (mg/g creat) 151 (H) 0 - 30 mg/g           ASSESSMENT/PLAN    Diagnoses and all orders for this visit:    Type 2 diabetes mellitus with microalbuminuria  ([facility] [address]-  a1c 9.1>7.4>7.3>7.6>8.8>8.5  Cont januvia, actos (increase dose causing edema), glucotrol  consideration to switch [place] to GLP1, he declines for now  GFR >45 we can consider slgt2/metformin at low doses, he declines for now  Advised to avoid skipping meals, monitor for hypoglycemia on JENKINS  Cont lisinopril  Check Cmp/a1c  3 months or sooner prn    Essential hypertension-  controlled  cont ace  Monitoring    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 Pure hypercholesterolemia-   at goal LDL< 100 on statin, cont  Lipid panel 8/2022    Gout without tophus, unspecified cause, unspecified chronicity, unspecified site-seldom flares,  Prn colchicine, low purine diet. BMI 31    Splenic artery aneursysm  S/p repair 2015  On ASA, statin      Ff-up in 3 months or sooner prn,  Patient understands plan of care. Patient has provided input and agrees with goals.

## 2022-08-29 NOTE — PROGRESS NOTES
Chief Complaint   Patient presents with    Annual Wellness Visit    Cholesterol Problem    Diabetes    Hypertension    Gout    Chronic Kidney Disease    Results     Review of results       1. \"Have you been to the ER, urgent care clinic since your last visit? Hospitalized since your last visit? \" No    2. \"Have you seen or consulted any other health care providers outside of the [address] [place] [address] since your last visit? \" No     3. For patients aged 39-70: Has the patient had a colonoscopy / FIT/ Cologuard? NA - based on age      If the patient is female:    4. For patients aged 41-77: Has the patient had a mammogram within the past 2 years? NA - based on age or sex      11. For patients aged 21-65: Has the patient had a pap smear?  NA - based on age or sex

## 2022-09-06 ENCOUNTER — APPOINTMENT (OUTPATIENT)
Dept: CT IMAGING | Age: 77
End: 2022-09-06
Attending: PHYSICIAN ASSISTANT
Payer: MEDICARE

## 2022-09-06 ENCOUNTER — HOSPITAL ENCOUNTER (EMERGENCY)
Age: 77
Discharge: HOME | End: 2022-09-06
Attending: STUDENT IN AN ORGANIZED HEALTH CARE EDUCATION/TRAINING PROGRAM
Payer: MEDICARE

## 2022-09-06 VITALS
HEART RATE: 85 BPM | DIASTOLIC BLOOD PRESSURE: 85 MMHG | OXYGEN SATURATION: 100 % | RESPIRATION RATE: 18 BRPM | TEMPERATURE: 98.5 F | WEIGHT: 167 LBS | BODY MASS INDEX: 30.73 KG/M2 | SYSTOLIC BLOOD PRESSURE: 159 MMHG | HEIGHT: 62 IN

## 2022-09-06 DIAGNOSIS — R19.5 LOOSE STOOLS: ICD-10-CM

## 2022-09-06 DIAGNOSIS — D49.9: ICD-10-CM

## 2022-09-06 DIAGNOSIS — R10.84 ABDOMINAL PAIN, GENERALIZED: Primary | ICD-10-CM

## 2022-09-06 DIAGNOSIS — R93.5 ABNORMAL CT OF THE ABDOMEN: ICD-10-CM

## 2022-09-06 LAB
ALBUMIN SERPL-MCNC: 3.6 G/DL (ref 3.4–5)
ALBUMIN/GLOB SERPL: 0.9 {RATIO} (ref 0.8–1.7)
ALP SERPL-CCNC: 60 U/L (ref 45–117)
ALT SERPL-CCNC: 36 U/L (ref 16–61)
ANION GAP SERPL CALC-SCNC: 5 MMOL/L (ref 3–18)
APPEARANCE UR: CLEAR
AST SERPL-CCNC: 21 U/L (ref 10–38)
BASOPHILS # BLD: 0 K/UL (ref 0–0.1)
BASOPHILS NFR BLD: 1 % (ref 0–2)
BILIRUB SERPL-MCNC: 1.3 MG/DL (ref 0.2–1)
BILIRUB UR QL: NEGATIVE
BUN SERPL-MCNC: 23 MG/DL (ref 7–18)
BUN/CREAT SERPL: 15 (ref 12–20)
CALCIUM SERPL-MCNC: 9.1 MG/DL (ref 8.5–10.1)
CHLORIDE SERPL-SCNC: 107 MMOL/L (ref 100–111)
CO2 SERPL-SCNC: 26 MMOL/L (ref 21–32)
COLOR UR: YELLOW
CREAT SERPL-MCNC: 1.56 MG/DL (ref 0.6–1.3)
DIFFERENTIAL METHOD BLD: ABNORMAL
EOSINOPHIL # BLD: 0.2 K/UL (ref 0–0.4)
EOSINOPHIL NFR BLD: 2 % (ref 0–5)
EPITH CASTS URNS QL MICRO: NORMAL /LPF (ref 0–5)
ERYTHROCYTE [DISTWIDTH] IN BLOOD BY AUTOMATED COUNT: 12.7 % (ref 11.6–14.5)
GLOBULIN SER CALC-MCNC: 3.8 G/DL (ref 2–4)
GLUCOSE SERPL-MCNC: 276 MG/DL (ref 74–99)
GLUCOSE UR STRIP.AUTO-MCNC: 500 MG/DL
HCT VFR BLD AUTO: 45.6 % (ref 36–48)
HGB BLD-MCNC: 15.1 G/DL (ref 13–16)
HGB UR QL STRIP: NEGATIVE
HYALINE CASTS URNS QL MICRO: NORMAL /LPF (ref 0–2)
IMM GRANULOCYTES # BLD AUTO: 0 K/UL (ref 0–0.04)
IMM GRANULOCYTES NFR BLD AUTO: 0 % (ref 0–0.5)
KETONES UR QL STRIP.AUTO: NEGATIVE MG/DL
LEUKOCYTE ESTERASE UR QL STRIP.AUTO: NEGATIVE
LIPASE SERPL-CCNC: 154 U/L (ref 73–393)
LYMPHOCYTES # BLD: 1.5 K/UL (ref 0.9–3.6)
LYMPHOCYTES NFR BLD: 18 % (ref 21–52)
MCH RBC QN AUTO: 30.7 PG (ref 24–34)
MCHC RBC AUTO-ENTMCNC: 33.1 G/DL (ref 31–37)
MCV RBC AUTO: 92.7 FL (ref 78–100)
MONOCYTES # BLD: 0.5 K/UL (ref 0.05–1.2)
MONOCYTES NFR BLD: 6 % (ref 3–10)
NEUTS SEG # BLD: 6.1 K/UL (ref 1.8–8)
NEUTS SEG NFR BLD: 73 % (ref 40–73)
NITRITE UR QL STRIP.AUTO: NEGATIVE
NRBC # BLD: 0 K/UL (ref 0–0.01)
NRBC BLD-RTO: 0 PER 100 WBC
PH UR STRIP: 5.5 [PH] (ref 5–8)
PLATELET # BLD AUTO: 221 K/UL (ref 135–420)
PMV BLD AUTO: 10.4 FL (ref 9.2–11.8)
POTASSIUM SERPL-SCNC: 3.9 MMOL/L (ref 3.5–5.5)
PROT SERPL-MCNC: 7.4 G/DL (ref 6.4–8.2)
PROT UR STRIP-MCNC: 30 MG/DL
RBC # BLD AUTO: 4.92 M/UL (ref 4.35–5.65)
RBC #/AREA URNS HPF: NORMAL /HPF (ref 0–5)
SARS-COV-2, COV2: NORMAL
SARS-COV-2, NAA: NOT DETECTED
SODIUM SERPL-SCNC: 138 MMOL/L (ref 136–145)
SP GR UR REFRACTOMETRY: 1.02 (ref 1–1.03)
UROBILINOGEN UR QL STRIP.AUTO: 1 EU/DL (ref 0.2–1)
WBC # BLD AUTO: 8.3 K/UL (ref 4.6–13.2)
WBC URNS QL MICRO: NORMAL /HPF (ref 0–4)

## 2022-09-06 PROCEDURE — 81001 URINALYSIS AUTO W/SCOPE: CPT

## 2022-09-06 PROCEDURE — 74011000636 HC RX REV CODE- 636: Performed by: STUDENT IN AN ORGANIZED HEALTH CARE EDUCATION/TRAINING PROGRAM

## 2022-09-06 PROCEDURE — 85025 COMPLETE CBC W/AUTO DIFF WBC: CPT

## 2022-09-06 PROCEDURE — 74174 CTA ABD&PLVS W/CONTRAST: CPT

## 2022-09-06 PROCEDURE — 83690 ASSAY OF LIPASE: CPT

## 2022-09-06 PROCEDURE — 80053 COMPREHEN METABOLIC PANEL: CPT

## 2022-09-06 PROCEDURE — 99285 EMERGENCY DEPT VISIT HI MDM: CPT

## 2022-09-06 PROCEDURE — U0003 INFECTIOUS AGENT DETECTION BY NUCLEIC ACID (DNA OR RNA); SEVERE ACUTE RESPIRATORY SYNDROME CORONAVIRUS 2 (SARS-COV-2) (CORONAVIRUS DISEASE [COVID-19]), AMPLIFIED PROBE TECHNIQUE, MAKING USE OF HIGH THROUGHPUT TECHNOLOGIES AS DESCRIBED BY CMS-2020-01-R: HCPCS

## 2022-09-06 RX ADMIN — IOPAMIDOL 78 ML: 755 INJECTION, SOLUTION INTRAVENOUS at 12:10

## 2022-09-06 NOTE — ED TRIAGE NOTES
Patient c/o generalized abdominal pain x 2.5 days. Denies vomiting, constipation, or diarrhea. States soft stool bowel movement this morning. Denies urinary difficulties. C/o chills. Patient states being evaluated at [facility] and referred to ER for further evaluation of symptoms.

## 2022-09-06 NOTE — ED PROVIDER NOTES
[address] [facility] EMERGENCY DEPT    Date: 9/6/2022  Patient Name: Frankey Bills    History of Presenting Illness     Chief Complaint   Patient presents with    Abdominal Pain     64 y.o. male with a past medical history of chronic renal insufficiency, diabetes, hypercholesterolemia, hypertension, prostate cancer in remission and splenic artery aneurysm status post stent by Dr. Dominick Coleamn presents to the ED complaining of intermittent abdominal cramping onset 2.5 days ago. Patient states he is also having brown loose stools, without any blood. He states he is also having intermittent chills. Patient states he has been eating and drinking normally. He denies any fever, nausea or vomiting, change in p.o. intake, other intra-abdominal surgeries, back pain, testicle pain, urinary complaints, or other symptoms. Patient denies any other associated signs or symptoms. Patient denies any other complaints. Nursing notes regarding the HPI and triage nursing notes were reviewed. Prior medical records were reviewed. Current Outpatient Medications   Medication Sig Dispense Refill    glipiZIDE SR (GLUCOTROL XL) 10 mg CR tablet Take 1 Tablet by mouth daily. 180 Tablet 2    lisinopriL (PRINIVIL, ZESTRIL) 20 mg tablet Take 1 Tablet by mouth two (2) times a day. 180 Tablet 1    SITagliptin (JANUVIA) 100 mg tablet Take 1 Tablet by mouth daily. 90 Tablet 1    simvastatin (ZOCOR) 40 mg tablet Take 1 Tablet by mouth nightly. 90 Tablet 3    hydrocortisone (HYTONE) 2.5 % topical cream Apply  to affected area two (2) times a day. use thin layer 60 g 2    pioglitazone (ACTOS) 15 mg tablet Take one tablet once daily 90 Tablet 3    alcohol swabs (Alcohol Pads) padm Use as directed daily. 200 Pad 1    lidocaine (Lidoderm) 5 % Apply patch to the affected area for 12 hours a day and remove for 12 hours a day.  30 Each 0    acetaminophen (Tylenol Extra Strength) 500 mg tablet Take 2 Tablets by mouth every six (6) hours as needed for Pain. 20 Tablet 0    cyclobenzaprine (FLEXERIL) 5 mg tablet Take 1 Tablet by mouth two (2) times a day. (Patient not taking: Reported on 2022) 10 Tablet 0    glucose blood VI test strips (Precision Xtra Test) strip Check glucose level once daily 100 Strip 11    colchicine 0.6 mg tablet Take 1 Tablet by mouth daily. 90 Tablet 1    lancets (Lancets,Ultra Thin) misc As directed once daily. 3 Package 3    Blood-Glucose Meter monitoring kit Free Style monitoring Kit. Check glucose fasting daily. (Patient taking differently: Precision Extra  monitoring Kit. Check glucose fasting daily. ) 1 Kit 0    aspirin 81 mg chewable tablet Take 1 Tab by mouth daily. 80 Tab 4       Past History     Past Medical History:  Past Medical History:   Diagnosis Date    Arthritis     CRI (chronic renal insufficiency)     Diabetes mellitus type II 5/13/10    Gout     Hypercholesteremia     Hypertension     Other ill-defined conditions(799.89)     gout    Prostate cancer ([facility] [address]     remission    Splenic artery aneurysm [facility]) 2013    s/p stent dr Pilar Pratt prn ff-up       Past Surgical History:  Past Surgical History:   Procedure Laterality Date    HX KNEE REPLACEMENT  12    Left; Dr. Campos Friendly PROSTATECTOMY  2007       Family History:  Family History   Problem Relation Age of Onset    Cancer Neg Hx     Diabetes Neg Hx     Heart Disease Neg Hx     Hypertension Neg Hx     Stroke Neg Hx        Social History:  Social History     Tobacco Use    Smoking status: Former     Packs/day: 1.00     Types: Cigarettes     Quit date: 1997     Years since quittin.1    Smokeless tobacco: Never    Tobacco comments:        Substance Use Topics    Alcohol use: Not Currently     Comment: rarely    Drug use: No       Allergies:   Allergies   Allergen Reactions    Watermelon Other (comments)    Azithromycin Palpitations    Norvasc [Amlodipine] Itching and Other (comments)       Patient's primary care provider (as noted in EPIC):  Jaya Mcwilliams MD    Review of Systems  Constitutional:  Denies malaise, fever, chills. Head:  Denies injury. Face:  Denies injury or pain. ENMT:  Denies sore throat. Neck:  Denies injury or pain. Chest:  Denies injury. Cardiac:  Denies chest pain or palpitations. Respiratory:  Denies cough, wheezing, difficulty breathing, shortness of breath. GI/ABD: + intermittent abd pain, loose stools. Denies nausea, vomiting. :  Denies injury, pain, dysuria or urgency. Back:  Denies injury or pain. Extremity/MS:  Denies injury or pain. Neuro:  Denies headache, LOC, dizziness, neurologic symptoms/deficits/paresthesias. Skin: Denies injury, rash, itching or skin changes. All other systems negative as reviewed. Visit Vitals  BP (!) 159/85 (BP 1 Location: Left upper arm, BP Patient Position: Sitting)   Pulse 85   Temp 98.5 °F (36.9 °C)   Resp 18   Ht 5' 2\" (1.575 m)   Wt 75.8 kg (167 lb)   SpO2 100%   BMI 30.54 kg/m²       Patient Vitals for the past 12 hrs:   Temp Pulse Resp BP SpO2   09/06/22 1510 -- 85 18 (!) 159/85 100 %   09/06/22 1110 98.5 °F (36.9 °C) (!) 107 18 (!) 140/75 98 %       PHYSICAL EXAM:    CONSTITUTIONAL:  Alert, in no apparent distress;  well developed;  well nourished. HEAD:  Normocephalic, atraumatic. EYES:  EOMI. Non-icteric sclera. Normal conjunctiva. ENTM:  Nose:  no rhinorrhea. Throat:  no erythema or exudate, mucous membranes moist.  NECK:  Supple  RESPIRATORY:  Chest clear, equal breath sounds, good air movement. CARDIOVASCULAR:  Regular rate and rhythm. No murmurs, rubs, or gallops. GI:  Normal bowel sounds, abdomen soft and non-tender. No rebound or guarding. BACK:  Non-tender. UPPER EXT:  Normal inspection. NEURO:  Moves all four extremities, and grossly normal motor exam.  SKIN:  No rashes;  Normal for age. PSYCH:  Alert and normal affect.     ED COURSE AND MEDICAL DECISION MAKING:    Patient declined any medications, states he is not currently having any pain. Recent Results (from the past 12 hour(s))   CBC WITH AUTOMATED DIFF    Collection Time: 09/06/22 11:20 AM   Result Value Ref Range    WBC 8.3 4.6 - 13.2 K/uL    RBC 4.92 4.35 - 5.65 M/uL    HGB 15.1 13.0 - 16.0 g/dL    HCT 45.6 36.0 - 48.0 %    MCV 92.7 78.0 - 100.0 FL    MCH 30.7 24.0 - 34.0 PG    MCHC 33.1 31.0 - 37.0 g/dL    RDW 12.7 11.6 - 14.5 %    PLATELET 758 199 - 416 K/uL    MPV 10.4 9.2 - 11.8 FL    NRBC 0.0 0  WBC    ABSOLUTE NRBC 0.00 0.00 - 0.01 K/uL    NEUTROPHILS 73 40 - 73 %    LYMPHOCYTES 18 (L) 21 - 52 %    MONOCYTES 6 3 - 10 %    EOSINOPHILS 2 0 - 5 %    BASOPHILS 1 0 - 2 %    IMMATURE GRANULOCYTES 0 0.0 - 0.5 %    ABS. NEUTROPHILS 6.1 1.8 - 8.0 K/UL    ABS. LYMPHOCYTES 1.5 0.9 - 3.6 K/UL    ABS. MONOCYTES 0.5 0.05 - 1.2 K/UL    ABS. EOSINOPHILS 0.2 0.0 - 0.4 K/UL    ABS. BASOPHILS 0.0 0.0 - 0.1 K/UL    ABS. IMM. GRANS. 0.0 0.00 - 0.04 K/UL    DF AUTOMATED     METABOLIC PANEL, COMPREHENSIVE    Collection Time: 09/06/22 11:20 AM   Result Value Ref Range    Sodium 138 136 - 145 mmol/L    Potassium 3.9 3.5 - 5.5 mmol/L    Chloride 107 100 - 111 mmol/L    CO2 26 21 - 32 mmol/L    Anion gap 5 3.0 - 18 mmol/L    Glucose 276 (H) 74 - 99 mg/dL    BUN 23 (H) 7.0 - 18 MG/DL    Creatinine 1.56 (H) 0.6 - 1.3 MG/DL    BUN/Creatinine ratio 15 12 - 20      GFR est AA 52 (L) >60 ml/min/1.73m2    GFR est non-AA 43 (L) >60 ml/min/1.73m2    Calcium 9.1 8.5 - 10.1 MG/DL    Bilirubin, total 1.3 (H) 0.2 - 1.0 MG/DL    ALT (SGPT) 36 16 - 61 U/L    AST (SGOT) 21 10 - 38 U/L    Alk.  phosphatase 60 45 - 117 U/L    Protein, total 7.4 6.4 - 8.2 g/dL    Albumin 3.6 3.4 - 5.0 g/dL    Globulin 3.8 2.0 - 4.0 g/dL    A-G Ratio 0.9 0.8 - 1.7     LIPASE    Collection Time: 09/06/22 11:20 AM   Result Value Ref Range    Lipase 154 73 - 393 U/L   SARS-COV-2    Collection Time: 09/06/22 11:54 AM   Result Value Ref Range    SARS-CoV-2 by PCR Please find results under separate order     SARS-COV-2 BY JAMIN    Collection Time: 09/06/22 11:54 AM   Result Value Ref Range    SARS-CoV-2, JAMIN Not detected NOTD     URINALYSIS W/ RFLX MICROSCOPIC    Collection Time: 09/06/22 11:55 AM   Result Value Ref Range    Color YELLOW      Appearance CLEAR      Specific gravity 1.021 1.005 - 1.030      pH (UA) 5.5 5.0 - 8.0      Protein 30 (A) NEG mg/dL    Glucose 500 (A) NEG mg/dL    Ketone Negative NEG mg/dL    Bilirubin Negative NEG      Blood Negative NEG      Urobilinogen 1.0 0.2 - 1.0 EU/dL    Nitrites Negative NEG      Leukocyte Esterase Negative NEG     URINE MICROSCOPIC ONLY    Collection Time: 09/06/22 11:55 AM   Result Value Ref Range    WBC 0 to 3 0 - 4 /hpf    RBC NONE 0 - 5 /hpf    Epithelial cells FEW 0 - 5 /lpf    Hyaline cast 0 to 3 0 - 2 /lpf      CTA ABDOMEN PELV W CONT    Result Date: 9/6/2022  CT without contrast and CT angiogram of the abdomen and pelvis HISTORY: Abdominal pain. Shift of splenic artery aneurysm COMPARISON: CTA 5/21/14 TECHNIQUE: Multi detector helical axial scan through the abdomen and pelvis is obtained in 2.5 mm thin section before and after dynamic nonionic IV contrast administration per [facility] protocol. Parenchymal organ imaging is limited by arterial phase of contrast administration. 3D postprocessed images, including surface shaded display, will produce for this exam, permanently archived, and interpreted. Parenchymal organ imaging will be limited by arterial phase contrast administration. All CT scans at this facility performed using dose optimization techniques as appreciated to a performed exam, to include automated exposure control, adjustment of the mA and or KU according to patient size (including appropriate matching for site specific examination), or use of iterative reconstruction technique. FINDINGS: CT ANGIOGRAM: AORTA: Normal caliber without aneurysm or dissection. Minimal atherosclerotic disease distally. ILIAC ARTERIES: Patent.  CELIAC ARTERY:The celiac trunk, hepatic artery and proximal portion of the splenic artery appear patent. There is occlusion at the mid splenic artery as well as occlusion of covered stent graft in distal splenic artery as seen previously. Reconstitution via the collateral at splenic hilum. SMA: Patent. JAS: Patent. RENAL ARTERIES: Solitary bilateral renal arteries with patency. . CT ABDOMEN AND PELVIS: LUNG BASES: Clear. . ABDOMINAL/PELVIC VISCERA:  The liver, spleen, pancreas, and adrenal glands appear unremarkable. The gallbladder is surgically absent. No biliary dilatation. There is a small hiatal hernia. The small and large bowel are nondilated. The 1 cm exophytic cyst at the posterior upper pole of left kidney is interval enlarged to 1.8 x 2.0 cm. Unremarkable kidneys otherwise. The bladder is poorly distended. The prostate is surgically resected. In the prosthetic bed, there is a lobulated solid mass with homogeneous enhancement which measures 2.8 x 3.3 x 4.1 cm with posterior portion involving the anterior wall of the rectum. There is superior lobulated portion protruding to the midline bladder base as well. PERITONEUM/RETROPERITONEUM: No significant adenopathy. No free fluid. CT OSSEOUS STRUCTURES: No suspicious osseous lesion seen. Moderate spondylosis. There is grade 1/2 and 2 anterolisthesis of L4 on L5 with slight interval progression. 1.  The occluded splenic artery stent graft again noted for aneurysmal repair. No apparent complication. 2.  Post prostatectomy noted on prior CT. Lobulated enhancing solid mass developed in the prostatic bed with lobulated protrusion to the bladder base anteriorly and posteriorly involving the anterior rectal wall. Neoplastic process is highly concerned, most likely recurrence from prostate cancer. Urology consult advised. 3.  Enlarged left renal cyst. 4.  Grade 1/2 and 2 anterolisthesis of L4 on L5 is slightly progressed.  Thank you for your referral.       Consult with Paulo Lozada from urology, she is requesting a bladder scan, states that if the patient is able to void without any difficulty and does not have any retention that he made to be discharged home and follow up. She is calling the office now to have them call the patient to schedule an appointment as soon as possible. Discussed the results with the patient, he agrees with the plan, he will follow-up with urology without fail. He will return should he develop any retention, fever, other concerning symptoms. Pt to take Tylenol at home for pain. Diagnosis:   1. Abdominal pain, generalized    2. Loose stools    3. Neoplastic growth    4. Abnormal CT of the abdomen      Disposition: Discharge    Follow-up Information       Follow up With Specialties Details Why Contact Info    [facility]   they will call you about an appointment as soon as possible [address]  [phone]            Discharge Medication List as of 9/6/2022  3:10 PM        CONTINUE these medications which have NOT CHANGED    Details   glipiZIDE SR (GLUCOTROL XL) 10 mg CR tablet Take 1 Tablet by mouth daily. , Normal, Disp-180 Tablet, R-2      lisinopriL (PRINIVIL, ZESTRIL) 20 mg tablet Take 1 Tablet by mouth two (2) times a day., Normal, Disp-180 Tablet, R-1      SITagliptin (JANUVIA) 100 mg tablet Take 1 Tablet by mouth daily. , Normal, Disp-90 Tablet, R-1      simvastatin (ZOCOR) 40 mg tablet Take 1 Tablet by mouth nightly., Normal, Disp-90 Tablet, R-3      hydrocortisone (HYTONE) 2.5 % topical cream Apply  to affected area two (2) times a day. use thin layer, Normal, Disp-60 g, R-2      pioglitazone (ACTOS) 15 mg tablet Take one tablet once daily, Normal, Disp-90 Tablet, R-3      alcohol swabs (Alcohol Pads) padm Use as directed daily. , Normal, Disp-200 Pad, R-1      lidocaine (Lidoderm) 5 % Apply patch to the affected area for 12 hours a day and remove for 12 hours a day., Normal, Disp-30 Each, R-0      acetaminophen (Tylenol Extra Strength) 500 mg tablet Take 2 Tablets by mouth every six (6) hours as needed for Pain., Normal, Disp-20 Tablet, R-0      cyclobenzaprine (FLEXERIL) 5 mg tablet Take 1 Tablet by mouth two (2) times a day., Normal, Disp-10 Tablet, R-0      glucose blood VI test strips (Precision Xtra Test) strip Check glucose level once daily, Normal, Disp-100 Strip, R-11      colchicine 0.6 mg tablet Take 1 Tablet by mouth daily. , Normal, Disp-90 Tablet, R-1      lancets (Lancets,Ultra Thin) misc As directed once daily. , Normal, Disp-3 Package, R-3      Blood-Glucose Meter monitoring kit Free Style monitoring Kit. Check glucose fasting daily. , Print, Disp-1 Kit, R-0      aspirin 81 mg chewable tablet Take 1 Tab by mouth daily. , Print, Disp-90 Tab, R-4           JAMIE Lara

## 2022-09-06 NOTE — ED NOTES
Pt discharged by provider. Pt had no questions. Pt teaching provided r/t diagnosis, follow up and when to seek further care. Pt left ambulatory with steady gait with discharge paperwork in hand.

## 2022-09-07 ENCOUNTER — HOSPITAL ENCOUNTER (EMERGENCY)
Age: 77
Discharge: HOME | End: 2022-09-07
Attending: EMERGENCY MEDICINE
Payer: MEDICARE

## 2022-09-07 VITALS
SYSTOLIC BLOOD PRESSURE: 127 MMHG | BODY MASS INDEX: 30.73 KG/M2 | OXYGEN SATURATION: 96 % | TEMPERATURE: 98 F | RESPIRATION RATE: 18 BRPM | HEIGHT: 62 IN | WEIGHT: 167 LBS | HEART RATE: 95 BPM | DIASTOLIC BLOOD PRESSURE: 68 MMHG

## 2022-09-07 DIAGNOSIS — R10.84 ABDOMINAL PAIN, GENERALIZED: Primary | ICD-10-CM

## 2022-09-07 DIAGNOSIS — N42.89 PROSTATE MASS: ICD-10-CM

## 2022-09-07 DIAGNOSIS — N18.9 CHRONIC KIDNEY DISEASE, UNSPECIFIED CKD STAGE: ICD-10-CM

## 2022-09-07 LAB
ALBUMIN SERPL-MCNC: 3.5 G/DL (ref 3.4–5)
ALBUMIN/GLOB SERPL: 0.9 {RATIO} (ref 0.8–1.7)
ALP SERPL-CCNC: 57 U/L (ref 45–117)
ALT SERPL-CCNC: 31 U/L (ref 16–61)
ANION GAP SERPL CALC-SCNC: 4 MMOL/L (ref 3–18)
APPEARANCE UR: CLEAR
AST SERPL-CCNC: 16 U/L (ref 10–38)
ATRIAL RATE: 84 BPM
BACTERIA URNS QL MICRO: NEGATIVE /HPF
BASOPHILS # BLD: 0 K/UL (ref 0–0.1)
BASOPHILS NFR BLD: 0 % (ref 0–2)
BILIRUB DIRECT SERPL-MCNC: 0.3 MG/DL (ref 0–0.2)
BILIRUB SERPL-MCNC: 1.3 MG/DL (ref 0.2–1)
BILIRUB UR QL: NEGATIVE
BUN SERPL-MCNC: 22 MG/DL (ref 7–18)
BUN/CREAT SERPL: 15 (ref 12–20)
CALCIUM SERPL-MCNC: 9.5 MG/DL (ref 8.5–10.1)
CALCULATED P AXIS, ECG09: 61 DEGREES
CALCULATED R AXIS, ECG10: 44 DEGREES
CALCULATED T AXIS, ECG11: 57 DEGREES
CHLORIDE SERPL-SCNC: 106 MMOL/L (ref 100–111)
CO2 SERPL-SCNC: 29 MMOL/L (ref 21–32)
COLOR UR: YELLOW
CREAT SERPL-MCNC: 1.48 MG/DL (ref 0.6–1.3)
DIAGNOSIS, 93000: NORMAL
DIFFERENTIAL METHOD BLD: ABNORMAL
EOSINOPHIL # BLD: 0.2 K/UL (ref 0–0.4)
EOSINOPHIL NFR BLD: 3 % (ref 0–5)
EPITH CASTS URNS QL MICRO: NEGATIVE /LPF (ref 0–5)
ERYTHROCYTE [DISTWIDTH] IN BLOOD BY AUTOMATED COUNT: 12.5 % (ref 11.6–14.5)
GLOBULIN SER CALC-MCNC: 3.8 G/DL (ref 2–4)
GLUCOSE SERPL-MCNC: 172 MG/DL (ref 74–99)
GLUCOSE UR STRIP.AUTO-MCNC: NEGATIVE MG/DL
HCT VFR BLD AUTO: 45 % (ref 36–48)
HGB BLD-MCNC: 15.2 G/DL (ref 13–16)
HGB UR QL STRIP: NEGATIVE
IMM GRANULOCYTES # BLD AUTO: 0 K/UL (ref 0–0.04)
IMM GRANULOCYTES NFR BLD AUTO: 0 % (ref 0–0.5)
KETONES UR QL STRIP.AUTO: NEGATIVE MG/DL
LACTATE BLD-SCNC: 0.69 MMOL/L (ref 0.4–2)
LEUKOCYTE ESTERASE UR QL STRIP.AUTO: NEGATIVE
LIPASE SERPL-CCNC: 135 U/L (ref 73–393)
LYMPHOCYTES # BLD: 1.3 K/UL (ref 0.9–3.6)
LYMPHOCYTES NFR BLD: 17 % (ref 21–52)
MAGNESIUM SERPL-MCNC: 1.8 MG/DL (ref 1.6–2.6)
MCH RBC QN AUTO: 31 PG (ref 24–34)
MCHC RBC AUTO-ENTMCNC: 33.8 G/DL (ref 31–37)
MCV RBC AUTO: 91.6 FL (ref 78–100)
MONOCYTES # BLD: 0.6 K/UL (ref 0.05–1.2)
MONOCYTES NFR BLD: 7 % (ref 3–10)
NEUTS SEG # BLD: 5.4 K/UL (ref 1.8–8)
NEUTS SEG NFR BLD: 72 % (ref 40–73)
NITRITE UR QL STRIP.AUTO: NEGATIVE
NRBC # BLD: 0 K/UL (ref 0–0.01)
NRBC BLD-RTO: 0 PER 100 WBC
P-R INTERVAL, ECG05: 154 MS
PH UR STRIP: 5.5 [PH] (ref 5–8)
PLATELET # BLD AUTO: 210 K/UL (ref 135–420)
PMV BLD AUTO: 10.4 FL (ref 9.2–11.8)
POTASSIUM SERPL-SCNC: 4.2 MMOL/L (ref 3.5–5.5)
PROT SERPL-MCNC: 7.3 G/DL (ref 6.4–8.2)
PROT UR STRIP-MCNC: 30 MG/DL
Q-T INTERVAL, ECG07: 362 MS
QRS DURATION, ECG06: 78 MS
QTC CALCULATION (BEZET), ECG08: 427 MS
RBC # BLD AUTO: 4.91 M/UL (ref 4.35–5.65)
RBC #/AREA URNS HPF: NORMAL /HPF (ref 0–5)
SODIUM SERPL-SCNC: 139 MMOL/L (ref 136–145)
SP GR UR REFRACTOMETRY: 1.02 (ref 1–1.03)
UROBILINOGEN UR QL STRIP.AUTO: 0.2 EU/DL (ref 0.2–1)
VENTRICULAR RATE, ECG03: 84 BPM
WBC # BLD AUTO: 7.5 K/UL (ref 4.6–13.2)
WBC URNS QL MICRO: NORMAL /HPF (ref 0–4)

## 2022-09-07 PROCEDURE — 83605 ASSAY OF LACTIC ACID: CPT

## 2022-09-07 PROCEDURE — 96372 THER/PROPH/DIAG INJ SC/IM: CPT

## 2022-09-07 PROCEDURE — 74011250636 HC RX REV CODE- 250/636: Performed by: EMERGENCY MEDICINE

## 2022-09-07 PROCEDURE — 96375 TX/PRO/DX INJ NEW DRUG ADDON: CPT

## 2022-09-07 PROCEDURE — 93005 ELECTROCARDIOGRAM TRACING: CPT

## 2022-09-07 PROCEDURE — 96374 THER/PROPH/DIAG INJ IV PUSH: CPT

## 2022-09-07 PROCEDURE — 83735 ASSAY OF MAGNESIUM: CPT

## 2022-09-07 PROCEDURE — 81001 URINALYSIS AUTO W/SCOPE: CPT

## 2022-09-07 PROCEDURE — 80048 BASIC METABOLIC PNL TOTAL CA: CPT

## 2022-09-07 PROCEDURE — 99284 EMERGENCY DEPT VISIT MOD MDM: CPT

## 2022-09-07 PROCEDURE — 80076 HEPATIC FUNCTION PANEL: CPT

## 2022-09-07 PROCEDURE — 83690 ASSAY OF LIPASE: CPT

## 2022-09-07 PROCEDURE — 85025 COMPLETE CBC W/AUTO DIFF WBC: CPT

## 2022-09-07 RX ORDER — DICYCLOMINE HYDROCHLORIDE 10 MG/5ML
20 SOLUTION ORAL EVERY 6 HOURS
Qty: 200 ML | Refills: 0 | Status: SHIPPED | OUTPATIENT
Start: 2022-09-07 | End: 2022-09-07

## 2022-09-07 RX ORDER — METOCLOPRAMIDE HYDROCHLORIDE 5 MG/ML
5 INJECTION INTRAMUSCULAR; INTRAVENOUS ONCE
Status: COMPLETED | OUTPATIENT
Start: 2022-09-07 | End: 2022-09-07

## 2022-09-07 RX ORDER — DICYCLOMINE HYDROCHLORIDE 10 MG/5ML
20 SOLUTION ORAL EVERY 6 HOURS
Qty: 200 ML | Refills: 0 | Status: SHIPPED | OUTPATIENT
Start: 2022-09-07 | End: 2022-09-12

## 2022-09-07 RX ORDER — DICYCLOMINE HYDROCHLORIDE 10 MG/ML
20 INJECTION INTRAMUSCULAR
Status: DISCONTINUED | OUTPATIENT
Start: 2022-09-07 | End: 2022-09-07 | Stop reason: HOSPADM

## 2022-09-07 RX ORDER — DIPHENHYDRAMINE HYDROCHLORIDE 50 MG/ML
12.5 INJECTION, SOLUTION INTRAMUSCULAR; INTRAVENOUS
Status: COMPLETED | OUTPATIENT
Start: 2022-09-07 | End: 2022-09-07

## 2022-09-07 RX ADMIN — SODIUM CHLORIDE 1000 ML: 900 INJECTION, SOLUTION INTRAVENOUS at 12:06

## 2022-09-07 RX ADMIN — METOCLOPRAMIDE HYDROCHLORIDE 5 MG: 5 INJECTION INTRAMUSCULAR; INTRAVENOUS at 13:06

## 2022-09-07 RX ADMIN — DICYCLOMINE HYDROCHLORIDE 20 MG: 20 INJECTION, SOLUTION INTRAMUSCULAR at 12:04

## 2022-09-07 RX ADMIN — DIPHENHYDRAMINE HYDROCHLORIDE 12.5 MG: 50 INJECTION, SOLUTION INTRAMUSCULAR; INTRAVENOUS at 13:06

## 2022-09-07 NOTE — ED PROVIDER NOTES
EMERGENCY DEPARTMENT HISTORY AND PHYSICAL EXAM    Date: 9/7/2022  Patient Name: Quinn Victor    History of Presenting Illness       Patient presents with:  Abdominal Pain      HPI:  Quinn Victor is a 64 y.o. male with history as listed presents with a concern of ABD pain. Ate toast and milk and last night had persistent ABD pain. Had about 4 -5 episodes of soft stool    Location: who stomach hurting  Duration: 2.5 days   Quality: dull  Severity: 8/10  Modifying Factors: worse with eating. Associated Symptoms:see ROS        Abdominal Pain   Associated symptoms include diarrhea. Pertinent negatives include no fever, no vomiting and no dysuria. PCP: Patricia Jerez MD    Current Outpatient Medications   Medication Sig Dispense Refill    dicyclomine (BENTYL) 10 mg/5 mL soln oral solution Take 10 mL by mouth every six (6) hours for 5 days. 200 mL 0    glipiZIDE SR (GLUCOTROL XL) 10 mg CR tablet Take 1 Tablet by mouth daily. 180 Tablet 2    lisinopriL (PRINIVIL, ZESTRIL) 20 mg tablet Take 1 Tablet by mouth two (2) times a day. 180 Tablet 1    SITagliptin (JANUVIA) 100 mg tablet Take 1 Tablet by mouth daily. 90 Tablet 1    simvastatin (ZOCOR) 40 mg tablet Take 1 Tablet by mouth nightly. 90 Tablet 3    hydrocortisone (HYTONE) 2.5 % topical cream Apply  to affected area two (2) times a day. use thin layer 60 g 2    pioglitazone (ACTOS) 15 mg tablet Take one tablet once daily 90 Tablet 3    alcohol swabs (Alcohol Pads) padm Use as directed daily. 200 Pad 1    lidocaine (Lidoderm) 5 % Apply patch to the affected area for 12 hours a day and remove for 12 hours a day. 30 Each 0    acetaminophen (Tylenol Extra Strength) 500 mg tablet Take 2 Tablets by mouth every six (6) hours as needed for Pain. 20 Tablet 0    cyclobenzaprine (FLEXERIL) 5 mg tablet Take 1 Tablet by mouth two (2) times a day.  (Patient not taking: Reported on 8/29/2022) 10 Tablet 0    glucose blood VI test strips (Precision Xtra Test) strip Check glucose level once daily 100 Strip 11    colchicine 0.6 mg tablet Take 1 Tablet by mouth daily. 90 Tablet 1    lancets (Lancets,Ultra Thin) misc As directed once daily. 3 Package 3    Blood-Glucose Meter monitoring kit Free Style monitoring Kit. Check glucose fasting daily. (Patient taking differently: Precision Extra  monitoring Kit. Check glucose fasting daily. ) 1 Kit 0    aspirin 81 mg chewable tablet Take 1 Tab by mouth daily. 80 Tab 4       Past History     Past Medical History:  Past Medical History:   Diagnosis Date    Arthritis     CRI (chronic renal insufficiency)     Diabetes mellitus type II 5/13/10    Gout     Hypercholesteremia     Hypertension     Other ill-defined conditions(799.89)     gout    Prostate cancer ([facility] [address]     remission    Splenic artery aneurysm [facility]) 2013    s/p stent dr Jose Maria Blackmon prn ff-up       Past Surgical History:  Past Surgical History:   Procedure Laterality Date    HX KNEE REPLACEMENT  12    Left; Dr. Malia Monae PROSTATECTOMY  2007       Family History:  Family History   Problem Relation Age of Onset    Cancer Neg Hx     Diabetes Neg Hx     Heart Disease Neg Hx     Hypertension Neg Hx     Stroke Neg Hx        Social History:  Social History     Tobacco Use    Smoking status: Former     Packs/day: 1.00     Types: Cigarettes     Quit date: 1997     Years since quittin.1    Smokeless tobacco: Never    Tobacco comments:        Substance Use Topics    Alcohol use: Not Currently     Comment: rarely    Drug use: No       Allergies: Allergies   Allergen Reactions    Watermelon Other (comments)    Azithromycin Palpitations    Norvasc [Amlodipine] Itching and Other (comments)         Review of Systems   Review of Systems   Constitutional:  Negative for fatigue and fever. HENT:  Negative for sore throat and trouble swallowing. Eyes:  Negative for photophobia and visual disturbance. Respiratory:  Negative for cough and shortness of breath. Gastrointestinal:  Positive for abdominal pain and diarrhea. Negative for rectal pain and vomiting. Genitourinary:  Negative for dysuria. Skin:  Negative for rash and wound. Neurological:  Negative for numbness. Physical Exam     Vitals:    09/07/22 1106 09/07/22 1250 09/07/22 1305 09/07/22 1335   BP:  (!) 146/81 138/77 127/68   Pulse:       Resp:       Temp: 98 °F (36.7 °C)      SpO2:   99% 96%   Weight:       Height:         Physical Exam  Constitutional:       General: He is not in acute distress. Appearance: Normal appearance. He is not ill-appearing. HENT:      Head: Normocephalic and atraumatic. Right Ear: External ear normal.      Left Ear: External ear normal.      Nose: No congestion. Mouth/Throat:      Pharynx: No oropharyngeal exudate or posterior oropharyngeal erythema. Eyes:      General:         Right eye: No discharge. Left eye: No discharge. Cardiovascular:      Heart sounds: No murmur heard. No friction rub. Pulmonary:      Effort: No respiratory distress. Breath sounds: No stridor. Abdominal:      General: There is no distension. Palpations: There is no mass. Tenderness: There is no abdominal tenderness. Musculoskeletal:         General: No swelling or tenderness. Cervical back: No rigidity or tenderness. Skin:     Coloration: Skin is not jaundiced or pale. Neurological:      General: No focal deficit present. Mental Status: He is alert and oriented to person, place, and time. Diagnostic Study Results     Labs -   No results found for this or any previous visit (from the past 12 hour(s)).     Radiologic Studies -   No orders to display     CT Results  (Last 48 hours)      None          CXR Results  (Last 48 hours)      None              Medical Decision Making       Procedures:  Procedures    Provider Notes (Medical Decision Making):   MDM  Number of Diagnoses or Management Options  Abdominal pain, generalized  Chronic kidney disease, unspecified CKD stage  Prostate mass  Diagnosis management comments: Assessment/Differential Diagnosis:  Consider obstruction, ileus, diverticulitis/osis, UTI, pyelo, AAA, colitis, appy, hernia, mesenteric ischemia, pancreatitis, hepatitis, peritonitis, cholecystitis,less likely pneumonia or anginal equivalent. ED Course/Medical Decision Making:  Pt is a 64 y.o. male who is well appearing, nontoxic, and has a reassuring exam, presenting with abdominal pain, no peritoneal signs. Will check labs, treat symptomatically, reassess. 3:41 PM patient was turned to Dr. Cade palma for disposition based on labs as well as imaging and p.o. challenge. Given the imaging noticed yesterday do not feel that repeat imaging right now is warranted. ED Course as of 09/08/22 1542   Wed Sep 07, 2022   1208 EKG shows normal sinus rhythm, axis normal, intervals normal, no ST segment elevations, no depressions, no T wave inversions, no signs of WPW, brugada, ARVD, QLT.   [RS]   1245 Patient not quire better at this time. [RS]      ED Course User Index  [RS] Pratik Wallace MD       MED RECONCILIATION:  No current facility-administered medications for this encounter. Current Outpatient Medications   Medication Sig    dicyclomine (BENTYL) 10 mg/5 mL soln oral solution Take 10 mL by mouth every six (6) hours for 5 days. glipiZIDE SR (GLUCOTROL XL) 10 mg CR tablet Take 1 Tablet by mouth daily. lisinopriL (PRINIVIL, ZESTRIL) 20 mg tablet Take 1 Tablet by mouth two (2) times a day. SITagliptin (JANUVIA) 100 mg tablet Take 1 Tablet by mouth daily. simvastatin (ZOCOR) 40 mg tablet Take 1 Tablet by mouth nightly. hydrocortisone (HYTONE) 2.5 % topical cream Apply  to affected area two (2) times a day. use thin layer    pioglitazone (ACTOS) 15 mg tablet Take one tablet once daily    alcohol swabs (Alcohol Pads) padm Use as directed daily.     lidocaine (Lidoderm) 5 % Apply patch to the affected area for 12 hours a day and remove for 12 hours a day. acetaminophen (Tylenol Extra Strength) 500 mg tablet Take 2 Tablets by mouth every six (6) hours as needed for Pain. cyclobenzaprine (FLEXERIL) 5 mg tablet Take 1 Tablet by mouth two (2) times a day. (Patient not taking: Reported on 8/29/2022)    glucose blood VI test strips (Precision Xtra Test) strip Check glucose level once daily    colchicine 0.6 mg tablet Take 1 Tablet by mouth daily. lancets (Lancets,Ultra Thin) misc As directed once daily. Blood-Glucose Meter monitoring kit Free Style monitoring Kit. Check glucose fasting daily. (Patient taking differently: Precision Extra  monitoring Kit. Check glucose fasting daily.)    aspirin 81 mg chewable tablet Take 1 Tab by mouth daily. Disposition:  Discharged       For Hospitalized Patients:    1. Hospitalization Decision Time:  9/7/2022      Diagnosis     Clinical Impression:   1. Abdominal pain, generalized    2. Prostate mass    3.  Chronic kidney disease, unspecified CKD stage        Elizabeth Alexander MD

## 2022-09-07 NOTE — DISCHARGE INSTRUCTIONS
Medications: Please take all medications as prescribed and read all medication instructions/inserts. Sedating medications received: It is not uncommon to receive medications in the ER which can impair your capability to drive. If you received these medications then you should NOT drive as this could result in an accident. Followup: The exam and treatment you received in the Emergency Department were for an urgent problem and are not intended as complete care. It is important that you follow-up with a doctor, nurse practitioner, or physician assistant to:  (1) confirm your diagnosis,  (2) re-evaluation of changes in your illness and treatment, and  (3) for ongoing care (4) to review your testing performed in the emergency department and determine if further evaluation is required (especially for findings not related to your visit). Some disease processes can present early or atypically, If your symptoms become worse or you do not improve as expected and you are unable to reach your usual health care provider, you should return to the Emergency Department. We are available 24 hours a day. Incidental Findings: We attempt to communicate incidental and other abnormal findings with you today (these may or may not be related to your visit). These findings may require further testing so it is imperative that you followup with your primary care provider in 1-2 days provider to go over your test results and get further evaluation if needed.

## 2022-09-07 NOTE — ED TRIAGE NOTES
Patient c/o continuing abdominal pain since evaluation in ER yesterday. Patient states that he was not prescribed any medications to alleviate the pain.

## 2022-09-07 NOTE — ED NOTES
2:21 PM :Pt care assumed from Dr. Roni Kaur , ED provider. Pt complaint(s), current treatment plan, progression and available diagnostic results have been discussed thoroughly. The patient was seen and evaluated on my shift. Rounding occurred: yes  Intended Disposition: DC  Pending diagnostic reports and/or labs (please list): Reevaluate after PO challenge     Patient was signed out to me to follow the patient after eating a p.o. challenge. The patient's labs are reassuring and had a CT scan done in the recent past which was reassuring per signout. The patient is tolerated p.o. and will proceed with close outpatient care and will return if at all worsened or concerned. Workup and recommendations were reviewed with the patient and all questions were answered. The patient understands the plan and will proceed with close outpatient care. I have encouraged the patient to return if at all worsened or concerned.    Kady Miranda,  2:22 PM

## 2022-09-13 PROBLEM — N18.30 CHRONIC RENAL DISEASE, STAGE III (HCC): Status: ACTIVE | Noted: 2022-09-13

## 2022-09-28 ENCOUNTER — ANESTHESIA EVENT (OUTPATIENT)
Dept: ENDOSCOPY | Age: 77
End: 2022-09-28
Payer: MEDICARE

## 2022-09-29 ENCOUNTER — HOSPITAL ENCOUNTER (OUTPATIENT)
Age: 77
Setting detail: OUTPATIENT SURGERY
Discharge: HOME | End: 2022-09-29
Attending: INTERNAL MEDICINE | Admitting: INTERNAL MEDICINE
Payer: MEDICARE

## 2022-09-29 ENCOUNTER — ANESTHESIA (OUTPATIENT)
Dept: ENDOSCOPY | Age: 77
End: 2022-09-29
Payer: MEDICARE

## 2022-09-29 VITALS
BODY MASS INDEX: 30.36 KG/M2 | WEIGHT: 165 LBS | RESPIRATION RATE: 20 BRPM | DIASTOLIC BLOOD PRESSURE: 87 MMHG | HEIGHT: 62 IN | SYSTOLIC BLOOD PRESSURE: 147 MMHG | HEART RATE: 88 BPM | OXYGEN SATURATION: 98 % | TEMPERATURE: 97.9 F

## 2022-09-29 LAB
ANION GAP SERPL CALC-SCNC: 8 MMOL/L (ref 3–18)
BUN SERPL-MCNC: 22 MG/DL (ref 7–18)
BUN/CREAT SERPL: 16 (ref 12–20)
CALCIUM SERPL-MCNC: 8.7 MG/DL (ref 8.5–10.1)
CHLORIDE SERPL-SCNC: 103 MMOL/L (ref 100–111)
CO2 SERPL-SCNC: 25 MMOL/L (ref 21–32)
CREAT SERPL-MCNC: 1.39 MG/DL (ref 0.6–1.3)
GLUCOSE BLD STRIP.AUTO-MCNC: 108 MG/DL (ref 70–110)
GLUCOSE BLD STRIP.AUTO-MCNC: 149 MG/DL (ref 70–110)
GLUCOSE SERPL-MCNC: 147 MG/DL (ref 74–99)
POTASSIUM SERPL-SCNC: 3.7 MMOL/L (ref 3.5–5.5)
SODIUM SERPL-SCNC: 136 MMOL/L (ref 136–145)

## 2022-09-29 PROCEDURE — 99100 ANES PT EXTEME AGE<1 YR&>70: CPT | Performed by: ANESTHESIOLOGY

## 2022-09-29 PROCEDURE — 74011250636 HC RX REV CODE- 250/636: Performed by: NURSE ANESTHETIST, CERTIFIED REGISTERED

## 2022-09-29 PROCEDURE — 76040000007: Performed by: INTERNAL MEDICINE

## 2022-09-29 PROCEDURE — 82962 GLUCOSE BLOOD TEST: CPT

## 2022-09-29 PROCEDURE — 77030013992 HC SNR POLYP ENDOSC BSC -B: Performed by: INTERNAL MEDICINE

## 2022-09-29 PROCEDURE — 76060000032 HC ANESTHESIA 0.5 TO 1 HR: Performed by: INTERNAL MEDICINE

## 2022-09-29 PROCEDURE — 2709999900 HC NON-CHARGEABLE SUPPLY: Performed by: INTERNAL MEDICINE

## 2022-09-29 PROCEDURE — 80048 BASIC METABOLIC PNL TOTAL CA: CPT

## 2022-09-29 PROCEDURE — 77030008565 HC TBNG SUC IRR ERBE -B: Performed by: INTERNAL MEDICINE

## 2022-09-29 PROCEDURE — 00812 ANES LWR INTST SCR COLSC: CPT | Performed by: ANESTHESIOLOGY

## 2022-09-29 PROCEDURE — 74011000250 HC RX REV CODE- 250: Performed by: NURSE ANESTHETIST, CERTIFIED REGISTERED

## 2022-09-29 PROCEDURE — 00812 ANES LWR INTST SCR COLSC: CPT | Performed by: NURSE ANESTHETIST, CERTIFIED REGISTERED

## 2022-09-29 PROCEDURE — 88305 TISSUE EXAM BY PATHOLOGIST: CPT

## 2022-09-29 PROCEDURE — 99100 ANES PT EXTEME AGE<1 YR&>70: CPT | Performed by: NURSE ANESTHETIST, CERTIFIED REGISTERED

## 2022-09-29 PROCEDURE — 74011250637 HC RX REV CODE- 250/637: Performed by: NURSE ANESTHETIST, CERTIFIED REGISTERED

## 2022-09-29 RX ORDER — DEXTROSE MONOHYDRATE 100 MG/ML
0-250 INJECTION, SOLUTION INTRAVENOUS AS NEEDED
Status: DISCONTINUED | OUTPATIENT
Start: 2022-09-29 | End: 2022-09-29 | Stop reason: HOSPADM

## 2022-09-29 RX ORDER — MAGNESIUM SULFATE 100 %
4 CRYSTALS MISCELLANEOUS AS NEEDED
Status: DISCONTINUED | OUTPATIENT
Start: 2022-09-29 | End: 2022-09-29 | Stop reason: HOSPADM

## 2022-09-29 RX ORDER — PROPOFOL 10 MG/ML
INJECTION, EMULSION INTRAVENOUS AS NEEDED
Status: DISCONTINUED | OUTPATIENT
Start: 2022-09-29 | End: 2022-09-29 | Stop reason: HOSPADM

## 2022-09-29 RX ORDER — SODIUM CHLORIDE 0.9 % (FLUSH) 0.9 %
5-40 SYRINGE (ML) INJECTION AS NEEDED
Status: DISCONTINUED | OUTPATIENT
Start: 2022-09-29 | End: 2022-09-29 | Stop reason: HOSPADM

## 2022-09-29 RX ORDER — SODIUM CHLORIDE 9 MG/ML
25 INJECTION, SOLUTION INTRAVENOUS CONTINUOUS
Status: DISCONTINUED | OUTPATIENT
Start: 2022-09-29 | End: 2022-09-29 | Stop reason: HOSPADM

## 2022-09-29 RX ORDER — INSULIN LISPRO 100 [IU]/ML
INJECTION, SOLUTION INTRAVENOUS; SUBCUTANEOUS ONCE
Status: DISCONTINUED | OUTPATIENT
Start: 2022-09-29 | End: 2022-09-29 | Stop reason: HOSPADM

## 2022-09-29 RX ORDER — ONDANSETRON 2 MG/ML
4 INJECTION INTRAMUSCULAR; INTRAVENOUS ONCE
Status: DISCONTINUED | OUTPATIENT
Start: 2022-09-29 | End: 2022-09-29 | Stop reason: HOSPADM

## 2022-09-29 RX ORDER — SODIUM CHLORIDE 0.9 % (FLUSH) 0.9 %
5-40 SYRINGE (ML) INJECTION EVERY 8 HOURS
Status: DISCONTINUED | OUTPATIENT
Start: 2022-09-29 | End: 2022-09-29 | Stop reason: HOSPADM

## 2022-09-29 RX ORDER — HYDROMORPHONE HYDROCHLORIDE 2 MG/ML
0.5 INJECTION, SOLUTION INTRAMUSCULAR; INTRAVENOUS; SUBCUTANEOUS AS NEEDED
Status: DISCONTINUED | OUTPATIENT
Start: 2022-09-29 | End: 2022-09-29 | Stop reason: HOSPADM

## 2022-09-29 RX ORDER — FAMOTIDINE 20 MG/1
20 TABLET, FILM COATED ORAL ONCE
Status: COMPLETED | OUTPATIENT
Start: 2022-09-29 | End: 2022-09-29

## 2022-09-29 RX ORDER — LIDOCAINE HYDROCHLORIDE 10 MG/ML
0.1 INJECTION, SOLUTION EPIDURAL; INFILTRATION; INTRACAUDAL; PERINEURAL AS NEEDED
Status: DISCONTINUED | OUTPATIENT
Start: 2022-09-29 | End: 2022-09-29 | Stop reason: HOSPADM

## 2022-09-29 RX ORDER — LIDOCAINE HYDROCHLORIDE 20 MG/ML
INJECTION, SOLUTION EPIDURAL; INFILTRATION; INTRACAUDAL; PERINEURAL AS NEEDED
Status: DISCONTINUED | OUTPATIENT
Start: 2022-09-29 | End: 2022-09-29 | Stop reason: HOSPADM

## 2022-09-29 RX ORDER — SODIUM CHLORIDE, SODIUM LACTATE, POTASSIUM CHLORIDE, CALCIUM CHLORIDE 600; 310; 30; 20 MG/100ML; MG/100ML; MG/100ML; MG/100ML
75 INJECTION, SOLUTION INTRAVENOUS CONTINUOUS
Status: DISCONTINUED | OUTPATIENT
Start: 2022-09-29 | End: 2022-09-29 | Stop reason: HOSPADM

## 2022-09-29 RX ADMIN — PROPOFOL 40 MG: 10 INJECTION, EMULSION INTRAVENOUS at 09:29

## 2022-09-29 RX ADMIN — SODIUM CHLORIDE 25 ML/HR: 9 INJECTION, SOLUTION INTRAVENOUS at 08:13

## 2022-09-29 RX ADMIN — FAMOTIDINE 20 MG: 20 TABLET ORAL at 08:13

## 2022-09-29 RX ADMIN — PROPOFOL 40 MG: 10 INJECTION, EMULSION INTRAVENOUS at 09:22

## 2022-09-29 RX ADMIN — LIDOCAINE HYDROCHLORIDE 50 MG: 20 INJECTION, SOLUTION EPIDURAL; INFILTRATION; INTRACAUDAL; PERINEURAL at 09:17

## 2022-09-29 RX ADMIN — PROPOFOL 50 MG: 10 INJECTION, EMULSION INTRAVENOUS at 09:35

## 2022-09-29 RX ADMIN — PROPOFOL 50 MG: 10 INJECTION, EMULSION INTRAVENOUS at 09:17

## 2022-09-29 NOTE — DISCHARGE INSTRUCTIONS
Colonoscopy: What to Expect at [address] [place] [address]  After a colonoscopy, you'll stay at the clinic until you wake up. Then you can go home. But you'll need to arrange for a ride. Your doctor will tell you when you can eat and do your other usual activities. Your doctor will talk to you about when you'll need your next colonoscopy. Your doctor can help you decide how often you need to be checked. This will depend on the results of your test and your risk for colorectal cancer. After the test, you may be bloated or have gas pains. You may need to pass gas. If a biopsy was done or a polyp was removed, you may have streaks of blood in your stool (feces) for a few days. Problems such as heavy rectal bleeding may not occur until several weeks after the test. This isn't common. But it can happen after polyps are removed. This care sheet gives you a general idea about how long it will take for you to recover. But each person recovers at a different pace. Follow the steps below to get better as quickly as possible. How can you care for yourself at home? Activity    Rest when you feel tired. You can do your normal activities when it feels okay to do so. Diet    Follow your doctor's directions for eating. Unless your doctor has told you not to, drink plenty of fluids. This helps to replace the fluids that were lost during the colon prep. Do not drink alcohol. Medicines    Your doctor will tell you if and when you can restart your medicines. You will also be given instructions about taking any new medicines. If you take aspirin or some other blood thinner, ask your doctor if and when to start taking it again. Make sure that you understand exactly what your doctor wants you to do. If polyps were removed or a biopsy was done during the test, your doctor may tell you not to take aspirin or other anti-inflammatory medicines for a few days. These include ibuprofen (Advil, Motrin) and naproxen (Aleve). Other instructions    For your safety, do not drive or operate machinery until the medicine wears off and you can think clearly. Your doctor may tell you not to drive or operate machinery until the day after your test.     Do not sign legal documents or make major decisions until the medicine wears off and you can think clearly. The anesthesia can make it hard for you to fully understand what you are agreeing to. Follow-up care is a key part of your treatment and safety. Be sure to make and go to all appointments, and call your doctor if you are having problems. It's also a good idea to know your test results and keep a list of the medicines you take. When should you call for help? Call 911 anytime you think you may need emergency care. For example, call if:    You passed out (lost consciousness). You pass maroon or bloody stools. You have trouble breathing. Call your doctor now or seek immediate medical care if:    You have pain that does not get better after you take pain medicine. You are sick to your stomach or cannot drink fluids. You have new or worse belly pain. You have blood in your stools. You have a fever. You cannot pass stools or gas. Watch closely for changes in your health, and be sure to contact your doctor if you have any problems. Where can you learn more? Go to http://www.gray.com/  Enter E264 in the search box to learn more about \"Colonoscopy: What to Expect at Home. \"  Current as of: September 8, 2021               Content Version: 13.2  © 2006-2022 Healthwise, Incorporated. Care instructions adapted under license by [employer] (which disclaims liability or warranty for this information). If you have questions about a medical condition or this instruction, always ask your healthcare professional. [place] [address] any warranty or liability for your use of this information.        Colon Polyps: Care Instructions  Your Care Instructions     Colon polyps are growths in the colon or the rectum. The cause of most colon polyps is not known, and most people who get them do not have any problems. But a certain kind can turn into cancer. For this reason, regular testing for colon polyps is important for people as they get older. It is also important for anyone who has an increased risk for colon cancer. Polyps are usually found through routine colon cancer screening tests. Although most colon polyps are not cancerous, they are usually removed and then tested for cancer. Screening for colon cancer saves lives because the cancer can usually be cured if it is caught early. If you have a polyp that is the type that can turn into cancer, you may need more tests to examine your entire colon. The doctor will remove any other polyps that he or she finds, and you will be tested more often. Follow-up care is a key part of your treatment and safety. Be sure to make and go to all appointments, and call your doctor if you are having problems. It's also a good idea to know your test results and keep a list of the medicines you take. How can you care for yourself at home? Regular exams to look for colon polyps are the best way to prevent polyps from turning into colon cancer. These can include stool tests, sigmoidoscopy, colonoscopy, and CT colonography. Talk with your doctor about a testing schedule that is right for you. To prevent polyps  There is no home treatment that can prevent colon polyps. But these steps may help lower your risk for cancer. Stay active. Being active can help you get to and stay at a healthy weight. Try to exercise on most days of the week. Walking is a good choice. Eat well. Choose a variety of vegetables, fruits, legumes (such as peas and beans), fish, poultry, and whole grains. Do not smoke. If you need help quitting, talk to your doctor about stop-smoking programs and medicines.  These can increase your chances of quitting for good. If you drink alcohol, limit how much you drink. Limit alcohol to 2 drinks a day for men and 1 drink a day for women. When should you call for help? Call your doctor now or seek immediate medical care if:    You have severe belly pain. Your stools are maroon or very bloody. Watch closely for changes in your health, and be sure to contact your doctor if:    You have a fever. You have nausea or vomiting. You have a change in bowel habits (new constipation or diarrhea). Your symptoms get worse or are not improving as expected. Where can you learn more? Go to http://www.donis.com/  Enter C571 in the search box to learn more about \"Colon Polyps: Care Instructions. \"  Current as of: September 8, 2021               Content Version: 13.2  © 2006-2022 [employer]. Care instructions adapted under license by [employer] (which disclaims liability or warranty for this information). If you have questions about a medical condition or this instruction, always ask your healthcare professional. [place] [address] any warranty or liability for your use of this information. DISCHARGE SUMMARY from Nurse     POST-PROCEDURE INSTRUCTIONS:    Call your Physician if you:  Observe any excess bleeding. Develop a temperature over 100.5o F. Experience abdominal, shoulder or chest pain. Notice any signs of decreased circulation or nerve impairment to an extremity such as a change in color, persistent numbness, tingling, coldness or increase in pain. Vomit blood or you have nausea and vomiting lasting longer than 4 hours. Are unable to take medications. Are unable to urinate within 8 hours after discharge following general anesthesia or intravenous sedation.     For the next 24 hours after receiving general anesthesia or intravenous sedation, or while taking prescription Narcotics, limit your activities:  Do NOT drive a motor vehicle, operate hazard machinery or power tools, or perform tasks that require coordination. The medication you received during your procedure may have some effect on your mental awareness. Do NOT make important personal or business decisions. The medication you received during your procedure may have some effect on your mental awareness. Do NOT drink alcoholic beverages. These drinks do not mix well with the medications that have been given to you. Upon discharge from the hospital, you must be accompanied by a responsible adult. Resume your diet as directed by your physician. Resume medications as your physician has prescribed. Please give a list of your current medications to your Primary Care Provider. Please update this list whenever your medications are discontinued, doses are changed, or new medications (including over-the-counter products) are added. Please carry medication information at all times in case of emergency situations. These are general instructions for a healthy lifestyle:    No smoking/ No tobacco products/ Avoid exposure to second hand smoke. Surgeon General's Warning:  Quitting smoking now greatly reduces serious risk to your health. Obesity, smoking, and a sedentary lifestyle greatly increase your risk for illness. A healthy diet, regular physical exercise & weight monitoring are important for maintaining a healthy lifestyle  You may be retaining fluid if you have a history of heart failure or if you experience any of the following symptoms:  Weight gain of 3 pounds or more overnight or 5 pounds in a week, increased swelling in our hands or feet or shortness of breath while lying flat in bed. Please call your doctor as soon as you notice any of these symptoms; do not wait until your next office visit.     Recognize signs and symptoms of STROKE:  F  -  Face looks uneven  A  -  Arms unable to move or move unevenly  S  -  Speech slurred or non-existent  T  -  Time to call 911 - as soon as signs and symptoms begin - DO NOT go back to bed or wait to see If you get better - TIME IS BRAIN. Colorectal Screening  Colorectal cancer almost always develops from precancerous polyps (abnormal growths) in the colon or rectum. Screening tests can find precancerous polyps, so that they can be removed before they turn into cancer. Screening tests can also find colorectal cancer early, when treatment works best.  Speak with your physician about when you should begin screening and how often you should be tested. [employer] Activation    Thank you for requesting access to [employer]. Please follow the instructions below to securely access and download your online medical record. [employer] allows you to send messages to your doctor, view your test results, renew your prescriptions, schedule appointments, and more. How Do I Sign Up? In your internet browser, go to [email or web address]. Click on the First Time User? Click Here link in the Sign In box. You will see the New Member Sign Up page. Enter your [employer] Access Code exactly as it appears below. You will not need to use this code after youve completed the sign-up process. If you do not sign up before the expiration date, you must request a new code. [employer] Access Code: Activation code not generated  Current [employer] Status: Active (This is the date your [employer] access code will )    Enter the last four digits of your Social Security Number (xxxx) and Date of Birth (mm/dd/yyyy) as indicated and click Submit. You will be taken to the next sign-up page. Create a [employer] ID. This will be your [employer] login ID and cannot be changed, so think of one that is secure and easy to remember. Create a [employer] password. You can change your password at any time. Enter your Password Reset Question and Answer. This can be used at a later time if you forget your password. Enter your e-mail address. You will receive e-mail notification when new information is available in [address] Click Sign Up. You can now view and download portions of your medical record. Click the [employer] link to download a portable copy of your medical information. Additional Information    If you have questions, please call [phone]. Remember, [employer] is NOT to be used for urgent needs. For medical emergencies, dial 911. Educational references and/or instructions provided during this visit included:    See Attached      APPOINTMENTS:    Per MD Instruction    Discharge information has been reviewed with the patient. The patient verbalized understanding.

## 2022-09-29 NOTE — ANESTHESIA PREPROCEDURE EVALUATION
Relevant Problems   No relevant active problems       Anesthetic History   No history of anesthetic complications            Review of Systems / Medical History  Patient summary reviewed and pertinent labs reviewed    Pulmonary  Within defined limits                 Neuro/Psych   Within defined limits           Cardiovascular    Hypertension              Exercise tolerance: >4 METS     GI/Hepatic/Renal  Within defined limits              Endo/Other    Diabetes: well controlled, type 2    Arthritis and cancer     Other Findings              Physical Exam    Airway  Mallampati: II  TM Distance: 4 - 6 cm  Neck ROM: normal range of motion   Mouth opening: Normal     Cardiovascular    Rhythm: regular  Rate: normal         Dental  No notable dental hx       Pulmonary  Breath sounds clear to auscultation               Abdominal  GI exam deferred       Other Findings            Anesthetic Plan    ASA: 3  Anesthesia type: MAC          Induction: Intravenous  Anesthetic plan and risks discussed with: Patient

## 2022-09-29 NOTE — H&P
[place]  [facility], [address]      History and Physical reviewed; I have examined the patient and there are no pertinent changes. Jeanette Mosquera MD, MD   7:42 AM 9/29/2022  [facility] [place] [address], [address] [facility]  [email or web address]

## 2022-09-29 NOTE — ANESTHESIA POSTPROCEDURE EVALUATION
Procedure(s):  COLONOSCOPY/Polypectomies. MAC    Anesthesia Post Evaluation      Multimodal analgesia: multimodal analgesia used between 6 hours prior to anesthesia start to PACU discharge  Patient location during evaluation: PACU  Patient participation: complete - patient participated  Level of consciousness: awake and alert  Pain management: adequate  Airway patency: patent  Anesthetic complications: no  Cardiovascular status: acceptable  Respiratory status: acceptable  Hydration status: acceptable  Post anesthesia nausea and vomiting:  none  Final Post Anesthesia Temperature Assessment:  Normothermia (36.0-37.5 degrees C)      INITIAL Post-op Vital signs:   Vitals Value Taken Time   /57 09/29/22 1017   Temp 36.5 °C (97.7 °F) 09/29/22 0951   Pulse 87 09/29/22 1019   Resp 18 09/29/22 1019   SpO2 99 % 09/29/22 1015   Vitals shown include unvalidated device data.

## 2022-10-03 ENCOUNTER — TELEPHONE (OUTPATIENT)
Dept: FAMILY MEDICINE CLINIC | Age: 77
End: 2022-10-03

## 2022-10-03 DIAGNOSIS — U07.1 COVID-19: Primary | ICD-10-CM

## 2022-10-03 RX ORDER — NIRMATRELVIR AND RITONAVIR 150-100 MG
2 KIT ORAL EVERY 12 HOURS
Qty: 1 BOX | Refills: 0 | Status: SHIPPED | OUTPATIENT
Start: 2022-10-03 | End: 2022-10-08

## 2022-10-03 NOTE — TELEPHONE ENCOUNTER
Spoke with patient and he is aware that Paxlovid was e scribed to pharmacy. Patient aware   Erx paxlovid, pls make aware that EUA, has med interactions, taken <5 days from symptom onset (still in the window until guillaume)  Therisa At[place]- hold colchicine and zocor while on antiviral, lower dose due to renal insufficiency  Patient verbalizes understanding.

## 2022-11-28 ENCOUNTER — HOSPITAL ENCOUNTER (OUTPATIENT)
Dept: LAB | Age: 77
Discharge: HOME | End: 2022-11-28
Payer: MEDICARE

## 2022-11-28 DIAGNOSIS — N18.30 CHRONIC RENAL IMPAIRMENT, STAGE 3 (MODERATE), UNSPECIFIED WHETHER STAGE 3A OR 3B CKD (HCC): ICD-10-CM

## 2022-11-28 DIAGNOSIS — E11.21 TYPE 2 DIABETES WITH NEPHROPATHY (HCC): ICD-10-CM

## 2022-11-28 LAB
ALBUMIN SERPL-MCNC: 3.3 G/DL (ref 3.4–5)
ALBUMIN/GLOB SERPL: 1 {RATIO} (ref 0.8–1.7)
ALP SERPL-CCNC: 75 U/L (ref 45–117)
ALT SERPL-CCNC: 25 U/L (ref 16–61)
ANION GAP SERPL CALC-SCNC: 7 MMOL/L (ref 3–18)
AST SERPL-CCNC: 16 U/L (ref 10–38)
BILIRUB SERPL-MCNC: 2.2 MG/DL (ref 0.2–1)
BUN SERPL-MCNC: 29 MG/DL (ref 7–18)
BUN/CREAT SERPL: 19 (ref 12–20)
CALCIUM SERPL-MCNC: 8.9 MG/DL (ref 8.5–10.1)
CHLORIDE SERPL-SCNC: 103 MMOL/L (ref 100–111)
CO2 SERPL-SCNC: 29 MMOL/L (ref 21–32)
CREAT SERPL-MCNC: 1.53 MG/DL (ref 0.6–1.3)
EST. AVERAGE GLUCOSE BLD GHB EST-MCNC: 189 MG/DL
GLOBULIN SER CALC-MCNC: 3.2 G/DL (ref 2–4)
GLUCOSE SERPL-MCNC: 315 MG/DL (ref 74–99)
HBA1C MFR BLD: 8.2 % (ref 4.2–5.6)
POTASSIUM SERPL-SCNC: 3.8 MMOL/L (ref 3.5–5.5)
PROT SERPL-MCNC: 6.5 G/DL (ref 6.4–8.2)
SODIUM SERPL-SCNC: 139 MMOL/L (ref 136–145)

## 2022-11-28 PROCEDURE — 83036 HEMOGLOBIN GLYCOSYLATED A1C: CPT

## 2022-11-28 PROCEDURE — 80053 COMPREHEN METABOLIC PANEL: CPT

## 2022-11-28 PROCEDURE — 36415 COLL VENOUS BLD VENIPUNCTURE: CPT

## 2022-11-29 ENCOUNTER — OFFICE VISIT (OUTPATIENT)
Dept: FAMILY MEDICINE CLINIC | Age: 77
End: 2022-11-29
Payer: MEDICARE

## 2022-11-29 VITALS
DIASTOLIC BLOOD PRESSURE: 76 MMHG | HEART RATE: 73 BPM | WEIGHT: 164 LBS | TEMPERATURE: 98.3 F | SYSTOLIC BLOOD PRESSURE: 130 MMHG | HEIGHT: 62 IN | RESPIRATION RATE: 16 BRPM | OXYGEN SATURATION: 98 % | BODY MASS INDEX: 30.18 KG/M2

## 2022-11-29 DIAGNOSIS — E78.00 PURE HYPERCHOLESTEROLEMIA: ICD-10-CM

## 2022-11-29 DIAGNOSIS — I10 ESSENTIAL HYPERTENSION: ICD-10-CM

## 2022-11-29 DIAGNOSIS — N18.30 STAGE 3 CHRONIC KIDNEY DISEASE, UNSPECIFIED WHETHER STAGE 3A OR 3B CKD (HCC): ICD-10-CM

## 2022-11-29 DIAGNOSIS — C61 PROSTATE CANCER (HCC): ICD-10-CM

## 2022-11-29 DIAGNOSIS — E11.21 TYPE 2 DIABETES WITH NEPHROPATHY (HCC): Primary | ICD-10-CM

## 2022-11-29 PROCEDURE — 99214 OFFICE O/P EST MOD 30 MIN: CPT | Performed by: FAMILY MEDICINE

## 2022-11-29 PROCEDURE — G8754 DIAS BP LESS 90: HCPCS | Performed by: FAMILY MEDICINE

## 2022-11-29 PROCEDURE — G8536 NO DOC ELDER MAL SCRN: HCPCS | Performed by: FAMILY MEDICINE

## 2022-11-29 PROCEDURE — 3078F DIAST BP <80 MM HG: CPT | Performed by: FAMILY MEDICINE

## 2022-11-29 PROCEDURE — 3052F HG A1C>EQUAL 8.0%<EQUAL 9.0%: CPT | Performed by: FAMILY MEDICINE

## 2022-11-29 PROCEDURE — G8417 CALC BMI ABV UP PARAM F/U: HCPCS | Performed by: FAMILY MEDICINE

## 2022-11-29 PROCEDURE — G8752 SYS BP LESS 140: HCPCS | Performed by: FAMILY MEDICINE

## 2022-11-29 PROCEDURE — 3074F SYST BP LT 130 MM HG: CPT | Performed by: FAMILY MEDICINE

## 2022-11-29 PROCEDURE — G0463 HOSPITAL OUTPT CLINIC VISIT: HCPCS | Performed by: FAMILY MEDICINE

## 2022-11-29 PROCEDURE — 1123F ACP DISCUSS/DSCN MKR DOCD: CPT | Performed by: FAMILY MEDICINE

## 2022-11-29 PROCEDURE — 1101F PT FALLS ASSESS-DOCD LE1/YR: CPT | Performed by: FAMILY MEDICINE

## 2022-11-29 PROCEDURE — G8432 DEP SCR NOT DOC, RNG: HCPCS | Performed by: FAMILY MEDICINE

## 2022-11-29 PROCEDURE — G8427 DOCREV CUR MEDS BY ELIG CLIN: HCPCS | Performed by: FAMILY MEDICINE

## 2022-11-29 NOTE — PROGRESS NOTES
Chief Complaint   Patient presents with    Cholesterol Problem    Chronic Kidney Disease    Gout    Hypertension          1. \"Have you been to the ER, urgent care clinic since your last visit? Hospitalized since your last visit? \" No    2. \"Have you seen or consulted any other health care providers outside of the [address] [place] [address] since your last visit? \" No     3. For patients aged 39-70: Has the patient had a colonoscopy / FIT/ Cologuard? Yes - no Care Gap present      If the patient is female:    4. For patients aged 41-77: Has the patient had a mammogram within the past 2 years? NA - based on age or sex      11. For patients aged 21-65: Has the patient had a pap smear?  NA - based on age or sex

## 2022-11-29 NOTE — PROGRESS NOTES
SUBJECTIVE  Routine ff-up    DM w/ nephropathy. Continues to take  Januvia  and glipizide. Pt reports was told to hold actos by urologist, also had hypoglycemic episodes shortly after colonoscopy and during covid infection last month. Says FBG now back to baseline 140's. He is on prednisone daily as part of his prostate cancer treatment    HTN/HL- taking medications, denies cp, sob. Gout-  rare flares, related to eating peanuts or some [place] delicacies. no recent flare ups, colchicine prn effective    Splenic artery stenosis (s/p repair 2015)-no symptoms    Previous h/o prostate cancer 2008 s/p prostatectomy. Recurrence 9/2022 ongoing therapy then plan for radiation. Following dr. Kavon Cruz. ROS:  See HPI, all others negative        Patient Active Problem List   Diagnosis Code    Prostate cancer ([facility] [address] C61    DJD (degenerative joint disease) of knee M17.10    CRI (chronic renal insufficiency) N18.9    Splenic artery aneurysm (HCC) I72.8    Essential hypertension I10    Pure hypercholesterolemia E78.00    Gout without tophus M10.9    Advance directive discussed with patient Z71.89    BMI 30.0-30.9,adult Z68.30    Type 2 diabetes with nephropathy ([facility] [address] E11.21       Current Outpatient Medications:     ergocalciferol (ERGOCALCIFEROL) 1,250 mcg (50,000 unit) capsule, Take 1 Capsule by mouth every seven (7) days for 8 doses. , Disp: 8 Capsule, Rfl: 0    predniSONE (DELTASONE) 5 mg tablet, Take 1 Tablet by mouth daily. , Disp: 90 Tablet, Rfl: 3    abiraterone (Zytiga) 250 mg tab, Take four tablets by mouth daily on an empty stomach. Take one hour prior to food or two hours after food. Tablets should be swallowed whole with water. Do not crush or chew tablets. , Disp: 120 Tablet, Rfl: 5    glipiZIDE SR (GLUCOTROL XL) 10 mg CR tablet, Take 1 Tablet by mouth daily. , Disp: 180 Tablet, Rfl: 2    lisinopriL (PRINIVIL, ZESTRIL) 20 mg tablet, Take 1 Tablet by mouth two (2) times a day., Disp: 180 Tablet, Rfl: 1 SITagliptin (JANUVIA) 100 mg tablet, Take 1 Tablet by mouth daily. , Disp: 90 Tablet, Rfl: 1    simvastatin (ZOCOR) 40 mg tablet, Take 1 Tablet by mouth nightly., Disp: 90 Tablet, Rfl: 3    hydrocortisone (HYTONE) 2.5 % topical cream, Apply  to affected area two (2) times a day. use thin layer (Patient taking differently: Apply  to affected area two (2) times a day. use thin layer as needed), Disp: 60 g, Rfl: 2    pioglitazone (ACTOS) 15 mg tablet, Take one tablet once daily, Disp: 90 Tablet, Rfl: 3    acetaminophen (Tylenol Extra Strength) 500 mg tablet, Take 2 Tablets by mouth every six (6) hours as needed for Pain., Disp: 20 Tablet, Rfl: 0    aspirin 81 mg chewable tablet, Take 1 Tab by mouth daily. , Disp: 90 Tab, Rfl: 4    colchicine 0.6 mg tablet, Take 1 Tablet by mouth daily. , Disp: 90 Tablet, Rfl: 1    lancets (Lancets,Ultra Thin) misc, As directed once daily. , Disp: 3 Package, Rfl: 3    Blood-Glucose Meter monitoring kit, Free Style monitoring Kit. Check glucose fasting daily. (Patient taking differently: Precision Extra  monitoring Kit.  Check glucose fasting daily.), Disp: 1 Kit, Rfl: 0      Allergies   Allergen Reactions    Azithromycin Palpitations    Norvasc [Amlodipine] Itching and Other (comments)    Watermelon Other (comments)       Past Medical History:   Diagnosis Date    Arthritis     CRI (chronic renal insufficiency)     Diabetes mellitus type II 5/13/10    Gout     Hypercholesteremia     Hypertension     Other ill-defined conditions(799.89)     gout    Prostate cancer ([facility] [address] 2008    remission    Splenic artery aneurysm ([facility] [address] 2013    s/p stent dr Mitzy Espinosa prn ff-up       Social History     Socioeconomic History    Marital status:      Spouse name: Not on file    Number of children: Not on file    Years of education: Not on file    Highest education level: Not on file   Occupational History    Not on file   Social Needs    Financial resource strain: Not on file    Food insecurity:     Worry: Not on file     Inability: Not on file    Transportation needs:     Medical: Not on file     Non-medical: Not on file   Tobacco Use    Smoking status: Former Smoker     Packs/day: 1.00     Types: Cigarettes     Last attempt to quit: 1997     Years since quittin.6    Smokeless tobacco: Never Used    Tobacco comment:    Substance and Sexual Activity    Alcohol use: Yes     Comment: rarely    Drug use: No    Sexual activity: Never   Lifestyle    Physical activity:     Days per week: Not on file     Minutes per session: Not on file    Stress: Not on file   Relationships    Social connections:     Talks on phone: Not on file     Gets together: Not on file     Attends Voodoo service: Not on file     Active member of club or organization: Not on file     Attends meetings of clubs or organizations: Not on file     Relationship status: Not on file    Intimate partner violence:     Fear of current or ex partner: Not on file     Emotionally abused: Not on file     Physically abused: Not on file     Forced sexual activity: Not on file   Other Topics Concern    Not on file   Social History Narrative    Not on file       Family History   Problem Relation Age of Onset    Cancer Neg Hx     Diabetes Neg Hx     Heart Disease Neg Hx     Hypertension Neg Hx     Stroke Neg Hx          OBJECTIVE    Physical Exam:     Visit Vitals  /76 (BP 1 Location: Right arm, BP Patient Position: Sitting, BP Cuff Size: Large adult)   Pulse 73   Temp 98.3 °F (36.8 °C) (Temporal)   Resp 16   Ht 5' 2\" (1.575 m)   Wt 164 lb (74.4 kg)   SpO2 98%   BMI 30.00 kg/m²       General: alert, well-appearing, in no apparent distress or pain  HEENT: throat and pharynx clear, eac patent, TM intact  CVS: normal rate, regular rhythm, distinct S1 and S2  Lungs:clear to ausculation bilaterally, no crackles, wheezing or rhonchi noted  Extremities: no edema  Psych:  mood and affect normal  Results for orders placed or performed during the hospital encounter of 11/28/22   HEMOGLOBIN A1C WITH EAG   Result Value Ref Range    Hemoglobin A1c 8.2 (H) 4.2 - 5.6 %    Est. average glucose 126 mg/dL   METABOLIC PANEL, COMPREHENSIVE   Result Value Ref Range    Sodium 139 136 - 145 mmol/L    Potassium 3.8 3.5 - 5.5 mmol/L    Chloride 103 100 - 111 mmol/L    CO2 29 21 - 32 mmol/L    Anion gap 7 3.0 - 18 mmol/L    Glucose 315 (H) 74 - 99 mg/dL    BUN 29 (H) 7.0 - 18 MG/DL    Creatinine 1.53 (H) 0.6 - 1.3 MG/DL    BUN/Creatinine ratio 19 12 - 20      eGFR 46 (L) >60 ml/min/1.73m2    Calcium 8.9 8.5 - 10.1 MG/DL    Bilirubin, total 2.2 (H) 0.2 - 1.0 MG/DL    ALT (SGPT) 25 16 - 61 U/L    AST (SGOT) 16 10 - 38 U/L    Alk. phosphatase 75 45 - 117 U/L    Protein, total 6.5 6.4 - 8.2 g/dL    Albumin 3.3 (L) 3.4 - 5.0 g/dL    Globulin 3.2 2.0 - 4.0 g/dL    A-G Ratio 1.0 0.8 - 1.7             ASSESSMENT/PLAN    Diagnoses and all orders for this visit:    Type 2 diabetes mellitus with microalbuminuria  ([facility] [address]-  a1c 9.1>7.4>7.3>7.6>8.8>8.2  Per pt no longer with hypoglycemic episodes, has lost weight, back to baseline appetite from covid infection. He is on daily prednisone for prostate ca treatment, currently 's will cont current regimen  Cont januvia, glucotrol  consideration to basal insulin, he declines for now. GFR >45 we can consider glp1/slgt2/metformin at low doses, he declines for now  Advised to avoid skipping meals, monitor for hypoglycemia on JENKINS (Hold if with illness//reduced appetite)  Cont lisinopril  Check Cmp/a1c  3 months or sooner prn    Essential hypertension-  controlled  cont ace  Monitoring    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 Pure hypercholesterolemia-   at goal LDL< 100 on statin, cont  Lipid panel 8/2022    Gout without tophus, unspecified cause, unspecified chronicity, unspecified site-seldom flares,  Prn colchicine, low purine diet.      BMI 30    Splenic artery aneursysm  S/p repair 2015  On ASA, statin    Prostate cancer [facility])  2008 recurrence 9/2022 with pelvic LN metastasis  Following dr. Jaswant Powell in 3 months or sooner prn,  Patient understands plan of care. Patient has provided input and agrees with goals.

## 2022-12-01 RX ORDER — LISINOPRIL 20 MG/1
20 TABLET ORAL 2 TIMES DAILY
Qty: 180 TABLET | Refills: 1 | Status: SHIPPED | OUTPATIENT
Start: 2022-12-01

## 2022-12-21 ENCOUNTER — TELEPHONE (OUTPATIENT)
Dept: FAMILY MEDICINE CLINIC | Age: 77
End: 2022-12-21

## 2022-12-21 DIAGNOSIS — E11.21 TYPE 2 DIABETES WITH NEPHROPATHY (HCC): ICD-10-CM

## 2022-12-21 DIAGNOSIS — N18.30 STAGE 3 CHRONIC KIDNEY DISEASE, UNSPECIFIED WHETHER STAGE 3A OR 3B CKD (HCC): Primary | ICD-10-CM

## 2022-12-21 DIAGNOSIS — C61 PROSTATE CANCER (HCC): ICD-10-CM

## 2022-12-21 NOTE — TELEPHONE ENCOUNTER
Patient aware of labs results and MD recommendation to see a diabetic educator, continue with urology, and PSR referral two patient identifiers obtained and verified

## 2022-12-27 RX ORDER — LISINOPRIL 20 MG/1
20 TABLET ORAL 2 TIMES DAILY
Qty: 180 TABLET | Refills: 1 | Status: SHIPPED | OUTPATIENT
Start: 2022-12-27

## 2023-01-03 ENCOUNTER — OFFICE VISIT (OUTPATIENT)
Dept: FAMILY MEDICINE CLINIC | Age: 78
End: 2023-01-03

## 2023-01-03 DIAGNOSIS — E11.21 TYPE 2 DIABETES WITH NEPHROPATHY (HCC): Primary | ICD-10-CM

## 2023-01-03 NOTE — PATIENT INSTRUCTIONS
Your Visit Summary:     Plan:  - Call to schedule for 2 weeks    Call me with any questions or concerns  Cary Mendoza [phone]    Check and document your blood sugar first thing in the morning (fasting at least 8 hours), 2 hours after a meal, and/or before bedtime staggered for the second check. Bring your meter/log to all future visits. Your blood sugar goals:  - Fasting (first thing in the morning)  blood sugar: 80 - 130mg/dL  - 2 hours after a meal: 80 - 180mg/dL    When you experience symptoms of low blood sugar (example: less than 70):  - Confirm low reading by checking your blood sugar.   - Then treat with 15 grams of carbohydrates (one-half cup of juice or regular soda, or 4-5 glucose tablets). - Wait 15 minutes to recheck blood sugar.   - Then eat a protein containing meal/snack to prevent another low blood sugar episode. (example: peanut butter + crackers)    Nutrition:  - When reviewing a nutrition label, focus on the serving size, total calories, fat (and type of fats), total carbohydrates, sugar (and amount of added sugar), amount of fiber (good for your digestive), and amount of protein. Refer to your nutrition label guide for more information.  - For a meal : max 45 - 60 grams of carbohydrates  - For a snack: max 15 grams of carbohydrates  - Reduce amount of saturated and trans fat. Consider more unsaturated fat options as they are better for your heart health.    - Have at least 1 serving of lean fat protein with each meal.    - Increase fiber intake slowly to prevent constipation.   - Substitute fruit juices for the whole fruit    Low carb snack ideas (15 grams total carb or less):    String cheese or babybel with 6 crackers  4 peanut butter crackers  3 cups of popcorn  1 cup raw vegetables with hummus or ranch dip (just need to watch how much dip you use)  Nuts  2 rice cakes  Celery with peanut butter or cream cheese  String cheese with 1 serving of fruit  Greek yogurt (look at label to make sure < 15 gram carb)  Plain greek yogurt with fresh berries added  [place] protein bar  Whisps parmesan cheese crisps  Hard boiled egg  Cottage cheese  Tuna salad lettuce roll-ups  Deli meat roll-ups with slice of cheese  Sugar Free Jello  Glucerna shake (16 grams)   Glucerna hunger smart shake (16 grams)  Ensure protein max shake  Fruit (1 serving/15 grams)  1/2 banana, ryan, or grapefruit   1/3 melon (small cantaloupe)  1 slice or 1 cup of honeydew melon  1 slice or 1 and 1/4 cups of watermelon   1 small apple, peach, orange or pear  2 small tangerines  1 cup of raspberries  3/4 cup of blackberries, blueberries or pineapples  1/2 cup of fruit juice, pears, applesauce, or mangos  17 small grapes  12 cherries    Be careful with the glucerna products as they differ in the total carbs depending on the product (some are intended as meal replacements not snacks). Make sure you look at the total carbs on the label as products can differ. Physical Activity:  - Aim for 30 minutes of consistent, moderately intensive, physical activity a day for 5 days or an average of 150 minutes per week. - Start slow, increase as tolerated. For example: Walk every day, working up to 30 minutes of brisk walking, 5 days a week--or split the 30 minutes into two 15-minute or three 10-minute walks. - If you sit for a long time, get up and move/stretch every 90 minutes. Other recommendations:  - Schedule an annual eye exam.  - Check your feet daily for any signs of open wounds, cuts, or sores. - Given your risk factors, the following vaccines are recommended: annual flu shot, age-based pneumococcal vaccines (Pneumovax, Prevnar 13). In addition to taking your medications as directed, improving your blood sugar involves modifying your nutrition and maximizing the amount of physical activity.

## 2023-01-03 NOTE — PROGRESS NOTES
Pharmacy Progress Note - Diabetes Management       Assessment / Plan:   Diabetes Management:  Per ADA guidelines, Pt's A1c is not at goal of < 7%. Pt's FBG values have decreased significantly with dietary changes alone. He has decreased his carb intake overall, but is still having some high sugar snacks which he will work on. Would like to replace his Januvia and Glipizide with Trulicity 4.70LJ weekly, but given his improvement with diet alone, will maintain his current tx. Will have him perform staggered SMBG checks to assess his NFBG values in addition to his FBG values. Will reassess with logs in 2 weeks to determine if a change in tx is needed. Nutrition/Lifestyle Modifications:  - Educated pt on the importance of moderating carbohydrate intake. Reviewed sources of carbohydrates and method to help determine appropriate portion sizes (e.g., Diabetes Plate Method). - Advised patient to avoid sugar-sweetened beverages and replace with water or diet/zero sugar option.  - Recommend ~30 minutes consistent, moderately intensive, exercise/day or ~150 minutes/week. Start small, stay consistent, and increase length and types of exercise, as tolerated. Patient will return to clinic in 2 week(s) for follow up. S/O: Mr. Abdi Limon, a 76 y.o. male referred by Francy Park MD,  has a past medical history of Arthritis, CRI (chronic renal insufficiency), Diabetes mellitus type II (05/13/2010), Gout, Hypercholesteremia, Hypertension, Kidney stone, Other ill-defined conditions(799.89), Personal history of prostate cancer (2007), Prostate cancer ([facility] [address] (01/01/2008), and [facility] ([facility]). Pt was seen today for diabetes management. Patient's last A1c was:   Lab Results   Component Value Date/Time    Hemoglobin A1c 8.2 (H) 11/28/2022 09:59 AM       Interim update:    Pt was referred to me for uncontrolled T2DM. Today:   Pt's wife was present during the visit.   Pt admits that he was eating a lot of carbs consisting of primarily rice prior to being told about his elevated A1c. Pt was eating a lot more at night in the past as well. He would snack while watching TV up until midnight. He has cut out the evening snacks for the most part. He would prefer to not inject himself, but was more amenable to the idea after description of the Trulicity pen. He would like to try to continue his diet before making any changes at this time. He is amenable to a close follow up. Current anti-hyperglycemic regimen includes:    Key Antihyperglycemic Medications               SITagliptin (JANUVIA) 100 mg tablet Take 1 Tablet by mouth daily. glipiZIDE SR (GLUCOTROL XL) 10 mg CR tablet Take 1 Tablet by mouth daily. Complete current medication regimen includes:  Current Outpatient Medications   Medication Sig    lisinopriL (PRINIVIL, ZESTRIL) 20 mg tablet Take 1 Tablet by mouth two (2) times a day. abiraterone (Zytiga) 250 mg tab Take four tablets by mouth daily on an empty stomach. Take one hour prior to food or two hours after food. Tablets should be swallowed whole with water. Do not crush or chew tablets. predniSONE (DELTASONE) 5 mg tablet Take 1 Tablet by mouth daily. SITagliptin (JANUVIA) 100 mg tablet Take 1 Tablet by mouth daily. glipiZIDE SR (GLUCOTROL XL) 10 mg CR tablet Take 1 Tablet by mouth daily. simvastatin (ZOCOR) 40 mg tablet Take 1 Tablet by mouth nightly. hydrocortisone (HYTONE) 2.5 % topical cream Apply  to affected area two (2) times a day. use thin layer (Patient taking differently: Apply  to affected area two (2) times a day. use thin layer as needed)    acetaminophen (Tylenol Extra Strength) 500 mg tablet Take 2 Tablets by mouth every six (6) hours as needed for Pain. colchicine 0.6 mg tablet Take 1 Tablet by mouth daily. lancets (Lancets,Ultra Thin) misc As directed once daily.     Blood-Glucose Meter monitoring kit Free Style monitoring Kit. Check glucose fasting daily. (Patient taking differently: Precision Extra  monitoring Kit. Check glucose fasting daily.)    aspirin 81 mg chewable tablet Take 1 Tab by mouth daily. No current facility-administered medications for this visit. Allergies: Allergies   Allergen Reactions    Azithromycin Palpitations    Watermelon Hives    Norvasc [Amlodipine] Itching and Other (comments)       Blood Glucose Monitoring (BGM) or CGM:  - Brought in home glucometer/blood glucose log/CGM reader today:  yes  - Conducts/scans: 1x daily   - Fasting BG today: 114mg/dL        ROS:  Today, Pt endorses:  - Symptoms of Hyperglycemia: none  - Symptoms of Hypoglycemia: none    Lifestyle modification(s):  - Eats 4 meals/day   - A typical days food intake is as follows:   - breakfast: eggs with a little bit of rice half a cup or less or toast with coffee (regular coffee mate creamer plus sugar - most tsp) or tea   - snack: none   - lunch: sandwich (ham and egg/cheese) plus cranberry juice (1gm sugar) or sometimes OJ - will sometimes feel dizzy when working out side and will have OJ   - snack: cookie (chocolate chip) or sometimes cheesecake    - dinner: soup or meat/veggies and is trying to avoid rice   - snack: none   - typical beverages: coffee x2 AM and noon or water or tea    Physical Activity:   no - other than yard work on occasion    Medication Adherence/Access:  - Brought in home medications including prescription/non-prescriptions:  no  - Endorses barriers to affording/accessing medications : no  - Uses a pill box/other methods to organize medications: yes  - Endorses adherence to current regimen?: yes    Vitals/Labs:   Wt Readings from Last 3 Encounters:   11/29/22 164 lb (74.4 kg)   11/01/22 165 lb (74.8 kg)   09/29/22 165 lb (74.8 kg)     BP Readings from Last 3 Encounters:   11/29/22 130/76   09/29/22 (!) 147/87   09/07/22 127/68     Pulse Readings from Last 3 Encounters:   11/29/22 73   09/29/22 88 09/07/22 95       Lab Results   Component Value Date/Time    Sodium 137 12/16/2022 10:37 AM    Potassium 4.1 12/16/2022 10:37 AM    Chloride 99 12/16/2022 10:37 AM    CO2 23 12/16/2022 10:37 AM    Anion gap 7 11/28/2022 09:59 AM    Glucose 323 (H) 12/16/2022 10:37 AM    BUN 27 12/16/2022 10:37 AM    Creatinine 1.40 (H) 12/16/2022 10:37 AM    BUN/Creatinine ratio 19 12/16/2022 10:37 AM    GFR est AA 60 (L) 09/29/2022 08:10 AM    GFR est non-AA 49 (L) 09/29/2022 08:10 AM    Calcium 9.2 12/16/2022 10:37 AM    Bilirubin, total 1.2 12/16/2022 10:37 AM    Alk.  phosphatase 67 12/16/2022 10:37 AM    Protein, total 6.6 12/16/2022 10:37 AM    Albumin 4.2 12/16/2022 10:37 AM    Globulin 3.2 11/28/2022 09:59 AM    A-G Ratio 1.8 12/16/2022 10:37 AM    ALT (SGPT) 23 12/16/2022 10:37 AM     Lab Results   Component Value Date/Time    Cholesterol, total 98 08/26/2022 08:11 AM    HDL Cholesterol 40 08/26/2022 08:11 AM    LDL, calculated 32.8 08/26/2022 08:11 AM    VLDL, calculated 25.2 08/26/2022 08:11 AM    Triglyceride 126 08/26/2022 08:11 AM    CHOL/HDL Ratio 2.5 08/26/2022 08:11 AM     Lab Results   Component Value Date/Time    WBC 7.5 09/07/2022 12:00 PM    WBC 7.6 07/02/2012 12:00 AM    HGB 15.2 09/07/2022 12:00 PM    HCT 45.0 09/07/2022 12:00 PM    PLATELET 485 61/88/3875 12:00 PM    MCV 91.6 09/07/2022 12:00 PM     Lab Results   Component Value Date/Time    Hemoglobin A1c 8.2 (H) 11/28/2022 09:59 AM    Hemoglobin A1c 8.5 (H) 08/26/2022 08:11 AM    Hemoglobin A1c 8.8 (H) 02/21/2022 09:26 AM     No results found for: [id number]     Screenings/Prevention Parameters:  -Diabetic Eye and Foot Exams:      Diabetic Foot and Eye Exam HM Status   Topic Date Due    Diabetic Foot Care  07/22/2022    Eye Exam  11/06/2022     -Microalbumin / Creatinine ratio:       Lab Results   Component Value Date/Time    Microalbumin/Creat ratio (mg/g creat) 151 (H) 08/26/2022 08:11 AM    MICROALBUMIN,MG/DAY 11.3 07/26/2013 12:00 AM Microalbumin,urine random 16.50 (H) 2022 08:11 AM     -ASCVD Risk Score Parameters and Calculation    Race:      BP Readings from Last 3 Encounters:   22 130/76   22 (!) 147/87   22 127/68        Lab Results   Component Value Date/Time    Cholesterol, total 98 2022 08:11 AM    HDL Cholesterol 40 2022 08:11 AM    LDL, calculated 32.8 2022 08:11 AM    VLDL, calculated 25.2 2022 08:11 AM    Triglyceride 126 2022 08:11 AM    CHOL/HDL Ratio 2.5 2022 08:11 AM        Social History     Tobacco Use    Smoking status: Former     Packs/day: 1.00     Years: 30.00     Pack years: 30.00     Types: Cigarettes     Quit date: 1997     Years since quittin.4    Smokeless tobacco: Never    Tobacco comments:        Substance Use Topics    Alcohol use: Yes     Comment: only social one shot of tequila         Calculated ASCVD Risk Score: The ASCVD Risk score (Taisha RASCON, et al., 2019) failed to calculate for the following reasons: The valid total cholesterol range is 130 to 320 mg/dL    -Statin Safety Parameters:      Lab Results   Component Value Date/Time    ALT (SGPT) 23 2022 10:37 AM    AST (SGOT) 21 2022 10:37 AM    Alk.  phosphatase 67 2022 10:37 AM    Bilirubin, direct 0.3 (H) 2022 12:00 PM    Bilirubin, total 1.2 2022 10:37 AM     -Immunizations:      Immunization History   Administered Date(s) Administered    COVID-19, PFIZER PURPLE top, DILUTE for use, (age 15 y+), IM, 30mcg/0.3mL 2021, 2021    Pneumococcal Conjugate (PCV-13) 2015    Pneumococcal Polysaccharide (PPSV-23) 2016    TDAP Vaccine 2010       Additional Laboratory Parameters of Interest:   Estimation of renal function:  Lab Results   Component Value Date/Time    Creatinine 1.40 (H) 2022 10:37 AM    Creatinine 1.45 (H) 2022 10:48 AM    Creatinine 1.53 (H) 2022 09:59 AM    GFR est AA 60 (L) 2022 08:10 AM GFR est AA 56 (L) 2022 12:00 PM    GFR est AA 52 (L) 2022 11:20 AM    GFR est non-AA 49 (L) 2022 08:10 AM    GFR est non-AA 46 (L) 2022 12:00 PM    GFR est non-AA 43 (L) 2022 11:20 AM     Wt Readings from Last 3 Encounters:   22 164 lb (74.4 kg)   22 165 lb (74.8 kg)   22 165 lb (74.8 kg)     Ht Readings from Last 1 Encounters:   22 5' 2\" (1.575 m)     Calculated estimated creatinine clearance: CrCl cannot be calculated (Unknown ideal weight.). Vital Signs Today:    There were no vitals taken for this visit. There are no discontinued medications. Future Appointments   Date Time Provider Marquis Lott   [facility]2023  [facility]   2023  2:20 PM [facility] NURSE [facility] ENRIQUE SCHED   2023  8:30 AM Guero Preston MD [facility] BS AMB   2023 11:50 AM [facility] NURSE [facility] ENRIQUE SCHED   2023 10:20 AM Marga Aguilera PA-C [id number]       Patient verbalized understanding of the information presented and all of the patients questions were answered. AVS was handed to the patient. Patient advised to call the office with any additional questions or concerns. Notifications of recommendations will be sent to Guero Preston MD for review.       Thank you for the consult,  Adan Farnsworth, PharmD, BCACP, BC-ADM        For Pharmacy Admin Tracking Only    Program: Medical Group  CPA in place: Yes  Recommendation Provided To: Patient/Caregiver: 2 via In person  Intervention Detail: Adherence Monitorin and Scheduled Appointment  Intervention Accepted By: Patient/Caregiver: 2  Gap Closed?: No  Time Spent (min): 60

## 2023-01-05 RX ORDER — LANCETS
EACH MISCELLANEOUS
Qty: 100 EACH | Refills: 3 | Status: SHIPPED | OUTPATIENT
Start: 2023-01-05

## 2023-01-05 RX ORDER — INSULIN PUMP SYRINGE, 3 ML
EACH MISCELLANEOUS
Qty: 1 KIT | Refills: 0 | Status: SHIPPED | OUTPATIENT
Start: 2023-01-05

## 2023-01-19 ENCOUNTER — OFFICE VISIT (OUTPATIENT)
Dept: FAMILY MEDICINE CLINIC | Age: 78
End: 2023-01-19

## 2023-01-19 DIAGNOSIS — E11.21 TYPE 2 DIABETES WITH NEPHROPATHY (HCC): Primary | ICD-10-CM

## 2023-01-19 RX ORDER — DULAGLUTIDE 0.75 MG/.5ML
0.75 INJECTION, SOLUTION SUBCUTANEOUS
Qty: 4 EACH | Refills: 3 | Status: SHIPPED | OUTPATIENT
Start: 2023-01-19

## 2023-01-19 NOTE — PATIENT INSTRUCTIONS
Your Visit Summary:     Plan:  - Stop Januvia  - Stop Glipizide    - Start Trulicity 3.51GN weekly    Call me with any questions or concerns  Alicia Darnell [phone]    Check and document your blood sugar first thing in the morning (fasting at least 8 hours), 2 hours after a meal, and/or before bedtime. Bring your meter/log to all future visits. Your blood sugar goals:  - Fasting (first thing in the morning)  blood sugar: 80 - 130mg/dL  - 2 hours after a meal: 80 - 180mg/dL    When you experience symptoms of low blood sugar (example: less than 70):  - Confirm low reading by checking your blood sugar.   - Then treat with 15 grams of carbohydrates (one-half cup of juice or regular soda, or 4-5 glucose tablets). - Wait 15 minutes to recheck blood sugar.   - Then eat a protein containing meal/snack to prevent another low blood sugar episode. (example: peanut butter + crackers)    Nutrition:  - When reviewing a nutrition label, focus on the serving size, total calories, fat (and type of fats), total carbohydrates, sugar (and amount of added sugar), amount of fiber (good for your digestive), and amount of protein. Refer to your nutrition label guide for more information.  - For a meal : max 45 - 60 grams of carbohydrates  - For a snack: max 15 grams of carbohydrates  - Reduce amount of saturated and trans fat. Consider more unsaturated fat options as they are better for your heart health.    - Have at least 1 serving of lean fat protein with each meal.    - Increase fiber intake slowly to prevent constipation.   - Substitute fruit juices for the whole fruit    Low carb snack ideas (15 grams total carb or less):    String cheese or babybel with 6 crackers  4 peanut butter crackers  3 cups of popcorn  1 cup raw vegetables with hummus or ranch dip (just need to watch how much dip you use)  Nuts  2 rice cakes  Celery with peanut butter or cream cheese  String cheese with 1 serving of fruit  Greek yogurt (look at label to make sure < 15 gram carb)  Plain greek yogurt with fresh berries added  [place] protein bar  Whisps parmesan cheese crisps  Hard boiled egg  Cottage cheese  Tuna salad lettuce roll-ups  Deli meat roll-ups with slice of cheese  Sugar Free Jello  Glucerna shake (16 grams)   Glucerna hunger smart shake (16 grams)  Ensure protein max shake  Fruit (1 serving/15 grams)  1/2 banana, ryan, or grapefruit   1/3 melon (small cantaloupe)  1 slice or 1 cup of honeydew melon  1 slice or 1 and 1/4 cups of watermelon   1 small apple, peach, orange or pear  2 small tangerines  1 cup of raspberries  3/4 cup of blackberries, blueberries or pineapples  1/2 cup of fruit juice, pears, applesauce, or mangos  17 small grapes  12 cherries    Be careful with the glucerna products as they differ in the total carbs depending on the product (some are intended as meal replacements not snacks). Make sure you look at the total carbs on the label as products can differ. Physical Activity:  - Aim for 30 minutes of consistent, moderately intensive, physical activity a day for 5 days or an average of 150 minutes per week. - Start slow, increase as tolerated. For example: Walk every day, working up to 30 minutes of brisk walking, 5 days a week--or split the 30 minutes into two 15-minute or three 10-minute walks. - If you sit for a long time, get up and move/stretch every 90 minutes. Other recommendations:  - Schedule an annual eye exam.  - Check your feet daily for any signs of open wounds, cuts, or sores. - Given your risk factors, the following vaccines are recommended: annual flu shot, age-based pneumococcal vaccines (Pneumovax, Prevnar 13). In addition to taking your medications as directed, improving your blood sugar involves modifying your nutrition and maximizing the amount of physical activity.

## 2023-01-19 NOTE — PROGRESS NOTES
Pharmacy Progress Note - Diabetes Management       Assessment / Plan:   Diabetes Management:  Per ADA guidelines, Pt's A1c is not at goal of < 7%. Pt's FBG values appear to be relatively controlled, but his prandial control is severely lacking. His glipizide and Januvia are not providing adequate control. Will discontinue these and start Trulicity 9.16VK weekly. Will reassess with SMBG logs in 3 weeks. Nutrition/Lifestyle Modifications:  - Educated pt on the importance of moderating carbohydrate intake. Reviewed sources of carbohydrates and method to help determine appropriate portion sizes (e.g., Diabetes Plate Method). - Advised patient to avoid sugar-sweetened beverages and replace with water or diet/zero sugar option.  - Recommend ~30 minutes consistent, moderately intensive, exercise/day or ~150 minutes/week. Start small, stay consistent, and increase length and types of exercise, as tolerated. Patient will return to clinic in 3 week(s) for follow up. S/O: Mr. Max López, a 76 y.o. male referred by Radha Akhtar MD,  has a past medical history of Arthritis, CRI (chronic renal insufficiency), Diabetes mellitus type II (05/13/2010), Gout, Hypercholesteremia, Hypertension, Kidney stone, Other ill-defined conditions(799.89), Personal history of prostate cancer (2007), Prostate cancer ([facility] [address] (01/01/2008), and [facility] (01/01/2013). Pt was seen today for diabetes management. Patient's last A1c was:   Lab Results   Component Value Date/Time    Hemoglobin A1c 8.2 (H) 11/28/2022 09:59 AM       Interim update: Pt was last seen by me on 03 Jan 2023. Per my prior note: Pt's A1c is not at goal of < 7%. Pt's FBG values have decreased significantly with dietary changes alone. He has decreased his carb intake overall, but is still having some high sugar snacks which he will work on.   Would like to replace his Januvia and Glipizide with Trulicity 0.72VT weekly, but given his improvement with diet alone, will maintain his current tx. Will have him perform staggered SMBG checks to assess his NFBG values in addition to his FBG values. Will reassess with logs in 2 weeks to determine if a change in tx is needed. Today:  Pt brought in his SMBG logs as below. He denies changes to diet/exercise. He is amenable to a switch to Trulicity. Current anti-hyperglycemic regimen includes:    Key Antihyperglycemic Medications               SITagliptin (JANUVIA) 100 mg tablet Take 1 Tablet by mouth daily. glipiZIDE SR (GLUCOTROL XL) 10 mg CR tablet Take 1 Tablet by mouth daily. Complete current medication regimen includes:  Current Outpatient Medications   Medication Sig    Blood-Glucose Meter monitoring kit Free Style monitoring Kit. Check glucose fasting daily. lancets (Lancets,Ultra Thin) misc As directed once daily. lisinopriL (PRINIVIL, ZESTRIL) 20 mg tablet Take 1 Tablet by mouth two (2) times a day. abiraterone (Zytiga) 250 mg tab Take four tablets by mouth daily on an empty stomach. Take one hour prior to food or two hours after food. Tablets should be swallowed whole with water. Do not crush or chew tablets. predniSONE (DELTASONE) 5 mg tablet Take 1 Tablet by mouth daily. SITagliptin (JANUVIA) 100 mg tablet Take 1 Tablet by mouth daily. glipiZIDE SR (GLUCOTROL XL) 10 mg CR tablet Take 1 Tablet by mouth daily. simvastatin (ZOCOR) 40 mg tablet Take 1 Tablet by mouth nightly. hydrocortisone (HYTONE) 2.5 % topical cream Apply  to affected area two (2) times a day. use thin layer (Patient taking differently: Apply  to affected area two (2) times a day. use thin layer as needed)    acetaminophen (Tylenol Extra Strength) 500 mg tablet Take 2 Tablets by mouth every six (6) hours as needed for Pain. colchicine 0.6 mg tablet Take 1 Tablet by mouth daily. aspirin 81 mg chewable tablet Take 1 Tab by mouth daily.      No current facility-administered medications for this visit. Allergies: Allergies   Allergen Reactions    Azithromycin Palpitations    Watermelon Hives    Norvasc [Amlodipine] Itching and Other (comments)       Blood Glucose Monitoring (BGM) or CGM:  - Brought in home glucometer/blood glucose log/CGM reader today:  yes        ROS:  Today, Pt endorses:  - Symptoms of Hyperglycemia: none  - Symptoms of Hypoglycemia: none    Lifestyle modification(s):  - none    Medication Adherence/Access:  - Endorses adherence to current regimen?: yes    Vitals/Labs: Wt Readings from Last 3 Encounters:   11/29/22 164 lb (74.4 kg)   11/01/22 165 lb (74.8 kg)   09/29/22 165 lb (74.8 kg)     BP Readings from Last 3 Encounters:   11/29/22 130/76   09/29/22 (!) 147/87   09/07/22 127/68     Pulse Readings from Last 3 Encounters:   11/29/22 73   09/29/22 88   09/07/22 95       Lab Results   Component Value Date/Time    Sodium 137 12/16/2022 10:37 AM    Potassium 4.1 12/16/2022 10:37 AM    Chloride 99 12/16/2022 10:37 AM    CO2 23 12/16/2022 10:37 AM    Anion gap 7 11/28/2022 09:59 AM    Glucose 323 (H) 12/16/2022 10:37 AM    BUN 27 12/16/2022 10:37 AM    Creatinine 1.40 (H) 12/16/2022 10:37 AM    BUN/Creatinine ratio 19 12/16/2022 10:37 AM    GFR est AA 60 (L) 09/29/2022 08:10 AM    GFR est non-AA 49 (L) 09/29/2022 08:10 AM    Calcium 9.2 12/16/2022 10:37 AM    Bilirubin, total 1.2 12/16/2022 10:37 AM    Alk.  phosphatase 67 12/16/2022 10:37 AM    Protein, total 6.6 12/16/2022 10:37 AM    Albumin 4.2 12/16/2022 10:37 AM    Globulin 3.2 11/28/2022 09:59 AM    A-G Ratio 1.8 12/16/2022 10:37 AM    ALT (SGPT) 23 12/16/2022 10:37 AM     Lab Results   Component Value Date/Time    Cholesterol, total 98 08/26/2022 08:11 AM    HDL Cholesterol 40 08/26/2022 08:11 AM    LDL, calculated 32.8 08/26/2022 08:11 AM    VLDL, calculated 25.2 08/26/2022 08:11 AM    Triglyceride 126 08/26/2022 08:11 AM    CHOL/HDL Ratio 2.5 08/26/2022 08:11 AM     Lab Results Component Value Date/Time    WBC 7.5 2022 12:00 PM    WBC 7.6 2012 12:00 AM    HGB 15.2 2022 12:00 PM    HCT 45.0 2022 12:00 PM    PLATELET 949  12:00 PM    MCV 91.6 2022 12:00 PM     Lab Results   Component Value Date/Time    Hemoglobin A1c 8.2 (H) 2022 09:59 AM    Hemoglobin A1c 8.5 (H) 2022 08:11 AM    Hemoglobin A1c 8.8 (H) 2022 09:26 AM     No results found for: [id number]     Screenings/Prevention Parameters:  -Diabetic Eye and Foot Exams:      Diabetic Foot and Eye Exam HM Status   Topic Date Due    Diabetic Foot Care  2022    Eye Exam  2022     -Microalbumin / Creatinine ratio:       Lab Results   Component Value Date/Time    Microalbumin/Creat ratio (mg/g creat) 151 (H) 2022 08:11 AM    MICROALBUMIN,MG/DAY 11.3 2013 12:00 AM    Microalbumin,urine random 16.50 (H) 2022 08:11 AM     -ASCVD Risk Score Parameters and Calculation    Race:      BP Readings from Last 3 Encounters:   22 130/76   22 (!) 147/87   22 127/68        Lab Results   Component Value Date/Time    Cholesterol, total 98 2022 08:11 AM    HDL Cholesterol 40 2022 08:11 AM    LDL, calculated 32.8 2022 08:11 AM    VLDL, calculated 25.2 2022 08:11 AM    Triglyceride 126 2022 08:11 AM    CHOL/HDL Ratio 2.5 2022 08:11 AM        Social History     Tobacco Use    Smoking status: Former     Packs/day: 1.00     Years: 30.00     Pack years: 30.00     Types: Cigarettes     Quit date: 1997     Years since quittin.5    Smokeless tobacco: Never    Tobacco comments:        Substance Use Topics    Alcohol use: Yes     Comment: only social one shot of tequila         Calculated ASCVD Risk Score: The ASCVD Risk score (Taisha RASCON, et al., 2019) failed to calculate for the following reasons:     The valid total cholesterol range is 130 to 320 mg/dL    -Statin Safety Parameters:      Lab Results   Component Value Date/Time    ALT (SGPT) 23 12/16/2022 10:37 AM    AST (SGOT) 21 12/16/2022 10:37 AM    Alk. phosphatase 67 12/16/2022 10:37 AM    Bilirubin, direct 0.3 (H) 09/07/2022 12:00 PM    Bilirubin, total 1.2 12/16/2022 10:37 AM     -Immunizations:      Immunization History   Administered Date(s) Administered    COVID-19, PFIZER PURPLE top, DILUTE for use, (age 15 y+), IM, 30mcg/0.3mL 02/06/2021, 02/27/2021    Pneumococcal Conjugate (PCV-13) 04/06/2015    Pneumococcal Polysaccharide (PPSV-23) 05/11/2016    TDAP Vaccine 05/13/2010       Additional Laboratory Parameters of Interest:   Estimation of renal function:  Lab Results   Component Value Date/Time    Creatinine 1.40 (H) 12/16/2022 10:37 AM    Creatinine 1.45 (H) 12/06/2022 10:48 AM    Creatinine 1.53 (H) 11/28/2022 09:59 AM    GFR est AA 60 (L) 09/29/2022 08:10 AM    GFR est AA 56 (L) 09/07/2022 12:00 PM    GFR est AA 52 (L) 09/06/2022 11:20 AM    GFR est non-AA 49 (L) 09/29/2022 08:10 AM    GFR est non-AA 46 (L) 09/07/2022 12:00 PM    GFR est non-AA 43 (L) 09/06/2022 11:20 AM     Wt Readings from Last 3 Encounters:   11/29/22 164 lb (74.4 kg)   11/01/22 165 lb (74.8 kg)   09/29/22 165 lb (74.8 kg)     Ht Readings from Last 1 Encounters:   01/04/23 5' 2\" (1.575 m)     Calculated estimated creatinine clearance: CrCl cannot be calculated (Unknown ideal weight.). Vital Signs Today:    There were no vitals taken for this visit. There are no discontinued medications. Future Appointments   Date Time Provider Marquis Lott   2/3/2023  1:40 PM [facility]ERC[facility] NURSE [facility]   2/9/2023 10:00 AM SHARON Weir BS AMB   2/28/2023  8:30 AM Amber Fothergill, MD [facility] BS AMB   5/1/2023 11:50 AM [facility] NURSE [facility]   5/9/2023 10:20 AM Letha Kaplan PA-C [id number]       Patient verbalized understanding of the information presented and all of the patients questions were answered.   AVS was handed to the patient. Patient advised to call the office with any additional questions or concerns. Notifications of recommendations will be sent to Nataliya Reed MD for review.       Thank you for the consult,  Purcell Hammans, PharmD, BCACP, BC-[facility]        For Pharmacy Admin Tracking Only    Program: Medical Group  CPA in place: Yes  Recommendation Provided To: Patient/Caregiver: 5 via In person  Intervention Detail: Adherence Monitorin, Discontinued Rx: 2, reason: Ineffective Therapy, New Rx: 1, reason: Needs Additional Therapy, and Scheduled Appointment  Intervention Accepted By: Patient/Caregiver: 5  Gap Closed?: No  Time Spent (min): 60

## 2023-01-25 RX ORDER — INSULIN PUMP SYRINGE, 3 ML
EACH MISCELLANEOUS
Qty: 1 KIT | Refills: 0 | Status: CANCELLED | OUTPATIENT
Start: 2023-01-25

## 2023-01-26 RX ORDER — BLOOD-GLUCOSE METER
KIT MISCELLANEOUS
Qty: 100 STRIP | Refills: 4 | Status: SHIPPED | OUTPATIENT
Start: 2023-01-26

## 2023-01-30 ENCOUNTER — HOSPITAL ENCOUNTER (EMERGENCY)
Age: 78
Discharge: HOME | End: 2023-01-30
Attending: EMERGENCY MEDICINE
Payer: MEDICARE

## 2023-01-30 VITALS
HEIGHT: 62 IN | WEIGHT: 158 LBS | SYSTOLIC BLOOD PRESSURE: 139 MMHG | TEMPERATURE: 97.3 F | BODY MASS INDEX: 29.08 KG/M2 | DIASTOLIC BLOOD PRESSURE: 86 MMHG | OXYGEN SATURATION: 97 % | RESPIRATION RATE: 18 BRPM | HEART RATE: 97 BPM

## 2023-01-30 DIAGNOSIS — R73.9 HYPERGLYCEMIA: Primary | ICD-10-CM

## 2023-01-30 LAB
ALBUMIN SERPL-MCNC: 3.3 G/DL (ref 3.4–5)
ALBUMIN/GLOB SERPL: 1 (ref 0.8–1.7)
ALP SERPL-CCNC: 88 U/L (ref 45–117)
ALT SERPL-CCNC: 57 U/L (ref 16–61)
ANION GAP SERPL CALC-SCNC: 6 MMOL/L (ref 3–18)
APPEARANCE UR: CLEAR
AST SERPL-CCNC: 22 U/L (ref 10–38)
BACTERIA URNS QL MICRO: NEGATIVE /HPF
BASOPHILS # BLD: 0 K/UL (ref 0–0.1)
BASOPHILS NFR BLD: 0 % (ref 0–2)
BILIRUB SERPL-MCNC: 1.2 MG/DL (ref 0.2–1)
BILIRUB UR QL: NEGATIVE
BUN SERPL-MCNC: 24 MG/DL (ref 7–18)
BUN/CREAT SERPL: 16 (ref 12–20)
CALCIUM SERPL-MCNC: 8.7 MG/DL (ref 8.5–10.1)
CHLORIDE SERPL-SCNC: 105 MMOL/L (ref 100–111)
CO2 SERPL-SCNC: 26 MMOL/L (ref 21–32)
COLOR UR: YELLOW
CREAT SERPL-MCNC: 1.5 MG/DL (ref 0.6–1.3)
DIFFERENTIAL METHOD BLD: ABNORMAL
EOSINOPHIL # BLD: 0.1 K/UL (ref 0–0.4)
EOSINOPHIL NFR BLD: 1 % (ref 0–5)
EPITH CASTS URNS QL MICRO: NEGATIVE /LPF (ref 0–5)
ERYTHROCYTE [DISTWIDTH] IN BLOOD BY AUTOMATED COUNT: 12 % (ref 11.6–14.5)
GLOBULIN SER CALC-MCNC: 3.3 G/DL (ref 2–4)
GLUCOSE BLD STRIP.AUTO-MCNC: 239 MG/DL (ref 70–110)
GLUCOSE BLD STRIP.AUTO-MCNC: 312 MG/DL (ref 70–110)
GLUCOSE SERPL-MCNC: 318 MG/DL (ref 74–99)
GLUCOSE UR STRIP.AUTO-MCNC: >1000 MG/DL
HCT VFR BLD AUTO: 39.3 % (ref 36–48)
HGB BLD-MCNC: 13.5 G/DL (ref 13–16)
HGB UR QL STRIP: NEGATIVE
IMM GRANULOCYTES # BLD AUTO: 0 K/UL (ref 0–0.04)
IMM GRANULOCYTES NFR BLD AUTO: 0 % (ref 0–0.5)
KETONES UR QL STRIP.AUTO: NEGATIVE MG/DL
LEUKOCYTE ESTERASE UR QL STRIP.AUTO: NEGATIVE
LYMPHOCYTES # BLD: 0.7 K/UL (ref 0.9–3.6)
LYMPHOCYTES NFR BLD: 13 % (ref 21–52)
MAGNESIUM SERPL-MCNC: 1.9 MG/DL (ref 1.6–2.6)
MCH RBC QN AUTO: 31.2 PG (ref 24–34)
MCHC RBC AUTO-ENTMCNC: 34.4 G/DL (ref 31–37)
MCV RBC AUTO: 90.8 FL (ref 78–100)
MONOCYTES # BLD: 0.4 K/UL (ref 0.05–1.2)
MONOCYTES NFR BLD: 7 % (ref 3–10)
NEUTS SEG # BLD: 4.4 K/UL (ref 1.8–8)
NEUTS SEG NFR BLD: 78 % (ref 40–73)
NITRITE UR QL STRIP.AUTO: NEGATIVE
NRBC # BLD: 0 K/UL (ref 0–0.01)
NRBC BLD-RTO: 0 PER 100 WBC
PH UR STRIP: 6 (ref 5–8)
PLATELET # BLD AUTO: 214 K/UL (ref 135–420)
PMV BLD AUTO: 10.4 FL (ref 9.2–11.8)
POTASSIUM SERPL-SCNC: 4.4 MMOL/L (ref 3.5–5.5)
PROT SERPL-MCNC: 6.6 G/DL (ref 6.4–8.2)
PROT UR STRIP-MCNC: 30 MG/DL
RBC # BLD AUTO: 4.33 M/UL (ref 4.35–5.65)
RBC #/AREA URNS HPF: NORMAL /HPF (ref 0–5)
SODIUM SERPL-SCNC: 137 MMOL/L (ref 136–145)
SP GR UR REFRACTOMETRY: 1.02 (ref 1–1.03)
UROBILINOGEN UR QL STRIP.AUTO: 1 EU/DL (ref 0.2–1)
WBC # BLD AUTO: 5.6 K/UL (ref 4.6–13.2)
WBC URNS QL MICRO: NORMAL /HPF (ref 0–4)

## 2023-01-30 PROCEDURE — 83036 HEMOGLOBIN GLYCOSYLATED A1C: CPT

## 2023-01-30 PROCEDURE — 82962 GLUCOSE BLOOD TEST: CPT

## 2023-01-30 PROCEDURE — 85025 COMPLETE CBC W/AUTO DIFF WBC: CPT

## 2023-01-30 PROCEDURE — 99284 EMERGENCY DEPT VISIT MOD MDM: CPT

## 2023-01-30 PROCEDURE — 82010 KETONE BODYS QUAN: CPT

## 2023-01-30 PROCEDURE — 81001 URINALYSIS AUTO W/SCOPE: CPT

## 2023-01-30 PROCEDURE — 74011250636 HC RX REV CODE- 250/636: Performed by: EMERGENCY MEDICINE

## 2023-01-30 PROCEDURE — 80053 COMPREHEN METABOLIC PANEL: CPT

## 2023-01-30 PROCEDURE — 83735 ASSAY OF MAGNESIUM: CPT

## 2023-01-30 RX ORDER — ACETAMINOPHEN 500 MG
2000 TABLET ORAL DAILY
COMMUNITY

## 2023-01-30 RX ADMIN — SODIUM CHLORIDE 500 ML: 9 INJECTION, SOLUTION INTRAVENOUS at 21:17

## 2023-01-31 LAB
B-OH-BUTYR SERPL-SCNC: 0.11 MMOL/L
EST. AVERAGE GLUCOSE BLD GHB EST-MCNC: 214 MG/DL
GLUCOSE BLD STRIP.AUTO-MCNC: 249 MG/DL (ref 70–110)
HBA1C MFR BLD: 9.1 % (ref 4.2–5.6)

## 2023-01-31 NOTE — ED PROVIDER NOTES
EMERGENCY DEPARTMENT HISTORY AND PHYSICAL EXAM      Date: 1/30/2023  Patient Name: Rickie Piña      History of Presenting Illness     Chief Complaint   Patient presents with    High Blood Sugar       Location/Duration/Severity/Modifying factors   Chief Complaint   Patient presents with    High Blood Sugar       HPI:  Rickie Piña is a 76 y.o. male with history as listed presents with a concern of of hyperglycemia. Patient is fingerstick glucose at home was 411. On the 23rd of this month his primary care provider referred him to our Pharm. D., Dr. Sarita Calderon, who stopped the patient's glipizide and Januvia and start the patient on Trulicity. The patient is to follow-up with Dr. Sarita Calderon in 3 weeks. However the wife noted that he had an elevated this evening and brought him in to be evaluated. The patient's glucose was 12 on arrival.  The laboratory studies glucose 318. The patient is asymptomatic. Associated Symptoms:see ROS      There are no other complaints, changes, or physical findings at this time. PCP: Eliseo Belcher MD    Current Outpatient Medications   Medication Sig Dispense Refill    degarelix (FIRMAGON) 80 mg solr injection by SubCUTAneous route every twenty-eight (28) days. cholecalciferol (VITAMIN D3) (2,000 UNITS /50 MCG) cap capsule Take 2,000 Units by mouth daily. glucose blood VI test strips (FreeStyle Lite Strips) strip Check glucose fasting daily 100 Strip 4    dulaglutide (Trulicity) 7.84 AC/7.3 mL sub-q pen 0.5 mL by SubCUTAneous route every seven (7) days. Indications: type 2 diabetes mellitus 4 Each 3    lisinopriL (PRINIVIL, ZESTRIL) 20 mg tablet Take 1 Tablet by mouth two (2) times a day. 180 Tablet 1    abiraterone (Zytiga) 250 mg tab Take four tablets by mouth daily on an empty stomach. Take one hour prior to food or two hours after food. Tablets should be swallowed whole with water. Do not crush or chew tablets.  120 Tablet 5    predniSONE (DELTASONE) 5 mg tablet Take 1 Tablet by mouth daily. 90 Tablet 3    simvastatin (ZOCOR) 40 mg tablet Take 1 Tablet by mouth nightly. 90 Tablet 3    hydrocortisone (HYTONE) 2.5 % topical cream Apply  to affected area two (2) times a day. use thin layer (Patient taking differently: Apply  to affected area two (2) times a day. use thin layer as needed) 60 g 2    colchicine 0.6 mg tablet Take 1 Tablet by mouth daily. 90 Tablet 1    aspirin 81 mg chewable tablet Take 1 Tab by mouth daily. 90 Tab 4    Blood-Glucose Meter monitoring kit Free Style monitoring Kit. Check glucose fasting daily. 1 Kit 0    lancets (Lancets,Ultra Thin) misc As directed once daily. 100 Each 3    acetaminophen (Tylenol Extra Strength) 500 mg tablet Take 2 Tablets by mouth every six (6) hours as needed for Pain.  20 Tablet 0       Past History     Past Medical History:  Past Medical History:   Diagnosis Date    Arthritis     CRI (chronic renal insufficiency)     Diabetes mellitus type II 05/13/2010    Gout     Hypercholesteremia     Hypertension     Kidney stone     Other ill-defined conditions(799.89)     gout    Personal history of prostate cancer 2007    Prostate cancer ([address] 01/01/2008    remission    Splenic artery aneurysm ([address] 01/01/2013    s/p stent dr Hortensia Polanco prn ff-up       Past Surgical History:  Past Surgical History:   Procedure Laterality Date    COLONOSCOPY N/A 09/29/2022    COLONOSCOPY/Polypectomies performed by Kadi Wakefield MD at [facility]    HX CYST REMOVAL Left 2019    thumb    HX KNEE REPLACEMENT  01/17/2012    Left; Dr. Jeni Phillips Right 01/2013    HX PROSTATECTOMY  12/01/2007    HX RADICAL PROSTATECTOMY      HX VASECTOMY  vasectomy    CO UNLISTED PROCEDURE VASCULAR SURGERY  04/23/2014    splenic artery repair with stent       Family History:  Family History   Problem Relation Age of Onset    Hypertension Other     Cancer Neg Hx     Diabetes Neg Hx     Heart Disease Neg Hx     Stroke Neg Hx        Social History:  Social History     Tobacco Use    Smoking status: Former     Packs/day: 1.00     Years: 30.00     Pack years: 30.00     Types: Cigarettes     Quit date: 1997     Years since quittin.5    Smokeless tobacco: Never    Tobacco comments:        Vaping Use    Vaping Use: Never used   Substance Use Topics    Alcohol use: Yes     Comment: only social one shot of tequila    Drug use: No       Allergies: Allergies   Allergen Reactions    Azithromycin Palpitations    Watermelon Hives    Norvasc [Amlodipine] Itching and Other (comments)         Review of Systems     Review of Systems   All other systems reviewed and are negative. Physical Exam     Physical Exam  Vitals and nursing note reviewed. Constitutional:       General: He is awake. He is not in acute distress. Appearance: Normal appearance. He is well-developed and well-groomed. He is not ill-appearing, toxic-appearing or diaphoretic. HENT:      Head: Normocephalic and atraumatic. Right Ear: External ear normal.      Left Ear: External ear normal.      Nose: Nose normal.      Mouth/Throat:      Mouth: Mucous membranes are moist.      Pharynx: Oropharynx is clear. No oropharyngeal exudate or posterior oropharyngeal erythema. Eyes:      General: Lids are normal. Vision grossly intact. Gaze aligned appropriately. No scleral icterus. Right eye: No discharge. Left eye: No discharge. Extraocular Movements: Extraocular movements intact. Conjunctiva/sclera: Conjunctivae normal.      Pupils: Pupils are equal, round, and reactive to light. Neck:      Vascular: No carotid bruit. Trachea: Trachea and phonation normal.   Cardiovascular:      Rate and Rhythm: Normal rate and regular rhythm. Pulses: Normal pulses. Heart sounds: Normal heart sounds, S1 normal and S2 normal. No murmur heard. Pulmonary:      Effort: Pulmonary effort is normal. No respiratory distress.       Breath sounds: Normal breath sounds and air entry. No wheezing or rales. Abdominal:      General: Bowel sounds are normal. There is no distension. Palpations: Abdomen is soft. Tenderness: There is no abdominal tenderness. There is no guarding. Musculoskeletal:         General: No swelling, deformity or signs of injury. Normal range of motion. Right shoulder: Normal. No swelling, deformity, tenderness, bony tenderness or crepitus. Left shoulder: Normal. No swelling, deformity, tenderness, bony tenderness or crepitus. Right upper arm: Normal. No swelling, edema, deformity, tenderness or bony tenderness. Left upper arm: Normal. No swelling, edema, deformity, tenderness or bony tenderness. Right elbow: No swelling or deformity. No tenderness. Left elbow: No swelling or deformity. No tenderness. Right forearm: No swelling, edema, deformity, tenderness or bony tenderness. Left forearm: No swelling, edema, deformity, tenderness or bony tenderness. Right wrist: No swelling, deformity, tenderness or crepitus. Normal pulse. Left wrist: No swelling, deformity, tenderness or crepitus. Normal pulse. Right hand: Normal. No swelling, deformity, tenderness or bony tenderness. Normal capillary refill. Left hand: Normal. No swelling, deformity, tenderness or bony tenderness. Normal capillary refill. Cervical back: Normal, full passive range of motion without pain, normal range of motion and neck supple. No swelling, deformity, rigidity, tenderness, bony tenderness or crepitus. No pain with movement. Thoracic back: Normal.      Lumbar back: Normal.      Right hip: No deformity, tenderness, bony tenderness or crepitus. Left hip: No deformity, tenderness, bony tenderness or crepitus. Right upper leg: Normal.      Left upper leg: Normal.      Right knee: Normal. No swelling. Left knee: No swelling. Right lower leg: No swelling or tenderness. No edema.       Left lower leg: No swelling or tenderness. No edema. Right ankle: Normal.      Left ankle: Normal.      Right foot: Normal. No swelling or deformity. Left foot: Normal. No swelling. Skin:     General: Skin is warm and dry. Neurological:      General: No focal deficit present. Mental Status: He is alert and oriented to person, place, and time. Mental status is at baseline. GCS: GCS eye subscore is 4. GCS verbal subscore is 5. GCS motor subscore is 6. Cranial Nerves: Cranial nerves 2-12 are intact. No cranial nerve deficit. Sensory: Sensation is intact. No sensory deficit. Motor: Motor function is intact. No weakness. Coordination: Coordination is intact. Coordination normal.      Gait: Gait is intact. Psychiatric:         Attention and Perception: Attention and perception normal.         Mood and Affect: Mood and affect normal.         Speech: Speech normal.         Behavior: Behavior normal. Behavior is cooperative. Thought Content: Thought content normal.         Cognition and Memory: Cognition and memory normal.         Judgment: Judgment normal.       Lab and Diagnostic Study Results     Labs -  Recent Results (from the past 24 hour(s))   GLUCOSE, POC    Collection Time: 01/30/23  8:49 PM   Result Value Ref Range    Glucose (POC) 312 (H) 70 - 110 mg/dL   CBC WITH AUTOMATED DIFF    Collection Time: 01/30/23  8:54 PM   Result Value Ref Range    WBC 5.6 4.6 - 13.2 K/uL    RBC 4.33 (L) 4.35 - 5.65 M/uL    HGB 13.5 13.0 - 16.0 g/dL    HCT 39.3 36.0 - 48.0 %    MCV 90.8 78.0 - 100.0 FL    MCH 31.2 24.0 - 34.0 PG    MCHC 34.4 31.0 - 37.0 g/dL    RDW 12.0 11.6 - 14.5 %    PLATELET 150 326 - 049 K/uL    MPV 10.4 9.2 - 11.8 FL    NRBC 0.0 0  WBC    ABSOLUTE NRBC 0.00 0.00 - 0.01 K/uL    NEUTROPHILS 78 (H) 40 - 73 %    LYMPHOCYTES 13 (L) 21 - 52 %    MONOCYTES 7 3 - 10 %    EOSINOPHILS 1 0 - 5 %    BASOPHILS 0 0 - 2 %    IMMATURE GRANULOCYTES 0 0.0 - 0.5 %    ABS. NEUTROPHILS 4.4 1.8 - 8.0 K/UL    ABS. LYMPHOCYTES 0.7 (L) 0.9 - 3.6 K/UL    ABS. MONOCYTES 0.4 0.05 - 1.2 K/UL    ABS. EOSINOPHILS 0.1 0.0 - 0.4 K/UL    ABS. BASOPHILS 0.0 0.0 - 0.1 K/UL    ABS. IMM. GRANS. 0.0 0.00 - 0.04 K/UL    DF AUTOMATED     MAGNESIUM    Collection Time: 01/30/23  8:54 PM   Result Value Ref Range    Magnesium 1.9 1.6 - 2.6 mg/dL   METABOLIC PANEL, COMPREHENSIVE    Collection Time: 01/30/23  8:54 PM   Result Value Ref Range    Sodium 137 136 - 145 mmol/L    Potassium 4.4 3.5 - 5.5 mmol/L    Chloride 105 100 - 111 mmol/L    CO2 26 21 - 32 mmol/L    Anion gap 6 3.0 - 18 mmol/L    Glucose 318 (H) 74 - 99 mg/dL    BUN 24 (H) 7.0 - 18 MG/DL    Creatinine 1.50 (H) 0.6 - 1.3 MG/DL    BUN/Creatinine ratio 16 12 - 20      eGFR 47 (L) >60 ml/min/1.73m2    Calcium 8.7 8.5 - 10.1 MG/DL    Bilirubin, total 1.2 (H) 0.2 - 1.0 MG/DL    ALT (SGPT) 57 16 - 61 U/L    AST (SGOT) 22 10 - 38 U/L    Alk.  phosphatase 88 45 - 117 U/L    Protein, total 6.6 6.4 - 8.2 g/dL    Albumin 3.3 (L) 3.4 - 5.0 g/dL    Globulin 3.3 2.0 - 4.0 g/dL    A-G Ratio 1.0 0.8 - 1.7     URINALYSIS W/ RFLX MICROSCOPIC    Collection Time: 01/30/23  9:18 PM   Result Value Ref Range    Color YELLOW      Appearance CLEAR      Specific gravity 1.022 1.005 - 1.030      pH (UA) 6.0 5.0 - 8.0      Protein 30 (A) NEG mg/dL    Glucose >1,000 (A) NEG mg/dL    Ketone Negative NEG mg/dL    Bilirubin Negative NEG      Blood Negative NEG      Urobilinogen 1.0 0.2 - 1.0 EU/dL    Nitrites Negative NEG      Leukocyte Esterase Negative NEG     URINE MICROSCOPIC ONLY    Collection Time: 01/30/23  9:18 PM   Result Value Ref Range    WBC NONE 0 - 4 /hpf    RBC NONE 0 - 5 /hpf    Epithelial cells Negative 0 - 5 /lpf    Bacteria Negative NEG /hpf   GLUCOSE, POC    Collection Time: 01/30/23 10:42 PM   Result Value Ref Range    Glucose (POC) 239 (H) 70 - 110 mg/dL         Radiologic Studies -   No orders to display         Procedures and Critical Care       Performed by: Ivan Salas MD    Procedures         Ivan Salas MD    Medical Decision Making and ED Course   - I am the first and primary provider for this patient AND AM THE PRIMARY PROVIDER OF RECORD. - I reviewed the vital signs, available nursing notes, past medical history, past surgical history, family history and social history. - Initial assessment performed. The patients presenting problems have been discussed, and the staff are in agreement with the care plan formulated and outlined with them. I have encouraged them to ask questions as they arise throughout their visit. Vital Signs-Reviewed the patient's vital signs. Patient Vitals for the past 12 hrs:   Temp Pulse Resp BP SpO2   01/30/23 2123 -- -- -- (!) 145/79 97 %   01/30/23 2053 -- -- -- (!) 158/75 96 %   01/30/23 2040 97.3 °F (36.3 °C) 97 18 (!) 151/82 95 %         Provider Notes (Medical Decision Making):     MDM  Number of Diagnoses or Management Options  Hyperglycemia  Diagnosis management comments: The patient presented to the emergency department with report of recent changes in his medication where glipizide and Januvia were discontinued and he was started on Trulicity. The patient's glucose was 411 at home. His wife had him drink water and upon arrival it was 318. He was treated with IV fluids with the repeat glucose trended toward normal in the last 1 reading 239. The CMP demonstrated a normal anion gap and CO2. It is likely that the patient has hyperglycemia related to recent medication changes and/or diet noncompliance. .  The patient will be discharged home with a plan for him to follow-up with his primary care provider as soon as possible. He is to continue monitoring his glucoses at home as well as the exercise and diet plan laid out for him per Dr. Vijay Daugherty. The patient is to return immediately for any worsening or concerning symptoms. He is to continue home medication as previously prescribed.   At the time of disposition, he is stable and in no acute distress or discomfort. ED Course:          ------------------------------------------------------------------------------------------------------------        Consultations:       Consultations:       Disposition         Discharged      Diagnosis     Clinical Impression:   1.  Hyperglycemia        Attestations:    Myra Clements MD

## 2023-01-31 NOTE — ED NOTES
Patient is in bed , resting with eyes open. Patient denies any s/s at this time. Wife at the bedside.

## 2023-02-09 ENCOUNTER — OFFICE VISIT (OUTPATIENT)
Dept: FAMILY MEDICINE CLINIC | Age: 78
End: 2023-02-09

## 2023-02-09 DIAGNOSIS — E11.21 TYPE 2 DIABETES WITH NEPHROPATHY (HCC): Primary | ICD-10-CM

## 2023-02-09 RX ORDER — INSULIN HUMAN 100 [IU]/ML
6 INJECTION, SUSPENSION SUBCUTANEOUS DAILY
Qty: 5 PEN | Refills: 11 | Status: SHIPPED | OUTPATIENT
Start: 2023-02-09

## 2023-02-09 RX ORDER — PEN NEEDLE, DIABETIC 31 GX3/16"
NEEDLE, DISPOSABLE MISCELLANEOUS
Qty: 100 PEN NEEDLE | Refills: 3 | Status: SHIPPED | OUTPATIENT
Start: 2023-02-09

## 2023-02-09 RX ORDER — DULAGLUTIDE 1.5 MG/.5ML
1.5 INJECTION, SOLUTION SUBCUTANEOUS
Qty: 4 EACH | Refills: 11 | Status: SHIPPED | OUTPATIENT
Start: 2023-02-09

## 2023-02-09 NOTE — PROGRESS NOTES
Pharmacy Progress Note - Diabetes Management       Assessment / Plan:   Diabetes Management:  Per ADA guidelines, Pt's A1c is not at goal of < 7%. Pt's basal and prandial control are lacking. His ac lunch values are most elevated followed by ac dinner. He is nearing normal levels by bedtime. This pattern follows the intermediate-acting steroid, prednisone. Will start intermediate-acting NPH insulin which follows the same pattern and should help counteract the abrupt rise that he is experiencing daily. Will time the dose with his dose of prednisone to follow the pattern of glucose elevations. Will start at 0.2units/kg and will adjust prn. Will also increase his dose of Trulicity to help with overall glycemic control as his FBG values continue to be elevated. - Start NPH 6units qam with his dose of prednisone - if not taking prednisone, will not take NPH  - Increase Trulicity to 6.7PE weekly  - follow up in 1 week with SMBG logs      Nutrition/Lifestyle Modifications:  - Educated pt on the importance of moderating carbohydrate intake. Reviewed sources of carbohydrates and method to help determine appropriate portion sizes (e.g., Diabetes Plate Method). - Advised patient to avoid sugar-sweetened beverages and replace with water or diet/zero sugar option.  - Recommend ~30 minutes consistent, moderately intensive, exercise/day or ~150 minutes/week. Start small, stay consistent, and increase length and types of exercise, as tolerated. Patient will return to clinic in 1 week(s) for follow up. S/O: Mr. Marci Joe, a 76 y.o. male referred by Javier Palafox MD,  has a past medical history of Arthritis, CRI (chronic renal insufficiency), Diabetes mellitus type II (05/13/2010), Gout, Hypercholesteremia, Hypertension, Kidney stone, Other ill-defined conditions(799.89), Personal history of prostate cancer (2007), Prostate cancer ([facility] [address] (01/01/2008), and Splenic artery aneurysm ([address] (01/01/2013). Pt was seen today for diabetes management. Patient's last A1c was:   Lab Results   Component Value Date/Time    Hemoglobin A1c 9.1 (H) 01/30/2023 08:54 PM       Interim update: Pt was last seen by me on 1/19/2023. Per my prior note: Pt's A1c is not at goal of < 7%. Pt's FBG values appear to be relatively controlled, but his prandial control is severely lacking. His glipizide and Januvia are not providing adequate control. Will discontinue these and start Trulicity 0.04FF weekly. Will reassess with SMBG logs in 3 weeks. Pt presented to the ED on 30 Jan 2023 with an elevated BG value in the 400s. He was advised to maintain his tx and focus on diet. Today:  Pt denies changes to his diet/exercise. He is amenable to an increase in his dose of Trulicity. He is amenable to starting NPH insulin after a long discussion regarding the benefits and risks. Literature and video demonstrations reviewed in the office with the pt for pen preparation and injection. Current anti-hyperglycemic regimen includes:    Key Antihyperglycemic Medications               dulaglutide (Trulicity) 9.10 FM/3.4 mL sub-q pen 0.5 mL by SubCUTAneous route every seven (7) days. Indications: type 2 diabetes mellitus          Complete current medication regimen includes:  Current Outpatient Medications   Medication Sig    glucose blood VI test strips (FreeStyle Lite Strips) strip Check glucose fasting daily    degarelix (FIRMAGON) 80 mg solr injection by SubCUTAneous route every twenty-eight (28) days. cholecalciferol (VITAMIN D3) (2,000 UNITS /50 MCG) cap capsule Take 2,000 Units by mouth daily. dulaglutide (Trulicity) 9.54 BE/4.1 mL sub-q pen 0.5 mL by SubCUTAneous route every seven (7) days. Indications: type 2 diabetes mellitus    Blood-Glucose Meter monitoring kit Free Style monitoring Kit. Check glucose fasting daily. lancets (Lancets,Ultra Thin) misc As directed once daily.     lisinopriL (PRINIVIL, ZESTRIL) 20 mg tablet Take 1 Tablet by mouth two (2) times a day. abiraterone (Zytiga) 250 mg tab Take four tablets by mouth daily on an empty stomach. Take one hour prior to food or two hours after food. Tablets should be swallowed whole with water. Do not crush or chew tablets. predniSONE (DELTASONE) 5 mg tablet Take 1 Tablet by mouth daily. simvastatin (ZOCOR) 40 mg tablet Take 1 Tablet by mouth nightly. hydrocortisone (HYTONE) 2.5 % topical cream Apply  to affected area two (2) times a day. use thin layer (Patient taking differently: Apply  to affected area two (2) times a day. use thin layer as needed)    acetaminophen (Tylenol Extra Strength) 500 mg tablet Take 2 Tablets by mouth every six (6) hours as needed for Pain. colchicine 0.6 mg tablet Take 1 Tablet by mouth daily. aspirin 81 mg chewable tablet Take 1 Tab by mouth daily. No current facility-administered medications for this visit. Allergies:   Allergies   Allergen Reactions    Azithromycin Palpitations    Watermelon Hives    Norvasc [Amlodipine] Itching and Other (comments)       Blood Glucose Monitoring (BGM) or CGM:  - Brought in home glucometer/blood glucose log/CGM reader today:  yes        ROS:  Today, Pt endorses:  - Symptoms of Hyperglycemia: none  - Symptoms of Hypoglycemia: none    Lifestyle modification(s):  - none    Medication Adherence/Access:  - Endorses adherence to current regimen?: yes    Vitals/Labs:  Lab Results   Component Value Date/Time    Hemoglobin A1c 9.1 (H) 01/30/2023 08:54 PM    Hemoglobin A1c 8.2 (H) 11/28/2022 09:59 AM    Hemoglobin A1c 8.5 (H) 08/26/2022 08:11 AM     No results found for: [id number]     Screenings/Prevention Parameters:  -Diabetic Eye and Foot Exams:      Diabetic Foot and Eye Exam HM Status   Topic Date Due    Diabetic Foot Care  07/22/2022    Eye Exam  11/06/2022     -Microalbumin / Creatinine ratio:       Lab Results   Component Value Date/Time    Microalbumin/Creat ratio (mg/g creat) 151 (H) 2022 08:11 AM    MICROALBUMIN,MG/DAY 11.3 2013 12:00 AM    Microalbumin,urine random 16.50 (H) 2022 08:11 AM     -ASCVD Risk Score Parameters and Calculation    The ASCVD Risk score (Taisha RASCON, et al., 2019) failed to calculate for the following reasons: The valid total cholesterol range is 130 to 320 mg/dL    -Immunizations:      Immunization History   Administered Date(s) Administered    COVID-19, PFIZER PURPLE top, DILUTE for use, (age 15 y+), IM, 30mcg/0.3mL 2021, 2021    Pneumococcal Conjugate (PCV-13) 2015    Pneumococcal Polysaccharide (PPSV-23) 2016    TDAP Vaccine 2010       Additional Laboratory Parameters of Interest:   Estimation of renal function:  Lab Results   Component Value Date/Time    Creatinine 1.50 (H) 2023 08:54 PM    Creatinine 1.40 (H) 2022 10:37 AM    Creatinine 1.45 (H) 2022 10:48 AM    GFR est AA 60 (L) 2022 08:10 AM    GFR est AA 56 (L) 2022 12:00 PM    GFR est AA 52 (L) 2022 11:20 AM    GFR est non-AA 49 (L) 2022 08:10 AM    GFR est non-AA 46 (L) 2022 12:00 PM    GFR est non-AA 43 (L) 2022 11:20 AM     Wt Readings from Last 3 Encounters:   23 158 lb (71.7 kg)   22 164 lb (74.4 kg)   22 165 lb (74.8 kg)     Ht Readings from Last 1 Encounters:   23 5' 2\" (1.575 m)     Calculated estimated creatinine clearance: CrCl cannot be calculated (Unknown ideal weight.). Vital Signs Today:    There were no vitals taken for this visit. Medications Discontinued During This Encounter   Medication Reason    dulaglutide (Trulicity) 9.94 MU/9.9 mL sub-q pen DOSE ADJUSTMENT       Orders Placed This Encounter    dulaglutide (Trulicity) 1.5 IQ/4.8 mL sub-q pen     Si.5 mL by SubCUTAneous route every seven (7) days.  Indications: type 2 diabetes mellitus     Dispense:  4 Each     Refill:  11     Increased dose    Insulin Needles, Disposable, 32 gauge x 5/32\" ndle     Sig: Use to inject insulin once daily. Dispense:  100 Pen Needle     Refill:  3     Switch to formulary alternative at same length or shorter if needed please. insulin NPH (HumuLIN N NPH Insulin KwikPen) 100 unit/mL (3 mL) inpn     Si Units by SubCUTAneous route daily. Take with your dose of prednisone. Indications: type 2 diabetes mellitus     Dispense:  5 Pen     Refill:  11       Future Appointments   Date Time Provider Marquis Lott   2023 11:30 AM SHARON Valerio [facility] BS AMB       Patient verbalized understanding of the information presented and all of the patients questions were answered. AVS was handed to the patient. Patient advised to call the office with any additional questions or concerns. Notifications of recommendations will be sent to Luis Alfredo Moyer MD for review.       Thank you for the consult,  Therese Gomes, SummerD, BCACP, BC-ADM        For Pharmacy Admin Tracking Only    Program: Medical Group  CPA in place: Yes  Recommendation Provided To: Patient/Caregiver: 5 via In person  Intervention Detail: Adherence Monitorin, Device Training, Discontinued Rx: 1, reason: Cost/Formulary Change, New Rx: 3, reason: Needs Additional Therapy, and Scheduled Appointment  Intervention Accepted By: Patient/Caregiver: 5  Gap Closed?: No  Time Spent (min): 45

## 2023-02-09 NOTE — PATIENT INSTRUCTIONS
Your Visit Summary:     Plan:  - Next Appointment: 2/16/23 at 11:30am    - Start Humulin NPH 6 units every morning with your dose of prednisone    - Increase dose of Trulicity to 0.4DY weekly once finished with 0.75mg dose    Call me with any questions or concerns  Janet Miranda [phone]    Check and document your blood sugar first thing in the morning (fasting at least 8 hours), 2 hours after a meal, and/or before bedtime. Bring your meter/log to all future visits. Your blood sugar goals:  - Fasting (first thing in the morning)  blood sugar: 80 - 130mg/dL  - 2 hours after a meal: 80 - 180mg/dL    When you experience symptoms of low blood sugar (example: less than 70):  - Confirm low reading by checking your blood sugar.   - Then treat with 15 grams of carbohydrates (one-half cup of juice or regular soda, or 4-5 glucose tablets). - Wait 15 minutes to recheck blood sugar.   - Then eat a protein containing meal/snack to prevent another low blood sugar episode. (example: peanut butter + crackers)    Nutrition:  - When reviewing a nutrition label, focus on the serving size, total calories, fat (and type of fats), total carbohydrates, sugar (and amount of added sugar), amount of fiber (good for your digestive), and amount of protein. Refer to your nutrition label guide for more information.  - For a meal : max 45 - 60 grams of carbohydrates  - For a snack: max 15 grams of carbohydrates  - Reduce amount of saturated and trans fat. Consider more unsaturated fat options as they are better for your heart health.    - Have at least 1 serving of lean fat protein with each meal.    - Increase fiber intake slowly to prevent constipation.   - Substitute fruit juices for the whole fruit    Low carb snack ideas (15 grams total carb or less):    String cheese or babybel with 6 crackers  4 peanut butter crackers  3 cups of popcorn  1 cup raw vegetables with hummus or ranch dip (just need to watch how much dip you use)  Nuts  2 rice cakes  Celery with peanut butter or cream cheese  String cheese with 1 serving of fruit  Greek yogurt (look at label to make sure < 15 gram carb)  Plain greek yogurt with fresh berries added  [employer] [place] protein bar  Whisps parmesan cheese crisps  Hard boiled egg  Cottage cheese  Tuna salad lettuce roll-ups  Deli meat roll-ups with slice of cheese  Sugar Free Jello  Glucerna shake (16 grams)   Glucerna hunger smart shake (16 grams)  Ensure protein max shake  Fruit (1 serving/15 grams)  1/2 banana, ryan, or grapefruit   1/3 melon (small cantaloupe)  1 slice or 1 cup of honeydew melon  1 slice or 1 and 1/4 cups of watermelon   1 small apple, peach, orange or pear  2 small tangerines  1 cup of raspberries  3/4 cup of blackberries, blueberries or pineapples  1/2 cup of fruit juice, pears, applesauce, or mangos  17 small grapes  12 cherries    Be careful with the glucerna products as they differ in the total carbs depending on the product (some are intended as meal replacements not snacks). Make sure you look at the total carbs on the label as products can differ. Physical Activity:  - Aim for 30 minutes of consistent, moderately intensive, physical activity a day for 5 days or an average of 150 minutes per week. - Start slow, increase as tolerated. For example: Walk every day, working up to 30 minutes of brisk walking, 5 days a week--or split the 30 minutes into two 15-minute or three 10-minute walks. - If you sit for a long time, get up and move/stretch every 90 minutes. Other recommendations:  - Schedule an annual eye exam.  - Check your feet daily for any signs of open wounds, cuts, or sores. - Given your risk factors, the following vaccines are recommended: annual flu shot, age-based pneumococcal vaccines (Pneumovax, Prevnar 13).     In addition to taking your medications as directed, improving your blood sugar involves modifying your nutrition and maximizing the amount of physical activity.

## 2023-02-09 NOTE — Clinical Note
He agreed to start NPH. I started 0.2 units/kg at 6 units qam with his prednisone dose - advised him not to take the NPH if he does not take his prednisone.

## 2023-02-16 ENCOUNTER — OFFICE VISIT (OUTPATIENT)
Age: 78
End: 2023-02-16

## 2023-02-16 DIAGNOSIS — E11.65 TYPE 2 DIABETES MELLITUS WITH HYPERGLYCEMIA, WITH LONG-TERM CURRENT USE OF INSULIN (HCC): ICD-10-CM

## 2023-02-16 DIAGNOSIS — Z79.4 TYPE 2 DIABETES MELLITUS WITH HYPERGLYCEMIA, WITH LONG-TERM CURRENT USE OF INSULIN (HCC): ICD-10-CM

## 2023-02-16 DIAGNOSIS — E11.21 TYPE 2 DIABETES WITH NEPHROPATHY (HCC): Primary | ICD-10-CM

## 2023-02-16 RX ORDER — PEN NEEDLE, DIABETIC 32GX 5/32"
NEEDLE, DISPOSABLE MISCELLANEOUS
COMMUNITY
Start: 2023-02-09

## 2023-02-16 RX ORDER — BLOOD-GLUCOSE METER
KIT MISCELLANEOUS
COMMUNITY
Start: 2023-01-26 | End: 2023-02-16 | Stop reason: SDUPTHER

## 2023-02-16 RX ORDER — BLOOD-GLUCOSE METER
KIT MISCELLANEOUS
COMMUNITY
Start: 2023-01-06

## 2023-02-16 RX ORDER — BLOOD-GLUCOSE METER
KIT MISCELLANEOUS
Qty: 150 EACH | Refills: 11 | Status: SHIPPED | OUTPATIENT
Start: 2023-02-16

## 2023-02-16 RX ORDER — LANCETS 28 GAUGE
EACH MISCELLANEOUS
Qty: 200 EACH | Refills: 11 | Status: SHIPPED | OUTPATIENT
Start: 2023-02-16

## 2023-02-16 RX ORDER — INSULIN HUMAN 100 [IU]/ML
8 INJECTION, SUSPENSION SUBCUTANEOUS DAILY
Qty: 5 ADJUSTABLE DOSE PRE-FILLED PEN SYRINGE | Refills: 11 | Status: SHIPPED
Start: 2023-02-16

## 2023-02-16 RX ORDER — LANCETS 28 GAUGE
EACH MISCELLANEOUS
COMMUNITY
Start: 2023-01-06 | End: 2023-02-16 | Stop reason: SDUPTHER

## 2023-02-16 NOTE — PROGRESS NOTES
Pharmacy Progress Note - Diabetes Management       Assessment / Plan:   Diabetes Management:  Per ADA guidelines, Pt's A1c is not at goal of < 7%. From the limited SMBG logs present, it appears that the 6 units of NPH is providing on modest improvement in his BG elevations from the prednisone. Will increase the dose of NPH to 8 units and have him increase SMBG checks to 3-4x daily to provide a greater insight into the control. Will reassess with SMBG logs in 1 week. Nutrition/Lifestyle Modifications:  - Educated pt on the importance of moderating carbohydrate intake. Reviewed sources of carbohydrates and method to help determine appropriate portion sizes (e.g., Diabetes Plate Method). - Advised patient to avoid sugar-sweetened beverages and replace with water or diet/zero sugar option.  - Recommend ~30 minutes consistent, moderately intensive, exercise/day or ~150 minutes/week. Start small, stay consistent, and increase length and types of exercise, as tolerated. Patient will return to clinic in 1 week(s) for follow up. S/O: Mr. Chanelle Kebede, a 76 y.o. male referred by Josse Keller MD,  has a past medical history of Arthritis, CRI (chronic renal insufficiency), Gout, Hypercholesteremia, Hypertension, Kidney stone, Other ill-defined conditions(799.89), Personal history of prostate cancer, Prostate cancer ([facility] [address] and Splenic artery aneurysm ([facility] [address] Pt was seen today for diabetes management. Patient's last A1c was:   Hemoglobin A1C   Date Value Ref Range Status   01/30/2023 9.1 (H) 4.2 - 5.6 % Final     Comment:     (NOTE)  HbA1C Interpretive Ranges  <5.7              Normal  5.7 - 6.4         Consider Prediabetes  >6.5              Consider Diabetes         Interim update: Pt was last seen by me on 09 Feb 2023. Per my prior note: Pt's A1c is not at goal of < 7%. Pt's basal and prandial control are lacking. His ac lunch values are most elevated followed by ac dinner.   He is nearing normal levels by bedtime. This pattern follows the intermediate-acting steroid, prednisone. Will start intermediate-acting NPH insulin which follows the same pattern and should help counteract the abrupt rise that he is experiencing daily. Will time the dose with his dose of prednisone to follow the pattern of glucose elevations. Will start at 0.2units/kg and will adjust prn. Will also increase his dose of Trulicity to help with overall glycemic control as his FBG values continue to be elevated. - Start NPH 6units qam with his dose of prednisone - if not taking prednisone, will not take NPH  - Increase Trulicity to 6.2WV weekly  - follow up in 1 week with SMBG logs    Today:   Pt's SMBG logs gone over with pt in the office. He did not have a lot of post-prandial values to assess. He revealed that he has been taking the prednisone and NPH injection around noon vs in the morning. He doesn't have any hs BG values to assess. He states that he is amenable to increasing his SMBG checks but will need a new Rx to do so. Current anti-hyperglycemic regimen includes:    glipiZIDE - 10 MG  SITagliptin - 100 MG  Complete current medication regimen includes:  Current Outpatient Medications   Medication Sig    Cholecalciferol 50 MCG (2000 UT) CAPS Take 2,000 Units by mouth daily    degarelix acetate (FIRMAGON) 80 MG SOLR chemo injection Inject into the skin    dulaglutide (TRULICITY) 1.5 VZ/4.2HO SC injection Inject 1.5 mg into the skin every 7 days    insulin NPH (HUMULIN N KWIKPEN) 100 UNIT/ML injection pen Inject 6 Units into the skin daily    abiraterone acetate (ZYTIGA) 250 MG tablet Take four tablets by mouth daily on an empty stomach. Take one hour prior to food or two hours after food. Tablets should be swallowed whole with water. Do not crush or chew tablets.     acetaminophen (TYLENOL) 500 MG tablet Take 1,000 mg by mouth every 6 hours as needed    aspirin 81 MG chewable tablet Take 81 mg by mouth daily colchicine (COLCRYS) 0.6 MG tablet Take 0.6 mg by mouth daily    glipiZIDE (GLUCOTROL XL) 10 MG extended release tablet Take 10 mg by mouth daily    hydrocortisone 2.5 % cream Apply topically 2 times daily    lisinopril (PRINIVIL;ZESTRIL) 20 MG tablet Take 20 mg by mouth 2 times daily    predniSONE (DELTASONE) 5 MG tablet Take 5 mg by mouth daily    simvastatin (ZOCOR) 40 MG tablet Take 40 mg by mouth    SITagliptin (JANUVIA) 100 MG tablet Take 100 mg by mouth daily     No current facility-administered medications for this visit. Allergies: Allergies   Allergen Reactions    Azithromycin Palpitations    Citrullus Vulgaris Hives    Amlodipine Itching and Other (See Comments)       Blood Glucose Monitoring (BGM) or CGM:  - Had access to home glucometer/blood glucose log/CGM reader today:  Yes        ROS:  Today, Pt endorses:  - Symptoms of Hyperglycemia: none  - Symptoms of Hypoglycemia: none    Lifestyle modification(s):  - none    Medication Adherence/Access:  - Endorses adherence to current regimen?: Yes    Vitals/Labs:  No results found for: HBA1C  No results found for: [id number]     Screenings/Prevention Parameters:  -Diabetic Eye and Foot Exams:      Diabetes Management   Topic Date Due    Diabetic foot exam  07/22/2022    Diabetic retinal exam  11/08/2022     -Microalbumin / Creatinine ratio:     No results found for: LABMICR, [id number]  -ASCVD Risk Score Parameters and Calculation    The ASCVD Risk score (Radha PRADO, et al., 2019) failed to calculate for the following reasons:     The valid total cholesterol range is 130 to 320 mg/dL    -Immunizations:      Immunization History   Administered Date(s) Administered    COVID-19, PFIZER PURPLE top, DILUTE for use, (age 15 y+), 30mcg/0.3mL 02/06/2021, 02/27/2021    Pneumococcal Conjugate 13-valent ([id number]) 04/06/2015    Pneumococcal Polysaccharide ([id number]) 05/11/2016    Tdap (Boostrix, Adacel) 05/13/2010       Additional Laboratory Parameters of Interest: Estimation of renal function:  Lab Results   Component Value Date/Time    GFRAA 60 09/29/2022 08:10 AM    GFRAA 56 09/07/2022 12:00 PM    GFRAA 52 09/06/2022 11:20 AM     Wt Readings from Last 3 Encounters:   11/29/22 164 lb (74.4 kg)   11/01/22 165 lb (74.8 kg)   09/13/22 167 lb (75.8 kg)     Ht Readings from Last 1 Encounters:   01/04/23 5' 2\" (1.575 m)     Calculated estimated creatinine clearance: CrCl cannot be calculated (Unknown ideal weight.). Vital Signs Today:    There were no vitals taken for this visit. Medications Discontinued During This Encounter   Medication Reason    FREESTYLE LITE strip REORDER    FreeStyle Lancets MISC REORDER    insulin NPH (HUMULIN N KWIKPEN) 100 UNIT/ML injection pen        Orders Placed This Encounter    Blood Glucose Monitoring Suppl (FREESTYLE FREEDOM LITE) w/Device KIT    DISCONTD: FREESTYLE LITE strip    DROPLET PEN NEEDLES 32G X 4 MM MISC    DISCONTD: FreeStyle Lancets MISC    FREESTYLE LITE strip     Sig: Use to check blood glucose up to four times daily. (Pt is now on insulin)     Dispense:  150 each     Refill:  11     Pt was started on insulin and will need to check more often. FreeStyle Lancets MISC     Sig: Use to check blood glucose up to four times daily. Dispense:  200 each     Refill:  11    insulin NPH (HUMULIN N KWIKPEN) 100 UNIT/ML injection pen     Sig: Inject 8 Units into the skin daily     Dispense:  5 Adjustable Dose Pre-filled Pen Syringe     Refill:  11           Future Appointments   Date Time Provider Tanna Oden   2/23/2023 11:30 AM KANDIS Arora [facility] BS AMB   3/30/2023  2:00 PM MD RADHA Garcia BS AMB   5/1/2023 11:20 AM [facility] NURSE [facility] Ring Sched   5/1/2023 11:50 AM [facility] NURSE [facility] Dorothy Sched   5/9/2023 10:20 AM Fausto Santos PA-C [facility] Yasir Campbell       Patient verbalized understanding of the information presented and all of the patients questions were answered.   AVS was handed to the patient. Patient advised to call the office with any additional questions or concerns. Notifications of recommendations will be sent to Asif Mejia MD for review.       Thank you for the consult,  Gil Gibson, PharmD, BCACP, BC-[facility]        For Pharmacy Admin Tracking Only    Program: Medical Group  CPA in place:  Yes  Recommendation Provided To: Patient/Caregiver: 4 via In person  Intervention Detail: Adherence Monitorin, Dose Adjustment: 1, reason: Therapy Optimization, New Rx: 2, reason: Needs Additional Therapy, and Scheduled Appointment  Intervention Accepted By: Patient/Caregiver: 4  Gap Closed?: No   Time Spent (min): 30

## 2023-02-22 NOTE — PROGRESS NOTES
Pharmacy Progress Note - Diabetes Management       Assessment / Plan:   Diabetes Management:  Per ADA guidelines, Pt's A1c is not at goal of < 7%. Pt's glycemic control has improved significantly with the dose increase of NPH. It has further improved since the increase of Trulicity to 4.8RG weekly. Suspect that his BG will further improve with time as the Trulicity builds more efficacy. Will not adjust NPH as this may cause hypoglycemia with the improved glycemic control. Will maintain his current tx and reassess with SMBG logs at follow up in 3 weeks. - Continue NPH 8 units with dose of prednisone  - Continue Trulicity 6.9ZN weekly  - f/u in 3 weeks to reassess with SMBG logs     Nutrition/Lifestyle Modifications:  - Educated pt on the importance of moderating carbohydrate intake. Reviewed sources of carbohydrates and method to help determine appropriate portion sizes (e.g., Diabetes Plate Method). - Advised patient to avoid sugar-sweetened beverages and replace with water or diet/zero sugar option.  - Recommend ~30 minutes consistent, moderately intensive, exercise/day or ~150 minutes/week. Start small, stay consistent, and increase length and types of exercise, as tolerated. Patient will return to clinic in 3 week(s) for follow up. S/O: Mr. Duyen Michael, a 76 y.o. male referred by Chet Miller MD,  has a past medical history of Arthritis, CRI (chronic renal insufficiency), Gout, Hypercholesteremia, Hypertension, Kidney stone, Other ill-defined conditions(799.89), Personal history of prostate cancer, Prostate cancer ([facility] [address] and Splenic artery aneurysm ([facility] [address] Pt was seen today for diabetes management.   Patient's last A1c was:   Hemoglobin A1C   Date Value Ref Range Status   01/30/2023 9.1 (H) 4.2 - 5.6 % Final     Comment:     (NOTE)  HbA1C Interpretive Ranges  <5.7              Normal  5.7 - 6.4         Consider Prediabetes  >6.5              Consider Diabetes         Interim update: Pt was last seen by me on 2/16/23. Per my prior note: Pt's A1c is not at goal of < 7%. From the limited SMBG logs present, it appears that the 6 units of NPH is providing on modest improvement in his BG elevations from the prednisone. Will increase the dose of NPH to 8 units and have him increase SMBG checks to 3-4x daily to provide a greater insight into the control. Will reassess with SMBG logs in 1 week. Today:   Pt thinks that his BG values are improved with the increase in dose of NPH. He has had a decrease in appetite since increasing the dose of Trulicity to 2.5KH weekly. He is amenable to maintaining his current tx. He denies changes to his diet/exercise. Current anti-hyperglycemic regimen includes:    Key Antihyperglycemic Medications               insulin NPH (HUMULIN N KWIKPEN) 100 UNIT/ML injection pen Inject 8 Units into the skin daily    dulaglutide (TRULICITY) 1.5 ZH/2.2YZ SC injection Inject 1.5 mg into the skin every 7 days    glipiZIDE (GLUCOTROL XL) 10 MG extended release tablet Take 10 mg by mouth daily    SITagliptin (JANUVIA) 100 MG tablet Take 100 mg by mouth daily          Complete current medication regimen includes:  Current Outpatient Medications   Medication Sig    Blood Glucose Monitoring Suppl (FREESTYLE FREEDOM LITE) w/Device KIT     DROPLET PEN NEEDLES 32G X 4 MM MISC     FREESTYLE LITE strip Use to check blood glucose up to four times daily. (Pt is now on insulin)    FreeStyle Lancets MISC Use to check blood glucose up to four times daily.     insulin NPH (HUMULIN N KWIKPEN) 100 UNIT/ML injection pen Inject 8 Units into the skin daily    Cholecalciferol 50 MCG (2000 UT) CAPS Take 2,000 Units by mouth daily    degarelix acetate (FIRMAGON) 80 MG SOLR chemo injection Inject into the skin    dulaglutide (TRULICITY) 1.5 VG/2.3YP SC injection Inject 1.5 mg into the skin every 7 days    abiraterone acetate (ZYTIGA) 250 MG tablet Take four tablets by mouth daily on an empty stomach. Take one hour prior to food or two hours after food. Tablets should be swallowed whole with water. Do not crush or chew tablets. acetaminophen (TYLENOL) 500 MG tablet Take 1,000 mg by mouth every 6 hours as needed    aspirin 81 MG chewable tablet Take 81 mg by mouth daily    colchicine (COLCRYS) 0.6 MG tablet Take 0.6 mg by mouth daily    glipiZIDE (GLUCOTROL XL) 10 MG extended release tablet Take 10 mg by mouth daily    hydrocortisone 2.5 % cream Apply topically 2 times daily    lisinopril (PRINIVIL;ZESTRIL) 20 MG tablet Take 20 mg by mouth 2 times daily    predniSONE (DELTASONE) 5 MG tablet Take 5 mg by mouth daily    simvastatin (ZOCOR) 40 MG tablet Take 40 mg by mouth    SITagliptin (JANUVIA) 100 MG tablet Take 100 mg by mouth daily     No current facility-administered medications for this visit. Allergies: Allergies   Allergen Reactions    Azithromycin Palpitations    Citrullus Vulgaris Hives    Amlodipine Itching and Other (See Comments)       Blood Glucose Monitoring (BGM) or CGM:        ROS:  Today, Pt endorses:  - Symptoms of Hyperglycemia: none  - Symptoms of Hypoglycemia: none    Lifestyle modification(s):  - slight decrease in overall food intake    Medication Adherence/Access:  - Endorses adherence to current regimen?: Yes    Vitals/Labs:  No results found for: HBA1C  No results found for: [id number]     Screenings/Prevention Parameters:  -Diabetic Eye and Foot Exams:      Diabetes Management   Topic Date Due    Diabetic foot exam  07/22/2022    Diabetic retinal exam  11/08/2022     -Microalbumin / Creatinine ratio:     No results found for: LABMICR, [id number]  -ASCVD Risk Score Parameters and Calculation    The ASCVD Risk score (Radha DK, et al., 2019) failed to calculate for the following reasons:     The valid total cholesterol range is 130 to 320 mg/dL    -Immunizations:      Immunization History   Administered Date(s) Administered    COVID-19, PFIZER PURPLE top, DILUTE for use, (age 15 y+), 30mcg/0.3mL 2021, 2021    Pneumococcal Conjugate 13-valent ([id number]) 2015    Pneumococcal Polysaccharide ([id number]) 2016    Tdap (Boostrix, Adacel) 2010       Additional Laboratory Parameters of Interest:   Estimation of renal function:  Lab Results   Component Value Date/Time    GFRAA 60 2022 08:10 AM    GFRAA 56 2022 12:00 PM    GFRAA 52 2022 11:20 AM     Wt Readings from Last 3 Encounters:   22 164 lb (74.4 kg)   22 165 lb (74.8 kg)   22 167 lb (75.8 kg)     Ht Readings from Last 1 Encounters:   23 5' 2\" (1.575 m)     Calculated estimated creatinine clearance: CrCl cannot be calculated (Unknown ideal weight.). Vital Signs Today:    There were no vitals taken for this visit. There are no discontinued medications. No orders of the defined types were placed in this encounter. Future Appointments   Date Time Provider Tanna Oden   3/16/2023 11:00 AM Rovertoswati Riley, [address] [place] [address] BS AMB   3/30/2023  2:00 PM Evelia Hernandez MD [facility] BS AMB   2023 11:20 AM [facility] NURSE [facility] Romp Sched   2023 11:50 AM [facility] NURSE [facility] [place] Sched   2023 10:20 [facility]       Patient verbalized understanding of the information presented and all of the patients questions were answered. AVS was handed to the patient. Patient advised to call the office with any additional questions or concerns. Notifications of recommendations will be sent to Evelia Hernandez MD for review.       Thank you for the consult,  Justin Reddy, PharmD, BCACP, BC-[facility]        For Pharmacy Admin Tracking Only    Program: Medical Group  CPA in place:  Yes  Recommendation Provided To: Patient/Caregiver: 1 via In person  Intervention Detail: Adherence Monitorin  Intervention Accepted By: Patient/Caregiver: 1  Gap Closed?: No   Time Spent (min): 30

## 2023-02-23 ENCOUNTER — OFFICE VISIT (OUTPATIENT)
Age: 78
End: 2023-02-23

## 2023-02-23 DIAGNOSIS — E11.21 TYPE 2 DIABETES WITH NEPHROPATHY (HCC): Primary | ICD-10-CM

## 2023-02-23 NOTE — PATIENT INSTRUCTIONS
Your Visit Summary:     Plan:  - Continue NPH 8 units with dose of prednisone. Call me with any questions or concerns  Maria Esther Leung [phone]    Check and document your blood sugar first thing in the morning (fasting at least 8 hours), 2 hours after a meal, and/or before bedtime. Bring your meter/log to all future visits. Your blood sugar goals:  - Fasting (first thing in the morning)  blood sugar: 80 - 130mg/dL  - 2 hours after a meal: 80 - 180mg/dL    When you experience symptoms of low blood sugar (example: less than 70):  - Confirm low reading by checking your blood sugar.   - Then treat with 15 grams of carbohydrates (one-half cup of juice or regular soda, or 4-5 glucose tablets). - Wait 15 minutes to recheck blood sugar.   - Then eat a protein containing meal/snack to prevent another low blood sugar episode. (example: peanut butter + crackers)    Nutrition:  - When reviewing a nutrition label, focus on the serving size, total calories, fat (and type of fats), total carbohydrates, sugar (and amount of added sugar), amount of fiber (good for your digestive), and amount of protein. Refer to your nutrition label guide for more information.  - For a meal : max 45 - 60 grams of carbohydrates  - For a snack: max 15 grams of carbohydrates  - Reduce amount of saturated and trans fat. Consider more unsaturated fat options as they are better for your heart health.    - Have at least 1 serving of lean fat protein with each meal.    - Increase fiber intake slowly to prevent constipation.   - Substitute fruit juices for the whole fruit    Low carb snack ideas (15 grams total carb or less):    String cheese or babybel with 6 crackers  4 peanut butter crackers  3 cups of popcorn  1 cup raw vegetables with hummus or ranch dip (just need to watch how much dip you use)  Nuts  2 rice cakes  Celery with peanut butter or cream cheese  String cheese with 1 serving of fruit  Greek yogurt (look at label to make sure < 15 gram carb)  Plain greek yogurt with fresh berries added  Nature valley protein bar  Whisps parmesan cheese crisps  Hard boiled egg  Cottage cheese  Tuna salad lettuce roll-ups  Deli meat roll-ups with slice of cheese  Sugar Free Jello  Glucerna shake (16 grams)   Glucerna hunger smart shake (16 grams)  Ensure protein max shake  Fruit (1 serving/15 grams)  1/2 banana, martha, or grapefruit   1/3 melon (small cantaloupe)  1 slice or 1 cup of honeydew melon  1 slice or 1 and 1/4 cups of watermelon   1 small apple, peach, orange or pear  2 small tangerines  1 cup of raspberries  3/4 cup of blackberries, blueberries or pineapples  1/2 cup of fruit juice, pears, applesauce, or mangos  17 small grapes  12 cherries    Be careful with the glucerna products as they differ in the total carbs depending on the product (some are intended as meal replacements not snacks). Make sure you look at the total carbs on the label as products can differ. Physical Activity:  - Aim for 30 minutes of consistent, moderately intensive, physical activity a day for 5 days or an average of 150 minutes per week. - Start slow, increase as tolerated. For example: Walk every day, working up to 30 minutes of brisk walking, 5 days a week--or split the 30 minutes into two 15-minute or three 10-minute walks. - If you sit for a long time, get up and move/stretch every 90 minutes. Other recommendations:  - Schedule an annual eye exam.  - Check your feet daily for any signs of open wounds, cuts, or sores. - Given your risk factors, the following vaccines are recommended: annual flu shot, age-based pneumococcal vaccines (Pneumovax, Prevnar 13). In addition to taking your medications as directed, improving your blood sugar involves modifying your nutrition and maximizing the amount of physical activity.

## 2023-03-15 NOTE — PROGRESS NOTES
Pharmacy Progress Note - Diabetes Management       Assessment / Plan:   Diabetes Management:  Per ADA guidelines, Pt's A1c is not at goal of < 7%. Pt's ac dinner and hs BG values continue to be elevated while his FBG values are on the lower end of normal.  It appears that the NPH efficacy is not enough for the pt's timing of taking his prednisone. Will move the NPH dose to ac breakfast only after he starts eating normal breakfasts following the completion of radiation therapy on 3/22/23. Will reassess with SMBG logs at follow up in 2 weeks. Nutrition/Lifestyle Modifications:  - Educated pt on the importance of moderating carbohydrate intake. Reviewed sources of carbohydrates and method to help determine appropriate portion sizes (e.g., Diabetes Plate Method). - Advised patient to avoid sugar-sweetened beverages and replace with water or diet/zero sugar option.  - Recommend ~30 minutes consistent, moderately intensive, exercise/day or ~150 minutes/week. Start small, stay consistent, and increase length and types of exercise, as tolerated. Patient will return to clinic in 2 week(s) for follow up. S/O: Mr. Digna Solano, a 76 y.o. male referred by Karli Marion MD,  has a past medical history of Arthritis, CRI (chronic renal insufficiency), Gout, Hypercholesteremia, Hypertension, Kidney stone, Other ill-defined conditions(799.89), Personal history of prostate cancer, Prostate cancer ([facility] [address] and Splenic artery aneurysm ([facility] [address] Pt was seen today for diabetes management. Patient's last A1c was:   Hemoglobin A1C   Date Value Ref Range Status   01/30/2023 9.1 (H) 4.2 - 5.6 % Final     Comment:     (NOTE)  HbA1C Interpretive Ranges  <5.7              Normal  5.7 - 6.4         Consider Prediabetes  >6.5              Consider Diabetes         Interim update: Pt was last seen by me on 2/23/2023. Per my prior note: Pt's A1c is not at goal of < 7%.   Pt's glycemic control has improved significantly with the dose increase of NPH. It has further improved since the increase of Trulicity to 6.6RY weekly. Suspect that his BG will further improve with time as the Trulicity builds more efficacy. Will not adjust NPH as this may cause hypoglycemia with the improved glycemic control. Will maintain his current tx and reassess with SMBG logs at follow up in 3 weeks. - Continue NPH 8 units with dose of prednisone  - Continue Trulicity 9.7AX weekly    Today:   Radiation is going to be ending next Wednesday. He will begin eating a normal breakfast starting after that. He denies changes to his diet/exercise since prior visit. Current anti-hyperglycemic regimen includes:    Key Antihyperglycemic Medications               insulin NPH (HUMULIN N KWIKPEN) 100 UNIT/ML injection pen Inject 8 Units into the skin daily    dulaglutide (TRULICITY) 1.5 ED/4.6JV SC injection Inject 1.5 mg into the skin every 7 days          Complete current medication regimen includes:  Current Outpatient Medications   Medication Sig    Alcohol Swabs (EASY TOUCH ALCOHOL PREP MEDIUM) 70 % PADS     Blood Glucose Monitoring Suppl (FREESTYLE FREEDOM LITE) w/Device KIT     DROPLET PEN NEEDLES 32G X 4 MM [facility]     FREESTYLE LITE strip Use to check blood glucose up to four times daily. (Pt is now on insulin)    FreeStyle Lancets MISC Use to check blood glucose up to four times daily. insulin NPH (HUMULIN N KWIKPEN) 100 UNIT/ML injection pen Inject 8 Units into the skin daily    Cholecalciferol 50 MCG (2000 UT) CAPS Take 2,000 Units by mouth daily    degarelix acetate (FIRMAGON) 80 MG SOLR chemo injection Inject into the skin    dulaglutide (TRULICITY) 1.5 XZ/3.5HN SC injection Inject 1.5 mg into the skin every 7 days    abiraterone acetate (ZYTIGA) 250 MG tablet Take four tablets by mouth daily on an empty stomach. Take one hour prior to food or two hours after food. Tablets should be swallowed whole with water. Do not crush or chew tablets. acetaminophen (TYLENOL) 500 MG tablet Take 1,000 mg by mouth every 6 hours as needed    aspirin 81 MG chewable tablet Take 81 mg by mouth daily    colchicine (COLCRYS) 0.6 MG tablet Take 0.6 mg by mouth daily    hydrocortisone 2.5 % cream Apply topically 2 times daily    lisinopril (PRINIVIL;ZESTRIL) 20 MG tablet Take 20 mg by mouth 2 times daily    predniSONE (DELTASONE) 5 MG tablet Take 5 mg by mouth daily    simvastatin (ZOCOR) 40 MG tablet Take 40 mg by mouth     No current facility-administered medications for this visit. Allergies:   Allergies   Allergen Reactions    Azithromycin Palpitations    Citrullus Vulgaris Hives    Amlodipine Itching and Other (See Comments)       Blood Glucose Monitoring (BGM) or CGM:  - Had access to home glucometer/blood glucose log/CGM reader today:  Yes        ROS:  Today, Pt endorses:  - Symptoms of Hyperglycemia: none  - Symptoms of Hypoglycemia: none    Lifestyle modification(s):  - none    Medication Adherence/Access:  - Endorses adherence to current regimen?: Yes    Vitals/Labs:  No results found for: HBA1C  Hemoglobin A1C   Date Value Ref Range Status   01/30/2023 9.1 (H) 4.2 - 5.6 % Final     Comment:     (NOTE)  HbA1C Interpretive Ranges  <5.7              Normal  5.7 - 6.4         Consider Prediabetes  >6.5              Consider Diabetes         Screenings/Prevention Parameters:  -Diabetic Eye and Foot Exams:      Diabetes Management   Topic Date Due    Diabetic foot exam  07/22/2022    Diabetic retinal exam  11/08/2022     -Microalbumin / Creatinine ratio:     No results found for: Christelle Pinto  -Immunizations:      Immunization History   Administered Date(s) Administered    COVID-19, PFIZER PURPLE top, DILUTE for use, (age 15 y+), 30mcg/0.3mL 02/06/2021, 02/27/2021    Pneumococcal Conjugate 13-valent ([id number]) 04/06/2015    Pneumococcal Polysaccharide ([id number]) 05/11/2016    Tdap (Boostrix, Adacel) 05/13/2010       Additional Laboratory Parameters of Interest:   Estimation of renal function:  Lab Results   Component Value Date/Time    GFRAA 60 2022 08:10 AM    GFRAA 56 2022 12:00 PM    GFRAA 52 2022 11:20 AM     Wt Readings from Last 3 Encounters:   22 164 lb (74.4 kg)   22 165 lb (74.8 kg)   22 167 lb (75.8 kg)     Ht Readings from Last 1 Encounters:   23 5' 2\" (1.575 m)     Calculated estimated creatinine clearance: CrCl cannot be calculated (Unknown ideal weight.). Vital Signs Today:    There were no vitals taken for this visit. There are no discontinued medications. No orders of the defined types were placed in this encounter. Future Appointments   Date Time Provider Tanna Oden   3/16/2023 11:00 AM Brian Garsia, [address] [facility] BS AMB   3/30/2023  2:00 PM Denise De La O MD [facility] BS AMB   2023 11:20 AM [facility] NURSE [facility] Adela Morn Sched   2023 11:50 AM [facility] NURSE [facility] Sched   2023 10:20 AM Irma Harris PA-C [facility] Arnoldo Georgina       Patient verbalized understanding of the information presented and all of the patients questions were answered. AVS was handed to the patient. Patient advised to call the office with any additional questions or concerns. Notifications of recommendations will be sent to Denise De La O MD for review.       Thank you for the consult,  Ely Cardoso, PharmD, BCACP, BC-ADM        For Pharmacy Admin Tracking Only    Program: Medical Group  CPA in place:  Yes  Recommendation Provided To: Patient/Caregiver: 2 via In person  Intervention Detail: Adherence Monitorin and Dose Adjustment: 1, reason: Therapy Optimization  Intervention Accepted By: Patient/Caregiver: 2  Gap Closed?: No   Time Spent (min): 30

## 2023-03-16 ENCOUNTER — PHARMACY VISIT (OUTPATIENT)
Age: 78
End: 2023-03-16

## 2023-03-16 DIAGNOSIS — E11.21 TYPE 2 DIABETES WITH NEPHROPATHY (HCC): Primary | ICD-10-CM

## 2023-03-16 RX ORDER — ISOPROPYL ALCOHOL 70 ML/100ML
SWAB TOPICAL
COMMUNITY
Start: 2023-03-14

## 2023-03-16 NOTE — PATIENT INSTRUCTIONS
Your Visit Summary:     Plan:  - Starting after radiation is done, move the NPH dose to your regular breakfast.    - If Lunch blood sugar is low, move prednisone to take with breakfast as well    Call me with any questions or concerns  Marikay Apgar [phone]    Check and document your blood sugar first thing in the morning (fasting at least 8 hours), 2 hours after a meal, and/or before bedtime. Bring your meter/log to all future visits. Your blood sugar goals:  - Fasting (first thing in the morning)  blood sugar: 80 - 130mg/dL  - 2 hours after a meal: 80 - 180mg/dL    When you experience symptoms of low blood sugar (example: less than 70):  - Confirm low reading by checking your blood sugar.   - Then treat with 15 grams of carbohydrates (one-half cup of juice or regular soda, or 4-5 glucose tablets). - Wait 15 minutes to recheck blood sugar.   - Then eat a protein containing meal/snack to prevent another low blood sugar episode. (example: peanut butter + crackers)    Nutrition:  - When reviewing a nutrition label, focus on the serving size, total calories, fat (and type of fats), total carbohydrates, sugar (and amount of added sugar), amount of fiber (good for your digestive), and amount of protein. Refer to your nutrition label guide for more information.  - For a meal : max 45 - 60 grams of carbohydrates  - For a snack: max 15 grams of carbohydrates  - Reduce amount of saturated and trans fat. Consider more unsaturated fat options as they are better for your heart health.    - Have at least 1 serving of lean fat protein with each meal.    - Increase fiber intake slowly to prevent constipation.   - Substitute fruit juices for the whole fruit    Low carb snack ideas (15 grams total carb or less):    String cheese or babybel with 6 crackers  4 peanut butter crackers  3 cups of popcorn  1 cup raw vegetables with hummus or ranch dip (just need to watch how much dip you use)  Nuts  2 rice cakes  Celery with peanut butter or cream cheese  String cheese with 1 serving of fruit  Greek yogurt (look at label to make sure < 15 gram carb)  Plain greek yogurt with fresh berries added  Nature valley protein bar  Whisps parmesan cheese crisps  Hard boiled egg  Cottage cheese  Tuna salad lettuce roll-ups  Deli meat roll-ups with slice of cheese  Sugar Free Jello  Glucerna shake (16 grams)   Glucerna hunger smart shake (16 grams)  Ensure protein max shake  Fruit (1 serving/15 grams)  1/2 banana, martha, or grapefruit   1/3 melon (small cantaloupe)  1 slice or 1 cup of honeydew melon  1 slice or 1 and 1/4 cups of watermelon   1 small apple, peach, orange or pear  2 small tangerines  1 cup of raspberries  3/4 cup of blackberries, blueberries or pineapples  1/2 cup of fruit juice, pears, applesauce, or mangos  17 small grapes  12 cherries    Be careful with the glucerna products as they differ in the total carbs depending on the product (some are intended as meal replacements not snacks). Make sure you look at the total carbs on the label as products can differ. Physical Activity:  - Aim for 30 minutes of consistent, moderately intensive, physical activity a day for 5 days or an average of 150 minutes per week. - Start slow, increase as tolerated. For example: Walk every day, working up to 30 minutes of brisk walking, 5 days a week--or split the 30 minutes into two 15-minute or three 10-minute walks. - If you sit for a long time, get up and move/stretch every 90 minutes. Other recommendations:  - Schedule an annual eye exam.  - Check your feet daily for any signs of open wounds, cuts, or sores. - Given your risk factors, the following vaccines are recommended: annual flu shot, age-based pneumococcal vaccines (Pneumovax, Prevnar 13). In addition to taking your medications as directed, improving your blood sugar involves modifying your nutrition and maximizing the amount of physical activity.

## 2023-03-27 ENCOUNTER — HOSPITAL ENCOUNTER (OUTPATIENT)
Facility: HOSPITAL | Age: 78
Discharge: HOME | End: 2023-03-30
Payer: MEDICARE

## 2023-03-27 LAB
ALBUMIN SERPL-MCNC: 3.3 G/DL (ref 3.4–5)
ALBUMIN/GLOB SERPL: 1.3 (ref 0.8–1.7)
ALP SERPL-CCNC: 77 U/L (ref 45–117)
ALT SERPL-CCNC: 25 U/L (ref 16–61)
ANION GAP SERPL CALC-SCNC: 5 MMOL/L (ref 3–18)
AST SERPL-CCNC: 15 U/L (ref 10–38)
BILIRUB SERPL-MCNC: 2 MG/DL (ref 0.2–1)
BUN SERPL-MCNC: 23 MG/DL (ref 7–18)
BUN/CREAT SERPL: 18 (ref 12–20)
CALCIUM SERPL-MCNC: 8.8 MG/DL (ref 8.5–10.1)
CHLORIDE SERPL-SCNC: 107 MMOL/L (ref 100–111)
CO2 SERPL-SCNC: 28 MMOL/L (ref 21–32)
CREAT SERPL-MCNC: 1.3 MG/DL (ref 0.6–1.3)
EST. AVERAGE GLUCOSE BLD GHB EST-MCNC: 169 MG/DL
GLOBULIN SER CALC-MCNC: 2.5 G/DL (ref 2–4)
GLUCOSE SERPL-MCNC: 228 MG/DL (ref 74–99)
HBA1C MFR BLD: 7.5 % (ref 4.2–5.6)
POTASSIUM SERPL-SCNC: 3.7 MMOL/L (ref 3.5–5.5)
PROT SERPL-MCNC: 5.8 G/DL (ref 6.4–8.2)
SODIUM SERPL-SCNC: 140 MMOL/L (ref 136–145)

## 2023-03-27 PROCEDURE — 83036 HEMOGLOBIN GLYCOSYLATED A1C: CPT

## 2023-03-27 PROCEDURE — 80053 COMPREHEN METABOLIC PANEL: CPT

## 2023-03-27 PROCEDURE — 36415 COLL VENOUS BLD VENIPUNCTURE: CPT

## 2023-03-29 SDOH — ECONOMIC STABILITY: HOUSING INSECURITY
IN THE LAST 12 MONTHS, WAS THERE A TIME WHEN YOU DID NOT HAVE A STEADY PLACE TO SLEEP OR SLEPT IN A SHELTER (INCLUDING NOW)?: NO

## 2023-03-29 SDOH — ECONOMIC STABILITY: INCOME INSECURITY: HOW HARD IS IT FOR YOU TO PAY FOR THE VERY BASICS LIKE FOOD, HOUSING, MEDICAL CARE, AND HEATING?: PATIENT DECLINED

## 2023-03-29 SDOH — ECONOMIC STABILITY: FOOD INSECURITY: WITHIN THE PAST 12 MONTHS, YOU WORRIED THAT YOUR FOOD WOULD RUN OUT BEFORE YOU GOT MONEY TO BUY MORE.: PATIENT DECLINED

## 2023-03-29 SDOH — ECONOMIC STABILITY: TRANSPORTATION INSECURITY
IN THE PAST 12 MONTHS, HAS LACK OF TRANSPORTATION KEPT YOU FROM MEETINGS, WORK, OR FROM GETTING THINGS NEEDED FOR DAILY LIVING?: NO

## 2023-03-29 SDOH — ECONOMIC STABILITY: FOOD INSECURITY: WITHIN THE PAST 12 MONTHS, THE FOOD YOU BOUGHT JUST DIDN'T LAST AND YOU DIDN'T HAVE MONEY TO GET MORE.: PATIENT DECLINED

## 2023-03-29 NOTE — PROGRESS NOTES
Pharmacy Progress Note - Diabetes Management       Assessment / Plan:   Diabetes Management:  Per ADA guidelines, Pt's A1c is not at goal of < 7%. Pt's glycemic control has improved since the start of NPH to counteract the large fluctuations from his prednisone. His A1c is skewed higher d/t the poor glycemic control at the beginning of the last 3 months. After moving the NPH dose to qam while continuing to take the prednisone in the afternoon, it appears that the NPH peak is lining up with the initial elevation in BG from his prednisone dose. He continues to have some NFBG values above goal which is translating into some elevated FBG values as well. Will increase his NPH dose to 10 units qam.  Will maintain this dose until he meets with oncology in May to determine if he will be continued on the prednisone. Will discontinue the NPH if he stops the prednisone. Will reassess with SMBG logs in 6 weeks. Nutrition/Lifestyle Modifications:  - Educated pt on the importance of moderating carbohydrate intake. Reviewed sources of carbohydrates and method to help determine appropriate portion sizes (e.g., Diabetes Plate Method). - Advised patient to avoid sugar-sweetened beverages and replace with water or diet/zero sugar option.  - Recommend ~30 minutes consistent, moderately intensive, exercise/day or ~150 minutes/week. Start small, stay consistent, and increase length and types of exercise, as tolerated. Patient will return to clinic in 6 week(s) for follow up. S/O: Mr. Gaby Emmanuel, a 76 y.o. male referred by Angely Benítez MD,  has a past medical history of Arthritis, CRI (chronic renal insufficiency), Gout, Hypercholesteremia, Hypertension, Kidney stone, Other ill-defined conditions(799.89), Personal history of prostate cancer, Prostate cancer ([facility] [address] and Splenic artery aneurysm ([facility] [address] Pt was seen today for diabetes management.   Patient's last A1c was:   Hemoglobin A1C   Date Value Ref

## 2023-03-30 ENCOUNTER — OFFICE VISIT (OUTPATIENT)
Age: 78
End: 2023-03-30
Payer: MEDICARE

## 2023-03-30 ENCOUNTER — PHARMACY VISIT (OUTPATIENT)
Age: 78
End: 2023-03-30

## 2023-03-30 VITALS
TEMPERATURE: 97.2 F | BODY MASS INDEX: 27.97 KG/M2 | WEIGHT: 152 LBS | DIASTOLIC BLOOD PRESSURE: 66 MMHG | RESPIRATION RATE: 16 BRPM | SYSTOLIC BLOOD PRESSURE: 126 MMHG | HEIGHT: 62 IN | HEART RATE: 95 BPM | OXYGEN SATURATION: 97 %

## 2023-03-30 DIAGNOSIS — E78.00 PURE HYPERCHOLESTEROLEMIA, UNSPECIFIED: ICD-10-CM

## 2023-03-30 DIAGNOSIS — Z79.4 TYPE 2 DIABETES MELLITUS WITH HYPERGLYCEMIA, WITH LONG-TERM CURRENT USE OF INSULIN (HCC): ICD-10-CM

## 2023-03-30 DIAGNOSIS — I10 ESSENTIAL (PRIMARY) HYPERTENSION: ICD-10-CM

## 2023-03-30 DIAGNOSIS — N18.30 STAGE 3 CHRONIC KIDNEY DISEASE, UNSPECIFIED WHETHER STAGE 3A OR 3B CKD (HCC): ICD-10-CM

## 2023-03-30 DIAGNOSIS — I72.8 ANEURYSM OF OTHER SPECIFIED ARTERIES (HCC): ICD-10-CM

## 2023-03-30 DIAGNOSIS — E11.65 TYPE 2 DIABETES MELLITUS WITH HYPERGLYCEMIA, WITH LONG-TERM CURRENT USE OF INSULIN (HCC): ICD-10-CM

## 2023-03-30 DIAGNOSIS — C61 MALIGNANT NEOPLASM OF PROSTATE (HCC): ICD-10-CM

## 2023-03-30 DIAGNOSIS — E55.9 VITAMIN D DEFICIENCY: ICD-10-CM

## 2023-03-30 DIAGNOSIS — E11.21 TYPE 2 DIABETES WITH NEPHROPATHY (HCC): Primary | ICD-10-CM

## 2023-03-30 PROCEDURE — 1123F ACP DISCUSS/DSCN MKR DOCD: CPT | Performed by: FAMILY MEDICINE

## 2023-03-30 PROCEDURE — 99214 OFFICE O/P EST MOD 30 MIN: CPT | Performed by: FAMILY MEDICINE

## 2023-03-30 PROCEDURE — 3051F HG A1C>EQUAL 7.0%<8.0%: CPT | Performed by: FAMILY MEDICINE

## 2023-03-30 PROCEDURE — G8427 DOCREV CUR MEDS BY ELIG CLIN: HCPCS | Performed by: FAMILY MEDICINE

## 2023-03-30 PROCEDURE — 3074F SYST BP LT 130 MM HG: CPT | Performed by: FAMILY MEDICINE

## 2023-03-30 PROCEDURE — G8417 CALC BMI ABV UP PARAM F/U: HCPCS | Performed by: FAMILY MEDICINE

## 2023-03-30 PROCEDURE — 1036F TOBACCO NON-USER: CPT | Performed by: FAMILY MEDICINE

## 2023-03-30 PROCEDURE — G8484 FLU IMMUNIZE NO ADMIN: HCPCS | Performed by: FAMILY MEDICINE

## 2023-03-30 PROCEDURE — 3078F DIAST BP <80 MM HG: CPT | Performed by: FAMILY MEDICINE

## 2023-03-30 RX ORDER — INSULIN HUMAN 100 [IU]/ML
10 INJECTION, SUSPENSION SUBCUTANEOUS EVERY MORNING
Qty: 5 ADJUSTABLE DOSE PRE-FILLED PEN SYRINGE | Refills: 11 | Status: SHIPPED
Start: 2023-03-30

## 2023-03-30 RX ORDER — LISINOPRIL 20 MG/1
20 TABLET ORAL DAILY
Qty: 90 TABLET | Refills: 1
Start: 2023-03-30

## 2023-03-30 ASSESSMENT — PATIENT HEALTH QUESTIONNAIRE - PHQ9
SUM OF ALL RESPONSES TO PHQ QUESTIONS 1-9: 0
1. LITTLE INTEREST OR PLEASURE IN DOING THINGS: 0
2. FEELING DOWN, DEPRESSED OR HOPELESS: 0
SUM OF ALL RESPONSES TO PHQ QUESTIONS 1-9: 0
SUM OF ALL RESPONSES TO PHQ9 QUESTIONS 1 & 2: 0

## 2023-04-02 NOTE — PROGRESS NOTES
1. \"Have you been to the ER, urgent care clinic since your last visit? Hospitalized since your last visit? \" Yes When: Jaime Nash 1/30/23 for Hyperglycemia. 2. \"Have you seen or consulted any other health care providers outside of the [address] [place] [address] since your last visit? \" Yes When: Urology LOV: 12/06/22. [facility] LOV: 3/22/23. 3. For patients aged 39-70: Has the patient had a colonoscopy / FIT/ Cologuard?  NA - based on age    Chief Complaint   Patient presents with    Diabetes    Hypertension    Cholesterol Problem    Gout    Prostate Cancer    Other     Splenic artery aneurysm and CRI stage 3
follow Pharm D  Check Cmp/a1c /lipid panel, cbc 3 months or sooner prn    Essential hypertension-  Will reduce lisinopril to 20 mg OD from BID with episodic lightheadedness, he has lost significant amount of weight on treatments. Will monitor, can completely hold if BP <110/60  Monitoring    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 Pure hypercholesterolemia-   at goal LDL< 100 on statin, cont  Lipid panel 8/2022    Gout without tophus, unspecified cause, unspecified chronicity, unspecified site-seldom flares,  Prn colchicine, low purine diet. BMI 30    Splenic artery aneursysm  S/p repair 2015  On ASA, statin    Prostate cancer [facility])  2008 recurrence 9/2022 with pelvic LN metastasis  Following dr. Radha Gallagher in 3 months or sooner prn,  Patient understands plan of care. Patient has provided input and agrees with goals.

## 2023-05-09 NOTE — PROGRESS NOTES
Pharmacy Progress Note - Diabetes Management       Assessment / Plan:   Diabetes Management:  Per ADA guidelines, Pt's A1c is not at goal of < 7%. Pt's prandial values are typically controlled, but he does have the occasional elevation from his diet. His appetite has increased and he is having larger portions and more snacks. Offered Trulicity dose increase to 3mg weekly to assist with his increased appetite, but pt would like to focus on improving his diet on his own. Will maintain his current tx and have pt work on decreasing portion sizes and carb content of his meals and snacks in the interim. Will reassess with SMBG logs in 4 weeks. Nutrition/Lifestyle Modifications:  - Educated pt on the importance of moderating carbohydrate intake. Reviewed sources of carbohydrates and method to help determine appropriate portion sizes (e.g., Diabetes Plate Method). - Advised patient to avoid sugar-sweetened beverages and replace with water or diet/zero sugar option.  - Recommend ~30 minutes consistent, moderately intensive, exercise/day or ~150 minutes/week. Start small, stay consistent, and increase length and types of exercise, as tolerated. Patient will return to clinic in 4 week(s) for follow up. S/O: Mr. Rakel Iverson, a 76 y.o. male referred by Santos De Jesus MD,  has a past medical history of Arthritis, CRI (chronic renal insufficiency), Gout, Hypercholesteremia, Hypertension, Kidney stone, Other ill-defined conditions(799.89), Personal history of prostate cancer, Prostate cancer ([facility] [address] and Splenic artery aneurysm ([facility] [address] Pt was seen today for diabetes management. Patient's last A1c was:   Hemoglobin A1C   Date Value Ref Range Status   03/27/2023 7.5 (H) 4.2 - 5.6 % Final     Comment:     (NOTE)  HbA1C Interpretive Ranges  <5.7              Normal  5.7 - 6.4         Consider Prediabetes  >6.5              Consider Diabetes         Interim update: Pt was last seen by me on 3/30/2023.   Per

## 2023-05-11 ENCOUNTER — PHARMACY VISIT (OUTPATIENT)
Age: 78
End: 2023-05-11

## 2023-05-11 DIAGNOSIS — E11.65 TYPE 2 DIABETES MELLITUS WITH HYPERGLYCEMIA, WITH LONG-TERM CURRENT USE OF INSULIN (HCC): Primary | ICD-10-CM

## 2023-05-11 DIAGNOSIS — Z79.4 TYPE 2 DIABETES MELLITUS WITH HYPERGLYCEMIA, WITH LONG-TERM CURRENT USE OF INSULIN (HCC): Primary | ICD-10-CM

## 2023-06-06 ENCOUNTER — PHARMACY VISIT (OUTPATIENT)
Age: 78
End: 2023-06-06

## 2023-06-06 DIAGNOSIS — Z79.4 TYPE 2 DIABETES MELLITUS WITH HYPERGLYCEMIA, WITH LONG-TERM CURRENT USE OF INSULIN (HCC): Primary | ICD-10-CM

## 2023-06-06 DIAGNOSIS — E11.65 TYPE 2 DIABETES MELLITUS WITH HYPERGLYCEMIA, WITH LONG-TERM CURRENT USE OF INSULIN (HCC): Primary | ICD-10-CM

## 2023-06-06 RX ORDER — PREDNISONE 5 MG/1
5 TABLET ORAL EVERY MORNING
Status: SHIPPED | COMMUNITY
Start: 2023-06-06

## 2023-06-06 NOTE — PROGRESS NOTES
up to four times daily. Cholecalciferol 50 MCG (2000 UT) CAPS Take 2,000 Units by mouth daily    degarelix acetate (FIRMAGON) 80 MG SOLR chemo injection Inject into the skin    dulaglutide (TRULICITY) 1.5 XO/4.2AS SC injection Inject 0.5 mLs into the skin every 7 days    abiraterone acetate (ZYTIGA) 250 MG tablet Take four tablets by mouth daily on an empty stomach. Take one hour prior to food or two hours after food. Tablets should be swallowed whole with water. Do not crush or chew tablets. acetaminophen (TYLENOL) 500 MG tablet Take 2 tablets by mouth every 6 hours as needed    aspirin 81 MG chewable tablet Take 1 tablet by mouth daily    colchicine (COLCRYS) 0.6 MG tablet Take 1 tablet by mouth daily    hydrocortisone 2.5 % cream Apply topically 2 times daily    predniSONE (DELTASONE) 5 MG tablet Take 1 tablet by mouth daily    simvastatin (ZOCOR) 40 MG tablet Take 1 tablet by mouth     No current facility-administered medications for this visit. Allergies:   Allergies   Allergen Reactions    Azithromycin Palpitations    Citrullus Vulgaris Hives    Amlodipine Itching, Other (See Comments) and Rash     Reaction(s): Unknown; Note: [id number] soc    HIVES - [id number] CRB         Blood Glucose Monitoring (BGM) or CGM:  - Had access to home glucometer/blood glucose log/CGM reader today:  Yes        ROS:  Today, Pt endorses:  - Symptoms of Hyperglycemia: none  - Symptoms of Hypoglycemia: none    Lifestyle modification(s):  - as above    Medication Adherence/Access:  - Endorses adherence to current regimen?: Yes    Vitals/Labs:  No results found for: HBA1C  Hemoglobin A1C   Date Value Ref Range Status   03/27/2023 7.5 (H) 4.2 - 5.6 % Final     Comment:     (NOTE)  HbA1C Interpretive Ranges  <5.7              Normal  5.7 - 6.4         Consider Prediabetes  >6.5              Consider Diabetes         Screenings/Prevention Parameters:  -Diabetic Eye and Foot Exams:      Diabetes Management   Topic Date Due

## 2023-06-27 ENCOUNTER — PHARMACY VISIT (OUTPATIENT)
Age: 78
End: 2023-06-27

## 2023-06-27 DIAGNOSIS — Z79.4 TYPE 2 DIABETES MELLITUS WITH HYPERGLYCEMIA, WITH LONG-TERM CURRENT USE OF INSULIN (HCC): ICD-10-CM

## 2023-06-27 DIAGNOSIS — E11.65 TYPE 2 DIABETES MELLITUS WITH HYPERGLYCEMIA, WITH LONG-TERM CURRENT USE OF INSULIN (HCC): ICD-10-CM

## 2023-06-27 RX ORDER — INSULIN HUMAN 100 [IU]/ML
12 INJECTION, SUSPENSION SUBCUTANEOUS EVERY MORNING
Qty: 5 ADJUSTABLE DOSE PRE-FILLED PEN SYRINGE | Refills: 11 | Status: SHIPPED
Start: 2023-06-27

## 2023-06-30 ENCOUNTER — HOSPITAL ENCOUNTER (OUTPATIENT)
Facility: HOSPITAL | Age: 78
End: 2023-06-30
Payer: MEDICARE

## 2023-06-30 LAB
25(OH)D3 SERPL-MCNC: 55.8 NG/ML (ref 30–100)
ALBUMIN SERPL-MCNC: 3.6 G/DL (ref 3.4–5)
ALBUMIN/GLOB SERPL: 1.3 (ref 0.8–1.7)
ALP SERPL-CCNC: 68 U/L (ref 45–117)
ALT SERPL-CCNC: 32 U/L (ref 16–61)
ANION GAP SERPL CALC-SCNC: 6 MMOL/L (ref 3–18)
AST SERPL-CCNC: 22 U/L (ref 10–38)
BILIRUB SERPL-MCNC: 2 MG/DL (ref 0.2–1)
BUN SERPL-MCNC: 30 MG/DL (ref 7–18)
BUN/CREAT SERPL: 18 (ref 12–20)
CALCIUM SERPL-MCNC: 9.4 MG/DL (ref 8.5–10.1)
CHLORIDE SERPL-SCNC: 104 MMOL/L (ref 100–111)
CHOLEST SERPL-MCNC: 108 MG/DL
CO2 SERPL-SCNC: 30 MMOL/L (ref 21–32)
CREAT SERPL-MCNC: 1.66 MG/DL (ref 0.6–1.3)
CREAT UR-MCNC: 146 MG/DL (ref 30–125)
ERYTHROCYTE [DISTWIDTH] IN BLOOD BY AUTOMATED COUNT: 12.1 % (ref 11.6–14.5)
EST. AVERAGE GLUCOSE BLD GHB EST-MCNC: 169 MG/DL
GLOBULIN SER CALC-MCNC: 2.8 G/DL (ref 2–4)
GLUCOSE SERPL-MCNC: 146 MG/DL (ref 74–99)
HBA1C MFR BLD: 7.5 % (ref 4.2–5.6)
HCT VFR BLD AUTO: 39.3 % (ref 36–48)
HDLC SERPL-MCNC: 45 MG/DL (ref 40–60)
HDLC SERPL: 2.4 (ref 0–5)
HGB BLD-MCNC: 13 G/DL (ref 13–16)
LDLC SERPL CALC-MCNC: 40 MG/DL (ref 0–100)
LIPID PANEL: NORMAL
MCH RBC QN AUTO: 31.9 PG (ref 24–34)
MCHC RBC AUTO-ENTMCNC: 33.1 G/DL (ref 31–37)
MCV RBC AUTO: 96.6 FL (ref 78–100)
MICROALBUMIN UR-MCNC: 10.7 MG/DL (ref 0–3)
MICROALBUMIN/CREAT UR-RTO: 73 MG/G (ref 0–30)
NRBC # BLD: 0 K/UL (ref 0–0.01)
NRBC BLD-RTO: 0 PER 100 WBC
PLATELET # BLD AUTO: 189 K/UL (ref 135–420)
PMV BLD AUTO: 10.3 FL (ref 9.2–11.8)
POTASSIUM SERPL-SCNC: 4 MMOL/L (ref 3.5–5.5)
PROT SERPL-MCNC: 6.4 G/DL (ref 6.4–8.2)
RBC # BLD AUTO: 4.07 M/UL (ref 4.35–5.65)
SODIUM SERPL-SCNC: 140 MMOL/L (ref 136–145)
TRIGL SERPL-MCNC: 115 MG/DL
VLDLC SERPL CALC-MCNC: 23 MG/DL
WBC # BLD AUTO: 4.9 K/UL (ref 4.6–13.2)

## 2023-06-30 PROCEDURE — 36415 COLL VENOUS BLD VENIPUNCTURE: CPT

## 2023-06-30 PROCEDURE — 80053 COMPREHEN METABOLIC PANEL: CPT

## 2023-06-30 PROCEDURE — 82043 UR ALBUMIN QUANTITATIVE: CPT

## 2023-06-30 PROCEDURE — 82570 ASSAY OF URINE CREATININE: CPT

## 2023-06-30 PROCEDURE — 80061 LIPID PANEL: CPT

## 2023-06-30 PROCEDURE — 83036 HEMOGLOBIN GLYCOSYLATED A1C: CPT

## 2023-06-30 PROCEDURE — 85027 COMPLETE CBC AUTOMATED: CPT

## 2023-06-30 PROCEDURE — 82306 VITAMIN D 25 HYDROXY: CPT

## 2023-07-05 ENCOUNTER — OFFICE VISIT (OUTPATIENT)
Age: 78
End: 2023-07-05
Payer: MEDICARE

## 2023-07-05 VITALS
TEMPERATURE: 98.6 F | HEART RATE: 82 BPM | HEIGHT: 62 IN | RESPIRATION RATE: 16 BRPM | SYSTOLIC BLOOD PRESSURE: 130 MMHG | DIASTOLIC BLOOD PRESSURE: 74 MMHG | WEIGHT: 150.5 LBS | BODY MASS INDEX: 27.7 KG/M2 | OXYGEN SATURATION: 98 %

## 2023-07-05 DIAGNOSIS — Z79.4 TYPE 2 DIABETES MELLITUS WITH HYPERGLYCEMIA, WITH LONG-TERM CURRENT USE OF INSULIN (HCC): Primary | ICD-10-CM

## 2023-07-05 DIAGNOSIS — E11.65 TYPE 2 DIABETES MELLITUS WITH HYPERGLYCEMIA, WITH LONG-TERM CURRENT USE OF INSULIN (HCC): Primary | ICD-10-CM

## 2023-07-05 DIAGNOSIS — E78.00 PURE HYPERCHOLESTEROLEMIA: ICD-10-CM

## 2023-07-05 DIAGNOSIS — E11.21 TYPE 2 DIABETES WITH NEPHROPATHY (HCC): ICD-10-CM

## 2023-07-05 DIAGNOSIS — I10 ESSENTIAL HYPERTENSION: ICD-10-CM

## 2023-07-05 DIAGNOSIS — I72.8 SPLENIC ARTERY ANEURYSM (HCC): ICD-10-CM

## 2023-07-05 DIAGNOSIS — C61 PROSTATE CANCER (HCC): ICD-10-CM

## 2023-07-05 DIAGNOSIS — N18.30 STAGE 3 CHRONIC KIDNEY DISEASE, UNSPECIFIED WHETHER STAGE 3A OR 3B CKD (HCC): ICD-10-CM

## 2023-07-05 PROCEDURE — G8417 CALC BMI ABV UP PARAM F/U: HCPCS | Performed by: FAMILY MEDICINE

## 2023-07-05 PROCEDURE — G8427 DOCREV CUR MEDS BY ELIG CLIN: HCPCS | Performed by: FAMILY MEDICINE

## 2023-07-05 PROCEDURE — 1036F TOBACCO NON-USER: CPT | Performed by: FAMILY MEDICINE

## 2023-07-05 PROCEDURE — 3051F HG A1C>EQUAL 7.0%<8.0%: CPT | Performed by: FAMILY MEDICINE

## 2023-07-05 PROCEDURE — 99214 OFFICE O/P EST MOD 30 MIN: CPT | Performed by: FAMILY MEDICINE

## 2023-07-05 PROCEDURE — 3078F DIAST BP <80 MM HG: CPT | Performed by: FAMILY MEDICINE

## 2023-07-05 PROCEDURE — 3075F SYST BP GE 130 - 139MM HG: CPT | Performed by: FAMILY MEDICINE

## 2023-07-05 PROCEDURE — 1123F ACP DISCUSS/DSCN MKR DOCD: CPT | Performed by: FAMILY MEDICINE

## 2023-07-05 RX ORDER — COLCHICINE 0.6 MG/1
TABLET ORAL
COMMUNITY
Start: 2023-06-12 | End: 2023-07-05 | Stop reason: SDUPTHER

## 2023-07-05 NOTE — PROGRESS NOTES
Chief Complaint   Patient presents with    Results     Review of results     Cholesterol Problem    Diabetes    Gout    Hypertension    Other     Hx of splenic artery aneurysm, CRI stage 3 and prostate cancer      1. \"Have you been to the ER, urgent care clinic since your last visit? Hospitalized since your last visit? \" No    2. \"Have you seen or consulted any other health care providers outside of the [address] [place] [address] since your last visit? \" No     3. For patients aged 43-73: Has the patient had a colonoscopy / FIT/ Cologuard? NA - based on age last noted 09/29/2022      If the patient is female:    4. For patients aged 43-66: Has the patient had a mammogram within the past 2 years? NA - based on age or sex      11. For patients aged 21-65: Has the patient had a pap smear?  NA - based on age or sex    Annual eye exam: 11/14/2022 (requ[facility] from [facility]) [phone]  Pneumococcal vaccine: 2015/206  Flu vaccine: N/A  Patient instructed to remove shoes: Yes    Health Maintenance Due   Topic Date Due    Shingles vaccine (1 of 2) Never done    DTaP/Tdap/Td vaccine (2 - Td or Tdap) 05/13/2020    COVID-19 Vaccine (3 - Booster for Pfizer series) 04/24/2021    Diabetic retinal exam  11/06/2021    Diabetic foot exam  07/22/2022

## 2023-07-05 NOTE — PATIENT INSTRUCTIONS
Learning About Meal Planning for Diabetes  Why plan your meals? Meal planning can be a key part of managing diabetes. Planning meals and snacks with the right balance of carbohydrate, protein, and fat can help you keep your blood sugar at the target level you set with your doctor. You don't have to eat special foods. You can eat what your family eats, including sweets once in a while. But you do have to pay attention to how often you eat and how much you eat of certain foods. You may want to work with a dietitian or a diabetes educator. They can give you tips and meal ideas and can answer your questions about meal planning. This health professional can also help you reach a healthy weight if that is one of your goals. What plan is right for you? Your dietitian or diabetes educator may suggest that you start with the plate format or carbohydrate counting. The plate format  The plate format is a simple way to help you manage how you eat. You plan meals by learning how much space each food should take on a plate. Using the plate format helps you manage the amount of carbohydrate you eat. It can make it easier to keep your blood sugar level within your target range. It also helps you see if you're eating healthy portion sizes. To use the plate format, you put non-starchy vegetables on half your plate. Add lean protein foods, such as fish, lean meats and poultry, or soy products, on one-quarter of the plate. Put a grain or starchy vegetable (such as brown rice or a potato) on the final quarter of the plate. You can add a small piece of fruit and some low-fat or fat-free milk or yogurt, depending on your carbohydrate goal for each meal.  Here are some tips for using the plate format:  Make sure that you are not using an oversized plate. A 9-inch plate is best. Many restaurants use larger plates. Get used to using the plate format at home. Then you can use it when you eat out.   Write down your questions

## 2023-07-05 NOTE — PROGRESS NOTES
SUBJECTIVE  Routine ff-up    DM w/ nephropathy. On NPH insulin 12 ujnits and  trulicity. Following Pharm D closely. He is on prednisone daily as part of his prostate cancer treatment. A1c 7.5    HTN- taking lisionpril 20 mg OD, no longer with lightheadedness. HL- on zocor    Gout-  rare flares, related to eating peanuts or some [place] delicacies. no recent flare ups, colchicine prn effective    Splenic artery stenosis (s/p repair 2015)-no symptoms    Previous h/o prostate cancer 2008 s/p prostatectomy. Recurrence 9/2022 ongoing therapy then plan for radiation. Following dr. Iqra Heart.      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       Current Outpatient Medications:     insulin NPH (HUMULIN N KWIKPEN) 100 UNIT/ML injection pen, Inject 12 Units into the skin every morning, Disp: 5 Adjustable Dose Pre-filled Pen Syringe, Rfl: 11    simvastatin (ZOCOR) 40 MG tablet, Take 1 tablet by mouth nightly, Disp: 90 tablet, Rfl: 3    predniSONE (DELTASONE) 5 MG tablet, Take 1 tablet by mouth every morning, Disp: , Rfl:     abiraterone acetate (ZYTIGA) 250 MG tablet, TAKE 4 TABLETS BY MOUTH AT THE SAME TIME DAILY, ON AN EMPTY STOMACH ONE HOUR PRIOR OR TWO HOURS AFTER FOOD., Disp: 120 tablet, Rfl: 5    CALCIUM PO, Take by mouth, Disp: , Rfl:     lisinopril (PRINIVIL;ZESTRIL) 20 MG tablet, Take 1 tablet by mouth daily, Disp: 90 tablet, Rfl: 1    Cholecalciferol 50 MCG (2000 UT) CAPS, Take 2,000 Units by mouth daily, Disp: , Rfl:     degarelix acetate (FIRMAGON) 80 MG SOLR chemo injection, Inject into the

## 2023-07-24 NOTE — PROGRESS NOTES
Pharmacy Progress Note - Diabetes Management       Assessment / Plan:   Diabetes Management:  Per ADA guidelines, Pt's A1c is not at goal of < 7%. Pt's basal control has improved with values typically at or just above goal.  The same is true for his prandial control. There are obvious values resulting from dietary indiscretions that he will need to work on. Will increase his NPH to 14 units qam with his dose of prednisone. Will start Jardiance 10mg qam to assist with nephroprotection given his elevated UACR as well as add additional glycemic control. Will reassess with SMBG logs at follow up in 3 weeks. Nutrition/Lifestyle Modifications:  - Educated pt on the importance of moderating carbohydrate intake. Reviewed sources of carbohydrates and method to help determine appropriate portion sizes (e.g., Diabetes Plate Method). - Advised patient to avoid sugar-sweetened beverages and replace with water or diet/zero sugar option.  - Recommend ~30 minutes consistent, moderately intensive, exercise/day or ~150 minutes/week. Start small, stay consistent, and increase length and types of exercise, as tolerated. Patient will return to clinic in 3 week(s) for follow up. S/O: Mr. Moses Summers, a 76 y.o. male referred by Ana M Carlson MD,  has a past medical history of Arthritis, CRI (chronic renal insufficiency), Gout, Hypercholesteremia, Hypertension, Kidney stone, Other ill-defined conditions(799.89), Personal history of prostate cancer, Prostate cancer ([address]), and Splenic artery aneurysm ([address] Pt was seen today for diabetes management. Patient's last A1c was:   Hemoglobin A1C   Date Value Ref Range Status   06/30/2023 7.5 (H) 4.2 - 5.6 % Final     Comment:     (NOTE)  HbA1C Interpretive Ranges  <5.7              Normal  5.7 - 6.4         Consider Prediabetes  >6.5              Consider Diabetes         Interim update: Pt was last seen by me on 6/27/2023.   Per my prior note: Pt's A1c is not at

## 2023-07-25 ENCOUNTER — PHARMACY VISIT (OUTPATIENT)
Age: 78
End: 2023-07-25

## 2023-07-25 DIAGNOSIS — Z79.4 TYPE 2 DIABETES MELLITUS WITH HYPERGLYCEMIA, WITH LONG-TERM CURRENT USE OF INSULIN (HCC): Primary | ICD-10-CM

## 2023-07-25 DIAGNOSIS — E11.65 TYPE 2 DIABETES MELLITUS WITH HYPERGLYCEMIA, WITH LONG-TERM CURRENT USE OF INSULIN (HCC): Primary | ICD-10-CM

## 2023-07-25 RX ORDER — LANCETS 28 GAUGE
EACH MISCELLANEOUS
Qty: 400 EACH | Refills: 3 | Status: SHIPPED | OUTPATIENT
Start: 2023-07-25

## 2023-07-25 RX ORDER — INSULIN HUMAN 100 [IU]/ML
14 INJECTION, SUSPENSION SUBCUTANEOUS EVERY MORNING
Qty: 5 ADJUSTABLE DOSE PRE-FILLED PEN SYRINGE | Refills: 11 | Status: SHIPPED
Start: 2023-07-25

## 2023-08-14 NOTE — PROGRESS NOTES
Pharmacy Progress Note - Diabetes Management       Assessment / Plan:   Diabetes Management:  Per ADA guidelines, Pt's A1c is not at goal of < 7%. Pt's ac lunch and dinner values continue to be elevated some of the time. His FBG values are on the ULN or above the majority of the time. Will increase his NPH to 16 units with his dose of prednisone. Will reassess with SMBG logs at follow up in 4 weeks. Nutrition/Lifestyle Modifications:  - Educated pt on the importance of moderating carbohydrate intake. Reviewed sources of carbohydrates and method to help determine appropriate portion sizes (e.g., Diabetes Plate Method). - Advised patient to avoid sugar-sweetened beverages and replace with water or diet/zero sugar option.  - Recommend ~30 minutes consistent, moderately intensive, exercise/day or ~150 minutes/week. Start small, stay consistent, and increase length and types of exercise, as tolerated. Patient will return to clinic in 4 week(s) for follow up. S/O: Mr. Jayme Warner, a 76 y.o. male referred by Kayce Castañeda MD,  has a past medical history of Arthritis, CRI (chronic renal insufficiency), Gout, Hypercholesteremia, Hypertension, Kidney stone, Other ill-defined conditions(799.89), Personal history of prostate cancer, Prostate cancer ([address]), and Splenic artery aneurysm ([address]). Pt was seen today for diabetes management. Patient's last A1c was:   Hemoglobin A1C   Date Value Ref Range Status   06/30/2023 7.5 (H) 4.2 - 5.6 % Final     Comment:     (NOTE)  HbA1C Interpretive Ranges  <5.7              Normal  5.7 - 6.4         Consider Prediabetes  >6.5              Consider Diabetes         Interim update: Pt was last seen by me on 7/25/2023. Per my prior note: Pt's A1c is not at goal of < 7%. Pt's basal control has improved with values typically at or just above goal.  The same is true for his prandial control.   There are obvious values resulting from dietary indiscretions that he

## 2023-08-15 ENCOUNTER — PHARMACY VISIT (OUTPATIENT)
Age: 78
End: 2023-08-15

## 2023-08-15 DIAGNOSIS — Z79.4 TYPE 2 DIABETES MELLITUS WITH HYPERGLYCEMIA, WITH LONG-TERM CURRENT USE OF INSULIN (HCC): ICD-10-CM

## 2023-08-15 DIAGNOSIS — E11.65 TYPE 2 DIABETES MELLITUS WITH HYPERGLYCEMIA, WITH LONG-TERM CURRENT USE OF INSULIN (HCC): ICD-10-CM

## 2023-08-15 RX ORDER — INSULIN HUMAN 100 [IU]/ML
16 INJECTION, SUSPENSION SUBCUTANEOUS EVERY MORNING
Qty: 5 ADJUSTABLE DOSE PRE-FILLED PEN SYRINGE | Refills: 11 | Status: SHIPPED
Start: 2023-08-15

## 2023-08-15 RX ORDER — ISOPROPYL ALCOHOL 70 ML/100ML
1 SWAB TOPICAL 2 TIMES DAILY
Qty: 600 EACH | Refills: 3 | Status: SHIPPED | OUTPATIENT
Start: 2023-08-15

## 2023-08-15 RX ORDER — LISINOPRIL 20 MG/1
20 TABLET ORAL DAILY
Qty: 90 TABLET | Refills: 1 | Status: SHIPPED | OUTPATIENT
Start: 2023-08-15

## 2023-09-11 NOTE — PROGRESS NOTES
Pharmacy Progress Note - Diabetes Management       Assessment / Plan:   Diabetes Management:  Per ADA guidelines, Pt's A1c is not at goal of < 7%. Pt's glycemic control has improved with the increase in dose of NPH to 16 units ac prednisone. He could probably have an increase in dose without causing hypoglycemia as he is typically on the ULN for both FBG and NFBG values. However, will maintain his current dose as he feels well on it and is tolerating his current regimen. His improvement in diet may further improve his BG values. Will maintain his current tx and reassess with SMBG logs at follow up in 2 months. Nutrition/Lifestyle Modifications:  - Educated pt on the importance of moderating carbohydrate intake. Reviewed sources of carbohydrates and method to help determine appropriate portion sizes (e.g., Diabetes Plate Method). - Advised patient to avoid sugar-sweetened beverages and replace with water or diet/zero sugar option.  - Recommend ~30 minutes consistent, moderately intensive, exercise/day or ~150 minutes/week. Start small, stay consistent, and increase length and types of exercise, as tolerated. Patient will return to clinic in 8 week(s) for follow up. S/O: Mr. Jayme Warner, a 76 y.o. male referred by Kayce Castañeda MD,  has a past medical history of Arthritis, CRI (chronic renal insufficiency), Gout, Hypercholesteremia, Hypertension, Kidney stone, Other ill-defined conditions(799.89), Personal history of prostate cancer, Prostate cancer ([address]), and Splenic artery aneurysm ([address] Pt was seen today for diabetes management. Patient's last A1c was:   Hemoglobin A1C   Date Value Ref Range Status   06/30/2023 7.5 (H) 4.2 - 5.6 % Final     Comment:     (NOTE)  HbA1C Interpretive Ranges  <5.7              Normal  5.7 - 6.4         Consider Prediabetes  >6.5              Consider Diabetes         Interim update: Pt was last seen by me on 8/15/2023.   Per my prior note: Pt's A1c

## 2023-09-12 ENCOUNTER — PHARMACY VISIT (OUTPATIENT)
Age: 78
End: 2023-09-12

## 2023-09-12 DIAGNOSIS — E11.65 TYPE 2 DIABETES MELLITUS WITH HYPERGLYCEMIA, WITH LONG-TERM CURRENT USE OF INSULIN (HCC): Primary | ICD-10-CM

## 2023-09-12 DIAGNOSIS — Z79.4 TYPE 2 DIABETES MELLITUS WITH HYPERGLYCEMIA, WITH LONG-TERM CURRENT USE OF INSULIN (HCC): Primary | ICD-10-CM

## 2023-09-29 ENCOUNTER — HOSPITAL ENCOUNTER (OUTPATIENT)
Facility: HOSPITAL | Age: 78
End: 2023-09-29
Payer: MEDICARE

## 2023-09-29 LAB
ALBUMIN SERPL-MCNC: 3.6 G/DL (ref 3.4–5)
ALBUMIN/GLOB SERPL: 1.2 (ref 0.8–1.7)
ALP SERPL-CCNC: 67 U/L (ref 45–117)
ALT SERPL-CCNC: 28 U/L (ref 16–61)
ANION GAP SERPL CALC-SCNC: 2 MMOL/L (ref 3–18)
AST SERPL-CCNC: 16 U/L (ref 10–38)
BILIRUB SERPL-MCNC: 1.4 MG/DL (ref 0.2–1)
BUN SERPL-MCNC: 31 MG/DL (ref 7–18)
BUN/CREAT SERPL: 21 (ref 12–20)
CALCIUM SERPL-MCNC: 9 MG/DL (ref 8.5–10.1)
CHLORIDE SERPL-SCNC: 106 MMOL/L (ref 100–111)
CO2 SERPL-SCNC: 31 MMOL/L (ref 21–32)
CREAT SERPL-MCNC: 1.48 MG/DL (ref 0.6–1.3)
EST. AVERAGE GLUCOSE BLD GHB EST-MCNC: 148 MG/DL
GLOBULIN SER CALC-MCNC: 3.1 G/DL (ref 2–4)
GLUCOSE SERPL-MCNC: 128 MG/DL (ref 74–99)
HBA1C MFR BLD: 6.8 % (ref 4.2–5.6)
POTASSIUM SERPL-SCNC: 4.2 MMOL/L (ref 3.5–5.5)
PROT SERPL-MCNC: 6.7 G/DL (ref 6.4–8.2)
SODIUM SERPL-SCNC: 139 MMOL/L (ref 136–145)
URATE SERPL-MCNC: 5.2 MG/DL (ref 2.6–7.2)

## 2023-09-29 PROCEDURE — 80053 COMPREHEN METABOLIC PANEL: CPT

## 2023-09-29 PROCEDURE — 36415 COLL VENOUS BLD VENIPUNCTURE: CPT

## 2023-09-29 PROCEDURE — 83036 HEMOGLOBIN GLYCOSYLATED A1C: CPT

## 2023-09-29 PROCEDURE — 84550 ASSAY OF BLOOD/URIC ACID: CPT

## 2023-10-04 ENCOUNTER — OFFICE VISIT (OUTPATIENT)
Age: 78
End: 2023-10-04
Payer: MEDICARE

## 2023-10-04 VITALS
OXYGEN SATURATION: 98 % | DIASTOLIC BLOOD PRESSURE: 78 MMHG | BODY MASS INDEX: 27.6 KG/M2 | TEMPERATURE: 98 F | SYSTOLIC BLOOD PRESSURE: 128 MMHG | WEIGHT: 150 LBS | HEART RATE: 78 BPM | RESPIRATION RATE: 16 BRPM | HEIGHT: 62 IN

## 2023-10-04 DIAGNOSIS — N18.30 STAGE 3 CHRONIC KIDNEY DISEASE, UNSPECIFIED WHETHER STAGE 3A OR 3B CKD (HCC): ICD-10-CM

## 2023-10-04 DIAGNOSIS — E11.21 TYPE 2 DIABETES WITH NEPHROPATHY (HCC): ICD-10-CM

## 2023-10-04 DIAGNOSIS — C61 PROSTATE CANCER (HCC): ICD-10-CM

## 2023-10-04 DIAGNOSIS — I10 ESSENTIAL HYPERTENSION: ICD-10-CM

## 2023-10-04 DIAGNOSIS — I72.8 SPLENIC ARTERY ANEURYSM (HCC): ICD-10-CM

## 2023-10-04 DIAGNOSIS — Z00.00 MEDICARE ANNUAL WELLNESS VISIT, SUBSEQUENT: Primary | ICD-10-CM

## 2023-10-04 DIAGNOSIS — E78.00 PURE HYPERCHOLESTEROLEMIA: ICD-10-CM

## 2023-10-04 PROCEDURE — 3044F HG A1C LEVEL LT 7.0%: CPT | Performed by: FAMILY MEDICINE

## 2023-10-04 PROCEDURE — 1123F ACP DISCUSS/DSCN MKR DOCD: CPT | Performed by: FAMILY MEDICINE

## 2023-10-04 PROCEDURE — G0439 PPPS, SUBSEQ VISIT: HCPCS | Performed by: FAMILY MEDICINE

## 2023-10-04 PROCEDURE — G8484 FLU IMMUNIZE NO ADMIN: HCPCS | Performed by: FAMILY MEDICINE

## 2023-10-04 PROCEDURE — G8428 CUR MEDS NOT DOCUMENT: HCPCS | Performed by: FAMILY MEDICINE

## 2023-10-04 PROCEDURE — 3078F DIAST BP <80 MM HG: CPT | Performed by: FAMILY MEDICINE

## 2023-10-04 PROCEDURE — 3074F SYST BP LT 130 MM HG: CPT | Performed by: FAMILY MEDICINE

## 2023-10-04 PROCEDURE — G8417 CALC BMI ABV UP PARAM F/U: HCPCS | Performed by: FAMILY MEDICINE

## 2023-10-04 PROCEDURE — 1036F TOBACCO NON-USER: CPT | Performed by: FAMILY MEDICINE

## 2023-10-04 PROCEDURE — 99214 OFFICE O/P EST MOD 30 MIN: CPT | Performed by: FAMILY MEDICINE

## 2023-10-04 RX ORDER — TRIAMCINOLONE ACETONIDE 0.25 MG/G
OINTMENT TOPICAL
Qty: 30 G | Refills: 1 | Status: SHIPPED | OUTPATIENT
Start: 2023-10-04 | End: 2023-10-11

## 2023-10-04 ASSESSMENT — PATIENT HEALTH QUESTIONNAIRE - PHQ9
SUM OF ALL RESPONSES TO PHQ QUESTIONS 1-9: 0
SUM OF ALL RESPONSES TO PHQ QUESTIONS 1-9: 0
SUM OF ALL RESPONSES TO PHQ9 QUESTIONS 1 & 2: 0
SUM OF ALL RESPONSES TO PHQ QUESTIONS 1-9: 0
2. FEELING DOWN, DEPRESSED OR HOPELESS: 0
SUM OF ALL RESPONSES TO PHQ QUESTIONS 1-9: 0
1. LITTLE INTEREST OR PLEASURE IN DOING THINGS: 0

## 2023-10-04 ASSESSMENT — LIFESTYLE VARIABLES
HOW MANY STANDARD DRINKS CONTAINING ALCOHOL DO YOU HAVE ON A TYPICAL DAY: PATIENT DOES NOT DRINK
HOW OFTEN DO YOU HAVE A DRINK CONTAINING ALCOHOL: NEVER

## 2023-10-04 NOTE — PROGRESS NOTES
1. \"Have you been to the ER, urgent care clinic since your last visit? Hospitalized since your last visit? \" No    2. \"Have you seen or consulted any other health care providers outside of the [address] [place] [address] since your last visit? \" Kinjal Moreira PharmD 0912/20   Dr. [facility]    3. For patients aged 43-73: Has the patient had a colonoscopy / FIT/ Cologuard? Yes - no Care Gap present   Patient decline    If the patient is female:    4. For patients aged 43-66: Has the patient had a mammogram within the past 2 years? NA - based on age or sex      11. For patients aged 21-65: Has the patient had a pap smear?  NA - based on age or sex
Consider Diabetes     06/30/2023 7.5 (H) 4.2 - 5.6 % Final     Comment:     (NOTE)  HbA1C Interpretive Ranges  <5.7              Normal  5.7 - 6.4         Consider Prediabetes  >6.5              Consider Diabetes     03/27/2023 7.5 (H) 4.2 - 5.6 % Final     Comment:     (NOTE)  HbA1C Interpretive Ranges  <5.7              Normal  5.7 - 6.4         Consider Prediabetes  >6.5              Consider Diabetes           ASSESSMENT/PLAN    Diagnoses and all orders for this visit:    Type 2 diabetes mellitus with nephropathy(HCC)-  X8H 4.0>6.3  On trulicity and NPH 12 units  On ace/statin  Consider addition of sglt2 for nephropathy,  he is hesitant to start on medication, will monitor  Consider nephro consult, he wants to hold off  Continues to be on daily prednisone as part of prostate ca treatment, improving dm control, continue to closely follow Pharm D  Eye exam 11/2022  Foot exam 7/2023  Check Cmp/a1c 3 months or sooner prn    Essential hypertension-  Will cont lisinopril to 20 mg OD   Monitoring    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 Consider nephrology consult, he wants to hold, monitoring    Pure hypercholesterolemia-   at goal LDL< 100 on statin, cont  Lipid panel 7/2023    Gout without tophus, unspecified cause, unspecified chronicity, unspecified site-seldom flares,  Prn colchicine, low purine diet. Check uric acid prior to next visit      Splenic artery aneursysm  S/p repair 2015  On ASA, statin    Prostate cancer [facility])  2008 recurrence 9/2022 with pelvic LN metastasis  Following dr. Cameron Postal in 3 months or sooner prn    Patient understands plan of care. Patient has provided input and agrees with goals.

## 2023-11-03 NOTE — PROGRESS NOTES
(NOTE)  HbA1C Interpretive Ranges  <5.7              Normal  5.7 - 6.4         Consider Prediabetes  >6.5              Consider Diabetes     03/27/2023 7.5 (H) 4.2 - 5.6 % Final     Comment:     (NOTE)  HbA1C Interpretive Ranges  <5.7              Normal  5.7 - 6.4         Consider Prediabetes  >6.5              Consider Diabetes         Screenings/Prevention Parameters:  -Diabetic Eye and Foot Exams:      Diabetes Management   Topic Date Due    Diabetic retinal exam  11/14/2023     -Microalbumin / Creatinine ratio:       Lab Results   Component Value Date/Time    MICROALBUR 16.50 08/26/2022 08:11 AM    LABCREA 146.00 06/30/2023 11:26 AM     -Immunizations:      Immunization History   Administered Date(s) Administered    COVID-19, PFIZER PURPLE top, DILUTE for use, (age 15 y+), 30mcg/0.3mL 02/06/2021, 02/27/2021    Pneumococcal, PCV-13, PREVNAR 13, (age 6w+), IM, 0.5mL 04/06/2015    Pneumococcal, PPSV23, PNEUMOVAX 23, (age 2y+), SC/IM, 0.5mL 05/11/2016    TDaP, ADACEL (age 6y-58y), BOOSTRIX (age 10y+), IM, 0.5mL 05/13/2010       Additional Laboratory Parameters of Interest:   Estimation of renal function:  Lab Results   Component Value Date/Time    GFRAA 60 09/29/2022 08:10 AM    GFRAA 56 09/07/2022 12:00 PM    GFRAA 52 09/06/2022 11:20 AM     Wt Readings from Last 3 Encounters:   10/04/23 68 kg (150 lb)   07/05/23 68.3 kg (150 lb 8 oz)   05/01/23 68 kg (150 lb)     Ht Readings from Last 1 Encounters:   10/04/23 1.575 m (5' 2\")     Calculated estimated creatinine clearance: estimated creatinine clearance is 34 mL/min (A) (based on SCr of 1.47 mg/dL (H)). Vital Signs Today:    There were no vitals taken for this visit. There are no discontinued medications. No orders of the defined types were placed in this encounter.       Future Appointments   Date Time Provider [address]   11/8/2023 10:40 AM Rupa Jessica PA-C [facility]   1/8/2024  9:30 AM Kayce Castañeda MD [facility] BS AMB   1/11/2024

## 2023-11-06 ENCOUNTER — ENROLLMENT (OUTPATIENT)
Age: 78
End: 2023-11-06

## 2023-11-07 ENCOUNTER — PHARMACY VISIT (OUTPATIENT)
Age: 78
End: 2023-11-07

## 2023-11-07 DIAGNOSIS — E11.21 TYPE 2 DIABETES WITH NEPHROPATHY (HCC): Primary | ICD-10-CM

## 2023-12-29 DIAGNOSIS — E11.65 TYPE 2 DIABETES MELLITUS WITH HYPERGLYCEMIA, WITH LONG-TERM CURRENT USE OF INSULIN (HCC): ICD-10-CM

## 2023-12-29 DIAGNOSIS — Z79.4 TYPE 2 DIABETES MELLITUS WITH HYPERGLYCEMIA, WITH LONG-TERM CURRENT USE OF INSULIN (HCC): ICD-10-CM

## 2023-12-29 RX ORDER — INSULIN HUMAN 100 [IU]/ML
16 INJECTION, SUSPENSION SUBCUTANEOUS EVERY MORNING
Qty: 5 ADJUSTABLE DOSE PRE-FILLED PEN SYRINGE | Refills: 11 | OUTPATIENT
Start: 2023-12-29

## 2024-01-02 ENCOUNTER — PATIENT MESSAGE (OUTPATIENT)
Age: 79
End: 2024-01-02

## 2024-01-02 DIAGNOSIS — Z79.4 TYPE 2 DIABETES MELLITUS WITH HYPERGLYCEMIA, WITH LONG-TERM CURRENT USE OF INSULIN (HCC): ICD-10-CM

## 2024-01-02 DIAGNOSIS — E11.65 TYPE 2 DIABETES MELLITUS WITH HYPERGLYCEMIA, WITH LONG-TERM CURRENT USE OF INSULIN (HCC): ICD-10-CM

## 2024-01-03 NOTE — TELEPHONE ENCOUNTER
From: Varun Disla  Sent: 1/3/2024 9:34 AM EST  To: Luis Carlos [facility] Clinical Staff  Subject: NPH INSULIN NOT APPROVED    my request for NPH INSULIN still not approved. message   received...ordering user, Fred CHANDLER  Ordering & Authorizing Provider BRISSA GOMEZ MD  WHAT OTHER WAY TO GET THIS PRESCRIPTION TO BE APPROVED???  THANKS,  Varun Disla

## 2024-01-04 RX ORDER — INSULIN HUMAN 100 [IU]/ML
16 INJECTION, SUSPENSION SUBCUTANEOUS EVERY MORNING
Qty: 5 ADJUSTABLE DOSE PRE-FILLED PEN SYRINGE | Refills: 0 | Status: SHIPPED | OUTPATIENT
Start: 2024-01-04

## 2024-01-09 ENCOUNTER — PATIENT MESSAGE (OUTPATIENT)
Age: 79
End: 2024-01-09

## 2024-01-10 RX ORDER — DULAGLUTIDE 1.5 MG/.5ML
1.5 INJECTION, SOLUTION SUBCUTANEOUS
Qty: 12 ADJUSTABLE DOSE PRE-FILLED PEN SYRINGE | Refills: 1 | Status: SHIPPED | OUTPATIENT
Start: 2024-01-10

## 2024-01-10 NOTE — TELEPHONE ENCOUNTER
From: Varun Disla  To: Dr. Mignon Lopez  Sent: 1/9/2024 9:33 AM EST  Subject: trulicity 1.5 was removed from medication list    I need a refill for Trulicity 1.5 mg.    thanks

## 2024-01-10 NOTE — TELEPHONE ENCOUNTER
This patient contacted the office for the following prescriptions to be refilled:    Medication requested :   Requested Prescriptions     Pending Prescriptions Disp Refills    dulaglutide (TRULICITY) 1.5 MG/0.5ML SC injection       Sig: Inject 0.5 mLs into the skin every 7 days      LAST REFILLED: 2/9/2023  PCP: Mignon Lopez MD  LOV: 10/4/2023  NOV: 2/14/2024  FUTURE APPT:   Future Appointments   Date Time Provider Department Center   1/22/2024 10:00 AM [facility] NURSE [facility] Sched   2/8/2024 11:00 AM Fred Ramsey [facility] WB BS AMB   2/14/2024  9:45 AM Mignon Lopez MD [facility] BS AMB   5/1/2024 10:10 AM [facility] NURSE [facility] Sched   5/8/2024 10:20 AM Keren Benitez PA-C [facility] Sched     LABS:   Results for orders placed or performed in visit on 12/07/23 (from the past 2160 hour(s))   Comprehensive Metabolic Panel   Result Value Ref Range    Glucose 166 (H) 70 - 99 mg/dL    BUN 30 (H) 8 - 27 mg/dL    Creatinine 1.48 (H) 0.76 - 1.27 mg/dL    Est, Glomerular Filtration Rate 48 (L) >59 mL/min/1.73    BUN/Creatinine Ratio 20 10 - 24    Sodium 145 (H) 134 - 144 mmol/L    Potassium 4.0 3.5 - 5.2 mmol/L    Chloride 103 96 - 106 mmol/L    CO2 26 20 - 29 mmol/L    Calcium 9.8 8.6 - 10.2 mg/dL    Total Protein 6.7 6.0 - 8.5 g/dL    Albumin 4.2 3.8 - 4.8 g/dL    Globulin, Total 2.5 1.5 - 4.5 g/dL    Albumin/Globulin Ratio 1.7 1.2 - 2.2    Total Bilirubin 1.2 0.0 - 1.2 mg/dL    Alkaline Phosphatase 75 44 - 121 IU/L    AST 15 0 - 40 IU/L    ALT 16 0 - 44 IU/L   Results for orders placed or performed in visit on 11/01/23 (from the past 2160 hour(s))   Prostate Specific Antigen, Total   Result Value Ref Range    PSA <0.03 0 - 4 ng/mL   Vitamin D, 25 Hydroxy   Result Value Ref Range    Vit D, 25-Hydroxy 44.0 30.0 - 100.0 ng/ml   Testosterone   Result Value Ref Range    Testosterone <3 (L) 348 - 1197 ng/dL   Comprehensive Metabolic Panel   Result Value Ref Range    Glucose 245 (H) 70 - 99

## 2024-01-17 ENCOUNTER — PATIENT MESSAGE (OUTPATIENT)
Age: 79
End: 2024-01-17

## 2024-01-17 RX ORDER — PEN NEEDLE, DIABETIC 32GX 5/32"
1 NEEDLE, DISPOSABLE MISCELLANEOUS DAILY
Qty: 100 EACH | Refills: 3 | Status: SHIPPED | OUTPATIENT
Start: 2024-01-17

## 2024-02-15 ENCOUNTER — PATIENT MESSAGE (OUTPATIENT)
Age: 79
End: 2024-02-15

## 2024-02-15 DIAGNOSIS — E11.65 TYPE 2 DIABETES MELLITUS WITH HYPERGLYCEMIA, WITH LONG-TERM CURRENT USE OF INSULIN (HCC): Primary | ICD-10-CM

## 2024-02-15 DIAGNOSIS — Z79.4 TYPE 2 DIABETES MELLITUS WITH HYPERGLYCEMIA, WITH LONG-TERM CURRENT USE OF INSULIN (HCC): Primary | ICD-10-CM

## 2024-02-21 RX ORDER — SEMAGLUTIDE 0.68 MG/ML
0.5 INJECTION, SOLUTION SUBCUTANEOUS
Qty: 3 ML | Refills: 1 | Status: SHIPPED | OUTPATIENT
Start: 2024-02-21

## 2024-02-21 NOTE — TELEPHONE ENCOUNTER
Will send in Ozempic 0.5mg weekly to replace the Trulicity 1.5mg weekly that is out of stock at [facility].    Thank you,    Fred Ramsey, PharmD, BCACP, [facility]      For Pharmacy Admin Tracking Only    Program: Medical Group  CPA in place:  Yes  Recommendation Provided To: Pharmacy: 2  Intervention Detail: Discontinued Rx: 1, reason: Cost/Formulary Change and New Rx: 1, reason: Cost/Formulary Change  Intervention Accepted By: Pharmacy: 2  Gap Closed?: Yes   Time Spent (min): 10

## 2024-02-21 NOTE — TELEPHONE ENCOUNTER
From: Varun Disla  To: Fred Ramsey  Sent: 2/15/2024 8:48 AM EST  Subject: TRULICITY OUT OF STOCK/NOT AVAILABLE    Mr. Ramsey Good morning,  [facility] is out of Trulicity, I am on my last medication. is there other medication that can replace   Trulicity. Hospital having a problem to refill Trulicity.    Thanks  Mr. Disla

## 2024-03-19 DIAGNOSIS — E11.65 TYPE 2 DIABETES MELLITUS WITH HYPERGLYCEMIA, WITH LONG-TERM CURRENT USE OF INSULIN (HCC): ICD-10-CM

## 2024-03-19 DIAGNOSIS — Z79.4 TYPE 2 DIABETES MELLITUS WITH HYPERGLYCEMIA, WITH LONG-TERM CURRENT USE OF INSULIN (HCC): ICD-10-CM

## 2024-03-21 NOTE — TELEPHONE ENCOUNTER
Last OV 10/04/2024  Next OV 04/02/2024  Return in about 3 months (around 1/4/2024)   Last lab 03/12/2024

## 2024-03-25 RX ORDER — INSULIN HUMAN 100 [IU]/ML
16 INJECTION, SUSPENSION SUBCUTANEOUS EVERY MORNING
Qty: 5 ADJUSTABLE DOSE PRE-FILLED PEN SYRINGE | Refills: 0 | Status: SHIPPED | OUTPATIENT
Start: 2024-03-25

## 2024-03-25 NOTE — PROGRESS NOTES
Pharmacy Progress Note - Diabetes Management       Assessment / Plan:   Diabetes Management:  Per ADA guidelines, Pt's A1c is at goal of < 7%.  Pt's average FBG values are slightly above goal.  His limited NFBG values have all been at goal.  May consider an increase in NPH dose if his A1c is elevated to improve his glycemic control.  Will reassess with SMBG logs at follow up in 3 months or sooner if uncontrolled A1c.     Nutrition/Lifestyle Modifications:  - Educated pt on the importance of moderating carbohydrate intake. Reviewed sources of carbohydrates and method to help determine appropriate portion sizes (e.g., Diabetes Plate Method).  - Advised patient to avoid sugar-sweetened beverages and replace with water or diet/zero sugar option.  - Recommend ~30 minutes consistent, moderately intensive, exercise/day or ~150 minutes/week. Start small, stay consistent, and increase length and types of exercise, as tolerated.       Patient will return to clinic in 3 month(s) for follow up.        S/O: Mr. Varun Disla, a 78 y.o. male referred by Mignon Lopez MD,  has a past medical history of Arthritis, CRI (chronic renal insufficiency), Gout, Hypercholesteremia, Hypertension, Kidney stone, Other ill-defined conditions(799.89), Personal history of prostate cancer, Prostate cancer (HCC), and Splenic artery aneurysm (HCC).  Pt was seen today for diabetes management.  Patient's last A1c was:   Hemoglobin A1C   Date Value Ref Range Status   09/29/2023 6.8 (H) 4.2 - 5.6 % Final     Comment:     (NOTE)  HbA1C Interpretive Ranges  <5.7              Normal  5.7 - 6.4         Consider Prediabetes  >6.5              Consider Diabetes         Interim update: Pt was last seen by me on 11/7/2023.  Per my prior note: Pt's A1c is at goal of < 7%.  Pt's glycemic control is maintained with his current dose of NPH.  He continues to be on the ULN for FBG and NFBG values, but his average overall is controlled.  Could increase his

## 2024-03-26 ENCOUNTER — PHARMACY VISIT (OUTPATIENT)
Age: 79
End: 2024-03-26

## 2024-03-26 DIAGNOSIS — Z79.4 TYPE 2 DIABETES MELLITUS WITH HYPERGLYCEMIA, WITH LONG-TERM CURRENT USE OF INSULIN (HCC): Primary | ICD-10-CM

## 2024-03-26 DIAGNOSIS — E11.65 TYPE 2 DIABETES MELLITUS WITH HYPERGLYCEMIA, WITH LONG-TERM CURRENT USE OF INSULIN (HCC): Primary | ICD-10-CM

## 2024-03-29 ENCOUNTER — HOSPITAL ENCOUNTER (OUTPATIENT)
Facility: HOSPITAL | Age: 79
End: 2024-03-29
Payer: MEDICARE

## 2024-03-29 LAB
ALBUMIN SERPL-MCNC: 3.7 G/DL (ref 3.4–5)
ALBUMIN/GLOB SERPL: 1.2 (ref 0.8–1.7)
ALP SERPL-CCNC: 75 U/L (ref 45–117)
ALT SERPL-CCNC: 20 U/L (ref 16–61)
ANION GAP SERPL CALC-SCNC: 2 MMOL/L (ref 3–18)
AST SERPL-CCNC: 12 U/L (ref 10–38)
BILIRUB SERPL-MCNC: 1.4 MG/DL (ref 0.2–1)
BUN SERPL-MCNC: 36 MG/DL (ref 7–18)
BUN/CREAT SERPL: 24 (ref 12–20)
CALCIUM SERPL-MCNC: 9.4 MG/DL (ref 8.5–10.1)
CHLORIDE SERPL-SCNC: 108 MMOL/L (ref 100–111)
CO2 SERPL-SCNC: 32 MMOL/L (ref 21–32)
CREAT SERPL-MCNC: 1.52 MG/DL (ref 0.6–1.3)
GLOBULIN SER CALC-MCNC: 3.2 G/DL (ref 2–4)
GLUCOSE SERPL-MCNC: 165 MG/DL (ref 74–99)
HBA1C MFR BLD: 6.9 % (ref 4.2–5.6)
POTASSIUM SERPL-SCNC: 4.8 MMOL/L (ref 3.5–5.5)
PROT SERPL-MCNC: 6.9 G/DL (ref 6.4–8.2)
SODIUM SERPL-SCNC: 142 MMOL/L (ref 136–145)

## 2024-03-29 PROCEDURE — 80053 COMPREHEN METABOLIC PANEL: CPT

## 2024-03-29 PROCEDURE — 83036 HEMOGLOBIN GLYCOSYLATED A1C: CPT

## 2024-03-29 PROCEDURE — 36415 COLL VENOUS BLD VENIPUNCTURE: CPT

## 2024-04-09 ENCOUNTER — TELEPHONE (OUTPATIENT)
Age: 79
End: 2024-04-09

## 2024-04-09 NOTE — TELEPHONE ENCOUNTER
Pt called on behalf of [employer] for pre auth in order to get his meds.     Pt stated that he picks up meds from [facility].

## 2024-05-07 DIAGNOSIS — Z79.4 TYPE 2 DIABETES MELLITUS WITH HYPERGLYCEMIA, WITH LONG-TERM CURRENT USE OF INSULIN (HCC): ICD-10-CM

## 2024-05-07 DIAGNOSIS — E11.65 TYPE 2 DIABETES MELLITUS WITH HYPERGLYCEMIA, WITH LONG-TERM CURRENT USE OF INSULIN (HCC): ICD-10-CM

## 2024-05-07 RX ORDER — LISINOPRIL 20 MG/1
20 TABLET ORAL DAILY
Qty: 90 TABLET | Refills: 1 | Status: SHIPPED | OUTPATIENT
Start: 2024-05-07

## 2024-05-07 RX ORDER — SEMAGLUTIDE 0.68 MG/ML
0.5 INJECTION, SOLUTION SUBCUTANEOUS
Qty: 3 ML | Refills: 1 | Status: CANCELLED | OUTPATIENT
Start: 2024-05-07

## 2024-05-09 DIAGNOSIS — E11.65 TYPE 2 DIABETES MELLITUS WITH HYPERGLYCEMIA, WITH LONG-TERM CURRENT USE OF INSULIN (HCC): ICD-10-CM

## 2024-05-09 DIAGNOSIS — Z79.4 TYPE 2 DIABETES MELLITUS WITH HYPERGLYCEMIA, WITH LONG-TERM CURRENT USE OF INSULIN (HCC): ICD-10-CM

## 2024-05-09 NOTE — TELEPHONE ENCOUNTER
----- Message from Varun Disla sent at 5/9/2024  1:47 PM EDT -----  Regarding: ozempic refill  Contact: [phone]  refill request past 4 days, [facility] pharmacy stated that they haven't received any prescription for Ozempic  as of today..

## 2024-05-09 NOTE — TELEPHONE ENCOUNTER
----- Message from Varun Disla sent at 5/9/2024  1:47 PM EDT -----  Regarding: ozempic refill  Contact: [phone]  refill request past 4 days, [facility] pharmacy stated that they haven't received any prescription for Ozempic  as of today..

## 2024-05-09 NOTE — TELEPHONE ENCOUNTER
Last OV: 04/02/2  Last labs:03/29/2024  Next OV and labs: 07/09/2024      Patients refill request went to [facility] on 05/07/2024  No contact from [facility]

## 2024-05-13 RX ORDER — SEMAGLUTIDE 0.68 MG/ML
0.5 INJECTION, SOLUTION SUBCUTANEOUS
Qty: 3 ML | Refills: 1 | Status: SHIPPED | OUTPATIENT
Start: 2024-05-13

## 2024-05-19 DIAGNOSIS — Z79.4 TYPE 2 DIABETES MELLITUS WITH HYPERGLYCEMIA, WITH LONG-TERM CURRENT USE OF INSULIN (HCC): ICD-10-CM

## 2024-05-19 DIAGNOSIS — E11.65 TYPE 2 DIABETES MELLITUS WITH HYPERGLYCEMIA, WITH LONG-TERM CURRENT USE OF INSULIN (HCC): ICD-10-CM

## 2024-05-20 RX ORDER — BLOOD-GLUCOSE METER
KIT MISCELLANEOUS
Qty: 150 EACH | Refills: 11 | Status: SHIPPED | OUTPATIENT
Start: 2024-05-20

## 2024-05-20 RX ORDER — INSULIN HUMAN 100 [IU]/ML
16 INJECTION, SUSPENSION SUBCUTANEOUS EVERY MORNING
Qty: 5 ADJUSTABLE DOSE PRE-FILLED PEN SYRINGE | Refills: 3 | Status: SHIPPED | OUTPATIENT
Start: 2024-05-20

## 2024-06-27 DIAGNOSIS — Z79.4 TYPE 2 DIABETES MELLITUS WITH HYPERGLYCEMIA, WITH LONG-TERM CURRENT USE OF INSULIN (HCC): ICD-10-CM

## 2024-06-27 DIAGNOSIS — E11.65 TYPE 2 DIABETES MELLITUS WITH HYPERGLYCEMIA, WITH LONG-TERM CURRENT USE OF INSULIN (HCC): ICD-10-CM

## 2024-06-27 NOTE — PROGRESS NOTES
Chief Complaint   Patient presents with    Hypertension    Diabetes       \"Have you been to the ER, urgent care clinic since your last visit?  Hospitalized since your last visit?\"    YES - When: approximately 3 months ago.  [facility] and [facility]    “Have you seen or consulted any other health care providers outside of [facility] since your last visit?”    YES - When: approximately 3 months ago.  [facility] and [facility]: [facility], [facility].            Click Here for Release of Records Request

## 2024-06-27 NOTE — PATIENT INSTRUCTIONS

## 2024-06-28 ENCOUNTER — HOSPITAL ENCOUNTER (OUTPATIENT)
Facility: HOSPITAL | Age: 79
End: 2024-06-28
Payer: MEDICARE

## 2024-06-28 DIAGNOSIS — E11.21 TYPE 2 DIABETES WITH NEPHROPATHY (HCC): ICD-10-CM

## 2024-06-28 LAB
ALBUMIN SERPL-MCNC: 3.4 G/DL (ref 3.4–5)
ALBUMIN/GLOB SERPL: 1.1 (ref 0.8–1.7)
ALP SERPL-CCNC: 76 U/L (ref 45–117)
ALT SERPL-CCNC: 22 U/L (ref 16–61)
ANION GAP SERPL CALC-SCNC: 6 MMOL/L (ref 3–18)
AST SERPL-CCNC: 15 U/L (ref 10–38)
BILIRUB SERPL-MCNC: 1.6 MG/DL (ref 0.2–1)
BUN SERPL-MCNC: 33 MG/DL (ref 7–18)
BUN/CREAT SERPL: 25 (ref 12–20)
CALCIUM SERPL-MCNC: 8.9 MG/DL (ref 8.5–10.1)
CHLORIDE SERPL-SCNC: 103 MMOL/L (ref 100–111)
CHOLEST SERPL-MCNC: 116 MG/DL
CO2 SERPL-SCNC: 29 MMOL/L (ref 21–32)
CREAT SERPL-MCNC: 1.34 MG/DL (ref 0.6–1.3)
CREAT UR-MCNC: 86 MG/DL (ref 30–125)
EST. AVERAGE GLUCOSE BLD GHB EST-MCNC: 157 MG/DL
GLOBULIN SER CALC-MCNC: 3 G/DL (ref 2–4)
GLUCOSE SERPL-MCNC: 157 MG/DL (ref 74–99)
HBA1C MFR BLD: 7.1 % (ref 4.2–5.6)
HDLC SERPL-MCNC: 45 MG/DL (ref 40–60)
HDLC SERPL: 2.6 (ref 0–5)
LDLC SERPL CALC-MCNC: 48.8 MG/DL (ref 0–100)
LIPID PANEL: NORMAL
MICROALBUMIN UR-MCNC: 7.1 MG/DL (ref 0–3)
MICROALBUMIN/CREAT UR-RTO: 83 MG/G (ref 0–30)
POTASSIUM SERPL-SCNC: 3.9 MMOL/L (ref 3.5–5.5)
PROT SERPL-MCNC: 6.4 G/DL (ref 6.4–8.2)
SODIUM SERPL-SCNC: 138 MMOL/L (ref 136–145)
TRIGL SERPL-MCNC: 111 MG/DL
VLDLC SERPL CALC-MCNC: 22.2 MG/DL

## 2024-06-28 PROCEDURE — 80061 LIPID PANEL: CPT

## 2024-06-28 PROCEDURE — 82570 ASSAY OF URINE CREATININE: CPT

## 2024-06-28 PROCEDURE — 80053 COMPREHEN METABOLIC PANEL: CPT

## 2024-06-28 PROCEDURE — 83036 HEMOGLOBIN GLYCOSYLATED A1C: CPT

## 2024-06-28 PROCEDURE — 36415 COLL VENOUS BLD VENIPUNCTURE: CPT

## 2024-06-28 PROCEDURE — 82043 UR ALBUMIN QUANTITATIVE: CPT

## 2024-06-28 RX ORDER — SEMAGLUTIDE 0.68 MG/ML
0.5 INJECTION, SOLUTION SUBCUTANEOUS
Qty: 3 ML | Refills: 1 | Status: SHIPPED | OUTPATIENT
Start: 2024-06-28

## 2024-06-28 RX ORDER — SIMVASTATIN 40 MG
40 TABLET ORAL NIGHTLY
Qty: 90 TABLET | Refills: 3 | Status: SHIPPED | OUTPATIENT
Start: 2024-06-28

## 2024-06-28 RX ORDER — COLCHICINE 0.6 MG/1
0.6 TABLET ORAL DAILY
Qty: 90 TABLET | Refills: 1 | Status: SHIPPED | OUTPATIENT
Start: 2024-06-28

## 2024-07-01 NOTE — PROGRESS NOTES
current regimen?: Yes    Vitals/Labs:  No results found for: \"HBA1C\"  Hemoglobin A1C   Date Value Ref Range Status   06/28/2024 7.1 (H) 4.2 - 5.6 % Final     Comment:     (NOTE)  HbA1C Interpretive Ranges  <5.7              Normal  5.7 - 6.4         Consider Prediabetes  >6.5              Consider Diabetes     03/29/2024 6.9 (H) 4.2 - 5.6 % Final     Comment:     (NOTE)  HbA1C Interpretive Ranges  <5.7              Normal  5.7 - 6.4         Consider Prediabetes  >6.5              Consider Diabetes     09/29/2023 6.8 (H) 4.2 - 5.6 % Final     Comment:     (NOTE)  HbA1C Interpretive Ranges  <5.7              Normal  5.7 - 6.4         Consider Prediabetes  >6.5              Consider Diabetes         Screenings/Prevention Parameters:  -Diabetic Eye and Foot Exams:      Diabetes Management   Topic Date Due    Diabetic foot exam  07/05/2024     -Microalbumin / Creatinine ratio:       Lab Results   Component Value Date/Time    MICROALBUR 7.10 06/28/2024 08:43 AM        Lab Results   Component Value Date    MALBCR 83 (H) 06/28/2024    MALBCR 73 (H) 06/30/2023    MALBCR 151 (H) 08/26/2022      No components found for: \"MALBCREARAT\"  -Immunizations:      Immunization History   Administered Date(s) Administered    COVID-19, PFIZER PURPLE top, DILUTE for use, (age 12 y+), 30mcg/0.3mL 02/06/2021, 02/27/2021    Pneumococcal, PCV-13, PREVNAR 13, (age 6w+), IM, 0.5mL 04/06/2015    Pneumococcal, PPSV23, PNEUMOVAX 23, (age 2y+), SC/IM, 0.5mL 05/11/2016    TDaP, ADACEL (age 10y-64y), BOOSTRIX (age 10y+), IM, 0.5mL 05/13/2010       Additional Laboratory Parameters of Interest:   Estimation of renal function:  Lab Results   Component Value Date/Time    LABGLOM 54 06/28/2024 08:43 AM    LABGLOM 43 05/01/2024 10:20 AM    LABGLOM 46 03/29/2024 09:32 AM    LABGLOM 61 03/12/2024 11:00 AM    LABGLOM 58 02/13/2024 10:06 AM    LABGLOM 56 01/22/2024 12:00 AM    GFRAA 60 09/29/2022 08:10 AM    GFRAA 56 09/07/2022 12:00 PM    GFRAA 52 09/06/2022

## 2024-07-08 ENCOUNTER — PHARMACY VISIT (OUTPATIENT)
Facility: CLINIC | Age: 79
End: 2024-07-08

## 2024-07-08 DIAGNOSIS — E11.21 TYPE 2 DIABETES WITH NEPHROPATHY (HCC): Primary | ICD-10-CM

## 2024-07-08 RX ORDER — SEMAGLUTIDE 1.34 MG/ML
1 INJECTION, SOLUTION SUBCUTANEOUS
Qty: 3 ML | Refills: 2 | Status: SHIPPED | OUTPATIENT
Start: 2024-07-08

## 2024-07-09 ENCOUNTER — OFFICE VISIT (OUTPATIENT)
Facility: CLINIC | Age: 79
End: 2024-07-09
Payer: MEDICARE

## 2024-07-09 VITALS
TEMPERATURE: 99.3 F | OXYGEN SATURATION: 97 % | HEIGHT: 62 IN | BODY MASS INDEX: 28.82 KG/M2 | RESPIRATION RATE: 16 BRPM | SYSTOLIC BLOOD PRESSURE: 126 MMHG | DIASTOLIC BLOOD PRESSURE: 70 MMHG | HEART RATE: 92 BPM | WEIGHT: 156.6 LBS

## 2024-07-09 DIAGNOSIS — I72.8 SPLENIC ARTERY ANEURYSM (HCC): ICD-10-CM

## 2024-07-09 DIAGNOSIS — E78.00 PURE HYPERCHOLESTEROLEMIA: ICD-10-CM

## 2024-07-09 DIAGNOSIS — M10.9 GOUT WITHOUT TOPHUS: ICD-10-CM

## 2024-07-09 DIAGNOSIS — E11.21 TYPE 2 DIABETES WITH NEPHROPATHY (HCC): Primary | ICD-10-CM

## 2024-07-09 DIAGNOSIS — C61 PROSTATE CANCER (HCC): ICD-10-CM

## 2024-07-09 DIAGNOSIS — N18.31 STAGE 3A CHRONIC KIDNEY DISEASE (HCC): ICD-10-CM

## 2024-07-09 DIAGNOSIS — I10 ESSENTIAL HYPERTENSION: ICD-10-CM

## 2024-07-09 PROCEDURE — 99214 OFFICE O/P EST MOD 30 MIN: CPT | Performed by: FAMILY MEDICINE

## 2024-07-09 PROCEDURE — G8417 CALC BMI ABV UP PARAM F/U: HCPCS | Performed by: FAMILY MEDICINE

## 2024-07-09 PROCEDURE — 3074F SYST BP LT 130 MM HG: CPT | Performed by: FAMILY MEDICINE

## 2024-07-09 PROCEDURE — G8427 DOCREV CUR MEDS BY ELIG CLIN: HCPCS | Performed by: FAMILY MEDICINE

## 2024-07-09 PROCEDURE — 1123F ACP DISCUSS/DSCN MKR DOCD: CPT | Performed by: FAMILY MEDICINE

## 2024-07-09 PROCEDURE — 3078F DIAST BP <80 MM HG: CPT | Performed by: FAMILY MEDICINE

## 2024-07-09 PROCEDURE — 1036F TOBACCO NON-USER: CPT | Performed by: FAMILY MEDICINE

## 2024-07-09 PROCEDURE — 3051F HG A1C>EQUAL 7.0%<8.0%: CPT | Performed by: FAMILY MEDICINE

## 2024-07-09 NOTE — PROGRESS NOTES
SUBJECTIVE  Routine ff-up    DM w/ nephropathy- On NPH insulin 16 units, jardiance and Ozempic 1 mg weekly  . -140's,'s denies hypoglycemia. On long term prednisone as part of prostate cancer treatment.  Following Pharm D He is on prednisone daily as part of his prostate cancer treatment. A1c 6.9> 7.1    HTN- taking lisionpril 20 mg OD, no longer with lightheadedness.   HL- on zocor    Gout-  rare flares, related to eating peanuts or some [place] delicacies.no recent flare ups, colchicine prn effective    Splenic artery stenosis (s/p repair 2015)-no symptoms    Previous h/o prostate cancer 2008 s/p prostatectomy. Recurrence 9/2022 ongoing therapy then plan for radiation. Following dr. Cid.     ROS:  See HPI, all others negative        Patient Active Problem List   Diagnosis Code    Prostate cancer (HCC) C61    DJD (degenerative joint disease) of knee M17.10    CRI (chronic renal insufficiency) N18.9    Splenic artery aneurysm (HCC) I72.8    Essential hypertension I10    Pure hypercholesterolemia E78.00    Gout without tophus M10.9    Advance directive discussed with patient Z71.89    BMI 30.0-30.9,adult Z68.30    Type 2 diabetes with nephropathy (HCC) E11.21       Current Outpatient Medications:     Semaglutide, 1 MG/DOSE, (OZEMPIC, 1 MG/DOSE,) 4 MG/3ML SOPN sc injection, Inject 1 mg into the skin every 7 days, Disp: 3 mL, Rfl: 2    empagliflozin (JARDIANCE) 10 MG tablet, Take 1 tablet by mouth every morning, Disp: 90 tablet, Rfl: 1    simvastatin (ZOCOR) 40 MG tablet, Take 1 tablet by mouth nightly, Disp: 90 tablet, Rfl: 3    colchicine (COLCRYS) 0.6 MG tablet, Take 1 tablet by mouth daily, Disp: 90 tablet, Rfl: 1    insulin NPH (HUMULIN N KWIKPEN) 100 UNIT/ML injection pen, Inject 16 Units into the skin every morning, Disp: 5 Adjustable Dose Pre-filled Pen Syringe, Rfl: 3    FREESTYLE LITE strip, Use to check blood glucose up to four times daily.  (Pt is now on insulin), Disp: 150 each, Rfl:

## 2024-10-02 ENCOUNTER — HOSPITAL ENCOUNTER (OUTPATIENT)
Facility: HOSPITAL | Age: 79
Discharge: HOME | End: 2024-10-05
Payer: MEDICARE

## 2024-10-02 DIAGNOSIS — E11.21 TYPE 2 DIABETES WITH NEPHROPATHY (HCC): ICD-10-CM

## 2024-10-02 LAB
ALBUMIN SERPL-MCNC: 3.8 G/DL (ref 3.4–5)
ALBUMIN/GLOB SERPL: 1.6 (ref 0.8–1.7)
ALP SERPL-CCNC: 70 U/L (ref 45–117)
ALT SERPL-CCNC: 23 U/L (ref 16–61)
ANION GAP SERPL CALC-SCNC: 5 MMOL/L (ref 3–18)
AST SERPL-CCNC: 14 U/L (ref 10–38)
BILIRUB SERPL-MCNC: 1.6 MG/DL (ref 0.2–1)
BUN SERPL-MCNC: 27 MG/DL (ref 7–18)
BUN/CREAT SERPL: 20 (ref 12–20)
CALCIUM SERPL-MCNC: 9.1 MG/DL (ref 8.5–10.1)
CHLORIDE SERPL-SCNC: 108 MMOL/L (ref 100–111)
CO2 SERPL-SCNC: 28 MMOL/L (ref 21–32)
CREAT SERPL-MCNC: 1.34 MG/DL (ref 0.6–1.3)
EST. AVERAGE GLUCOSE BLD GHB EST-MCNC: 157 MG/DL
GLOBULIN SER CALC-MCNC: 2.4 G/DL (ref 2–4)
GLUCOSE SERPL-MCNC: 164 MG/DL (ref 74–99)
HBA1C MFR BLD: 7.1 % (ref 4.2–5.6)
POTASSIUM SERPL-SCNC: 4.8 MMOL/L (ref 3.5–5.5)
PROT SERPL-MCNC: 6.2 G/DL (ref 6.4–8.2)
SODIUM SERPL-SCNC: 141 MMOL/L (ref 136–145)

## 2024-10-02 PROCEDURE — 80053 COMPREHEN METABOLIC PANEL: CPT

## 2024-10-02 PROCEDURE — 83036 HEMOGLOBIN GLYCOSYLATED A1C: CPT

## 2024-10-02 PROCEDURE — 36415 COLL VENOUS BLD VENIPUNCTURE: CPT

## 2024-10-02 NOTE — PROGRESS NOTES
Pharmacy Progress Note - Diabetes Management       Assessment / Plan:   Diabetes Management:  Per ADA guidelines, Pt's A1c is not at goal of < 7%.  A1c unchanged from prior check just above goal.  Both his FBG and ac dinner values are typically wnl.  Suspect that he is having hyperglycemia in between peaking at lunchtime.  Will increase his NPH to 18 units ac breakfast when taking prednisone to target the elevations following breakfast.  Will reassess with SMBG logs at follow up in 6 weeks.     Nutrition/Lifestyle Modifications:  - Educated pt on the importance of moderating carbohydrate intake. Reviewed sources of carbohydrates and method to help determine appropriate portion sizes (e.g., Diabetes Plate Method).  - Advised patient to avoid sugar-sweetened beverages and replace with water or diet/zero sugar option.  - Recommend ~30 minutes consistent, moderately intensive, exercise/day or ~150 minutes/week. Start small, stay consistent, and increase length and types of exercise, as tolerated.       Patient will return to clinic in 6 week(s) for follow up.        S/O: Mr. Varun Disla, a 79 y.o. male referred by Mignon Lopez MD,  has a past medical history of Arthritis, CRI (chronic renal insufficiency), Gout, Hypercholesteremia, Hypertension, Kidney stone, Other ill-defined conditions(799.89), Personal history of prostate cancer, Prostate cancer (HCC), and Splenic artery aneurysm (HCC).  Pt was seen today for diabetes management.  Patient's last A1c was:   Hemoglobin A1C   Date Value Ref Range Status   10/02/2024 7.1 (H) 4.2 - 5.6 % Final     Comment:     (NOTE)  HbA1C Interpretive Ranges  <5.7              Normal  5.7 - 6.4         Consider Prediabetes  >6.5              Consider Diabetes         Interim update: Pt was last seen by me on 7/8/2024.  Per my prior note: Pt's A1c is not at goal of < 7%, but very close with minimal change from last check.  His FBG values are typically at the ULN or above.

## 2024-10-07 ENCOUNTER — PHARMACY VISIT (OUTPATIENT)
Facility: CLINIC | Age: 79
End: 2024-10-07

## 2024-10-07 DIAGNOSIS — E11.65 TYPE 2 DIABETES MELLITUS WITH HYPERGLYCEMIA, WITH LONG-TERM CURRENT USE OF INSULIN (HCC): ICD-10-CM

## 2024-10-07 DIAGNOSIS — Z79.4 TYPE 2 DIABETES MELLITUS WITH HYPERGLYCEMIA, WITH LONG-TERM CURRENT USE OF INSULIN (HCC): ICD-10-CM

## 2024-10-07 RX ORDER — INSULIN HUMAN 100 [IU]/ML
18 INJECTION, SUSPENSION SUBCUTANEOUS EVERY MORNING
Qty: 5 ADJUSTABLE DOSE PRE-FILLED PEN SYRINGE | Refills: 3 | Status: SHIPPED | OUTPATIENT
Start: 2024-10-07

## 2024-10-09 ENCOUNTER — OFFICE VISIT (OUTPATIENT)
Facility: CLINIC | Age: 79
End: 2024-10-09
Payer: MEDICARE

## 2024-10-09 ENCOUNTER — OFFICE VISIT (OUTPATIENT)
Facility: CLINIC | Age: 79
End: 2024-10-09

## 2024-10-09 VITALS
SYSTOLIC BLOOD PRESSURE: 138 MMHG | OXYGEN SATURATION: 98 % | TEMPERATURE: 99.6 F | WEIGHT: 153 LBS | HEIGHT: 62 IN | BODY MASS INDEX: 28.16 KG/M2 | RESPIRATION RATE: 16 BRPM | DIASTOLIC BLOOD PRESSURE: 70 MMHG | HEART RATE: 97 BPM

## 2024-10-09 DIAGNOSIS — C61 PROSTATE CANCER (HCC): ICD-10-CM

## 2024-10-09 DIAGNOSIS — I72.8 SPLENIC ARTERY ANEURYSM (HCC): ICD-10-CM

## 2024-10-09 DIAGNOSIS — E11.21 TYPE 2 DIABETES WITH NEPHROPATHY (HCC): Primary | ICD-10-CM

## 2024-10-09 DIAGNOSIS — I10 ESSENTIAL HYPERTENSION: ICD-10-CM

## 2024-10-09 DIAGNOSIS — N18.31 CHRONIC KIDNEY DISEASE, STAGE 3A (HCC): ICD-10-CM

## 2024-10-09 DIAGNOSIS — Z00.00 MEDICARE ANNUAL WELLNESS VISIT, SUBSEQUENT: Primary | ICD-10-CM

## 2024-10-09 PROCEDURE — G8484 FLU IMMUNIZE NO ADMIN: HCPCS | Performed by: FAMILY MEDICINE

## 2024-10-09 PROCEDURE — 1123F ACP DISCUSS/DSCN MKR DOCD: CPT | Performed by: FAMILY MEDICINE

## 2024-10-09 PROCEDURE — G0439 PPPS, SUBSEQ VISIT: HCPCS | Performed by: FAMILY MEDICINE

## 2024-10-09 RX ORDER — SEMAGLUTIDE 2.68 MG/ML
2 INJECTION, SOLUTION SUBCUTANEOUS
Qty: 9 ML | Refills: 0 | Status: SHIPPED | OUTPATIENT
Start: 2024-10-09

## 2024-10-09 ASSESSMENT — PATIENT HEALTH QUESTIONNAIRE - PHQ9
SUM OF ALL RESPONSES TO PHQ9 QUESTIONS 1 & 2: 0
SUM OF ALL RESPONSES TO PHQ QUESTIONS 1-9: 0
SUM OF ALL RESPONSES TO PHQ QUESTIONS 1-9: 0
1. LITTLE INTEREST OR PLEASURE IN DOING THINGS: NOT AT ALL
SUM OF ALL RESPONSES TO PHQ QUESTIONS 1-9: 0
SUM OF ALL RESPONSES TO PHQ QUESTIONS 1-9: 0
2. FEELING DOWN, DEPRESSED OR HOPELESS: NOT AT ALL

## 2024-10-09 ASSESSMENT — LIFESTYLE VARIABLES
HOW OFTEN DO YOU HAVE A DRINK CONTAINING ALCOHOL: NEVER
HOW MANY STANDARD DRINKS CONTAINING ALCOHOL DO YOU HAVE ON A TYPICAL DAY: PATIENT DOES NOT DRINK

## 2024-10-09 NOTE — PROGRESS NOTES
Medicare Annual Wellness Visit    Varun Disla is here for No chief complaint on file.    Assessment & Plan   Medicare annual wellness visit, subsequent  Recommendations for Preventive Services Due: see orders and patient instructions/AVS.  Recommended screening schedule for the next 5-10 years is provided to the patient in written form: see Patient Instructions/AVS.     No follow-ups on file.     Subjective       Patient's complete Health Risk Assessment and screening values have been reviewed and are found in Flowsheets. The following problems were reviewed today and where indicated follow up appointments were made and/or referrals ordered.    Positive Risk Factor Screenings with Interventions:                   Dentist Screen:  Have you seen the dentist within the past year?: (!) No    Intervention:  Advised to schedule with their dentist    Hearing Screen:  Do you or your family notice any trouble with your hearing that hasn't been managed with hearing aids?: (!) Yes    Interventions:  See AVS for additional education material       Advanced Directives:  Do you have a Living Will?: (!) No    Intervention:  has NO advanced directive - information provided                     Objective   There were no vitals filed for this visit.   There is no height or weight on file to calculate BMI.                    Allergies   Allergen Reactions    Azithromycin Palpitations    Citrullus Vulgaris Hives    Amlodipine Itching, Other (See Comments) and Rash     Reaction(s): Unknown; Note: [id number] soc    HIVES - [id number] CRB       Prior to Visit Medications    Medication Sig Taking? Authorizing Provider   insulin NPH (HUMULIN N KWIKPEN) 100 UNIT/ML injection pen Inject 18 Units into the skin every morning  Mignon Lopez MD   abiraterone acetate (ZYTIGA) 250 MG tablet TAKE 4 TABLETS BY MOUTH AT THE SAME TIME DAILY, ON AN EMPTY STOMACH ONE HOUR PRIOR OR TWO HOURS AFTER FOOD.  Vincent Cid MD   Leuprolide Acetate, 3

## 2024-10-09 NOTE — PROGRESS NOTES
\"Have you been to the ER, urgent care clinic since your last visit?  Hospitalized since your last visit?\"    NO    “Have you seen or consulted any other health care providers outside our system since your last visit?”    NO           
every morning, Disp: 90 tablet, Rfl: 1    simvastatin (ZOCOR) 40 MG tablet, Take 1 tablet by mouth nightly, Disp: 90 tablet, Rfl: 3    colchicine (COLCRYS) 0.6 MG tablet, Take 1 tablet by mouth daily, Disp: 90 tablet, Rfl: 1    FREESTYLE LITE strip, Use to check blood glucose up to four times daily.  (Pt is now on insulin), Disp: 150 each, Rfl: 11    lisinopril (PRINIVIL;ZESTRIL) 20 MG tablet, Take 1 tablet by mouth daily, Disp: 90 tablet, Rfl: 1    DROPLET PEN NEEDLES 32G X 4 MM MISC, Inject 1 each into the skin daily, Disp: 100 each, Rfl: 3    predniSONE (DELTASONE) 5 MG tablet, Take 1 tablet by mouth two times per day. (Patient taking differently: Take 1 tablet by mouth every morning Take 1 tablet by mouth two times per day.), Disp: 60 tablet, Rfl: 5    Alcohol Swabs (EASY TOUCH ALCOHOL PREP MEDIUM) 70 % PADS, Apply 1 swab topically in the morning and at bedtime, Disp: 600 each, Rfl: 3    Abiraterone Acetate 500 MG TABS, TAKE 2 TABLETS BY MOUTH ONCE PER DAY 1 HOUR BEFORE OR 2 HOURS AFTER EATING., Disp: 60 tablet, Rfl: 5    FreeStyle Lancets MISC, Use to check blood glucose up to four times daily, Disp: 400 each, Rfl: 3    CALCIUM PO, Take by mouth, Disp: , Rfl:     Blood Glucose Monitoring Suppl (FREESTYLE FREEDOM LITE) w/Device KIT, , Disp: , Rfl:     Cholecalciferol 50 MCG (2000 UT) CAPS, Take 1 capsule by mouth daily, Disp: , Rfl:     acetaminophen (TYLENOL) 500 MG tablet, Take 2 tablets by mouth every 6 hours as needed, Disp: , Rfl:     aspirin 81 MG chewable tablet, Take 1 tablet by mouth daily, Disp: , Rfl:     hydrocortisone 2.5 % cream, Apply topically 2 times daily, Disp: , Rfl:     Leuprolide Acetate, 3 Month, (ELIGARD) 22.5 MG KIT, Inject 22.5 mg into the skin once for 1 dose, Disp: 1 kit, Rfl: 0    Leuprolide Acetate, 3 Month, (ELIGARD) 22.5 MG KIT, Inject 22.5 mg into the skin once for 1 dose, Disp: 1 kit, Rfl: 0    Leuprolide Acetate, 3 Month, (ELIGARD) 22.5 MG KIT, Inject 22.5 mg into the skin once

## 2024-10-10 ENCOUNTER — HOSPITAL ENCOUNTER (OUTPATIENT)
Facility: HOSPITAL | Age: 79
Discharge: HOME | End: 2024-10-13
Payer: MEDICARE

## 2024-10-10 LAB
25(OH)D3 SERPL-MCNC: 40 NG/ML (ref 30–100)
ALBUMIN SERPL-MCNC: 4 G/DL (ref 3.4–5)
ANION GAP SERPL CALC-SCNC: 4 MMOL/L (ref 3–18)
APPEARANCE UR: CLEAR
BACTERIA URNS QL MICRO: ABNORMAL /HPF
BILIRUB UR QL: NEGATIVE
BUN SERPL-MCNC: 30 MG/DL (ref 7–18)
BUN/CREAT SERPL: 23 (ref 12–20)
CALCIUM SERPL-MCNC: 9.5 MG/DL (ref 8.5–10.1)
CALCIUM SERPL-MCNC: 9.8 MG/DL (ref 8.5–10.1)
CHLORIDE SERPL-SCNC: 104 MMOL/L (ref 100–111)
CO2 SERPL-SCNC: 29 MMOL/L (ref 21–32)
COLOR UR: YELLOW
CREAT SERPL-MCNC: 1.31 MG/DL (ref 0.6–1.3)
CREAT UR-MCNC: 70 MG/DL (ref 30–125)
CREAT UR-MCNC: 71 MG/DL (ref 30–125)
EPITH CASTS URNS QL MICRO: ABNORMAL /LPF (ref 0–5)
EST. AVERAGE GLUCOSE BLD GHB EST-MCNC: 154 MG/DL
GLUCOSE SERPL-MCNC: 139 MG/DL (ref 74–99)
GLUCOSE UR STRIP.AUTO-MCNC: >1000 MG/DL
HBA1C MFR BLD: 7 % (ref 4.2–5.6)
HGB UR QL STRIP: NEGATIVE
KETONES UR QL STRIP.AUTO: NEGATIVE MG/DL
LEUKOCYTE ESTERASE UR QL STRIP.AUTO: NEGATIVE
MICROALBUMIN UR-MCNC: 5.47 MG/DL (ref 0–3)
MICROALBUMIN/CREAT UR-RTO: 78 MG/G (ref 0–30)
NITRITE UR QL STRIP.AUTO: NEGATIVE
PH UR STRIP: 6 (ref 5–8)
PHOSPHATE SERPL-MCNC: 3.5 MG/DL (ref 2.5–4.9)
PHOSPHATE SERPL-MCNC: 3.7 MG/DL (ref 2.5–4.9)
POTASSIUM SERPL-SCNC: 4.1 MMOL/L (ref 3.5–5.5)
PROT UR STRIP-MCNC: ABNORMAL MG/DL
PROT UR-MCNC: 26 MG/DL
PROT/CREAT UR-RTO: 0.4
PTH-INTACT SERPL-MCNC: 27.1 PG/ML (ref 18.4–88)
RBC #/AREA URNS HPF: ABNORMAL /HPF (ref 0–5)
SODIUM SERPL-SCNC: 137 MMOL/L (ref 136–145)
SP GR UR REFRACTOMETRY: 1.03 (ref 1–1.03)
URATE SERPL-MCNC: 4.8 MG/DL (ref 2.6–7.2)
UROBILINOGEN UR QL STRIP.AUTO: 1 EU/DL (ref 0.2–1)
WBC URNS QL MICRO: ABNORMAL /HPF (ref 0–5)

## 2024-10-10 PROCEDURE — 80069 RENAL FUNCTION PANEL: CPT

## 2024-10-10 PROCEDURE — 84550 ASSAY OF BLOOD/URIC ACID: CPT

## 2024-10-10 PROCEDURE — 82043 UR ALBUMIN QUANTITATIVE: CPT

## 2024-10-10 PROCEDURE — 36415 COLL VENOUS BLD VENIPUNCTURE: CPT

## 2024-10-10 PROCEDURE — 83036 HEMOGLOBIN GLYCOSYLATED A1C: CPT

## 2024-10-10 PROCEDURE — 82784 ASSAY IGA/IGD/IGG/IGM EACH: CPT

## 2024-10-10 PROCEDURE — 83970 ASSAY OF PARATHORMONE: CPT

## 2024-10-10 PROCEDURE — 84165 PROTEIN E-PHORESIS SERUM: CPT

## 2024-10-10 PROCEDURE — 86334 IMMUNOFIX E-PHORESIS SERUM: CPT

## 2024-10-10 PROCEDURE — 84156 ASSAY OF PROTEIN URINE: CPT

## 2024-10-10 PROCEDURE — 84100 ASSAY OF PHOSPHORUS: CPT

## 2024-10-10 PROCEDURE — 82570 ASSAY OF URINE CREATININE: CPT

## 2024-10-10 PROCEDURE — 83521 IG LIGHT CHAINS FREE EACH: CPT

## 2024-10-10 PROCEDURE — 82306 VITAMIN D 25 HYDROXY: CPT

## 2024-10-10 PROCEDURE — 81001 URINALYSIS AUTO W/SCOPE: CPT

## 2024-10-13 LAB
KAPPA LC FREE SER-MCNC: 31.6 MG/L (ref 3.3–19.4)
KAPPA LC FREE/LAMBDA FREE SER: 1.04 (ref 0.26–1.65)
LAMBDA LC FREE SERPL-MCNC: 30.5 MG/L (ref 5.7–26.3)

## 2024-10-15 LAB
ALBUMIN SERPL ELPH-MCNC: 3.7 G/DL (ref 2.9–4.4)
ALBUMIN/GLOB SERPL: 1.4 (ref 0.7–1.7)
ALPHA1 GLOB SERPL ELPH-MCNC: 0.2 G/DL (ref 0–0.4)
ALPHA2 GLOB SERPL ELPH-MCNC: 0.7 G/DL (ref 0.4–1)
B-GLOBULIN SERPL ELPH-MCNC: 0.9 G/DL (ref 0.7–1.3)
GAMMA GLOB SERPL ELPH-MCNC: 0.9 G/DL (ref 0.4–1.8)
GLOBULIN SER-MCNC: 2.7 G/DL (ref 2.2–3.9)
IGA SERPL-MCNC: 266 MG/DL (ref 61–437)
IGG SERPL-MCNC: 938 MG/DL (ref 603–1613)
IGM SERPL-MCNC: 40 MG/DL (ref 15–143)
INTERPRETATION SERPL IEP-IMP: NORMAL
M PROTEIN SERPL ELPH-MCNC: NORMAL G/DL
PROT SERPL-MCNC: 6.4 G/DL (ref 6–8.5)

## 2024-11-14 NOTE — PROGRESS NOTES
Pharmacy Progress Note - Diabetes Management       Assessment / Plan:   Diabetes Management:  Per ADA guidelines, Pt's A1c is not at goal of < 7%.  Pt's SMBG logs indicate stable control with FBG at ULN or just above and NFBG values typically at goal.  With the taper of his prednisone, he will need less insulin.  Once he is off of insulin, he may no longer need it.  Starting the day after he stops prednisone, 11/29/24, he will decrease his NPH to 14 units ac breakfast.  For every BG < 100 mg/dL per day, he will decrease his dose by 2 units the following day until reaching 8 units.  If he has any BG < 100 mg/dL on 8 units of NPH, he will discontinue it.  Will reassess with SMBG logs at follow up in 3 weeks.     Nutrition/Lifestyle Modifications:  - Educated pt on the importance of moderating carbohydrate intake. Reviewed sources of carbohydrates and method to help determine appropriate portion sizes (e.g., Diabetes Plate Method).  - Advised patient to avoid sugar-sweetened beverages and replace with water or diet/zero sugar option.  - Recommend ~30 minutes consistent, moderately intensive, exercise/day or ~150 minutes/week. Start small, stay consistent, and increase length and types of exercise, as tolerated.       Patient will return to clinic in 3 week(s) for follow up.        S/O: Mr. Varun Disla, a 79 y.o. male referred by Mignon Lopez MD,  has a past medical history of Arthritis, CRI (chronic renal insufficiency), Gout, Hypercholesteremia, Hypertension, Kidney stone, Other ill-defined conditions(799.89), Personal history of prostate cancer, Prostate cancer (HCC), and Splenic artery aneurysm (HCC).  Pt was seen today for diabetes management.  Patient's last A1c was:   Hemoglobin A1C   Date Value Ref Range Status   10/10/2024 7.0 (H) 4.2 - 5.6 % Final     Comment:     (NOTE)  HbA1C Interpretive Ranges  <5.7              Normal  5.7 - 6.4         Consider Prediabetes  >6.5              Consider

## 2024-11-18 ENCOUNTER — PHARMACY VISIT (OUTPATIENT)
Facility: CLINIC | Age: 79
End: 2024-11-18

## 2024-11-18 DIAGNOSIS — Z79.4 TYPE 2 DIABETES MELLITUS WITH HYPERGLYCEMIA, WITH LONG-TERM CURRENT USE OF INSULIN (HCC): Primary | ICD-10-CM

## 2024-11-18 DIAGNOSIS — E11.65 TYPE 2 DIABETES MELLITUS WITH HYPERGLYCEMIA, WITH LONG-TERM CURRENT USE OF INSULIN (HCC): Primary | ICD-10-CM

## 2024-11-18 NOTE — PATIENT INSTRUCTIONS
- Starting on 11/29:    * Decrease NPH to 14 units every morning    - For each day that you have a blood glucose < 100 mg/dL, decrease by 2 units    - Decrease to 12, 10, 8, and then stop if you continue to have blood glucose < 100 mg/dL

## 2024-11-30 DIAGNOSIS — E11.65 TYPE 2 DIABETES MELLITUS WITH HYPERGLYCEMIA, WITH LONG-TERM CURRENT USE OF INSULIN (HCC): ICD-10-CM

## 2024-11-30 DIAGNOSIS — Z79.4 TYPE 2 DIABETES MELLITUS WITH HYPERGLYCEMIA, WITH LONG-TERM CURRENT USE OF INSULIN (HCC): ICD-10-CM

## 2024-12-02 RX ORDER — ISOPROPYL ALCOHOL 70 ML/100ML
1 SWAB TOPICAL 2 TIMES DAILY
Qty: 600 EACH | Refills: 3 | Status: SHIPPED | OUTPATIENT
Start: 2024-12-02

## 2024-12-02 RX ORDER — LISINOPRIL 20 MG/1
20 TABLET ORAL DAILY
Qty: 90 TABLET | Refills: 1 | Status: SHIPPED | OUTPATIENT
Start: 2024-12-02

## 2024-12-03 RX ORDER — LANCETS 28 GAUGE
EACH MISCELLANEOUS
Qty: 400 EACH | Refills: 3 | Status: SHIPPED | OUTPATIENT
Start: 2024-12-03

## 2024-12-09 NOTE — PROGRESS NOTES
Pharmacy Progress Note - Diabetes Management       Assessment / Plan:   Diabetes Management:  Per ADA guidelines, Pt's A1c is not at goal of < 7%.  Pt's FBG values are typically just above goal while his NFBG values have improved despite the dose decrease of NPH after coming off of the prednisone.  Suspect that his glycemic control will slowly improve with time so will maintain his current tx for now.  However, for every BG < 100 mg/dL per day, he will decrease his dose by 2 units the following day until reaching 8 units. If he has any BG < 100 mg/dL on 8 units of NPH, he will discontinue it.  Will reassess with SMBG logs at follow up in 4 weeks.     Nutrition/Lifestyle Modifications:  - Educated pt on the importance of moderating carbohydrate intake. Reviewed sources of carbohydrates and method to help determine appropriate portion sizes (e.g., Diabetes Plate Method).  - Advised patient to avoid sugar-sweetened beverages and replace with water or diet/zero sugar option.  - Recommend ~30 minutes consistent, moderately intensive, exercise/day or ~150 minutes/week. Start small, stay consistent, and increase length and types of exercise, as tolerated.       Patient will return to clinic in 4 week(s) for follow up.        S/O: Mr. Varun Disla, a 79 y.o. male referred by Mignon Lopez MD,  has a past medical history of Arthritis, CRI (chronic renal insufficiency), Gout, Hypercholesteremia, Hypertension, Kidney stone, Other ill-defined conditions(799.89), Personal history of prostate cancer, Prostate cancer (HCC), and Splenic artery aneurysm (HCC).  Pt was seen today for diabetes management.  Patient's last A1c was:   Hemoglobin A1C   Date Value Ref Range Status   10/10/2024 7.0 (H) 4.2 - 5.6 % Final     Comment:     (NOTE)  HbA1C Interpretive Ranges  <5.7              Normal  5.7 - 6.4         Consider Prediabetes  >6.5              Consider Diabetes         Interim update: Pt was last seen by me on

## 2024-12-10 ENCOUNTER — PHARMACY VISIT (OUTPATIENT)
Facility: CLINIC | Age: 79
End: 2024-12-10

## 2024-12-10 DIAGNOSIS — Z79.4 TYPE 2 DIABETES MELLITUS WITH HYPERGLYCEMIA, WITH LONG-TERM CURRENT USE OF INSULIN (HCC): Primary | ICD-10-CM

## 2024-12-10 DIAGNOSIS — E11.65 TYPE 2 DIABETES MELLITUS WITH HYPERGLYCEMIA, WITH LONG-TERM CURRENT USE OF INSULIN (HCC): Primary | ICD-10-CM

## 2024-12-10 NOTE — PATIENT INSTRUCTIONS
Continue NPH 14 units every morning     - For each day that you have a blood glucose < 100 mg/dL, decrease by 2 units     - Decrease to 12, 10, 8, and then stop if you continue to have blood glucose < 100 mg/dL

## 2024-12-30 RX ORDER — SEMAGLUTIDE 1.34 MG/ML
1 INJECTION, SOLUTION SUBCUTANEOUS
Qty: 12 ADJUSTABLE DOSE PRE-FILLED PEN SYRINGE | Refills: 0 | Status: SHIPPED | OUTPATIENT
Start: 2024-12-30

## 2025-01-02 ENCOUNTER — HOSPITAL ENCOUNTER (OUTPATIENT)
Facility: HOSPITAL | Age: 80
Discharge: HOME | End: 2025-01-02
Payer: MEDICARE

## 2025-01-02 LAB
ALBUMIN SERPL-MCNC: 3.7 G/DL (ref 3.4–5)
ALBUMIN/GLOB SERPL: 1.3 (ref 0.8–1.7)
ALP SERPL-CCNC: 60 U/L (ref 45–117)
ALT SERPL-CCNC: 27 U/L (ref 16–61)
ANION GAP SERPL CALC-SCNC: 2 MMOL/L (ref 3–18)
AST SERPL-CCNC: 19 U/L (ref 10–38)
BILIRUB SERPL-MCNC: 1.1 MG/DL (ref 0.2–1)
BUN SERPL-MCNC: 30 MG/DL (ref 7–18)
BUN/CREAT SERPL: 25 (ref 12–20)
CALCIUM SERPL-MCNC: 8.7 MG/DL (ref 8.5–10.1)
CHLORIDE SERPL-SCNC: 106 MMOL/L (ref 100–111)
CHOLEST SERPL-MCNC: 116 MG/DL
CO2 SERPL-SCNC: 30 MMOL/L (ref 21–32)
CREAT SERPL-MCNC: 1.22 MG/DL (ref 0.6–1.3)
CREAT UR-MCNC: 83 MG/DL (ref 30–125)
EST. AVERAGE GLUCOSE BLD GHB EST-MCNC: 143 MG/DL
GLOBULIN SER CALC-MCNC: 2.8 G/DL (ref 2–4)
GLUCOSE SERPL-MCNC: 132 MG/DL (ref 74–99)
HBA1C MFR BLD: 6.6 % (ref 4.2–5.6)
HDLC SERPL-MCNC: 52 MG/DL (ref 40–60)
HDLC SERPL: 2.2 (ref 0–5)
LDLC SERPL CALC-MCNC: 44 MG/DL (ref 0–100)
LIPID PANEL: NORMAL
MICROALBUMIN UR-MCNC: 8.49 MG/DL (ref 0–3)
MICROALBUMIN/CREAT UR-RTO: 102 MG/G (ref 0–30)
POTASSIUM SERPL-SCNC: 4.2 MMOL/L (ref 3.5–5.5)
PROT SERPL-MCNC: 6.5 G/DL (ref 6.4–8.2)
SODIUM SERPL-SCNC: 138 MMOL/L (ref 136–145)
TRIGL SERPL-MCNC: 100 MG/DL
VLDLC SERPL CALC-MCNC: 20 MG/DL

## 2025-01-02 PROCEDURE — 80061 LIPID PANEL: CPT

## 2025-01-02 PROCEDURE — 83036 HEMOGLOBIN GLYCOSYLATED A1C: CPT

## 2025-01-02 PROCEDURE — 82043 UR ALBUMIN QUANTITATIVE: CPT

## 2025-01-02 PROCEDURE — 36415 COLL VENOUS BLD VENIPUNCTURE: CPT

## 2025-01-02 PROCEDURE — 80053 COMPREHEN METABOLIC PANEL: CPT

## 2025-01-02 PROCEDURE — 82570 ASSAY OF URINE CREATININE: CPT

## 2025-01-08 NOTE — PROGRESS NOTES
Pharmacy Progress Note - Diabetes Management       Assessment / Plan:   Diabetes Management:  Per ADA guidelines, Pt's A1c is at goal of < 7%.  Pt's FBG values are typically just above goal while all of his NFBG values are wnl.  Now that he is off of the prednisone, his insulin requirement may be less.  Will attempt to discontinue the NPH at this time to see if the combination of his current meds and dietary changes can maintain his glycemic control.  Will have him decrease his Ozempic to 1mg weekly as directed by Mignon Lopez MD d/t his new-onset itching following the dose increase.    Advised pt to contact the clinic if his BG values increase above their current levels.  If insulin is restarted, will switch to Lantus for a 24hr control now that he no longer needs the NPH profile to match his prednisone.  Will reassess with SMBG logs at follow up in 4 weeks.     Nutrition/Lifestyle Modifications:  - Educated pt on the importance of moderating carbohydrate intake. Reviewed sources of carbohydrates and method to help determine appropriate portion sizes (e.g., Diabetes Plate Method).  - Advised patient to avoid sugar-sweetened beverages and replace with water or diet/zero sugar option.  - Recommend ~30 minutes consistent, moderately intensive, exercise/day or ~150 minutes/week. Start small, stay consistent, and increase length and types of exercise, as tolerated.       Patient will return to clinic in 4 week(s) for follow up.        S/O: Mr. Varun Disla, a 79 y.o. male referred by Mignon Lopez MD,  has a past medical history of Arthritis, CRI (chronic renal insufficiency), Gout, Hypercholesteremia, Hypertension, Kidney stone, Other ill-defined conditions(799.89), Personal history of prostate cancer, Prostate cancer (HCC), and Splenic artery aneurysm (HCC).  Pt was seen today for diabetes management.  Patient's last A1c was:   Hemoglobin A1C   Date Value Ref Range Status   01/02/2025 6.6 (H) 4.2 - 5.6

## 2025-01-09 ENCOUNTER — PHARMACY VISIT (OUTPATIENT)
Facility: CLINIC | Age: 80
End: 2025-01-09

## 2025-01-09 ENCOUNTER — OFFICE VISIT (OUTPATIENT)
Facility: CLINIC | Age: 80
End: 2025-01-09

## 2025-01-09 VITALS
RESPIRATION RATE: 16 BRPM | HEIGHT: 62 IN | HEART RATE: 94 BPM | OXYGEN SATURATION: 98 % | BODY MASS INDEX: 27.82 KG/M2 | SYSTOLIC BLOOD PRESSURE: 138 MMHG | TEMPERATURE: 98.2 F | WEIGHT: 151.2 LBS | DIASTOLIC BLOOD PRESSURE: 70 MMHG

## 2025-01-09 DIAGNOSIS — N18.31 CHRONIC KIDNEY DISEASE, STAGE 3A (HCC): ICD-10-CM

## 2025-01-09 DIAGNOSIS — I10 ESSENTIAL HYPERTENSION: Primary | ICD-10-CM

## 2025-01-09 DIAGNOSIS — C61 PROSTATE CANCER (HCC): ICD-10-CM

## 2025-01-09 DIAGNOSIS — I72.8 SPLENIC ARTERY ANEURYSM (HCC): ICD-10-CM

## 2025-01-09 DIAGNOSIS — L29.9 PRURITUS: ICD-10-CM

## 2025-01-09 DIAGNOSIS — Z79.4 TYPE 2 DIABETES MELLITUS WITH HYPERGLYCEMIA, WITH LONG-TERM CURRENT USE OF INSULIN (HCC): Primary | ICD-10-CM

## 2025-01-09 DIAGNOSIS — E78.00 PURE HYPERCHOLESTEROLEMIA: ICD-10-CM

## 2025-01-09 DIAGNOSIS — E11.65 TYPE 2 DIABETES MELLITUS WITH HYPERGLYCEMIA, WITH LONG-TERM CURRENT USE OF INSULIN (HCC): Primary | ICD-10-CM

## 2025-01-09 DIAGNOSIS — E11.21 TYPE 2 DIABETES WITH NEPHROPATHY (HCC): ICD-10-CM

## 2025-01-09 PROBLEM — N18.30 CHRONIC RENAL DISEASE, STAGE III (HCC): Status: RESOLVED | Noted: 2022-09-13 | Resolved: 2025-01-09

## 2025-01-09 RX ORDER — PHENOL 1.4 %
600 AEROSOL, SPRAY (ML) MUCOUS MEMBRANE 2 TIMES DAILY
COMMUNITY

## 2025-01-09 SDOH — ECONOMIC STABILITY: FOOD INSECURITY: WITHIN THE PAST 12 MONTHS, YOU WORRIED THAT YOUR FOOD WOULD RUN OUT BEFORE YOU GOT MONEY TO BUY MORE.: NEVER TRUE

## 2025-01-09 SDOH — ECONOMIC STABILITY: FOOD INSECURITY: WITHIN THE PAST 12 MONTHS, THE FOOD YOU BOUGHT JUST DIDN'T LAST AND YOU DIDN'T HAVE MONEY TO GET MORE.: NEVER TRUE

## 2025-01-09 ASSESSMENT — PATIENT HEALTH QUESTIONNAIRE - PHQ9
1. LITTLE INTEREST OR PLEASURE IN DOING THINGS: NOT AT ALL
SUM OF ALL RESPONSES TO PHQ QUESTIONS 1-9: 0
SUM OF ALL RESPONSES TO PHQ9 QUESTIONS 1 & 2: 0
SUM OF ALL RESPONSES TO PHQ QUESTIONS 1-9: 0
2. FEELING DOWN, DEPRESSED OR HOPELESS: NOT AT ALL
SUM OF ALL RESPONSES TO PHQ QUESTIONS 1-9: 0
SUM OF ALL RESPONSES TO PHQ QUESTIONS 1-9: 0

## 2025-01-09 NOTE — PROGRESS NOTES
\"Have you been to the ER, urgent care clinic since your last visit?  Hospitalized since your last visit?\"    NO    “Have you seen or consulted any other health care providers outside our system since your last visit?”    NO           
09:34 AM    BILITOT 1.1 01/02/2025 09:34 AM    AST 19 01/02/2025 09:34 AM    ALT 27 01/02/2025 09:34 AM        CBC:   Lab Results   Component Value Date/Time    WBC 6.5 10/29/2024 11:28 AM    RBC 5.03 10/29/2024 11:28 AM    HGB 15.4 10/29/2024 11:28 AM    HCT 49.7 10/29/2024 11:28 AM    MCV 99 10/29/2024 11:28 AM    MCH 30.6 10/29/2024 11:28 AM    MCHC 31.0 10/29/2024 11:28 AM    RDW 12.7 10/29/2024 11:28 AM     10/29/2024 11:28 AM    MPV 10.3 06/30/2023 11:28 AM        Lipids   Lab Results   Component Value Date/Time    CHOL 116 01/02/2025 09:34 AM    TRIG 100 01/02/2025 09:34 AM    HDL 52 01/02/2025 09:34 AM    CHOLHDLRATIO 2.2 01/02/2025 09:34 AM         Imaging results last 24 hrs :No results found.    Imaging results impression onlyNo results found.   No orders to display       A1c:   Hemoglobin A1C   Date Value Ref Range Status   01/02/2025 6.6 (H) 4.2 - 5.6 % Final     Comment:     (NOTE)  HbA1C Interpretive Ranges  <5.7              Normal  5.7 - 6.4         Consider Prediabetes  >6.5              Consider Diabetes     10/10/2024 7.0 (H) 4.2 - 5.6 % Final     Comment:     (NOTE)  HbA1C Interpretive Ranges  <5.7              Normal  5.7 - 6.4         Consider Prediabetes  >6.5              Consider Diabetes     10/02/2024 7.1 (H) 4.2 - 5.6 % Final     Comment:     (NOTE)  HbA1C Interpretive Ranges  <5.7              Normal  5.7 - 6.4         Consider Prediabetes  >6.5              Consider Diabetes           ASSESSMENT/PLAN    Diagnoses and all orders for this visit:    Type 2 diabetes mellitus with nephropathy(HCC)-  a1c 7.5>6.8>7.1>7.1>6.6  Unclear if pruritus is a side effect of Ozempic 2 mg weekly, as he has administered this dose a few days ago without symptoms.  We agreed on completing this months 2 mg dose and see if there is a pattern noticed with subsequent administration.  will plan to go down to 1 mg if there is, he will notify me through MyChart  Agreed to hold NPH   Cont jardiance  Cont

## 2025-01-09 NOTE — PATIENT INSTRUCTIONS
- Stop the Insulin NPH   * Contact the clinic if you start to have high blood glucose *    - Decrease Ozempic to 1mg weekly as directed by Dr. Lopez   * Contact the clinic if the itching continues *

## 2025-01-20 ENCOUNTER — HOSPITAL ENCOUNTER (INPATIENT)
Facility: HOSPITAL | Age: 80
LOS: 5 days | Discharge: HOME | End: 2025-01-25
Attending: EMERGENCY MEDICINE | Admitting: STUDENT IN AN ORGANIZED HEALTH CARE EDUCATION/TRAINING PROGRAM
Payer: MEDICARE

## 2025-01-20 ENCOUNTER — APPOINTMENT (OUTPATIENT)
Facility: HOSPITAL | Age: 80
End: 2025-01-20
Payer: MEDICARE

## 2025-01-20 DIAGNOSIS — K52.9 COLITIS: Primary | ICD-10-CM

## 2025-01-20 DIAGNOSIS — K52.9 ACUTE COLITIS: ICD-10-CM

## 2025-01-20 DIAGNOSIS — N17.9 AKI (ACUTE KIDNEY INJURY) (HCC): ICD-10-CM

## 2025-01-20 DIAGNOSIS — E86.0 DEHYDRATION: ICD-10-CM

## 2025-01-20 LAB
ALBUMIN SERPL-MCNC: 3.6 G/DL (ref 3.4–5)
ALBUMIN/GLOB SERPL: 1 (ref 0.8–1.7)
ALP SERPL-CCNC: 60 U/L (ref 45–117)
ALT SERPL-CCNC: 24 U/L (ref 16–61)
ANION GAP SERPL CALC-SCNC: 5 MMOL/L (ref 3–18)
APPEARANCE UR: CLEAR
AST SERPL-CCNC: 10 U/L (ref 10–38)
BACTERIA URNS QL MICRO: NEGATIVE /HPF
BASOPHILS # BLD: 0 K/UL (ref 0–0.1)
BASOPHILS NFR BLD: 0 % (ref 0–2)
BILIRUB SERPL-MCNC: 2.1 MG/DL (ref 0.2–1)
BILIRUB UR QL: NEGATIVE
BUN SERPL-MCNC: 38 MG/DL (ref 7–18)
BUN/CREAT SERPL: 20 (ref 12–20)
CALCIUM SERPL-MCNC: 9.2 MG/DL (ref 8.5–10.1)
CHLORIDE SERPL-SCNC: 103 MMOL/L (ref 100–111)
CO2 SERPL-SCNC: 28 MMOL/L (ref 21–32)
COLOR UR: YELLOW
CREAT SERPL-MCNC: 1.88 MG/DL (ref 0.6–1.3)
DIFFERENTIAL METHOD BLD: ABNORMAL
EOSINOPHIL # BLD: 0 K/UL (ref 0–0.4)
EOSINOPHIL NFR BLD: 0 % (ref 0–5)
EPITH CASTS URNS QL MICRO: ABNORMAL /LPF (ref 0–5)
ERYTHROCYTE [DISTWIDTH] IN BLOOD BY AUTOMATED COUNT: 12.5 % (ref 11.6–14.5)
GLOBULIN SER CALC-MCNC: 3.5 G/DL (ref 2–4)
GLUCOSE SERPL-MCNC: 212 MG/DL (ref 74–99)
GLUCOSE UR STRIP.AUTO-MCNC: >1000 MG/DL
GRAN CASTS URNS QL MICRO: ABNORMAL /LPF
HCT VFR BLD AUTO: 45.2 % (ref 36–48)
HGB BLD-MCNC: 15.4 G/DL (ref 13–16)
HGB UR QL STRIP: NEGATIVE
IMM GRANULOCYTES # BLD AUTO: 0 K/UL
IMM GRANULOCYTES NFR BLD AUTO: 0 %
KETONES UR QL STRIP.AUTO: NEGATIVE MG/DL
LACTATE BLD-SCNC: 1.39 MMOL/L (ref 0.4–2)
LEUKOCYTE ESTERASE UR QL STRIP.AUTO: NEGATIVE
LIPASE SERPL-CCNC: 33 U/L (ref 13–75)
LYMPHOCYTES # BLD: 0.38 K/UL (ref 0.9–3.6)
LYMPHOCYTES NFR BLD: 2 % (ref 21–52)
MCH RBC QN AUTO: 30.9 PG (ref 24–34)
MCHC RBC AUTO-ENTMCNC: 34.1 G/DL (ref 31–37)
MCV RBC AUTO: 90.8 FL (ref 78–100)
MONOCYTES # BLD: 1.71 K/UL (ref 0.05–1.2)
MONOCYTES NFR BLD: 9 % (ref 3–10)
NEUTS SEG # BLD: 16.91 K/UL (ref 1.8–8)
NEUTS SEG NFR BLD: 89 % (ref 40–73)
NITRITE UR QL STRIP.AUTO: NEGATIVE
NRBC # BLD: 0 K/UL (ref 0–0.01)
NRBC BLD-RTO: 0 PER 100 WBC
PH UR STRIP: 5.5 (ref 5–8)
PLATELET # BLD AUTO: 200 K/UL (ref 135–420)
PLATELET COMMENT: ABNORMAL
PMV BLD AUTO: 9.5 FL (ref 9.2–11.8)
POTASSIUM SERPL-SCNC: 4.1 MMOL/L (ref 3.5–5.5)
PROT SERPL-MCNC: 7.1 G/DL (ref 6.4–8.2)
PROT UR STRIP-MCNC: 100 MG/DL
RBC # BLD AUTO: 4.98 M/UL (ref 4.35–5.65)
RBC #/AREA URNS HPF: ABNORMAL /HPF (ref 0–5)
RBC MORPH BLD: ABNORMAL
SODIUM SERPL-SCNC: 136 MMOL/L (ref 136–145)
SP GR UR REFRACTOMETRY: >1.03 (ref 1–1.03)
UROBILINOGEN UR QL STRIP.AUTO: 0.2 EU/DL (ref 0.2–1)
WBC # BLD AUTO: 19 K/UL (ref 4.6–13.2)
WBC URNS QL MICRO: ABNORMAL /HPF (ref 0–4)

## 2025-01-20 PROCEDURE — 96365 THER/PROPH/DIAG IV INF INIT: CPT

## 2025-01-20 PROCEDURE — 2500000003 HC RX 250 WO HCPCS: Performed by: STUDENT IN AN ORGANIZED HEALTH CARE EDUCATION/TRAINING PROGRAM

## 2025-01-20 PROCEDURE — 96375 TX/PRO/DX INJ NEW DRUG ADDON: CPT

## 2025-01-20 PROCEDURE — 6360000002 HC RX W HCPCS: Performed by: STUDENT IN AN ORGANIZED HEALTH CARE EDUCATION/TRAINING PROGRAM

## 2025-01-20 PROCEDURE — 83605 ASSAY OF LACTIC ACID: CPT

## 2025-01-20 PROCEDURE — 80053 COMPREHEN METABOLIC PANEL: CPT

## 2025-01-20 PROCEDURE — 6360000004 HC RX CONTRAST MEDICATION: Performed by: EMERGENCY MEDICINE

## 2025-01-20 PROCEDURE — 85025 COMPLETE CBC W/AUTO DIFF WBC: CPT

## 2025-01-20 PROCEDURE — 83690 ASSAY OF LIPASE: CPT

## 2025-01-20 PROCEDURE — 96361 HYDRATE IV INFUSION ADD-ON: CPT

## 2025-01-20 PROCEDURE — 2580000003 HC RX 258: Performed by: EMERGENCY MEDICINE

## 2025-01-20 PROCEDURE — 1100000003 HC PRIVATE W/ TELEMETRY

## 2025-01-20 PROCEDURE — 6360000002 HC RX W HCPCS: Performed by: EMERGENCY MEDICINE

## 2025-01-20 PROCEDURE — 99285 EMERGENCY DEPT VISIT HI MDM: CPT

## 2025-01-20 PROCEDURE — 87040 BLOOD CULTURE FOR BACTERIA: CPT

## 2025-01-20 PROCEDURE — 81001 URINALYSIS AUTO W/SCOPE: CPT

## 2025-01-20 PROCEDURE — 2580000003 HC RX 258: Performed by: STUDENT IN AN ORGANIZED HEALTH CARE EDUCATION/TRAINING PROGRAM

## 2025-01-20 PROCEDURE — 74177 CT ABD & PELVIS W/CONTRAST: CPT

## 2025-01-20 RX ORDER — MORPHINE SULFATE 4 MG/ML
4 INJECTION, SOLUTION INTRAMUSCULAR; INTRAVENOUS
Status: COMPLETED | OUTPATIENT
Start: 2025-01-20 | End: 2025-01-20

## 2025-01-20 RX ORDER — HYDRALAZINE HYDROCHLORIDE 20 MG/ML
10 INJECTION INTRAMUSCULAR; INTRAVENOUS EVERY 6 HOURS PRN
Status: DISCONTINUED | OUTPATIENT
Start: 2025-01-20 | End: 2025-01-25 | Stop reason: HOSPADM

## 2025-01-20 RX ORDER — 0.9 % SODIUM CHLORIDE 0.9 %
1000 INTRAVENOUS SOLUTION INTRAVENOUS ONCE
Status: COMPLETED | OUTPATIENT
Start: 2025-01-20 | End: 2025-01-20

## 2025-01-20 RX ORDER — ONDANSETRON 2 MG/ML
4 INJECTION INTRAMUSCULAR; INTRAVENOUS EVERY 6 HOURS PRN
Status: DISCONTINUED | OUTPATIENT
Start: 2025-01-20 | End: 2025-01-25 | Stop reason: HOSPADM

## 2025-01-20 RX ORDER — SODIUM CHLORIDE, SODIUM LACTATE, POTASSIUM CHLORIDE, CALCIUM CHLORIDE 600; 310; 30; 20 MG/100ML; MG/100ML; MG/100ML; MG/100ML
INJECTION, SOLUTION INTRAVENOUS CONTINUOUS
Status: DISPENSED | OUTPATIENT
Start: 2025-01-20 | End: 2025-01-21

## 2025-01-20 RX ORDER — SODIUM CHLORIDE 0.9 % (FLUSH) 0.9 %
5-40 SYRINGE (ML) INJECTION EVERY 12 HOURS SCHEDULED
Status: DISCONTINUED | OUTPATIENT
Start: 2025-01-20 | End: 2025-01-25 | Stop reason: HOSPADM

## 2025-01-20 RX ORDER — POLYETHYLENE GLYCOL 3350 17 G/17G
17 POWDER, FOR SOLUTION ORAL DAILY PRN
Status: DISCONTINUED | OUTPATIENT
Start: 2025-01-20 | End: 2025-01-25 | Stop reason: HOSPADM

## 2025-01-20 RX ORDER — IODIXANOL 320 MG/ML
80 INJECTION, SOLUTION INTRAVASCULAR
Status: COMPLETED | OUTPATIENT
Start: 2025-01-20 | End: 2025-01-20

## 2025-01-20 RX ORDER — ONDANSETRON 4 MG/1
4 TABLET, ORALLY DISINTEGRATING ORAL EVERY 8 HOURS PRN
Status: DISCONTINUED | OUTPATIENT
Start: 2025-01-20 | End: 2025-01-25 | Stop reason: HOSPADM

## 2025-01-20 RX ORDER — ACETAMINOPHEN 325 MG/1
650 TABLET ORAL EVERY 6 HOURS PRN
Status: DISCONTINUED | OUTPATIENT
Start: 2025-01-20 | End: 2025-01-25 | Stop reason: HOSPADM

## 2025-01-20 RX ORDER — HEPARIN SODIUM 5000 [USP'U]/ML
5000 INJECTION, SOLUTION INTRAVENOUS; SUBCUTANEOUS EVERY 8 HOURS SCHEDULED
Status: DISCONTINUED | OUTPATIENT
Start: 2025-01-20 | End: 2025-01-25 | Stop reason: HOSPADM

## 2025-01-20 RX ORDER — ACETAMINOPHEN 650 MG/1
650 SUPPOSITORY RECTAL EVERY 6 HOURS PRN
Status: DISCONTINUED | OUTPATIENT
Start: 2025-01-20 | End: 2025-01-25 | Stop reason: HOSPADM

## 2025-01-20 RX ORDER — SODIUM CHLORIDE 9 MG/ML
INJECTION, SOLUTION INTRAVENOUS PRN
Status: DISCONTINUED | OUTPATIENT
Start: 2025-01-20 | End: 2025-01-25 | Stop reason: HOSPADM

## 2025-01-20 RX ORDER — MORPHINE SULFATE 2 MG/ML
2 INJECTION, SOLUTION INTRAMUSCULAR; INTRAVENOUS EVERY 4 HOURS PRN
Status: DISCONTINUED | OUTPATIENT
Start: 2025-01-20 | End: 2025-01-21

## 2025-01-20 RX ORDER — SODIUM CHLORIDE 0.9 % (FLUSH) 0.9 %
5-40 SYRINGE (ML) INJECTION PRN
Status: DISCONTINUED | OUTPATIENT
Start: 2025-01-20 | End: 2025-01-25 | Stop reason: HOSPADM

## 2025-01-20 RX ADMIN — MORPHINE SULFATE 4 MG: 4 INJECTION, SOLUTION INTRAMUSCULAR; INTRAVENOUS at 16:10

## 2025-01-20 RX ADMIN — SODIUM CHLORIDE, POTASSIUM CHLORIDE, SODIUM LACTATE AND CALCIUM CHLORIDE: 600; 310; 30; 20 INJECTION, SOLUTION INTRAVENOUS at 23:36

## 2025-01-20 RX ADMIN — SODIUM CHLORIDE 1000 ML: 9 INJECTION, SOLUTION INTRAVENOUS at 15:14

## 2025-01-20 RX ADMIN — MORPHINE SULFATE 4 MG: 4 INJECTION, SOLUTION INTRAMUSCULAR; INTRAVENOUS at 19:17

## 2025-01-20 RX ADMIN — IODIXANOL 80 ML: 320 INJECTION, SOLUTION INTRAVASCULAR at 13:57

## 2025-01-20 RX ADMIN — MORPHINE SULFATE 2 MG: 2 INJECTION, SOLUTION INTRAMUSCULAR; INTRAVENOUS at 22:37

## 2025-01-20 RX ADMIN — PIPERACILLIN AND TAZOBACTAM 4500 MG: 4; .5 INJECTION, POWDER, FOR SOLUTION INTRAVENOUS at 15:19

## 2025-01-20 RX ADMIN — SODIUM CHLORIDE, PRESERVATIVE FREE 10 ML: 5 INJECTION INTRAVENOUS at 23:37

## 2025-01-20 RX ADMIN — SODIUM CHLORIDE 1000 ML: 9 INJECTION, SOLUTION INTRAVENOUS at 14:25

## 2025-01-20 ASSESSMENT — PAIN SCALES - GENERAL
PAINLEVEL_OUTOF10: 4
PAINLEVEL_OUTOF10: 10
PAINLEVEL_OUTOF10: 0
PAINLEVEL_OUTOF10: 8
PAINLEVEL_OUTOF10: 5
PAINLEVEL_OUTOF10: 7

## 2025-01-20 ASSESSMENT — PAIN DESCRIPTION - FREQUENCY: FREQUENCY: INTERMITTENT

## 2025-01-20 ASSESSMENT — PAIN DESCRIPTION - LOCATION
LOCATION: ABDOMEN

## 2025-01-20 ASSESSMENT — PAIN - FUNCTIONAL ASSESSMENT
PAIN_FUNCTIONAL_ASSESSMENT: ACTIVITIES ARE NOT PREVENTED
PAIN_FUNCTIONAL_ASSESSMENT: 0-10

## 2025-01-20 ASSESSMENT — PAIN DESCRIPTION - ORIENTATION: ORIENTATION: MID

## 2025-01-20 ASSESSMENT — PAIN DESCRIPTION - DESCRIPTORS: DESCRIPTORS: ACHING;THROBBING

## 2025-01-20 ASSESSMENT — PAIN DESCRIPTION - PAIN TYPE: TYPE: ACUTE PAIN

## 2025-01-20 ASSESSMENT — PAIN DESCRIPTION - ONSET: ONSET: ON-GOING

## 2025-01-20 NOTE — ED PROVIDER NOTES
EMERGENCY DEPARTMENT HISTORY AND PHYSICAL EXAM      Patient Name: Varun Disla  MRN: [id number]  YOB: 1945  Provider: Mari Medina MD  PCP: Mignon Lopez MD   Time/Date of evaluation: 2:16 PM EST on 1/20/25    History of Presenting Illness     Chief Complaint   Patient presents with    Abdominal Pain    Constipation       History Provided By: Patient and Patient's Wife     History (Narative):   Varun Disla is a 79 y.o. male with a PMHX of diabetes, chronic kidney disease, gout, hyperlipidemia, hypertension, kidney stones, prostate cancer, and a splenic artery aneurysm  who presents to the emergency department  by POV C/O abdominal pain and constipation.  The patient states that he has not been able to have a bowel movement for the past 4 days.  He has tried Dulcolax, milk of magnesium, and suppositories.  This morning at 4 AM he did have some liquid stool.  He is also complaining of lower abdominal pain.  This morning he also began to have some nausea and vomiting.  He is to had a decreased appetite associated with feeling like he is dehydrated.    His wife also states that he has had a prostatectomy and completed his prostate cancer treatments this past fall.        Past History     Past Medical History:  Past Medical History:   Diagnosis Date    Arthritis     CRI (chronic renal insufficiency)     Gout     Hypercholesteremia     Hypertension     Kidney stone     Other ill-defined conditions(799.89)     gout    Personal history of prostate cancer 2007    Prostate cancer (HCC) 01/01/2008    remission    Splenic artery aneurysm (HCC) 01/01/2013    s/p stent dr silva prn ff-up       Past Surgical History:  Past Surgical History:   Procedure Laterality Date    COLONOSCOPY N/A 09/29/2022    COLONOSCOPY/Polypectomies performed by Raul Maharaj MD at [facility] ENDOSCOPY    CYST REMOVAL Left 2019    thumb    PROSTATECTOMY  12/01/2007    PROSTATECTOMY      TOTAL KNEE ARTHROPLASTY  01/17/2012

## 2025-01-20 NOTE — ED TRIAGE NOTES
Ambulatory to triage c/o diffuse abdominal pain and constipation, has been trying Dulcolax, MOM, and suppositories x 4 days and has had mild relief but unable to completely evacuate bowels. Nausea, no vomiting. Hx of Prostate CA and finished radiation last year. Denies Dysuria.

## 2025-01-20 NOTE — ED NOTES
Pt to ED for eval of continued bilateral lower quadrant pain s/p having multiple bowel movements after taking stool softener and MOM Saturday. Pt wife at bedside reports pt was having BM all day yesterday and is still c/o lower abdominal pain and nausea. Given pepcid today.

## 2025-01-21 LAB
ANION GAP SERPL CALC-SCNC: 7 MMOL/L (ref 3–18)
BASOPHILS # BLD: 0.03 K/UL (ref 0–0.1)
BASOPHILS NFR BLD: 0.2 % (ref 0–2)
BUN SERPL-MCNC: 28 MG/DL (ref 7–18)
BUN/CREAT SERPL: 24 (ref 12–20)
CALCIUM SERPL-MCNC: 8.5 MG/DL (ref 8.5–10.1)
CHLORIDE SERPL-SCNC: 110 MMOL/L (ref 100–111)
CO2 SERPL-SCNC: 24 MMOL/L (ref 21–32)
CREAT SERPL-MCNC: 1.15 MG/DL (ref 0.6–1.3)
DIFFERENTIAL METHOD BLD: ABNORMAL
EOSINOPHIL # BLD: 0.02 K/UL (ref 0–0.4)
EOSINOPHIL NFR BLD: 0.1 % (ref 0–5)
ERYTHROCYTE [DISTWIDTH] IN BLOOD BY AUTOMATED COUNT: 12.4 % (ref 11.6–14.5)
GLUCOSE BLD STRIP.AUTO-MCNC: 109 MG/DL (ref 70–110)
GLUCOSE BLD STRIP.AUTO-MCNC: 120 MG/DL (ref 70–110)
GLUCOSE BLD STRIP.AUTO-MCNC: 125 MG/DL (ref 70–110)
GLUCOSE BLD STRIP.AUTO-MCNC: 203 MG/DL (ref 70–110)
GLUCOSE BLD STRIP.AUTO-MCNC: 99 MG/DL (ref 70–110)
GLUCOSE SERPL-MCNC: 118 MG/DL (ref 74–99)
HCT VFR BLD AUTO: 41.5 % (ref 36–48)
HGB BLD-MCNC: 13.4 G/DL (ref 13–16)
IMM GRANULOCYTES # BLD AUTO: 0.15 K/UL (ref 0–0.04)
IMM GRANULOCYTES NFR BLD AUTO: 0.8 % (ref 0–0.5)
LYMPHOCYTES # BLD: 0.61 K/UL (ref 0.9–3.6)
LYMPHOCYTES NFR BLD: 3.4 % (ref 21–52)
MCH RBC QN AUTO: 30.6 PG (ref 24–34)
MCHC RBC AUTO-ENTMCNC: 32.3 G/DL (ref 31–37)
MCV RBC AUTO: 94.7 FL (ref 78–100)
MONOCYTES # BLD: 1.33 K/UL (ref 0.05–1.2)
MONOCYTES NFR BLD: 7.4 % (ref 3–10)
NEUTS SEG # BLD: 15.87 K/UL (ref 1.8–8)
NEUTS SEG NFR BLD: 88.1 % (ref 40–73)
NRBC # BLD: 0 K/UL (ref 0–0.01)
NRBC BLD-RTO: 0 PER 100 WBC
PHOSPHATE SERPL-MCNC: 3.3 MG/DL (ref 2.5–4.9)
PLATELET # BLD AUTO: 158 K/UL (ref 135–420)
PMV BLD AUTO: 10.1 FL (ref 9.2–11.8)
POTASSIUM SERPL-SCNC: 3.6 MMOL/L (ref 3.5–5.5)
PROCALCITONIN SERPL-MCNC: 0.39 NG/ML
RBC # BLD AUTO: 4.38 M/UL (ref 4.35–5.65)
SODIUM SERPL-SCNC: 141 MMOL/L (ref 136–145)
WBC # BLD AUTO: 18 K/UL (ref 4.6–13.2)

## 2025-01-21 PROCEDURE — 83993 ASSAY FOR CALPROTECTIN FECAL: CPT

## 2025-01-21 PROCEDURE — 87427 SHIGA-LIKE TOXIN AG IA: CPT

## 2025-01-21 PROCEDURE — 85025 COMPLETE CBC W/AUTO DIFF WBC: CPT

## 2025-01-21 PROCEDURE — 87506 IADNA-DNA/RNA PROBE TQ 6-11: CPT

## 2025-01-21 PROCEDURE — 83631 LACTOFERRIN FECAL (QUANT): CPT

## 2025-01-21 PROCEDURE — 94761 N-INVAS EAR/PLS OXIMETRY MLT: CPT

## 2025-01-21 PROCEDURE — 2580000003 HC RX 258: Performed by: STUDENT IN AN ORGANIZED HEALTH CARE EDUCATION/TRAINING PROGRAM

## 2025-01-21 PROCEDURE — 1100000003 HC PRIVATE W/ TELEMETRY

## 2025-01-21 PROCEDURE — 99232 SBSQ HOSP IP/OBS MODERATE 35: CPT | Performed by: STUDENT IN AN ORGANIZED HEALTH CARE EDUCATION/TRAINING PROGRAM

## 2025-01-21 PROCEDURE — 82962 GLUCOSE BLOOD TEST: CPT

## 2025-01-21 PROCEDURE — 6360000002 HC RX W HCPCS: Performed by: STUDENT IN AN ORGANIZED HEALTH CARE EDUCATION/TRAINING PROGRAM

## 2025-01-21 PROCEDURE — 84100 ASSAY OF PHOSPHORUS: CPT

## 2025-01-21 PROCEDURE — 2500000003 HC RX 250 WO HCPCS: Performed by: STUDENT IN AN ORGANIZED HEALTH CARE EDUCATION/TRAINING PROGRAM

## 2025-01-21 PROCEDURE — 80048 BASIC METABOLIC PNL TOTAL CA: CPT

## 2025-01-21 PROCEDURE — 89055 LEUKOCYTE ASSESSMENT FECAL: CPT

## 2025-01-21 PROCEDURE — 6370000000 HC RX 637 (ALT 250 FOR IP): Performed by: STUDENT IN AN ORGANIZED HEALTH CARE EDUCATION/TRAINING PROGRAM

## 2025-01-21 PROCEDURE — 84145 PROCALCITONIN (PCT): CPT

## 2025-01-21 PROCEDURE — 36415 COLL VENOUS BLD VENIPUNCTURE: CPT

## 2025-01-21 RX ORDER — MORPHINE SULFATE 2 MG/ML
1 INJECTION, SOLUTION INTRAMUSCULAR; INTRAVENOUS EVERY 4 HOURS PRN
Status: DISCONTINUED | OUTPATIENT
Start: 2025-01-21 | End: 2025-01-23

## 2025-01-21 RX ORDER — COLCHICINE 0.6 MG/1
0.6 CAPSULE ORAL DAILY
Status: DISCONTINUED | OUTPATIENT
Start: 2025-01-21 | End: 2025-01-22

## 2025-01-21 RX ORDER — INSULIN LISPRO 100 [IU]/ML
0-4 INJECTION, SOLUTION INTRAVENOUS; SUBCUTANEOUS
Status: DISCONTINUED | OUTPATIENT
Start: 2025-01-21 | End: 2025-01-25 | Stop reason: HOSPADM

## 2025-01-21 RX ORDER — GLUCAGON 1 MG
1 KIT INJECTION PRN
Status: DISCONTINUED | OUTPATIENT
Start: 2025-01-21 | End: 2025-01-25 | Stop reason: HOSPADM

## 2025-01-21 RX ORDER — LISINOPRIL 20 MG/1
20 TABLET ORAL DAILY
Status: DISCONTINUED | OUTPATIENT
Start: 2025-01-21 | End: 2025-01-25 | Stop reason: HOSPADM

## 2025-01-21 RX ORDER — DEXTROSE MONOHYDRATE 100 MG/ML
INJECTION, SOLUTION INTRAVENOUS CONTINUOUS PRN
Status: DISCONTINUED | OUTPATIENT
Start: 2025-01-21 | End: 2025-01-25 | Stop reason: HOSPADM

## 2025-01-21 RX ORDER — ATORVASTATIN CALCIUM 20 MG/1
20 TABLET, FILM COATED ORAL DAILY
Status: DISCONTINUED | OUTPATIENT
Start: 2025-01-21 | End: 2025-01-25 | Stop reason: HOSPADM

## 2025-01-21 RX ORDER — DICYCLOMINE HYDROCHLORIDE 10 MG/1
20 CAPSULE ORAL 4 TIMES DAILY PRN
Status: DISCONTINUED | OUTPATIENT
Start: 2025-01-21 | End: 2025-01-25 | Stop reason: HOSPADM

## 2025-01-21 RX ORDER — ASPIRIN 81 MG/1
81 TABLET, CHEWABLE ORAL DAILY
Status: DISCONTINUED | OUTPATIENT
Start: 2025-01-21 | End: 2025-01-25 | Stop reason: HOSPADM

## 2025-01-21 RX ORDER — PANTOPRAZOLE SODIUM 40 MG/1
40 TABLET, DELAYED RELEASE ORAL
Status: DISCONTINUED | OUTPATIENT
Start: 2025-01-21 | End: 2025-01-25 | Stop reason: HOSPADM

## 2025-01-21 RX ADMIN — PIPERACILLIN AND TAZOBACTAM 3375 MG: 3; .375 INJECTION, POWDER, LYOPHILIZED, FOR SOLUTION INTRAVENOUS at 02:27

## 2025-01-21 RX ADMIN — SODIUM CHLORIDE, POTASSIUM CHLORIDE, SODIUM LACTATE AND CALCIUM CHLORIDE: 600; 310; 30; 20 INJECTION, SOLUTION INTRAVENOUS at 13:50

## 2025-01-21 RX ADMIN — SODIUM CHLORIDE, PRESERVATIVE FREE 10 ML: 5 INJECTION INTRAVENOUS at 21:53

## 2025-01-21 RX ADMIN — PIPERACILLIN AND TAZOBACTAM 3375 MG: 3; .375 INJECTION, POWDER, LYOPHILIZED, FOR SOLUTION INTRAVENOUS at 10:51

## 2025-01-21 RX ADMIN — HEPARIN SODIUM 5000 UNITS: 5000 INJECTION INTRAVENOUS; SUBCUTANEOUS at 22:03

## 2025-01-21 RX ADMIN — ASPIRIN 81 MG CHEWABLE TABLET 81 MG: 81 TABLET CHEWABLE at 09:08

## 2025-01-21 RX ADMIN — MORPHINE SULFATE 1 MG: 2 INJECTION, SOLUTION INTRAMUSCULAR; INTRAVENOUS at 22:02

## 2025-01-21 RX ADMIN — PIPERACILLIN AND TAZOBACTAM 3375 MG: 3; .375 INJECTION, POWDER, LYOPHILIZED, FOR SOLUTION INTRAVENOUS at 17:39

## 2025-01-21 RX ADMIN — MORPHINE SULFATE 1 MG: 2 INJECTION, SOLUTION INTRAMUSCULAR; INTRAVENOUS at 17:45

## 2025-01-21 RX ADMIN — ATORVASTATIN CALCIUM 20 MG: 20 TABLET, FILM COATED ORAL at 09:08

## 2025-01-21 RX ADMIN — INSULIN LISPRO 1 UNITS: 100 INJECTION, SOLUTION INTRAVENOUS; SUBCUTANEOUS at 17:30

## 2025-01-21 RX ADMIN — SODIUM CHLORIDE, PRESERVATIVE FREE 10 ML: 5 INJECTION INTRAVENOUS at 09:08

## 2025-01-21 RX ADMIN — PANTOPRAZOLE SODIUM 40 MG: 40 TABLET, DELAYED RELEASE ORAL at 09:08

## 2025-01-21 RX ADMIN — MORPHINE SULFATE 1 MG: 2 INJECTION, SOLUTION INTRAMUSCULAR; INTRAVENOUS at 13:46

## 2025-01-21 ASSESSMENT — ENCOUNTER SYMPTOMS
APHONIA: 0
HEMATEMESIS: 0
HEMATOCHEZIA: 0
BLURRED VISION: 0
COUGH: 0
ABDOMINAL PAIN: 1
NAUSEA: 1
NAIL CHANGES: 0

## 2025-01-21 ASSESSMENT — PAIN DESCRIPTION - DESCRIPTORS
DESCRIPTORS: ACHING;CRAMPING
DESCRIPTORS: ACHING
DESCRIPTORS: ACHING;SHARP

## 2025-01-21 ASSESSMENT — PAIN SCALES - GENERAL
PAINLEVEL_OUTOF10: 8
PAINLEVEL_OUTOF10: 0
PAINLEVEL_OUTOF10: 10
PAINLEVEL_OUTOF10: 0
PAINLEVEL_OUTOF10: 8
PAINLEVEL_OUTOF10: 10

## 2025-01-21 ASSESSMENT — PAIN DESCRIPTION - FREQUENCY
FREQUENCY: INTERMITTENT
FREQUENCY: INTERMITTENT

## 2025-01-21 ASSESSMENT — PAIN DESCRIPTION - LOCATION
LOCATION: ABDOMEN

## 2025-01-21 ASSESSMENT — PAIN DESCRIPTION - ONSET
ONSET: GRADUAL
ONSET: GRADUAL

## 2025-01-21 ASSESSMENT — PAIN - FUNCTIONAL ASSESSMENT
PAIN_FUNCTIONAL_ASSESSMENT: PREVENTS OR INTERFERES SOME ACTIVE ACTIVITIES AND ADLS
PAIN_FUNCTIONAL_ASSESSMENT: PREVENTS OR INTERFERES SOME ACTIVE ACTIVITIES AND ADLS

## 2025-01-21 ASSESSMENT — PAIN DESCRIPTION - PAIN TYPE
TYPE: ACUTE PAIN
TYPE: ACUTE PAIN

## 2025-01-21 ASSESSMENT — PAIN DESCRIPTION - ORIENTATION: ORIENTATION: MID

## 2025-01-21 ASSESSMENT — PAIN SCALES - WONG BAKER: WONGBAKER_NUMERICALRESPONSE: NO HURT

## 2025-01-21 NOTE — PROGRESS NOTES
Progress Note  Hospitalist Service    Patient: Varun Disla MRN: [id number]   SSN: [id number]  YOB: 1945   Age: 79 y.o.  Sex: male      Admit Date: 1/20/2025    LOS: 1 day   Chief Complaint   Patient presents with    Abdominal Pain    Constipation       Subjective:     Patient seen and examined.  Wife is at bedside. Wife assisted in history.  Patient was sleeping; awoken for exam.  Stomach is soft, non-distended.     Objective:     Vitals:  BP (!) 158/74   Pulse (!) 110   Temp 97.9 °F (36.6 °C) (Oral)   Resp 17   Ht 1.575 m (5' 2\")   Wt 68 kg (150 lb)   SpO2 93%   BMI 27.44 kg/m²     Physical Exam:   General appearance: alert, appears stated age, and cooperative  Lungs: clear to auscultation bilaterally  Heart: regular rate and rhythm, S1, S2 normal, no murmur, click, rub or gallop  Abdomen: soft, non-distended. Generalized tenderness.   Pulses: 2+ and symmetric  Skin: Skin color, texture, turgor normal. No rashes or lesions  Neuro:  normal without focal findings, mental status, speech normal, alert and oriented x3, MARYBEL, and reflexes normal and symmetric    Intake and Output:  Current Shift: No intake/output data recorded.  Last three shifts: 01/19 1901 - 01/21 0700  In: 625.8 [I.V.:479.1]  Out: -     Lab/Data Review:  Recent Results (from the past 12 hour(s))   POCT Glucose    Collection Time: 01/21/25  8:01 AM   Result Value Ref Range    POC Glucose 99 70 - 110 mg/dL   POCT Glucose    Collection Time: 01/21/25 11:02 AM   Result Value Ref Range    POC Glucose 125 (H) 70 - 110 mg/dL   POCT Glucose    Collection Time: 01/21/25  4:08 PM   Result Value Ref Range    POC Glucose 203 (H) 70 - 110 mg/dL         Key Findings or tests:       Telemetry NONE   Oxygen NONE     Assessment and Plan:     Acute colitis of the descending colon, unknown etiology.  Wife is at bedside.  She states that patient has been constipated for several days requiring multiple bowel regimens.  He is now having bowel

## 2025-01-21 NOTE — PROGRESS NOTES
Advance Care Planning   Healthcare Decision Maker:    Primary Decision Maker: Kim Disla - Spouse - [phone]    Today we documented Decision Maker(s) consistent with Legal Next of Kin hierarchy.       Spiritual Health History and Assessment/Progress Note  [facility]    Spiritual/Emotional Needs,  ,  ,      Name: Varun Disla MRN: [id number]    Age: 79 y.o.     Sex: male   Language: English   Samaritan: Catholic   Colitis     Date: 1/21/2025            Total Time Calculated: 7 min              Spiritual Assessment began in [facility] 2S TELEMETRY        Referral/Consult From: Multi-disciplinary team   Encounter Overview/Reason: Spiritual/Emotional Needs  Service Provided For: Patient and family together    Trina, Belief, Meaning:   Patient has beliefs or practices that help with coping during difficult times  Family/Friends have beliefs or practices that help with coping during difficult times      Importance and Influence:  Patient has spiritual/personal beliefs that influence decisions regarding their health  Family/Friends have spiritual/personal beliefs that influence decisions regarding the patient's health    Community:  Patient is connected with a spiritual community and feels well-supported. Support system includes: Spouse/Partner  Family/Friends are connected with a spiritual community: and feel well-supported. Support system includes: Spouse/Partner    Assessment and Plan of Care:     Patient Interventions include: Affirmed coping skills/support systems  Family/Friends Interventions include: Affirmed coping skills/support systems    Patient Plan of Care: No spiritual needs identified for follow-up  Family/Friends Plan of Care: No spiritual needs identified for follow-up    Electronically signed by DREW Arnold on 1/21/2025 at 12:16 PM

## 2025-01-21 NOTE — PROGRESS NOTES
4 Eyes Skin Assessment     NAME:  aVrun Disla  YOB: 1945  MEDICAL RECORD NUMBER:  [id number]    The patient is being assessed for  Admission    I agree that at least one RN has performed a thorough Head to Toe Skin Assessment on the patient. ALL assessment sites listed below have been assessed.      Areas assessed by both nurses:    Head, Face, Ears, Shoulders, Back, Chest, Arms, Elbows, Hands, Sacrum. Buttock, Coccyx, Ischium, Legs. Feet and Heels, and Under Medical Devices         Does the Patient have a Wound? No noted wound(s)       Sean Prevention initiated by RN: No  Wound Care Orders initiated by RN: No    Pressure Injury (Stage 3,4, Unstageable, DTI, NWPT, and Complex wounds) if present, place Wound referral order by RN under : No    New Ostomies, if present place, Ostomy referral order under : No     Nurse 1 eSignature: Electronically signed by AMY WILLSON RN on 1/21/25 at 1:41 AM EST    **SHARE this note so that the co-signing nurse can place an eSignature**    Nurse 2 eSignature: {Esignature:[id number]}

## 2025-01-21 NOTE — PROGRESS NOTES
4 Eyes Skin Assessment     NAME:  Varun Disla  YOB: 1945  MEDICAL RECORD NUMBER:  [id number]    The patient is being assessed for  Shift Handoff    I agree that at least one RN has performed a thorough Head to Toe Skin Assessment on the patient. ALL assessment sites listed below have been assessed.      Areas assessed by both nurses:    Head, Face, Ears, Shoulders, Back, Chest, Arms, Elbows, Hands, Sacrum. Buttock, Coccyx, Ischium, Legs. Feet and Heels, and Under Medical Devices         Does the Patient have a Wound? No noted wound(s)       Sean Prevention initiated by RN: No  Wound Care Orders initiated by RN: No    Pressure Injury (Stage 3,4, Unstageable, DTI, NWPT, and Complex wounds) if present, place Wound referral order by RN under : No    New Ostomies, if present place, Ostomy referral order under : No     Nurse 1 eSignature: Electronically signed by Geo Madrid RN on 1/21/25 at 6:32 PM EST    **SHARE this note so that the co-signing nurse can place an eSignature**    Nurse 2 eSignature: {Esignature:[id number]}

## 2025-01-21 NOTE — CARE COORDINATION
01/21/25 0922   Service Assessment   Patient Orientation Alert and Oriented   Cognition Alert   History Provided By Patient   Primary Caregiver Self   Accompanied By/Relationship spouse Kim   Support Systems Children;Family Members;Friends/Neighbors   Patient's Healthcare Decision Maker is: Legal Next of Kin   PCP Verified by CM Yes   Last Visit to PCP Within last 3 months   Prior Functional Level Independent in ADLs/IADLs   Current Functional Level Independent in ADLs/IADLs   Can patient return to prior living arrangement Yes   Ability to make needs known: Good   Family able to assist with home care needs: Yes   Would you like for me to discuss the discharge plan with any other family members/significant others, and if so, who? Yes  (spouse)   Financial Resources Medicare;Other (Comment)   Social/Functional History   Lives With Spouse   Type of Home House   Home Layout Two level;Able to Live on Main level with bedroom/bathroom   Bathroom Shower/Tub Walk-in shower   Bathroom Toilet Standard   Bathroom Equipment Built-in shower seat   Bathroom Accessibility Accessible   Home Equipment None   Receives Help From Family   Prior Level of Assist for ADLs Independent   Prior Level of Assist for Homemaking Independent   Homemaking Responsibilities Yes   Ambulation Assistance Independent   Prior Level of Assist for Transfers Independent   Active  Yes   Mode of Transportation Car   Occupation Retired   Type of Occupation [employer]   Discharge Planning   Type of Residence House   Living Arrangements Spouse/Significant Other   Current Services Prior To Admission None   Potential Assistance Needed N/A   DME Ordered? No   Potential Assistance Purchasing Medications No   Type of Home Care Services None   Patient expects to be discharged to: House   One/Two Story Residence Two story, live on first floor   Lift Chair Available No   History of falls? 0   Services At/After Discharge   Transition of Care Consult (CM Consult)

## 2025-01-21 NOTE — PLAN OF CARE
Problem: Chronic Conditions and Co-morbidities  Goal: Patient's chronic conditions and co-morbidity symptoms are monitored and maintained or improved  Outcome: Progressing  Flowsheets (Taken 1/20/2025 2153)  Care Plan - Patient's Chronic Conditions and Co-Morbidity Symptoms are Monitored and Maintained or Improved: Monitor and assess patient's chronic conditions and comorbid symptoms for stability, deterioration, or improvement     Problem: Discharge Planning  Goal: Discharge to home or other facility with appropriate resources  Outcome: Progressing  Flowsheets (Taken 1/20/2025 2153)  Discharge to home or other facility with appropriate resources:   Identify barriers to discharge with patient and caregiver   Arrange for needed discharge resources and transportation as appropriate   Identify discharge learning needs (meds, wound care, etc)     Problem: Pain  Goal: Verbalizes/displays adequate comfort level or baseline comfort level  Outcome: Progressing     Problem: Gastrointestinal - Adult  Goal: Minimal or absence of nausea and vomiting  Outcome: Progressing  Goal: Maintains or returns to baseline bowel function  Outcome: Progressing     Problem: Genitourinary - Adult  Goal: Absence of urinary retention  Outcome: Progressing     Problem: Infection - Adult  Goal: Absence of infection at discharge  Outcome: Progressing     Problem: Metabolic/Fluid and Electrolytes - Adult  Goal: Electrolytes maintained within normal limits  Outcome: Progressing

## 2025-01-21 NOTE — PLAN OF CARE
Problem: Chronic Conditions and Co-morbidities  Goal: Patient's chronic conditions and co-morbidity symptoms are monitored and maintained or improved  1/21/2025 1156 by Geo Madrid RN  Outcome: Progressing  1/21/2025 0144 by Anusha Caruso RN  Outcome: Progressing  Flowsheets (Taken 1/20/2025 2153)  Care Plan - Patient's Chronic Conditions and Co-Morbidity Symptoms are Monitored and Maintained or Improved: Monitor and assess patient's chronic conditions and comorbid symptoms for stability, deterioration, or improvement     Problem: Discharge Planning  Goal: Discharge to home or other facility with appropriate resources  1/21/2025 1156 by Geo Madrid RN  Outcome: Progressing  1/21/2025 0144 by Anusha Caruso RN  Outcome: Progressing  Flowsheets (Taken 1/20/2025 2153)  Discharge to home or other facility with appropriate resources:   Identify barriers to discharge with patient and caregiver   Arrange for needed discharge resources and transportation as appropriate   Identify discharge learning needs (meds, wound care, etc)     Problem: Pain  Goal: Verbalizes/displays adequate comfort level or baseline comfort level  1/21/2025 1156 by Geo Madrid RN  Outcome: Progressing  1/21/2025 0144 by Anusha Caruso RN  Outcome: Progressing     Problem: Gastrointestinal - Adult  Goal: Minimal or absence of nausea and vomiting  1/21/2025 1156 by Geo Madrid RN  Outcome: Progressing  1/21/2025 0144 by Anusha Caruso RN  Outcome: Progressing  Goal: Maintains or returns to baseline bowel function  1/21/2025 1156 by Geo Madrid RN  Outcome: Progressing  1/21/2025 0144 by Anusha Carsuo RN  Outcome: Progressing     Problem: Genitourinary - Adult  Goal: Absence of urinary retention  1/21/2025 1156 by Geo Madrid RN  Outcome: Progressing  1/21/2025 0144 by Anusha Caruso RN  Outcome: Progressing     Problem: Infection - Adult  Goal: Absence of infection at discharge  1/21/2025 1156 by Geo Madrid RN  Outcome:

## 2025-01-21 NOTE — PROGRESS NOTES
Patient hasn't arrived to [address]. Verbal shift report given to Anusha MOLINA. Questions asked and answered.

## 2025-01-21 NOTE — PROGRESS NOTES
4 Eyes Skin Assessment     NAME:  Varun Disla  YOB: 1945  MEDICAL RECORD NUMBER:  [id number]    The patient is being assessed for  Shift Handoff    I agree that at least one RN has performed a thorough Head to Toe Skin Assessment on the patient. ALL assessment sites listed below have been assessed.      Areas assessed by both nurses:    Head, Face, Ears, Shoulders, Back, Chest, Arms, Elbows, Hands, Sacrum. Buttock, Coccyx, Ischium, Legs. Feet and Heels, and Under Medical Devices         Does the Patient have a Wound? No noted wound(s)       Sean Prevention initiated by RN: No  Wound Care Orders initiated by RN: No    Pressure Injury (Stage 3,4, Unstageable, DTI, NWPT, and Complex wounds) if present, place Wound referral order by RN under : No    New Ostomies, if present place, Ostomy referral order under : No     Nurse 1 eSignature: Electronically signed by AMY WILLSON RN on 1/21/25 at 4:09 AM EST    **SHARE this note so that the co-signing nurse can place an eSignature**    Nurse 2 eSignature: Electronically signed by Geo Madrid RN on 1/21/25 at 11:56 AM EST

## 2025-01-22 LAB
ANION GAP SERPL CALC-SCNC: 10 MMOL/L (ref 3–18)
BASOPHILS # BLD: 0.03 K/UL (ref 0–0.1)
BASOPHILS NFR BLD: 0.2 % (ref 0–2)
BUN SERPL-MCNC: 23 MG/DL (ref 7–18)
BUN/CREAT SERPL: 20 (ref 12–20)
CALCIUM SERPL-MCNC: 8.4 MG/DL (ref 8.5–10.1)
CALPROTECTIN STL-MCNT: 3800 UG/G (ref 0–120)
CHLORIDE SERPL-SCNC: 110 MMOL/L (ref 100–111)
CO2 SERPL-SCNC: 21 MMOL/L (ref 21–32)
CREAT SERPL-MCNC: 1.17 MG/DL (ref 0.6–1.3)
DIFFERENTIAL METHOD BLD: ABNORMAL
EOSINOPHIL # BLD: 0.02 K/UL (ref 0–0.4)
EOSINOPHIL NFR BLD: 0.1 % (ref 0–5)
ERYTHROCYTE [DISTWIDTH] IN BLOOD BY AUTOMATED COUNT: 12.4 % (ref 11.6–14.5)
EST. AVERAGE GLUCOSE BLD GHB EST-MCNC: 146 MG/DL
GLUCOSE BLD STRIP.AUTO-MCNC: 101 MG/DL (ref 70–110)
GLUCOSE BLD STRIP.AUTO-MCNC: 104 MG/DL (ref 70–110)
GLUCOSE BLD STRIP.AUTO-MCNC: 119 MG/DL (ref 70–110)
GLUCOSE BLD STRIP.AUTO-MCNC: 99 MG/DL (ref 70–110)
GLUCOSE SERPL-MCNC: 138 MG/DL (ref 74–99)
HBA1C MFR BLD: 6.7 % (ref 4.2–5.6)
HCT VFR BLD AUTO: 39.4 % (ref 36–48)
HGB BLD-MCNC: 12.7 G/DL (ref 13–16)
IMM GRANULOCYTES # BLD AUTO: 0.18 K/UL (ref 0–0.04)
IMM GRANULOCYTES NFR BLD AUTO: 1.3 % (ref 0–0.5)
LYMPHOCYTES # BLD: 0.45 K/UL (ref 0.9–3.6)
LYMPHOCYTES NFR BLD: 3.2 % (ref 21–52)
MCH RBC QN AUTO: 30.1 PG (ref 24–34)
MCHC RBC AUTO-ENTMCNC: 32.2 G/DL (ref 31–37)
MCV RBC AUTO: 93.4 FL (ref 78–100)
MONOCYTES # BLD: 1.04 K/UL (ref 0.05–1.2)
MONOCYTES NFR BLD: 7.5 % (ref 3–10)
NEUTS SEG # BLD: 12.23 K/UL (ref 1.8–8)
NEUTS SEG NFR BLD: 87.7 % (ref 40–73)
NRBC # BLD: 0 K/UL (ref 0–0.01)
NRBC BLD-RTO: 0 PER 100 WBC
PLATELET # BLD AUTO: 149 K/UL (ref 135–420)
PMV BLD AUTO: 10.3 FL (ref 9.2–11.8)
POTASSIUM SERPL-SCNC: 3.3 MMOL/L (ref 3.5–5.5)
RBC # BLD AUTO: 4.22 M/UL (ref 4.35–5.65)
SODIUM SERPL-SCNC: 141 MMOL/L (ref 136–145)
WBC # BLD AUTO: 14 K/UL (ref 4.6–13.2)
WBC #/AREA STL HPF: NORMAL /HPF (ref 0–4)

## 2025-01-22 PROCEDURE — 80048 BASIC METABOLIC PNL TOTAL CA: CPT

## 2025-01-22 PROCEDURE — 6370000000 HC RX 637 (ALT 250 FOR IP): Performed by: STUDENT IN AN ORGANIZED HEALTH CARE EDUCATION/TRAINING PROGRAM

## 2025-01-22 PROCEDURE — 36415 COLL VENOUS BLD VENIPUNCTURE: CPT

## 2025-01-22 PROCEDURE — 99232 SBSQ HOSP IP/OBS MODERATE 35: CPT | Performed by: STUDENT IN AN ORGANIZED HEALTH CARE EDUCATION/TRAINING PROGRAM

## 2025-01-22 PROCEDURE — 82962 GLUCOSE BLOOD TEST: CPT

## 2025-01-22 PROCEDURE — 85025 COMPLETE CBC W/AUTO DIFF WBC: CPT

## 2025-01-22 PROCEDURE — 2580000003 HC RX 258: Performed by: STUDENT IN AN ORGANIZED HEALTH CARE EDUCATION/TRAINING PROGRAM

## 2025-01-22 PROCEDURE — 2500000003 HC RX 250 WO HCPCS: Performed by: STUDENT IN AN ORGANIZED HEALTH CARE EDUCATION/TRAINING PROGRAM

## 2025-01-22 PROCEDURE — 6360000002 HC RX W HCPCS: Performed by: STUDENT IN AN ORGANIZED HEALTH CARE EDUCATION/TRAINING PROGRAM

## 2025-01-22 PROCEDURE — 83036 HEMOGLOBIN GLYCOSYLATED A1C: CPT

## 2025-01-22 PROCEDURE — 1100000003 HC PRIVATE W/ TELEMETRY

## 2025-01-22 RX ORDER — POTASSIUM CHLORIDE 7.45 MG/ML
10 INJECTION INTRAVENOUS PRN
Status: DISCONTINUED | OUTPATIENT
Start: 2025-01-22 | End: 2025-01-25 | Stop reason: HOSPADM

## 2025-01-22 RX ORDER — POTASSIUM CHLORIDE 1500 MG/1
40 TABLET, EXTENDED RELEASE ORAL PRN
Status: DISCONTINUED | OUTPATIENT
Start: 2025-01-22 | End: 2025-01-25 | Stop reason: HOSPADM

## 2025-01-22 RX ORDER — COLCHICINE 0.6 MG/1
0.6 CAPSULE ORAL DAILY
Status: DISCONTINUED | OUTPATIENT
Start: 2025-01-22 | End: 2025-01-23

## 2025-01-22 RX ADMIN — COLCHICINE 0.6 MG: 0.6 CAPSULE ORAL at 15:18

## 2025-01-22 RX ADMIN — ASPIRIN 81 MG CHEWABLE TABLET 81 MG: 81 TABLET CHEWABLE at 09:49

## 2025-01-22 RX ADMIN — HEPARIN SODIUM 5000 UNITS: 5000 INJECTION INTRAVENOUS; SUBCUTANEOUS at 15:18

## 2025-01-22 RX ADMIN — MORPHINE SULFATE 1 MG: 2 INJECTION, SOLUTION INTRAMUSCULAR; INTRAVENOUS at 20:23

## 2025-01-22 RX ADMIN — HEPARIN SODIUM 5000 UNITS: 5000 INJECTION INTRAVENOUS; SUBCUTANEOUS at 22:00

## 2025-01-22 RX ADMIN — PANTOPRAZOLE SODIUM 40 MG: 40 TABLET, DELAYED RELEASE ORAL at 09:49

## 2025-01-22 RX ADMIN — PIPERACILLIN AND TAZOBACTAM 3375 MG: 3; .375 INJECTION, POWDER, LYOPHILIZED, FOR SOLUTION INTRAVENOUS at 10:38

## 2025-01-22 RX ADMIN — MORPHINE SULFATE 1 MG: 2 INJECTION, SOLUTION INTRAMUSCULAR; INTRAVENOUS at 03:11

## 2025-01-22 RX ADMIN — ATORVASTATIN CALCIUM 20 MG: 20 TABLET, FILM COATED ORAL at 09:49

## 2025-01-22 RX ADMIN — SODIUM CHLORIDE, PRESERVATIVE FREE 10 ML: 5 INJECTION INTRAVENOUS at 20:26

## 2025-01-22 RX ADMIN — POTASSIUM CHLORIDE 40 MEQ: 1500 TABLET, EXTENDED RELEASE ORAL at 09:50

## 2025-01-22 RX ADMIN — PIPERACILLIN AND TAZOBACTAM 3375 MG: 3; .375 INJECTION, POWDER, LYOPHILIZED, FOR SOLUTION INTRAVENOUS at 02:00

## 2025-01-22 RX ADMIN — PIPERACILLIN AND TAZOBACTAM 3375 MG: 3; .375 INJECTION, POWDER, LYOPHILIZED, FOR SOLUTION INTRAVENOUS at 18:15

## 2025-01-22 RX ADMIN — SODIUM CHLORIDE, PRESERVATIVE FREE 10 ML: 5 INJECTION INTRAVENOUS at 09:51

## 2025-01-22 ASSESSMENT — PAIN SCALES - GENERAL
PAINLEVEL_OUTOF10: 0
PAINLEVEL_OUTOF10: 7
PAINLEVEL_OUTOF10: 10

## 2025-01-22 ASSESSMENT — PAIN DESCRIPTION - ORIENTATION: ORIENTATION: RIGHT;LEFT

## 2025-01-22 ASSESSMENT — PAIN SCALES - WONG BAKER
WONGBAKER_NUMERICALRESPONSE: NO HURT

## 2025-01-22 ASSESSMENT — PAIN DESCRIPTION - LOCATION
LOCATION: ANKLE
LOCATION: ABDOMEN

## 2025-01-22 ASSESSMENT — PAIN DESCRIPTION - DESCRIPTORS
DESCRIPTORS: ACHING
DESCRIPTORS: ACHING

## 2025-01-22 NOTE — PLAN OF CARE
Problem: Chronic Conditions and Co-morbidities  Goal: Patient's chronic conditions and co-morbidity symptoms are monitored and maintained or improved  Outcome: Progressing     Problem: Discharge Planning  Goal: Discharge to home or other facility with appropriate resources  Outcome: Progressing     Problem: Pain  Goal: Verbalizes/displays adequate comfort level or baseline comfort level  Outcome: Progressing     Problem: Gastrointestinal - Adult  Goal: Minimal or absence of nausea and vomiting  Outcome: Progressing  Goal: Maintains or returns to baseline bowel function  Outcome: Progressing     Problem: Genitourinary - Adult  Goal: Absence of urinary retention  Outcome: Progressing     Problem: Infection - Adult  Goal: Absence of infection at discharge  Outcome: Progressing     Problem: Metabolic/Fluid and Electrolytes - Adult  Goal: Electrolytes maintained within normal limits  Outcome: Progressing

## 2025-01-22 NOTE — PLAN OF CARE
Problem: Chronic Conditions and Co-morbidities  Goal: Patient's chronic conditions and co-morbidity symptoms are monitored and maintained or improved  1/21/2025 2051 by Swapna Mesa RN  Outcome: Progressing  1/21/2025 1156 by Geo Madrid RN  Outcome: Progressing     Problem: Discharge Planning  Goal: Discharge to home or other facility with appropriate resources  1/21/2025 2051 by Swapna Mesa RN  Outcome: Progressing  1/21/2025 1156 by Geo Madrid RN  Outcome: Progressing     Problem: Pain  Goal: Verbalizes/displays adequate comfort level or baseline comfort level  1/21/2025 2051 by Swapna Mesa RN  Outcome: Progressing  1/21/2025 1156 by Geo Madrid RN  Outcome: Progressing     Problem: Gastrointestinal - Adult  Goal: Minimal or absence of nausea and vomiting  1/21/2025 2051 by Swapna Mesa RN  Outcome: Progressing  1/21/2025 1156 by Geo Madrid RN  Outcome: Progressing  Goal: Maintains or returns to baseline bowel function  1/21/2025 1156 by Geo Madrid RN  Outcome: Progressing     Problem: Genitourinary - Adult  Goal: Absence of urinary retention  1/21/2025 2051 by Swapna Mesa RN  Outcome: Progressing  1/21/2025 1156 by Geo Madrid RN  Outcome: Progressing     Problem: Infection - Adult  Goal: Absence of infection at discharge  1/21/2025 2051 by Swapna Mesa RN  Outcome: Progressing  1/21/2025 1156 by Geo Madrid RN  Outcome: Progressing     Problem: Metabolic/Fluid and Electrolytes - Adult  Goal: Electrolytes maintained within normal limits  1/21/2025 2051 by Swapna Mesa RN  Outcome: Progressing  1/21/2025 1156 by Geo Madrid RN  Outcome: Progressing

## 2025-01-22 NOTE — PROGRESS NOTES
4 Eyes Skin Assessment     NAME:  Varun Disla  YOB: 1945  MEDICAL RECORD NUMBER:  [id number]    The patient is being assessed for  Shift Handoff    I agree that at least one RN has performed a thorough Head to Toe Skin Assessment on the patient. ALL assessment sites listed below have been assessed.      Areas assessed by both nurses:    Head, Face, Ears, Shoulders, Back, Chest, Arms, Elbows, Hands, Sacrum. Buttock, Coccyx, Ischium, and Legs. Feet and Heels        Does the Patient have a Wound? No noted wound(s)       Sean Prevention initiated by RN: Yes  Wound Care Orders initiated by RN: No    Pressure Injury (Stage 3,4, Unstageable, DTI, NWPT, and Complex wounds) if present, place Wound referral order by RN under : No    New Ostomies, if present place, Ostomy referral order under : No     Nurse 1 eSignature: Electronically signed by Swapna Mesa RN on 1/22/25 at 8:17 AM EST    **SHARE this note so that the co-signing nurse can place an eSignature**    Nurse 2 eSignature: Electronically signed by Geo Madrid RN on 1/22/25 at 9:57 AM EST

## 2025-01-22 NOTE — PROGRESS NOTES
Progress Note  Hospitalist Service    Patient: Varun Disla MRN: [id number]   SSN: [id number]  YOB: 1945   Age: 79 y.o.  Sex: male      Admit Date: 1/20/2025    LOS: 2 days   Chief Complaint   Patient presents with    Abdominal Pain    Constipation       Subjective:     Patient seen and examined.  Wife is at bedside. Wife assisted in history.  Stomach is soft, non-distended.   Is having  Bms.   Afraid to eat but encouraged today.     Objective:     Vitals:  BP (!) 147/68   Pulse (!) 114   Temp 98.2 °F (36.8 °C)   Resp 18   Ht 1.575 m (5' 2\")   Wt 68 kg (150 lb)   SpO2 98%   BMI 27.44 kg/m²     Physical Exam:   General appearance: alert, appears stated age, and cooperative  Lungs: clear to auscultation bilaterally  Heart: regular rate and rhythm, S1, S2 normal, no murmur, click, rub or gallop  Abdomen: soft, non-distended. Generalized tenderness.   Pulses: 2+ and symmetric  Skin: Skin color, texture, turgor normal. No rashes or lesions  Neuro:  normal without focal findings, mental status, speech normal, alert and oriented x3, MARYBEL, and reflexes normal and symmetric    Intake and Output:  Current Shift: 01/22 0701 - 01/22 1900  In: 480 [P.O.:480]  Out: -   Last three shifts: 01/20 1901 - 01/22 0700  In: 625.8 [I.V.:479.1]  Out: -     Lab/Data Review:  Recent Results (from the past 12 hour(s))   Hemoglobin A1c    Collection Time: 01/22/25  4:11 AM   Result Value Ref Range    Hemoglobin A1C 6.7 (H) 4.2 - 5.6 %    Estimated Avg Glucose 146 mg/dL   CBC with Auto Differential    Collection Time: 01/22/25  4:11 AM   Result Value Ref Range    WBC 14.0 (H) 4.6 - 13.2 K/uL    RBC 4.22 (L) 4.35 - 5.65 M/uL    Hemoglobin 12.7 (L) 13.0 - 16.0 g/dL    Hematocrit 39.4 36.0 - 48.0 %    MCV 93.4 78.0 - 100.0 FL    MCH 30.1 24.0 - 34.0 PG    MCHC 32.2 31.0 - 37.0 g/dL    RDW 12.4 11.6 - 14.5 %    Platelets 149 135 - 420 K/uL    MPV 10.3 9.2 - 11.8 FL    Nucleated RBCs 0.0 0  WBC    nRBC 0.00 0.00 -

## 2025-01-23 LAB
ANION GAP SERPL CALC-SCNC: 11 MMOL/L (ref 3–18)
BASOPHILS # BLD: 0.03 K/UL (ref 0–0.1)
BASOPHILS NFR BLD: 0.3 % (ref 0–2)
BUN SERPL-MCNC: 21 MG/DL (ref 7–18)
BUN/CREAT SERPL: 20 (ref 12–20)
C COLI+JEJUNI TUF STL QL NAA+PROBE: NEGATIVE
CALCIUM SERPL-MCNC: 8.4 MG/DL (ref 8.5–10.1)
CHLORIDE SERPL-SCNC: 108 MMOL/L (ref 100–111)
CO2 SERPL-SCNC: 20 MMOL/L (ref 21–32)
CREAT SERPL-MCNC: 1.04 MG/DL (ref 0.6–1.3)
DIFFERENTIAL METHOD BLD: ABNORMAL
EC STX1+STX2 GENES STL QL NAA+PROBE: NEGATIVE
EOSINOPHIL # BLD: 0.07 K/UL (ref 0–0.4)
EOSINOPHIL NFR BLD: 0.6 % (ref 0–5)
ERYTHROCYTE [DISTWIDTH] IN BLOOD BY AUTOMATED COUNT: 12 % (ref 11.6–14.5)
ETEC ELTA+ESTB GENES STL QL NAA+PROBE: NEGATIVE
GLUCOSE BLD STRIP.AUTO-MCNC: 122 MG/DL (ref 70–110)
GLUCOSE BLD STRIP.AUTO-MCNC: 140 MG/DL (ref 70–110)
GLUCOSE BLD STRIP.AUTO-MCNC: 142 MG/DL (ref 70–110)
GLUCOSE BLD STRIP.AUTO-MCNC: 256 MG/DL (ref 70–110)
GLUCOSE SERPL-MCNC: 117 MG/DL (ref 74–99)
HCT VFR BLD AUTO: 39.6 % (ref 36–48)
HGB BLD-MCNC: 12.9 G/DL (ref 13–16)
IMM GRANULOCYTES # BLD AUTO: 0.1 K/UL (ref 0–0.04)
IMM GRANULOCYTES NFR BLD AUTO: 0.9 % (ref 0–0.5)
LYMPHOCYTES # BLD: 0.46 K/UL (ref 0.9–3.6)
LYMPHOCYTES NFR BLD: 4.1 % (ref 21–52)
MCH RBC QN AUTO: 30.4 PG (ref 24–34)
MCHC RBC AUTO-ENTMCNC: 32.6 G/DL (ref 31–37)
MCV RBC AUTO: 93.4 FL (ref 78–100)
MONOCYTES # BLD: 1.13 K/UL (ref 0.05–1.2)
MONOCYTES NFR BLD: 10 % (ref 3–10)
NEUTS SEG # BLD: 9.47 K/UL (ref 1.8–8)
NEUTS SEG NFR BLD: 84.1 % (ref 40–73)
NRBC # BLD: 0 K/UL (ref 0–0.01)
NRBC BLD-RTO: 0 PER 100 WBC
P SHIGELLOIDES DNA STL QL NAA+PROBE: NEGATIVE
PLATELET # BLD AUTO: 158 K/UL (ref 135–420)
PMV BLD AUTO: 10.2 FL (ref 9.2–11.8)
POTASSIUM SERPL-SCNC: 3.6 MMOL/L (ref 3.5–5.5)
RBC # BLD AUTO: 4.24 M/UL (ref 4.35–5.65)
SALMONELLA SP SPAO STL QL NAA+PROBE: NEGATIVE
SHIGELLA SP+EIEC IPAH STL QL NAA+PROBE: NEGATIVE
SODIUM SERPL-SCNC: 139 MMOL/L (ref 136–145)
V CHOL+PARA+VUL DNA STL QL NAA+NON-PROBE: NEGATIVE
WBC # BLD AUTO: 11.3 K/UL (ref 4.6–13.2)
Y ENTEROCOL DNA STL QL NAA+NON-PROBE: NEGATIVE

## 2025-01-23 PROCEDURE — 36415 COLL VENOUS BLD VENIPUNCTURE: CPT

## 2025-01-23 PROCEDURE — 80048 BASIC METABOLIC PNL TOTAL CA: CPT

## 2025-01-23 PROCEDURE — 99232 SBSQ HOSP IP/OBS MODERATE 35: CPT | Performed by: STUDENT IN AN ORGANIZED HEALTH CARE EDUCATION/TRAINING PROGRAM

## 2025-01-23 PROCEDURE — 2500000003 HC RX 250 WO HCPCS: Performed by: STUDENT IN AN ORGANIZED HEALTH CARE EDUCATION/TRAINING PROGRAM

## 2025-01-23 PROCEDURE — 6360000002 HC RX W HCPCS: Performed by: STUDENT IN AN ORGANIZED HEALTH CARE EDUCATION/TRAINING PROGRAM

## 2025-01-23 PROCEDURE — 85025 COMPLETE CBC W/AUTO DIFF WBC: CPT

## 2025-01-23 PROCEDURE — 82962 GLUCOSE BLOOD TEST: CPT

## 2025-01-23 PROCEDURE — 94761 N-INVAS EAR/PLS OXIMETRY MLT: CPT

## 2025-01-23 PROCEDURE — 6370000000 HC RX 637 (ALT 250 FOR IP): Performed by: STUDENT IN AN ORGANIZED HEALTH CARE EDUCATION/TRAINING PROGRAM

## 2025-01-23 PROCEDURE — 2580000003 HC RX 258: Performed by: STUDENT IN AN ORGANIZED HEALTH CARE EDUCATION/TRAINING PROGRAM

## 2025-01-23 PROCEDURE — 1100000003 HC PRIVATE W/ TELEMETRY

## 2025-01-23 RX ORDER — COLCHICINE 0.6 MG/1
1.2 CAPSULE ORAL ONCE
Status: COMPLETED | OUTPATIENT
Start: 2025-01-23 | End: 2025-01-23

## 2025-01-23 RX ORDER — COLCHICINE 0.6 MG/1
0.6 CAPSULE ORAL 2 TIMES DAILY
Status: DISCONTINUED | OUTPATIENT
Start: 2025-01-24 | End: 2025-01-25 | Stop reason: HOSPADM

## 2025-01-23 RX ORDER — OXYCODONE AND ACETAMINOPHEN 5; 325 MG/1; MG/1
1 TABLET ORAL EVERY 4 HOURS PRN
Status: DISCONTINUED | OUTPATIENT
Start: 2025-01-23 | End: 2025-01-25 | Stop reason: HOSPADM

## 2025-01-23 RX ADMIN — PIPERACILLIN AND TAZOBACTAM 3375 MG: 3; .375 INJECTION, POWDER, LYOPHILIZED, FOR SOLUTION INTRAVENOUS at 17:18

## 2025-01-23 RX ADMIN — OXYCODONE HYDROCHLORIDE AND ACETAMINOPHEN 1 TABLET: 5; 325 TABLET ORAL at 15:33

## 2025-01-23 RX ADMIN — SODIUM CHLORIDE, PRESERVATIVE FREE 10 ML: 5 INJECTION INTRAVENOUS at 20:00

## 2025-01-23 RX ADMIN — PANTOPRAZOLE SODIUM 40 MG: 40 TABLET, DELAYED RELEASE ORAL at 06:20

## 2025-01-23 RX ADMIN — ACETAMINOPHEN 650 MG: 325 TABLET ORAL at 12:28

## 2025-01-23 RX ADMIN — INSULIN LISPRO 2 UNITS: 100 INJECTION, SOLUTION INTRAVENOUS; SUBCUTANEOUS at 17:19

## 2025-01-23 RX ADMIN — OXYCODONE HYDROCHLORIDE AND ACETAMINOPHEN 1 TABLET: 5; 325 TABLET ORAL at 21:39

## 2025-01-23 RX ADMIN — PIPERACILLIN AND TAZOBACTAM 3375 MG: 3; .375 INJECTION, POWDER, LYOPHILIZED, FOR SOLUTION INTRAVENOUS at 02:00

## 2025-01-23 RX ADMIN — COLCHICINE 0.6 MG: 0.6 CAPSULE ORAL at 09:41

## 2025-01-23 RX ADMIN — PIPERACILLIN AND TAZOBACTAM 3375 MG: 3; .375 INJECTION, POWDER, LYOPHILIZED, FOR SOLUTION INTRAVENOUS at 09:47

## 2025-01-23 RX ADMIN — MORPHINE SULFATE 1 MG: 2 INJECTION, SOLUTION INTRAMUSCULAR; INTRAVENOUS at 02:52

## 2025-01-23 RX ADMIN — HEPARIN SODIUM 5000 UNITS: 5000 INJECTION INTRAVENOUS; SUBCUTANEOUS at 15:56

## 2025-01-23 RX ADMIN — ASPIRIN 81 MG CHEWABLE TABLET 81 MG: 81 TABLET CHEWABLE at 09:41

## 2025-01-23 RX ADMIN — ATORVASTATIN CALCIUM 20 MG: 20 TABLET, FILM COATED ORAL at 09:41

## 2025-01-23 RX ADMIN — SODIUM CHLORIDE, PRESERVATIVE FREE 10 ML: 5 INJECTION INTRAVENOUS at 09:42

## 2025-01-23 RX ADMIN — DICLOFENAC SODIUM 2 G: 10 GEL TOPICAL at 15:24

## 2025-01-23 RX ADMIN — PREDNISONE 50 MG: 20 TABLET ORAL at 19:53

## 2025-01-23 RX ADMIN — HEPARIN SODIUM 5000 UNITS: 5000 INJECTION INTRAVENOUS; SUBCUTANEOUS at 21:39

## 2025-01-23 RX ADMIN — COLCHICINE 1.2 MG: 0.6 CAPSULE ORAL at 15:20

## 2025-01-23 RX ADMIN — HEPARIN SODIUM 5000 UNITS: 5000 INJECTION INTRAVENOUS; SUBCUTANEOUS at 06:20

## 2025-01-23 ASSESSMENT — PAIN DESCRIPTION - DESCRIPTORS
DESCRIPTORS: THROBBING
DESCRIPTORS: ACHING

## 2025-01-23 ASSESSMENT — PAIN DESCRIPTION - ORIENTATION
ORIENTATION: LEFT
ORIENTATION: RIGHT;LEFT
ORIENTATION: RIGHT;LEFT
ORIENTATION: LEFT
ORIENTATION: LEFT

## 2025-01-23 ASSESSMENT — PAIN SCALES - GENERAL
PAINLEVEL_OUTOF10: 4
PAINLEVEL_OUTOF10: 0
PAINLEVEL_OUTOF10: 0
PAINLEVEL_OUTOF10: 4
PAINLEVEL_OUTOF10: 0
PAINLEVEL_OUTOF10: 4
PAINLEVEL_OUTOF10: 0
PAINLEVEL_OUTOF10: 0
PAINLEVEL_OUTOF10: 10
PAINLEVEL_OUTOF10: 10

## 2025-01-23 ASSESSMENT — PAIN DESCRIPTION - PAIN TYPE: TYPE: ACUTE PAIN

## 2025-01-23 ASSESSMENT — PAIN SCALES - WONG BAKER
WONGBAKER_NUMERICALRESPONSE: NO HURT

## 2025-01-23 ASSESSMENT — PAIN DESCRIPTION - ONSET: ONSET: ON-GOING

## 2025-01-23 ASSESSMENT — PAIN - FUNCTIONAL ASSESSMENT
PAIN_FUNCTIONAL_ASSESSMENT: ACTIVITIES ARE NOT PREVENTED
PAIN_FUNCTIONAL_ASSESSMENT: ACTIVITIES ARE NOT PREVENTED

## 2025-01-23 ASSESSMENT — PAIN DESCRIPTION - LOCATION
LOCATION: TOE (COMMENT WHICH ONE)
LOCATION: ABDOMEN;LEG
LOCATION: TOE (COMMENT WHICH ONE)
LOCATION: TOE (COMMENT WHICH ONE)
LOCATION: ANKLE

## 2025-01-23 ASSESSMENT — PAIN DESCRIPTION - FREQUENCY: FREQUENCY: INTERMITTENT

## 2025-01-23 NOTE — PROGRESS NOTES
4 Eyes Skin Assessment     NAME:  Varun Disla  YOB: 1945  MEDICAL RECORD NUMBER:  [id number]    The patient is being assessed for  Shift Handoff    I agree that at least one RN has performed a thorough Head to Toe Skin Assessment on the patient. ALL assessment sites listed below have been assessed.      Areas assessed by both nurses:    Head, Face, Ears, Shoulders, Back, Chest, Arms, Elbows, Hands, Sacrum. Buttock, Coccyx, Ischium, and Legs. Feet and Heels        Does the Patient have a Wound? No noted wound(s)       Sean Prevention initiated by RN: No  Wound Care Orders initiated by RN: No    Pressure Injury (Stage 3,4, Unstageable, DTI, NWPT, and Complex wounds) if present, place Wound referral order by RN under : No    New Ostomies, if present place, Ostomy referral order under : No     Nurse 1 eSignature: Electronically signed by Geo Madrid RN on 1/22/25 at 7:04 PM EST    **SHARE this note so that the co-signing nurse can place an eSignature**    Nurse 2 eSignature: Electronically signed by Emma Aguilar RN on 1/23/25 at 7:08 PM EST

## 2025-01-23 NOTE — PLAN OF CARE
Problem: Chronic Conditions and Co-morbidities  Goal: Patient's chronic conditions and co-morbidity symptoms are monitored and maintained or improved  1/23/2025 0350 by Swapna Mesa RN  Outcome: Progressing  1/22/2025 1358 by Geo Madrid RN  Outcome: Progressing     Problem: Discharge Planning  Goal: Discharge to home or other facility with appropriate resources  1/23/2025 0350 by Swapna Mesa RN  Outcome: Progressing  1/22/2025 1358 by Geo Madrid RN  Outcome: Progressing     Problem: Pain  Goal: Verbalizes/displays adequate comfort level or baseline comfort level  1/23/2025 0350 by Swapna Mesa RN  Outcome: Progressing  1/22/2025 1358 by Geo Madrid RN  Outcome: Progressing     Problem: Gastrointestinal - Adult  Goal: Minimal or absence of nausea and vomiting  1/23/2025 0350 by Swapna Mesa RN  Outcome: Progressing  1/22/2025 1358 by Geo Madrid RN  Outcome: Progressing  Goal: Maintains or returns to baseline bowel function  1/23/2025 0350 by Swapna Mesa RN  Outcome: Progressing  1/22/2025 1358 by Geo Madrid RN  Outcome: Progressing     Problem: Genitourinary - Adult  Goal: Absence of urinary retention  1/23/2025 0350 by Swapna Mesa RN  Outcome: Progressing  1/22/2025 1358 by Geo Madrid RN  Outcome: Progressing     Problem: Infection - Adult  Goal: Absence of infection at discharge  1/23/2025 0350 by Swapna Mesa RN  Outcome: Progressing  1/22/2025 1358 by Geo Madrid RN  Outcome: Progressing     Problem: Metabolic/Fluid and Electrolytes - Adult  Goal: Electrolytes maintained within normal limits  1/23/2025 0350 by Swapna Mesa RN  Outcome: Progressing  1/22/2025 1358 by Geo Madrid RN  Outcome: Progressing     Problem: Safety - Adult  Goal: Free from fall injury  Outcome: Progressing

## 2025-01-23 NOTE — PLAN OF CARE
Problem: Chronic Conditions and Co-morbidities  Goal: Patient's chronic conditions and co-morbidity symptoms are monitored and maintained or improved  1/23/2025 0350 by Swapna Mesa RN  Outcome: Progressing     Problem: Discharge Planning  Goal: Discharge to home or other facility with appropriate resources  1/23/2025 0350 by Swapna Mesa RN  Outcome: Progressing     Problem: Pain  Goal: Verbalizes/displays adequate comfort level or baseline comfort level  1/23/2025 0350 by Swapna Mesa RN  Outcome: Progressing     Problem: Gastrointestinal - Adult  Goal: Minimal or absence of nausea and vomiting  1/23/2025 0350 by Swapna Mesa RN  Outcome: Progressing  Goal: Maintains or returns to baseline bowel function  1/23/2025 0350 by Swapna Mesa RN  Outcome: Progressing     Problem: Genitourinary - Adult  Goal: Absence of urinary retention  1/23/2025 0350 by Swapna Mesa RN  Outcome: Progressing     Problem: Infection - Adult  Goal: Absence of infection at discharge  1/23/2025 0350 by Swapna Mesa RN  Outcome: Progressing     Problem: Metabolic/Fluid and Electrolytes - Adult  Goal: Electrolytes maintained within normal limits  1/23/2025 0350 by Swapna Mesa RN  Outcome: Progressing     Problem: Safety - Adult  Goal: Free from fall injury  1/23/2025 0350 by Swapna Mesa RN  Outcome: Progressing     Problem: Chronic Conditions and Co-morbidities  Goal: Patient's chronic conditions and co-morbidity symptoms are monitored and maintained or improved  1/23/2025 0350 by Swapna Mesa RN  Outcome: Progressing     Problem: Discharge Planning  Goal: Discharge to home or other facility with appropriate resources  1/23/2025 0350 by Swapna Mesa RN  Outcome: Progressing     Problem: Pain  Goal: Verbalizes/displays adequate comfort level or baseline comfort level  1/23/2025 0350 by Swapna Mesa RN  Outcome: Progressing     Problem: Gastrointestinal - Adult  Goal: Minimal or

## 2025-01-24 LAB
ANION GAP SERPL CALC-SCNC: 11 MMOL/L (ref 3–18)
BASOPHILS # BLD: 0 K/UL (ref 0–0.1)
BASOPHILS NFR BLD: 0 % (ref 0–2)
BUN SERPL-MCNC: 24 MG/DL (ref 7–18)
BUN/CREAT SERPL: 25 (ref 12–20)
CALCIUM SERPL-MCNC: 8.3 MG/DL (ref 8.5–10.1)
CHLORIDE SERPL-SCNC: 104 MMOL/L (ref 100–111)
CO2 SERPL-SCNC: 21 MMOL/L (ref 21–32)
CREAT SERPL-MCNC: 0.97 MG/DL (ref 0.6–1.3)
DIFFERENTIAL METHOD BLD: ABNORMAL
E COLI SXT STL QL IA: NEGATIVE
EOSINOPHIL # BLD: 0.1 K/UL (ref 0–0.4)
EOSINOPHIL NFR BLD: 1 % (ref 0–5)
ERYTHROCYTE [DISTWIDTH] IN BLOOD BY AUTOMATED COUNT: 11.9 % (ref 11.6–14.5)
GLUCOSE BLD STRIP.AUTO-MCNC: 167 MG/DL (ref 70–110)
GLUCOSE BLD STRIP.AUTO-MCNC: 180 MG/DL (ref 70–110)
GLUCOSE BLD STRIP.AUTO-MCNC: 190 MG/DL (ref 70–110)
GLUCOSE BLD STRIP.AUTO-MCNC: 201 MG/DL (ref 70–110)
GLUCOSE BLD STRIP.AUTO-MCNC: 214 MG/DL (ref 70–110)
GLUCOSE SERPL-MCNC: 187 MG/DL (ref 74–99)
HCT VFR BLD AUTO: 39.3 % (ref 36–48)
HGB BLD-MCNC: 13.2 G/DL (ref 13–16)
IMM GRANULOCYTES # BLD AUTO: 0 K/UL (ref 0–0.04)
IMM GRANULOCYTES NFR BLD AUTO: 0 % (ref 0–0.5)
LYMPHOCYTES # BLD: 0.29 K/UL (ref 0.9–3.6)
LYMPHOCYTES NFR BLD: 3 % (ref 21–52)
MCH RBC QN AUTO: 30.6 PG (ref 24–34)
MCHC RBC AUTO-ENTMCNC: 33.6 G/DL (ref 31–37)
MCV RBC AUTO: 91 FL (ref 78–100)
MONOCYTES # BLD: 0.59 K/UL (ref 0.05–1.2)
MONOCYTES NFR BLD: 6 % (ref 3–10)
NEUTS BAND NFR BLD MANUAL: 2 %
NEUTS SEG # BLD: 8.82 K/UL (ref 1.8–8)
NEUTS SEG NFR BLD: 88 % (ref 40–73)
NRBC # BLD: 0 K/UL (ref 0–0.01)
NRBC BLD-RTO: 0 PER 100 WBC
PLATELET # BLD AUTO: 183 K/UL (ref 135–420)
PLATELET COMMENT: ABNORMAL
PMV BLD AUTO: 10.8 FL (ref 9.2–11.8)
POTASSIUM SERPL-SCNC: 3.7 MMOL/L (ref 3.5–5.5)
RBC # BLD AUTO: 4.32 M/UL (ref 4.35–5.65)
RBC MORPH BLD: ABNORMAL
SODIUM SERPL-SCNC: 136 MMOL/L (ref 136–145)
SPECIMEN SOURCE: NORMAL
WBC # BLD AUTO: 9.8 K/UL (ref 4.6–13.2)

## 2025-01-24 PROCEDURE — 97162 PT EVAL MOD COMPLEX 30 MIN: CPT

## 2025-01-24 PROCEDURE — 97116 GAIT TRAINING THERAPY: CPT

## 2025-01-24 PROCEDURE — 6370000000 HC RX 637 (ALT 250 FOR IP): Performed by: STUDENT IN AN ORGANIZED HEALTH CARE EDUCATION/TRAINING PROGRAM

## 2025-01-24 PROCEDURE — 85025 COMPLETE CBC W/AUTO DIFF WBC: CPT

## 2025-01-24 PROCEDURE — 1100000003 HC PRIVATE W/ TELEMETRY

## 2025-01-24 PROCEDURE — 6360000002 HC RX W HCPCS: Performed by: STUDENT IN AN ORGANIZED HEALTH CARE EDUCATION/TRAINING PROGRAM

## 2025-01-24 PROCEDURE — 99232 SBSQ HOSP IP/OBS MODERATE 35: CPT | Performed by: INTERNAL MEDICINE

## 2025-01-24 PROCEDURE — 2580000003 HC RX 258: Performed by: STUDENT IN AN ORGANIZED HEALTH CARE EDUCATION/TRAINING PROGRAM

## 2025-01-24 PROCEDURE — 36415 COLL VENOUS BLD VENIPUNCTURE: CPT

## 2025-01-24 PROCEDURE — 2500000003 HC RX 250 WO HCPCS: Performed by: STUDENT IN AN ORGANIZED HEALTH CARE EDUCATION/TRAINING PROGRAM

## 2025-01-24 PROCEDURE — 80048 BASIC METABOLIC PNL TOTAL CA: CPT

## 2025-01-24 PROCEDURE — 82962 GLUCOSE BLOOD TEST: CPT

## 2025-01-24 RX ADMIN — SODIUM CHLORIDE, PRESERVATIVE FREE 5 ML: 5 INJECTION INTRAVENOUS at 21:00

## 2025-01-24 RX ADMIN — PREDNISONE 50 MG: 20 TABLET ORAL at 09:07

## 2025-01-24 RX ADMIN — ASPIRIN 81 MG CHEWABLE TABLET 81 MG: 81 TABLET CHEWABLE at 09:06

## 2025-01-24 RX ADMIN — COLCHICINE 0.6 MG: 0.6 CAPSULE ORAL at 21:51

## 2025-01-24 RX ADMIN — INSULIN LISPRO 1 UNITS: 100 INJECTION, SOLUTION INTRAVENOUS; SUBCUTANEOUS at 12:19

## 2025-01-24 RX ADMIN — HEPARIN SODIUM 5000 UNITS: 5000 INJECTION INTRAVENOUS; SUBCUTANEOUS at 14:48

## 2025-01-24 RX ADMIN — ATORVASTATIN CALCIUM 20 MG: 20 TABLET, FILM COATED ORAL at 09:07

## 2025-01-24 RX ADMIN — HEPARIN SODIUM 5000 UNITS: 5000 INJECTION INTRAVENOUS; SUBCUTANEOUS at 21:52

## 2025-01-24 RX ADMIN — PIPERACILLIN AND TAZOBACTAM 3375 MG: 3; .375 INJECTION, POWDER, LYOPHILIZED, FOR SOLUTION INTRAVENOUS at 09:14

## 2025-01-24 RX ADMIN — INSULIN LISPRO 1 UNITS: 100 INJECTION, SOLUTION INTRAVENOUS; SUBCUTANEOUS at 17:48

## 2025-01-24 RX ADMIN — PANTOPRAZOLE SODIUM 40 MG: 40 TABLET, DELAYED RELEASE ORAL at 06:36

## 2025-01-24 RX ADMIN — COLCHICINE 0.6 MG: 0.6 CAPSULE ORAL at 09:06

## 2025-01-24 RX ADMIN — PIPERACILLIN AND TAZOBACTAM 3375 MG: 3; .375 INJECTION, POWDER, LYOPHILIZED, FOR SOLUTION INTRAVENOUS at 02:09

## 2025-01-24 RX ADMIN — DICLOFENAC SODIUM 2 G: 10 GEL TOPICAL at 11:56

## 2025-01-24 RX ADMIN — SODIUM CHLORIDE, PRESERVATIVE FREE 10 ML: 5 INJECTION INTRAVENOUS at 09:07

## 2025-01-24 RX ADMIN — PIPERACILLIN AND TAZOBACTAM 3375 MG: 3; .375 INJECTION, POWDER, LYOPHILIZED, FOR SOLUTION INTRAVENOUS at 17:54

## 2025-01-24 RX ADMIN — DICLOFENAC SODIUM 2 G: 10 GEL TOPICAL at 21:55

## 2025-01-24 RX ADMIN — INSULIN LISPRO 1 UNITS: 100 INJECTION, SOLUTION INTRAVENOUS; SUBCUTANEOUS at 22:06

## 2025-01-24 RX ADMIN — HEPARIN SODIUM 5000 UNITS: 5000 INJECTION INTRAVENOUS; SUBCUTANEOUS at 06:35

## 2025-01-24 ASSESSMENT — PAIN SCALES - GENERAL
PAINLEVEL_OUTOF10: 0
PAINLEVEL_OUTOF10: 4
PAINLEVEL_OUTOF10: 0
PAINLEVEL_OUTOF10: 4
PAINLEVEL_OUTOF10: 0
PAINLEVEL_OUTOF10: 0

## 2025-01-24 ASSESSMENT — PAIN DESCRIPTION - LOCATION
LOCATION: FOOT
LOCATION: LEG;FOOT

## 2025-01-24 ASSESSMENT — PAIN DESCRIPTION - ORIENTATION: ORIENTATION: RIGHT;LEFT

## 2025-01-24 ASSESSMENT — PAIN SCALES - WONG BAKER
WONGBAKER_NUMERICALRESPONSE: NO HURT
WONGBAKER_NUMERICALRESPONSE: NO HURT

## 2025-01-24 ASSESSMENT — PAIN DESCRIPTION - ONSET: ONSET: ON-GOING

## 2025-01-24 ASSESSMENT — PAIN DESCRIPTION - FREQUENCY: FREQUENCY: INTERMITTENT

## 2025-01-24 ASSESSMENT — PAIN DESCRIPTION - PAIN TYPE: TYPE: ACUTE PAIN

## 2025-01-24 ASSESSMENT — PAIN - FUNCTIONAL ASSESSMENT: PAIN_FUNCTIONAL_ASSESSMENT: ACTIVITIES ARE NOT PREVENTED

## 2025-01-24 ASSESSMENT — PAIN DESCRIPTION - DESCRIPTORS: DESCRIPTORS: ACHING

## 2025-01-24 NOTE — PROGRESS NOTES
Physical Therapy  PHYSICAL THERAPY EVALUATION/DISCHARGE    Patient: Varun Disla (79 y.o. male)  Date: 1/24/2025  Primary Diagnosis: Dehydration [E86.0]  Colitis [K52.9]  GM (acute kidney injury) (HCC) [N17.9]  Acute colitis [K52.9]       Precautions: Skin, Fall Risk,  ,  ,  ,  ,  ,  ,    PLOF: Lives with spouse in a 2 story home with 0 TODD, ind prior to admission.     ASSESSMENT AND RECOMMENDATIONS:  Pt cleared by nursing. Pt received in bed in NAD, supportive family present, educated on PT role and evaluation process and agreeable. Pt reports bilateral foot pain due to his \"gout\" that is causing him to walk on his heels. SBA to EOB, good seated balance and 4/5 BLE knee extension, hip flexion, and >3/5 ankle Df/PF with increase pain. Reports intact sensation. Sit to stand SBA into RW, slight forward trunk lean to compensate for balance due to weight mostly on heels due to pain. Ambulates within room with RW, SBA with short shuffling steps with weight on heels and slowly progresses to near equal weight distrubution on foot per observation. Returns to EOB and back to supine. Pt overall limited by pain, however has support at home and good safety awareness. Call bell and all needs left within reach. Pt is near functional baseline, skilled acute care PT is not indicated at this time, will sign off.       Patient does not require further skilled physical therapy intervention at this level of care.    Further Equipment Recommendations for Discharge: rolling walker    AMPA: AM-PAC Inpatient Mobility Raw Score : 20      Current research shows that an AM-PAC score of 18 (14 without stairs) or greater is associated with a discharge to the patient's home setting.    This AMPAC score should be considered in conjunction with interdisciplinary team recommendations to determine the most appropriate discharge setting. Patient's social support, diagnosis, medical stability, and prior level of function should also be taken

## 2025-01-24 NOTE — PROGRESS NOTES
4 Eyes Skin Assessment     NAME:  Varun Disla  YOB: 1945  MEDICAL RECORD NUMBER:  [id number]    The patient is being assessed for  Shift Handoff    I agree that at least one RN has performed a thorough Head to Toe Skin Assessment on the patient. ALL assessment sites listed below have been assessed.      Areas assessed by both nurses:    Head, Face, Ears, Shoulders, Back, Chest, Arms, Elbows, Hands, Sacrum. Buttock, Coccyx, Ischium, and Legs. Feet and Heels        Does the Patient have a Wound? No noted wound(s)       Sean Prevention initiated by RN: No  Wound Care Orders initiated by RN: No    Pressure Injury (Stage 3,4, Unstageable, DTI, NWPT, and Complex wounds) if present, place Wound referral order by RN under : No    New Ostomies, if present place, Ostomy referral order under : No     Nurse 1 eSignature: Electronically signed by Emma Aguilar RN on 1/23/25 at 7:08 PM EST    **SHARE this note so that the co-signing nurse can place an eSignature**    Nurse 2 eSignature: Electronically signed by Sanjana Nielsen RN on 1/23/25 at 7:50 PM EST

## 2025-01-24 NOTE — CARE COORDINATION
Discharge order noted for today. Orders received. No needs identified at this time. Case management remains available as needed.  Family in room with patient and will transport patient home.    Nhi Allen RN BSN  Case Management

## 2025-01-24 NOTE — PLAN OF CARE
Problem: Chronic Conditions and Co-morbidities  Goal: Patient's chronic conditions and co-morbidity symptoms are monitored and maintained or improved  Outcome: Progressing  Flowsheets (Taken 1/23/2025 1927)  Care Plan - Patient's Chronic Conditions and Co-Morbidity Symptoms are Monitored and Maintained or Improved: Monitor and assess patient's chronic conditions and comorbid symptoms for stability, deterioration, or improvement     Problem: Discharge Planning  Goal: Discharge to home or other facility with appropriate resources  Outcome: Progressing  Flowsheets (Taken 1/23/2025 1927)  Discharge to home or other facility with appropriate resources: Identify barriers to discharge with patient and caregiver     Problem: Pain  Goal: Verbalizes/displays adequate comfort level or baseline comfort level  Outcome: Progressing     Problem: Gastrointestinal - Adult  Goal: Minimal or absence of nausea and vomiting  Outcome: Progressing  Flowsheets (Taken 1/23/2025 1927)  Minimal or absence of nausea and vomiting: Administer IV fluids as ordered to ensure adequate hydration  Goal: Maintains or returns to baseline bowel function  Outcome: Progressing  Flowsheets (Taken 1/23/2025 1927)  Maintains or returns to baseline bowel function: Assess bowel function     Problem: Genitourinary - Adult  Goal: Absence of urinary retention  Outcome: Progressing  Flowsheets (Taken 1/23/2025 1927)  Absence of urinary retention: Assess patient’s ability to void and empty bladder     Problem: Infection - Adult  Goal: Absence of infection at discharge  Outcome: Progressing  Flowsheets (Taken 1/23/2025 1927)  Absence of infection at discharge: Assess and monitor for signs and symptoms of infection     Problem: Metabolic/Fluid and Electrolytes - Adult  Goal: Electrolytes maintained within normal limits  Outcome: Progressing  Flowsheets (Taken 1/23/2025 1927)  Electrolytes maintained within normal limits: Monitor labs and assess patient for signs

## 2025-01-24 NOTE — PROGRESS NOTES
4 Eyes Skin Assessment     NAME:  Varun Disla  YOB: 1945  MEDICAL RECORD NUMBER:  [id number]    The patient is being assessed for  Shift Handoff    I agree that at least one RN has performed a thorough Head to Toe Skin Assessment on the patient. ALL assessment sites listed below have been assessed.      Areas assessed by both nurses:    Head, Face, Ears, Shoulders, Back, Chest, Arms, Elbows, Hands, Sacrum. Buttock, Coccyx, Ischium, Legs. Feet and Heels, and Under Medical Devices         Does the Patient have a Wound? No noted wound(s)       Sean Prevention initiated by RN: No  Wound Care Orders initiated by RN: No    Pressure Injury (Stage 3,4, Unstageable, DTI, NWPT, and Complex wounds) if present, place Wound referral order by RN under : No    New Ostomies, if present place, Ostomy referral order under : No     Nurse 1 eSignature: Electronically signed by Sanjana Nielsen RN on 1/24/25 at 7:37 AM EST    **SHARE this note so that the co-signing nurse can place an eSignature**    Nurse 2 eSignature: Electronically signed by Vielka Garsia RN on 1/24/25 at 8:02 AM EST

## 2025-01-24 NOTE — CARE COORDINATION
01/24/25 1241   IMM Letter   IMM Letter given to Patient/Family/Significant other/Guardian/POA/by: Nhi Allen   IMM Letter date given: 01/24/25   IMM Letter time given: 1242       Patient waived four hour notice of discharge requirement by Medicare.    Nhi Allen, RN BSN  Case Management

## 2025-01-25 VITALS
RESPIRATION RATE: 18 BRPM | BODY MASS INDEX: 27.6 KG/M2 | WEIGHT: 150 LBS | HEART RATE: 70 BPM | OXYGEN SATURATION: 95 % | TEMPERATURE: 98.2 F | HEIGHT: 62 IN | SYSTOLIC BLOOD PRESSURE: 118 MMHG | DIASTOLIC BLOOD PRESSURE: 68 MMHG

## 2025-01-25 LAB
ALBUMIN SERPL-MCNC: 2 G/DL (ref 3.4–5)
ALBUMIN/GLOB SERPL: 0.6 (ref 0.8–1.7)
ALP SERPL-CCNC: 85 U/L (ref 45–117)
ALT SERPL-CCNC: 41 U/L (ref 16–61)
ANION GAP SERPL CALC-SCNC: 4 MMOL/L (ref 3–18)
AST SERPL-CCNC: 29 U/L (ref 10–38)
BASOPHILS # BLD: 0.03 K/UL (ref 0–0.1)
BASOPHILS NFR BLD: 0.3 % (ref 0–2)
BILIRUB SERPL-MCNC: 0.8 MG/DL (ref 0.2–1)
BUN SERPL-MCNC: 26 MG/DL (ref 7–18)
BUN/CREAT SERPL: 28 (ref 12–20)
CALCIUM SERPL-MCNC: 7.6 MG/DL (ref 8.5–10.1)
CHLORIDE SERPL-SCNC: 106 MMOL/L (ref 100–111)
CO2 SERPL-SCNC: 26 MMOL/L (ref 21–32)
CREAT SERPL-MCNC: 0.94 MG/DL (ref 0.6–1.3)
DIFFERENTIAL METHOD BLD: ABNORMAL
EOSINOPHIL # BLD: 0 K/UL (ref 0–0.4)
EOSINOPHIL NFR BLD: 0 % (ref 0–5)
ERYTHROCYTE [DISTWIDTH] IN BLOOD BY AUTOMATED COUNT: 11.9 % (ref 11.6–14.5)
GLOBULIN SER CALC-MCNC: 3.4 G/DL (ref 2–4)
GLUCOSE BLD STRIP.AUTO-MCNC: 138 MG/DL (ref 70–110)
GLUCOSE SERPL-MCNC: 210 MG/DL (ref 74–99)
HCT VFR BLD AUTO: 36.6 % (ref 36–48)
HGB BLD-MCNC: 12.3 G/DL (ref 13–16)
IMM GRANULOCYTES # BLD AUTO: 0.15 K/UL (ref 0–0.04)
IMM GRANULOCYTES NFR BLD AUTO: 1.4 % (ref 0–0.5)
LYMPHOCYTES # BLD: 0.41 K/UL (ref 0.9–3.6)
LYMPHOCYTES NFR BLD: 3.9 % (ref 21–52)
MCH RBC QN AUTO: 30 PG (ref 24–34)
MCHC RBC AUTO-ENTMCNC: 33.6 G/DL (ref 31–37)
MCV RBC AUTO: 89.3 FL (ref 78–100)
MONOCYTES # BLD: 1.34 K/UL (ref 0.05–1.2)
MONOCYTES NFR BLD: 12.8 % (ref 3–10)
NEUTS SEG # BLD: 8.56 K/UL (ref 1.8–8)
NEUTS SEG NFR BLD: 81.6 % (ref 40–73)
NRBC # BLD: 0 K/UL (ref 0–0.01)
NRBC BLD-RTO: 0 PER 100 WBC
PLATELET # BLD AUTO: 212 K/UL (ref 135–420)
PMV BLD AUTO: 10.4 FL (ref 9.2–11.8)
POTASSIUM SERPL-SCNC: 3 MMOL/L (ref 3.5–5.5)
PROT SERPL-MCNC: 5.4 G/DL (ref 6.4–8.2)
RBC # BLD AUTO: 4.1 M/UL (ref 4.35–5.65)
SODIUM SERPL-SCNC: 136 MMOL/L (ref 136–145)
WBC # BLD AUTO: 10.5 K/UL (ref 4.6–13.2)

## 2025-01-25 PROCEDURE — 36415 COLL VENOUS BLD VENIPUNCTURE: CPT

## 2025-01-25 PROCEDURE — 82962 GLUCOSE BLOOD TEST: CPT

## 2025-01-25 PROCEDURE — 6360000002 HC RX W HCPCS: Performed by: STUDENT IN AN ORGANIZED HEALTH CARE EDUCATION/TRAINING PROGRAM

## 2025-01-25 PROCEDURE — 85025 COMPLETE CBC W/AUTO DIFF WBC: CPT

## 2025-01-25 PROCEDURE — 6370000000 HC RX 637 (ALT 250 FOR IP): Performed by: STUDENT IN AN ORGANIZED HEALTH CARE EDUCATION/TRAINING PROGRAM

## 2025-01-25 PROCEDURE — 2500000003 HC RX 250 WO HCPCS: Performed by: STUDENT IN AN ORGANIZED HEALTH CARE EDUCATION/TRAINING PROGRAM

## 2025-01-25 PROCEDURE — 99239 HOSP IP/OBS DSCHRG MGMT >30: CPT | Performed by: INTERNAL MEDICINE

## 2025-01-25 PROCEDURE — 6370000000 HC RX 637 (ALT 250 FOR IP): Performed by: INTERNAL MEDICINE

## 2025-01-25 PROCEDURE — 2580000003 HC RX 258: Performed by: STUDENT IN AN ORGANIZED HEALTH CARE EDUCATION/TRAINING PROGRAM

## 2025-01-25 PROCEDURE — 80053 COMPREHEN METABOLIC PANEL: CPT

## 2025-01-25 RX ORDER — OXYCODONE AND ACETAMINOPHEN 5; 325 MG/1; MG/1
1 TABLET ORAL EVERY 8 HOURS PRN
Qty: 9 TABLET | Refills: 0 | Status: SHIPPED | OUTPATIENT
Start: 2025-01-25 | End: 2025-01-28

## 2025-01-25 RX ORDER — LEVOFLOXACIN 750 MG/1
750 TABLET, FILM COATED ORAL DAILY
Qty: 3 TABLET | Refills: 0 | Status: SHIPPED | OUTPATIENT
Start: 2025-01-25 | End: 2025-01-28

## 2025-01-25 RX ORDER — PANTOPRAZOLE SODIUM 40 MG/1
40 TABLET, DELAYED RELEASE ORAL
Qty: 30 TABLET | Refills: 3 | Status: SHIPPED | OUTPATIENT
Start: 2025-01-26

## 2025-01-25 RX ORDER — METRONIDAZOLE 500 MG/1
500 TABLET ORAL 3 TIMES DAILY
Qty: 9 TABLET | Refills: 0 | Status: SHIPPED | OUTPATIENT
Start: 2025-01-25 | End: 2025-01-28

## 2025-01-25 RX ORDER — DICYCLOMINE HYDROCHLORIDE 10 MG/1
20 CAPSULE ORAL 4 TIMES DAILY PRN
Qty: 120 CAPSULE | Refills: 1 | Status: SHIPPED | OUTPATIENT
Start: 2025-01-25

## 2025-01-25 RX ORDER — COLCHICINE 0.6 MG/1
0.6 CAPSULE ORAL 2 TIMES DAILY
Qty: 60 CAPSULE | Refills: 0 | Status: SHIPPED | OUTPATIENT
Start: 2025-01-25

## 2025-01-25 RX ADMIN — SODIUM CHLORIDE, PRESERVATIVE FREE 10 ML: 5 INJECTION INTRAVENOUS at 10:09

## 2025-01-25 RX ADMIN — COLCHICINE 0.6 MG: 0.6 CAPSULE ORAL at 09:53

## 2025-01-25 RX ADMIN — PREDNISONE 50 MG: 20 TABLET ORAL at 09:53

## 2025-01-25 RX ADMIN — HEPARIN SODIUM 5000 UNITS: 5000 INJECTION INTRAVENOUS; SUBCUTANEOUS at 06:36

## 2025-01-25 RX ADMIN — ASPIRIN 81 MG CHEWABLE TABLET 81 MG: 81 TABLET CHEWABLE at 09:53

## 2025-01-25 RX ADMIN — PANTOPRAZOLE SODIUM 40 MG: 40 TABLET, DELAYED RELEASE ORAL at 06:35

## 2025-01-25 RX ADMIN — PIPERACILLIN AND TAZOBACTAM 3375 MG: 3; .375 INJECTION, POWDER, LYOPHILIZED, FOR SOLUTION INTRAVENOUS at 03:39

## 2025-01-25 RX ADMIN — ATORVASTATIN CALCIUM 20 MG: 20 TABLET, FILM COATED ORAL at 09:53

## 2025-01-25 RX ADMIN — POTASSIUM BICARBONATE 50 MEQ: 978 TABLET, EFFERVESCENT ORAL at 09:58

## 2025-01-25 ASSESSMENT — PAIN SCALES - GENERAL: PAINLEVEL_OUTOF10: 0

## 2025-01-25 NOTE — PLAN OF CARE
Problem: Chronic Conditions and Co-morbidities  Goal: Patient's chronic conditions and co-morbidity symptoms are monitored and maintained or improved  Outcome: Progressing  Flowsheets (Taken 1/24/2025 0735)  Care Plan - Patient's Chronic Conditions and Co-Morbidity Symptoms are Monitored and Maintained or Improved:   Monitor and assess patient's chronic conditions and comorbid symptoms for stability, deterioration, or improvement   Collaborate with multidisciplinary team to address chronic and comorbid conditions and prevent exacerbation or deterioration     Problem: Discharge Planning  Goal: Discharge to home or other facility with appropriate resources  Outcome: Progressing  Flowsheets (Taken 1/24/2025 0735)  Discharge to home or other facility with appropriate resources:   Identify barriers to discharge with patient and caregiver   Arrange for needed discharge resources and transportation as appropriate     Problem: Pain  Goal: Verbalizes/displays adequate comfort level or baseline comfort level  Outcome: Progressing  Flowsheets (Taken 1/24/2025 5058)  Verbalizes/displays adequate comfort level or baseline comfort level:   Encourage patient to monitor pain and request assistance   Assess pain using appropriate pain scale   Administer analgesics based on type and severity of pain and evaluate response     Problem: Gastrointestinal - Adult  Goal: Minimal or absence of nausea and vomiting  Outcome: Progressing     Problem: Infection - Adult  Goal: Absence of infection at discharge  Outcome: Progressing  Flowsheets (Taken 1/24/2025 0735)  Absence of infection at discharge:   Assess and monitor for signs and symptoms of infection   Monitor lab/diagnostic results     Problem: Metabolic/Fluid and Electrolytes - Adult  Goal: Electrolytes maintained within normal limits  Outcome: Progressing  Flowsheets (Taken 1/24/2025 0735)  Electrolytes maintained within normal limits:   Monitor labs and assess patient for signs and

## 2025-01-25 NOTE — PROGRESS NOTES
4 Eyes Skin Assessment     NAME:  Varun Disla  YOB: 1945  MEDICAL RECORD NUMBER:  [id number]    The patient is being assessed for  Shift Handoff    I agree that at least one RN has performed a thorough Head to Toe Skin Assessment on the patient. ALL assessment sites listed below have been assessed.      Areas assessed by both nurses:    Head, Face, Ears, Shoulders, Back, Chest, Arms, Elbows, Hands, Sacrum. Buttock, Coccyx, Ischium, and Legs. Feet and Heels        Does the Patient have a Wound? No noted wound(s)       Sean Prevention initiated by RN: Yes  Wound Care Orders initiated by RN: No    Pressure Injury (Stage 3,4, Unstageable, DTI, NWPT, and Complex wounds) if present, place Wound referral order by RN under : No    New Ostomies, if present place, Ostomy referral order under : No     Nurse 1 eSignature: Electronically signed by Vielka Garsia RN on 1/24/25 at 7:33 PM EST    **SHARE this note so that the co-signing nurse can place an eSignature**    Nurse 2 eSignature: {Esignature:[id number]}

## 2025-01-25 NOTE — DISCHARGE SUMMARY
Discharge Summary    Patient: Varun Disla MRN: [id number]  [facility]: [id number]    YOB: 1945  Age: 79 y.o.  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  PERRLA, EOMI. Anicteric sclerae.  Lungs:  CTA Bilaterally. No Wheezing/Rhonchi/Rales.  Heart:  Regular  rhythm,  No murmur, No Rubs, No Gallops  Abdomen: Soft, Non distended, Non tender.  +Bowel sounds, no HSM  Extremities: Mild tenderness to bilateral ankles  Psych:   Good insight. Not anxious or agitated.  Neurologic:  CN 2-12 grossly intact, oriented X 3.  No acute neurological                                 Deficits,     Hospital Course: Per the H&P:79-year-old male past medical history of type 2 diabetes with nephropathy, prostate cancer, hypertension, hyperlipidemia, gout, CKD 3, splenic artery aneurysm who presents to the outside ED at [facility] for abdominal pain and constipation.  Patient describes constipation/no bowel meant for 2 days which prompted him to try an oral laxative and suppository with a successful bowel movement described as a large bowel movement this past Sunday morning.  In the next day patient with lower abdominal pain and nausea which prompted him to come to the ED for further evaluation.  At the bedside patient still endorses nausea with attempting orals, left lower quadrant pain that has markedly improved with

## 2025-01-25 NOTE — PROGRESS NOTES
[facility] Hospitalist Group  Progress Note    Patient: Varun Disla Age: 79 y.o. : 1945 MR#: [id number] SSN: [id number]  Date/Time: 2025     Subjective:   Patient doing well. Seen and examined bedside. No overnight events.    Dispo: Home  DC tomorrow    Review of systems  General: No fevers or chills.  Cardiovascular: No chest pain or pressure. No palpitations.   Pulmonary: No shortness of breath, cough or wheeze.   Gastrointestinal: No abdominal pain, nausea, vomiting or diarrhea.   Genitourinary: No urinary frequency, urgency, hesitancy or dysuria.   Musculoskeletal: No joint or muscle pain, no back pain, no recent trauma.    Neurologic: No headache, numbness, tingling or weakness.   Assessment/Plan:   Acute colitis of the descending colon, unknown etiology.  Wife is at bedside.  She states that patient has been constipated for several days requiring multiple bowel regimens.  He is now having bowel movements.   Intractable abdominal pain, resolving.   #1-2. Admitted on . Continue Zosyn. Enteric panel negative. PRN morphine pain control.    GM on CKD stage IIIb - Cr 1.88 upon admission; baseline around 1.3. Cr now 1.15.  #3. Off IVF. Diet started.   SIRS criteria - Tachycardia, Leukocytosis - WBC 19.0 upon admission. imrproved  H/o prostate cancer - patient has not had radiation or other therapy since 2024.   Insulin dependent type II diabetes mellitus - hold home regimen  #6. SSI.   7.  Gout Flare               #7. Continue colchicine for gout flare, limited course of steroid on board. PRN Percocet.          I spent 40 minutes with the patient in face-to-face consultation, of which greater than 50% was spent in counseling and coordination of care as described above.    Case discussed with:  []Patient  []Family  []Nursing  []Case Management  DVT Prophylaxis:  []Lovenox  []Hep SQ  []SCDs  []Coumadin   []Eliquis/Xarelto     Objective:   VS: /69   Pulse

## 2025-01-25 NOTE — PROGRESS NOTES
MD placed discharge orders. Discharge instructions reviewed and discussed with patient and spouse. Medications reviewed. Both voiced understanding and questions answered. Pt IV's were removed.

## 2025-01-25 NOTE — CARE COORDINATION
Discharge order noted for today. Orders received. Family will transport home no further CM  needs identified at this time. Case management remains available as needed.     Ibis Means BSN RN  Case Management  [phone]

## 2025-01-25 NOTE — DISCHARGE INSTRUCTIONS
Kelley Greene you came in with colitis and a gout flare.  Please take colchicine twice a day until your symptoms resolve and then switch to once a day.  Regarding colitis please complete your course of antibiotics I have sent them to your pharmacy.  Please take care    DISCHARGE SUMMARY from Nurse    PATIENT INSTRUCTIONS:    After general anesthesia or intravenous sedation, for 24 hours or while taking prescription Narcotics:  Limit your activities  Do not drive and operate hazardous machinery  Do not make important personal or business decisions  Do  not drink alcoholic beverages  If you have not urinated within 8 hours after discharge, please contact your surgeon on call.    Report the following to your surgeon:  Excessive pain, swelling, redness or odor of or around the surgical area  Temperature over 100.5  Nausea and vomiting lasting longer than 4 hours or if unable to take medications  Any signs of decreased circulation or nerve impairment to extremity: change in color, persistent  numbness, tingling, coldness or increase pain  Any questions    What to do at Home:  Recommended activity: activity as tolerated,     If you experience any of the following symptoms abdominal cramping, please follow up with primary care provider or go to nearest emergency room.    *  Please give a list of your current medications to your Primary Care Provider.    *  Please update this list whenever your medications are discontinued, doses are      changed, or new medications (including over-the-counter products) are added.    *  Please carry medication information at all times in case of emergency situations.    These are general instructions for a healthy lifestyle:    No smoking/ No tobacco products/ Avoid exposure to second hand smoke  Surgeon General's Warning:  Quitting smoking now greatly reduces serious risk to your health.    Obesity, smoking, and sedentary lifestyle greatly increases your risk for illness    A healthy diet,

## 2025-01-26 LAB
BACTERIA SPEC CULT: NORMAL
BACTERIA SPEC CULT: NORMAL
LACTOFERRIN, FECAL: 350.56 UG/ML(G) (ref 0–7.24)
SERVICE CMNT-IMP: NORMAL
SERVICE CMNT-IMP: NORMAL

## 2025-01-27 ENCOUNTER — ENROLLMENT (OUTPATIENT)
Facility: CLINIC | Age: 80
End: 2025-01-27

## 2025-01-27 ENCOUNTER — CARE COORDINATION (OUTPATIENT)
Facility: CLINIC | Age: 80
End: 2025-01-27

## 2025-01-27 NOTE — CARE COORDINATION
Care Transitions Note    Initial Call - Call within 2 business days of discharge: Yes      Patient: Varun Disla    Patient : 1945   MRN: [id number]    Reason for Admission: Colitis  Discharge Date: 25  RURS: Readmission Risk Score: 14.1      Last Discharge Facility       Date Complaint Diagnosis Description Type Department Provider    25 Abdominal Pain; Constipation Colitis ... ED to Hosp-Admission (Discharged) (ADMITTED) [id number] Ryan Montoya, DO; Mari Medina...            Was this an external facility discharge? No    Additional needs identified to be addressed with provider   No needs identified             Method of communication with provider: none.  Care Summary Note:     CTN attempt to reach patient on phone for follow up.   CTN reached Patient spouse Mrs. Kim Disla ( listed on PHI) Pt. spouse reported that Patient is resting at this time.   Pt. Spouse reports that Patient has all his medications and already Started taking them   Pt. Spouse states that Patient is doing good and not running any fever.   No new or worsening of symptoms reported by Pt. Spouse at this time .   Pt. Spouse states that ankle/feet swelling are better. Pt. Spouse also reported that Patient is walking with walker.   Pt. Spouse reports that Pt. Loose stool is still present. This is not new and not worsening as per Pt. Spouse.   Pt. Spouse states that Patient handles his own medications.   Patient spouse agrees for CTN to call back some other time for CTN to review medrec with Pt.     Pt. Spouse states that they will self schdule Pt. Appt.     Future Appointments         Provider Specialty Dept Phone    2025 10:30 AM Fred Ramsey [facility] Internal Medicine [phone]    2025 10:10 AM [facility] NURSE Urology [phone]    2025 11:00 AM Mignon Lopez MD Family Medicine [phone]    2025 11:10 AM [facility] NURSE Urology [phone]    2025 10:20 AM Keren Benitez

## 2025-01-28 ENCOUNTER — CARE COORDINATION (OUTPATIENT)
Facility: CLINIC | Age: 80
End: 2025-01-28

## 2025-01-28 DIAGNOSIS — K52.9 COLITIS: Primary | ICD-10-CM

## 2025-01-28 PROCEDURE — 1111F DSCHRG MED/CURRENT MED MERGE: CPT | Performed by: FAMILY MEDICINE

## 2025-01-28 NOTE — CARE COORDINATION
Urology [phone]    5/6/2025 10:20 AM Keren Benitez PA-C Urology [phone]            Care Transition Nurse provided contact information.  Plan for follow-up call in 6-10 days based on severity of symptoms and risk factors.  Plan for next call:  follow up symptoms, assess for any needs.       Adriana Sanford RN

## 2025-02-04 ENCOUNTER — CARE COORDINATION (OUTPATIENT)
Facility: CLINIC | Age: 80
End: 2025-02-04

## 2025-02-04 NOTE — CARE COORDINATION
Care Transitions Note    Follow Up Call     Patient Current Location:  Home: [address] [place] [id number]    LPN Care Coordinator contacted the patient by telephone. Verified name and  as identifiers.    Additional needs identified to be addressed with provider   No needs identified                 Method of communication with provider: none.    Care Summary Note:   Spoke with patient's spouse on PHI.  Spouse states patient is doing good.  Spouse reports blood sugar 160. States sugars run 140 to 160 never goes above 200.  Spouse has no questions or concerns and requests no further calls. States no one ever called before when he was discharged and that patient is doing fine.      Advance Care Planning:   Does patient have an Advance Directive:  spouse states they have the paperwork. .    Follow Up Appointment:     Future Appointments         Provider Specialty Dept Phone    2025 10:10 AM [facility] NURSE Urology [phone]    2025 11:00 AM Mignon Lopez MD Family Medicine [phone]    2025 11:10 AM [facility] NURSE Urology [phone]    2025 10:20 AM Keren Benitez PA-C Urology [phone]            No further calls per spouse request  Becki Orozco LPN

## 2025-04-04 ENCOUNTER — HOSPITAL ENCOUNTER (OUTPATIENT)
Facility: HOSPITAL | Age: 80
Discharge: HOME | End: 2025-04-04
Payer: MEDICARE

## 2025-04-04 DIAGNOSIS — E11.21 TYPE 2 DIABETES WITH NEPHROPATHY (HCC): ICD-10-CM

## 2025-04-04 LAB
ALBUMIN SERPL-MCNC: 3.6 G/DL (ref 3.4–5)
ALBUMIN/GLOB SERPL: 1.2 (ref 0.8–1.7)
ALP SERPL-CCNC: 65 U/L (ref 45–117)
ALT SERPL-CCNC: 80 U/L (ref 16–61)
ANION GAP SERPL CALC-SCNC: 7 MMOL/L (ref 3–18)
AST SERPL-CCNC: 41 U/L (ref 10–38)
BILIRUB SERPL-MCNC: 1.5 MG/DL (ref 0.2–1)
BUN SERPL-MCNC: 28 MG/DL (ref 7–18)
BUN/CREAT SERPL: 24 (ref 12–20)
CALCIUM SERPL-MCNC: 9.2 MG/DL (ref 8.5–10.1)
CHLORIDE SERPL-SCNC: 106 MMOL/L (ref 100–111)
CO2 SERPL-SCNC: 26 MMOL/L (ref 21–32)
CREAT SERPL-MCNC: 1.18 MG/DL (ref 0.6–1.3)
CREAT UR-MCNC: 65 MG/DL (ref 30–125)
EST. AVERAGE GLUCOSE BLD GHB EST-MCNC: 192 MG/DL
GLOBULIN SER CALC-MCNC: 3 G/DL (ref 2–4)
GLUCOSE SERPL-MCNC: 170 MG/DL (ref 74–99)
HBA1C MFR BLD: 8.3 % (ref 4.2–5.6)
MICROALBUMIN UR-MCNC: 3.25 MG/DL (ref 0–3)
MICROALBUMIN/CREAT UR-RTO: 50 MG/G (ref 0–30)
POTASSIUM SERPL-SCNC: 4.1 MMOL/L (ref 3.5–5.5)
PROT SERPL-MCNC: 6.6 G/DL (ref 6.4–8.2)
SODIUM SERPL-SCNC: 139 MMOL/L (ref 136–145)

## 2025-04-04 PROCEDURE — 83036 HEMOGLOBIN GLYCOSYLATED A1C: CPT

## 2025-04-04 PROCEDURE — 36415 COLL VENOUS BLD VENIPUNCTURE: CPT

## 2025-04-04 PROCEDURE — 80053 COMPREHEN METABOLIC PANEL: CPT

## 2025-04-04 PROCEDURE — 82570 ASSAY OF URINE CREATININE: CPT

## 2025-04-04 PROCEDURE — 82043 UR ALBUMIN QUANTITATIVE: CPT

## 2025-04-07 ENCOUNTER — RESULTS FOLLOW-UP (OUTPATIENT)
Facility: CLINIC | Age: 80
End: 2025-04-07

## 2025-04-09 ENCOUNTER — OFFICE VISIT (OUTPATIENT)
Facility: CLINIC | Age: 80
End: 2025-04-09
Payer: MEDICARE

## 2025-04-09 ENCOUNTER — TELEPHONE (OUTPATIENT)
Facility: CLINIC | Age: 80
End: 2025-04-09

## 2025-04-09 VITALS
OXYGEN SATURATION: 96 % | RESPIRATION RATE: 16 BRPM | SYSTOLIC BLOOD PRESSURE: 110 MMHG | TEMPERATURE: 98 F | DIASTOLIC BLOOD PRESSURE: 70 MMHG | BODY MASS INDEX: 27.6 KG/M2 | HEART RATE: 104 BPM | HEIGHT: 62 IN | WEIGHT: 150 LBS

## 2025-04-09 DIAGNOSIS — M10.9 GOUT WITHOUT TOPHUS: ICD-10-CM

## 2025-04-09 DIAGNOSIS — E11.65 TYPE 2 DIABETES MELLITUS WITH HYPERGLYCEMIA, WITH LONG-TERM CURRENT USE OF INSULIN (HCC): ICD-10-CM

## 2025-04-09 DIAGNOSIS — Z79.4 TYPE 2 DIABETES MELLITUS WITH HYPERGLYCEMIA, WITH LONG-TERM CURRENT USE OF INSULIN (HCC): ICD-10-CM

## 2025-04-09 DIAGNOSIS — C61 PROSTATE CANCER (HCC): ICD-10-CM

## 2025-04-09 DIAGNOSIS — I10 ESSENTIAL HYPERTENSION: ICD-10-CM

## 2025-04-09 DIAGNOSIS — E78.00 PURE HYPERCHOLESTEROLEMIA: ICD-10-CM

## 2025-04-09 DIAGNOSIS — N18.31 CHRONIC KIDNEY DISEASE, STAGE 3A (HCC): ICD-10-CM

## 2025-04-09 DIAGNOSIS — I72.8 SPLENIC ARTERY ANEURYSM: ICD-10-CM

## 2025-04-09 DIAGNOSIS — E11.21 TYPE 2 DIABETES WITH NEPHROPATHY (HCC): Primary | ICD-10-CM

## 2025-04-09 PROCEDURE — G8417 CALC BMI ABV UP PARAM F/U: HCPCS | Performed by: FAMILY MEDICINE

## 2025-04-09 PROCEDURE — 1160F RVW MEDS BY RX/DR IN RCRD: CPT | Performed by: FAMILY MEDICINE

## 2025-04-09 PROCEDURE — 99214 OFFICE O/P EST MOD 30 MIN: CPT | Performed by: FAMILY MEDICINE

## 2025-04-09 PROCEDURE — 1126F AMNT PAIN NOTED NONE PRSNT: CPT | Performed by: FAMILY MEDICINE

## 2025-04-09 PROCEDURE — 3078F DIAST BP <80 MM HG: CPT | Performed by: FAMILY MEDICINE

## 2025-04-09 PROCEDURE — 3074F SYST BP LT 130 MM HG: CPT | Performed by: FAMILY MEDICINE

## 2025-04-09 PROCEDURE — 1124F ACP DISCUSS-NO DSCNMKR DOCD: CPT | Performed by: FAMILY MEDICINE

## 2025-04-09 PROCEDURE — 1036F TOBACCO NON-USER: CPT | Performed by: FAMILY MEDICINE

## 2025-04-09 PROCEDURE — G8427 DOCREV CUR MEDS BY ELIG CLIN: HCPCS | Performed by: FAMILY MEDICINE

## 2025-04-09 PROCEDURE — 3052F HG A1C>EQUAL 8.0%<EQUAL 9.0%: CPT | Performed by: FAMILY MEDICINE

## 2025-04-09 PROCEDURE — 1159F MED LIST DOCD IN RCRD: CPT | Performed by: FAMILY MEDICINE

## 2025-04-09 RX ORDER — LISINOPRIL 20 MG/1
20 TABLET ORAL DAILY
Qty: 90 TABLET | Refills: 1 | Status: SHIPPED | OUTPATIENT
Start: 2025-04-09

## 2025-04-09 RX ORDER — INSULIN HUMAN 100 [IU]/ML
14 INJECTION, SUSPENSION SUBCUTANEOUS EVERY MORNING
Qty: 5 ADJUSTABLE DOSE PRE-FILLED PEN SYRINGE | Refills: 0 | Status: SHIPPED | OUTPATIENT
Start: 2025-04-09

## 2025-04-09 RX ORDER — HYDROCORTISONE 25 MG/G
CREAM TOPICAL 2 TIMES DAILY
Qty: 30 G | Refills: 1 | Status: SHIPPED | OUTPATIENT
Start: 2025-04-09

## 2025-04-09 NOTE — TELEPHONE ENCOUNTER
**[facility]**    Attempt made to contact patient at the mobile number.    Spoke with patients Wife    Scheduled a in person appointment .  Appointment scheduled for 4/25/25 at 10:30 am.    Grazyna Stapleton   Ambulatory Pharmacy Technician Clinical   [phone]  Department, toll free: [phone], option 2       For Pharmacy Admin Tracking Only    Program: Medical Group  Recommendation Provided To: Patient/Caregiver: 1 via Telephone  Intervention Detail: Scheduled Appointment  Intervention Accepted By: Patient/Caregiver: 1  Gap Closed?: Yes   Time Spent (min): 10

## 2025-04-09 NOTE — TELEPHONE ENCOUNTER
Reason for referral: DM  Referring provider: Dr Lopez  Referring provider office:  [facility]  Referred to: Fred Ramsey, PharmD, BCACP, BC-ADM  Status of Patient: Existing  Length of Appt: 30 min  Type of Appt: In Person   Patient need address: [address] [place], [place] [id number]

## 2025-04-09 NOTE — PROGRESS NOTES
\"Have you been to the ER, urgent care clinic since your last visit?  Hospitalized since your last visit?\"    YES - When: approximately 2 months ago.  Where and Why: hospital for colitis .    “Have you seen or consulted any other health care providers outside our system since your last visit?”    NO           
Diabetes     01/02/2025 6.6 (H) 4.2 - 5.6 % Final     Comment:     (NOTE)  HbA1C Interpretive Ranges  <5.7              Normal  5.7 - 6.4         Consider Prediabetes  >6.5              Consider Diabetes           ASSESSMENT/PLAN    Diagnoses and all orders for this visit:    Type 2 diabetes mellitus with nephropathy(HCC)-  a1c 7.5>6.8>7.1>7.1>6.6>8.3  Continue NPH 14 units, go down to units if fasting blood glucose is less than 120   Cont jardiance  Cont lisinopril  cont statin  History of intolerance to GLP-1 Ozempic with constipation and abdominal pain  Resume care with Pharm.D. if willing, referral to endocrinology  Check cbc, CMP A1c in 3 months or sooner prn    Essential hypertension-  Will cont lisinopril to 20 mg OD   Monitoring    CRI 3 stage a  monitoring, avoid nsaids, renally dose meds, optimize DM/BP control  Cont ACE and SGLT2  Also following nephrology    Pure hypercholesterolemia-   at goal LDL< 100 on statin, cont    Gout without tophus, unspecified cause, unspecified chronicity, unspecified site-seldom flares,  Prn colchicine, low purine diet.     Splenic artery aneursysm  S/p repair 2015  On ASA, statin    Prostate cancer (HCC)  2008 recurrence 9/2022 with pelvic LN metastasis, recent completion of treatment  Following dr. Cid      Ff-up in 3 months or sooner prn    Patient understands plan of care. Patient has provided input and agrees with goals.

## 2025-04-16 DIAGNOSIS — Z79.4 TYPE 2 DIABETES MELLITUS WITH HYPERGLYCEMIA, WITH LONG-TERM CURRENT USE OF INSULIN (HCC): ICD-10-CM

## 2025-04-16 DIAGNOSIS — E11.65 TYPE 2 DIABETES MELLITUS WITH HYPERGLYCEMIA, WITH LONG-TERM CURRENT USE OF INSULIN (HCC): ICD-10-CM

## 2025-04-16 RX ORDER — PEN NEEDLE, DIABETIC 32GX 5/32"
1 NEEDLE, DISPOSABLE MISCELLANEOUS DAILY
Qty: 100 EACH | Refills: 3 | Status: SHIPPED | OUTPATIENT
Start: 2025-04-16

## 2025-04-16 RX ORDER — BLOOD-GLUCOSE METER
KIT MISCELLANEOUS
Qty: 300 EACH | Refills: 3 | Status: SHIPPED | OUTPATIENT
Start: 2025-04-16

## 2025-04-23 NOTE — PROGRESS NOTES
Pharmacy Progress Note - Diabetes Management       Assessment / Plan:   Diabetes Management:  Per ADA guidelines, Pt's A1c is not at goal of < 7%.  Pt's overall glycemic control is poor despite restarting NPH.  His dose of NPH is only dosed once daily and is not providing 24 hour coverage.  Will focus on basal control at this time and switch NPH to Lantus 14 units qam and have him titrate by 2 units every 3 days until FBG < 130 mg/dL.  He does not have anything on board to help with prandial control since his Ozempic was stopped.  Will assess a need for prandial control with more data.  Will be starting a Dexcom G7 CGM in 1 week.  Will reassess with SMBG logs at follow up in 1 week.     Nutrition/Lifestyle Modifications:  - Educated pt on the importance of moderating carbohydrate intake. Reviewed sources of carbohydrates and method to help determine appropriate portion sizes (e.g., Diabetes Plate Method).  - Advised patient to avoid sugar-sweetened beverages and replace with water or diet/zero sugar option.  - Recommend ~30 minutes consistent, moderately intensive, exercise/day or ~150 minutes/week. Start small, stay consistent, and increase length and types of exercise, as tolerated.       Patient will return to clinic in 1 week(s) for follow up.        S/O: Mr. Varun Disla, a 79 y.o. male referred by Mignon Lopez MD,  has a past medical history of Arthritis, CRI (chronic renal insufficiency), Gout, Hypercholesteremia, Hypertension, Kidney stone, Other ill-defined conditions(799.89), Personal history of prostate cancer, Prostate cancer (HCC), and Splenic artery aneurysm.  Pt was seen today for diabetes management.  Patient's last A1c was:   Hemoglobin A1C   Date Value Ref Range Status   04/04/2025 8.3 (H) 4.2 - 5.6 % Final     Comment:     (NOTE)  HbA1C Interpretive Ranges  <5.7              Normal  5.7 - 6.4         Consider Prediabetes  >6.5              Consider Diabetes         Interim update: Pt

## 2025-04-25 ENCOUNTER — PHARMACY VISIT (OUTPATIENT)
Facility: CLINIC | Age: 80
End: 2025-04-25

## 2025-04-25 DIAGNOSIS — E11.65 TYPE 2 DIABETES MELLITUS WITH HYPERGLYCEMIA, WITH LONG-TERM CURRENT USE OF INSULIN (HCC): Primary | ICD-10-CM

## 2025-04-25 DIAGNOSIS — Z79.4 TYPE 2 DIABETES MELLITUS WITH HYPERGLYCEMIA, WITH LONG-TERM CURRENT USE OF INSULIN (HCC): Primary | ICD-10-CM

## 2025-04-25 RX ORDER — ACYCLOVIR 400 MG/1
TABLET ORAL
Qty: 9 EACH | Refills: 3 | Status: SHIPPED | OUTPATIENT
Start: 2025-04-25

## 2025-04-25 RX ORDER — ACYCLOVIR 400 MG/1
TABLET ORAL
Qty: 1 EACH | Refills: 0 | Status: SHIPPED | OUTPATIENT
Start: 2025-04-25

## 2025-04-25 RX ORDER — INSULIN GLARGINE 100 [IU]/ML
14 INJECTION, SOLUTION SUBCUTANEOUS EVERY MORNING
Qty: 15 ML | Refills: 11 | Status: SHIPPED | OUTPATIENT
Start: 2025-04-25

## 2025-04-25 NOTE — PATIENT INSTRUCTIONS
- Stop NPH    - Start Lantus 14 units every morning   * Increase by 2 units every 3 days until your FASTING morning blood glucose is consistently < 130 mg/dL    -  the Dexcom G7 sensors and  and bring to the next appointment

## 2025-04-30 NOTE — PROGRESS NOTES
Pharmacy Progress Note - Diabetes Management       Assessment / Plan:   Diabetes Management:  Per ADA guidelines, Pt's A1c is not at goal of < 7%.  Pt's FBG values are improving with the dose increase of Lantus.  However, it is still above goal.  Will increase his Lantus to 20 units qam and have him continue his dose titration of 2 units every 2 days until FBG < 130 mg/dL.  His prandial values are roughly his FBG values so his prandial control will likely be adequate once his basal control is improved.  Will reassess with CGM data in 4 weeks.     Patient will return to clinic in 4 week(s) for follow up.        S/O: Mr. Varun Disla, a 79 y.o. male referred by Mignon Lopez MD,  has a past medical history of Arthritis, CRI (chronic renal insufficiency), Gout, Hypercholesteremia, Hypertension, Kidney stone, Other ill-defined conditions(799.89), Personal history of prostate cancer, Prostate cancer (HCC), and Splenic artery aneurysm.  Pt was seen today for diabetes management.  Patient's last A1c was:   Hemoglobin A1C   Date Value Ref Range Status   04/04/2025 8.3 (H) 4.2 - 5.6 % Final     Comment:     (NOTE)  HbA1C Interpretive Ranges  <5.7              Normal  5.7 - 6.4         Consider Prediabetes  >6.5              Consider Diabetes         Interim update: Pt was last seen by me on 4/25/2025.  Per my prior note: Pt's A1c is not at goal of < 7%.  Pt's overall glycemic control is poor despite restarting NPH.  His dose of NPH is only dosed once daily and is not providing 24 hour coverage.  Will focus on basal control at this time and switch NPH to Lantus 14 units qam and have him titrate by 2 units every 3 days until FBG < 130 mg/dL.  He does not have anything on board to help with prandial control since his Ozempic was stopped.  Will assess a need for prandial control with more data.  Will be starting a Dexcom G7 CGM in 1 week.  Will reassess with SMBG logs at follow up in 1 week.    Today:   Pt brought in

## 2025-05-02 ENCOUNTER — PHARMACY VISIT (OUTPATIENT)
Facility: CLINIC | Age: 80
End: 2025-05-02

## 2025-05-02 DIAGNOSIS — Z79.4 TYPE 2 DIABETES MELLITUS WITH HYPERGLYCEMIA, WITH LONG-TERM CURRENT USE OF INSULIN (HCC): ICD-10-CM

## 2025-05-02 DIAGNOSIS — E11.65 TYPE 2 DIABETES MELLITUS WITH HYPERGLYCEMIA, WITH LONG-TERM CURRENT USE OF INSULIN (HCC): ICD-10-CM

## 2025-05-02 RX ORDER — INSULIN GLARGINE 100 [IU]/ML
20 INJECTION, SOLUTION SUBCUTANEOUS EVERY MORNING
Qty: 15 ML | Refills: 0 | Status: SHIPPED | OUTPATIENT
Start: 2025-05-02

## 2025-05-06 PROBLEM — Z85.46 PERSONAL HISTORY OF PROSTATE CANCER: Status: ACTIVE | Noted: 2024-08-02

## 2025-06-03 NOTE — PROGRESS NOTES
Pharmacy Progress Note - Diabetes Management       Assessment / Plan:   Diabetes Management:  Per ADA guidelines, Pt's A1c is not at goal of < 7%.  Pt's AGP report from the past 14 days shows time in target range 55%, average glucose 182 mg/dL, and GMI 7.7%.  Pt's basal control has improved with the increase in Lantus dose.  However, his prandial control is very poor despite the addition of Prandin 1mg ac tid making no difference between the CGM data prior.  Pt may need to switch to prandial insulin d/t a lack of adequate endogenous insulin production.  However, will attempt to maximize his current tx and will increase his Prandin to 2mg ac tid.  Will reassess with CGM data in 3 weeks.     Nutrition/Lifestyle Modifications:  - Educated pt on the importance of moderating carbohydrate intake. Reviewed sources of carbohydrates and method to help determine appropriate portion sizes (e.g., Diabetes Plate Method).  - Advised patient to avoid sugar-sweetened beverages and replace with water or diet/zero sugar option.  - Recommend ~30 minutes consistent, moderately intensive, exercise/day or ~150 minutes/week. Start small, stay consistent, and increase length and types of exercise, as tolerated.       Patient will return to clinic in 3 week(s) for follow up.        S/O: Mr. Varun Disla, a 79 y.o. male referred by Mignon Lopez MD,  has a past medical history of Arthritis, CRI (chronic renal insufficiency), Gout, Hypercholesteremia, Hypertension, Kidney stone, Other ill-defined conditions(799.89), Personal history of prostate cancer, Prostate cancer (HCC), and Splenic artery aneurysm.  Pt was seen today for diabetes management.  Patient's last A1c was:   Hemoglobin A1C   Date Value Ref Range Status   04/04/2025 8.3 (H) 4.2 - 5.6 % Final     Comment:     (NOTE)  HbA1C Interpretive Ranges  <5.7              Normal  5.7 - 6.4         Consider Prediabetes  >6.5              Consider Diabetes         Interim update: Pt

## 2025-06-04 ENCOUNTER — PHARMACY VISIT (OUTPATIENT)
Facility: CLINIC | Age: 80
End: 2025-06-04

## 2025-06-04 DIAGNOSIS — Z79.4 TYPE 2 DIABETES MELLITUS WITH HYPERGLYCEMIA, WITH LONG-TERM CURRENT USE OF INSULIN (HCC): Primary | ICD-10-CM

## 2025-06-04 DIAGNOSIS — E11.65 TYPE 2 DIABETES MELLITUS WITH HYPERGLYCEMIA, WITH LONG-TERM CURRENT USE OF INSULIN (HCC): Primary | ICD-10-CM

## 2025-06-04 RX ORDER — REPAGLINIDE 1 MG/1
1 TABLET ORAL
COMMUNITY
Start: 2025-05-23 | End: 2025-06-04 | Stop reason: DRUGHIGH

## 2025-06-04 RX ORDER — REPAGLINIDE 1 MG/1
2 TABLET ORAL
Status: SHIPPED | COMMUNITY
Start: 2025-06-04

## 2025-06-04 RX ORDER — EMPAGLIFLOZIN 25 MG/1
25 TABLET, FILM COATED ORAL DAILY
COMMUNITY
Start: 2025-05-23

## 2025-06-07 ENCOUNTER — PATIENT MESSAGE (OUTPATIENT)
Facility: CLINIC | Age: 80
End: 2025-06-07

## 2025-06-07 DIAGNOSIS — E11.65 TYPE 2 DIABETES MELLITUS WITH HYPERGLYCEMIA, WITH LONG-TERM CURRENT USE OF INSULIN (HCC): Primary | ICD-10-CM

## 2025-06-07 DIAGNOSIS — Z79.4 TYPE 2 DIABETES MELLITUS WITH HYPERGLYCEMIA, WITH LONG-TERM CURRENT USE OF INSULIN (HCC): Primary | ICD-10-CM

## 2025-06-09 RX ORDER — REPAGLINIDE 2 MG/1
2 TABLET ORAL
Qty: 90 TABLET | Refills: 3 | Status: SHIPPED | OUTPATIENT
Start: 2025-06-09

## 2025-06-09 NOTE — TELEPHONE ENCOUNTER
Will send Rx for Prandin 2mg as requested.  He has been taking two 1mg tablets.    Thank you,    Fred Ramsey, PharmD, BCACP, [facility]    For Pharmacy Admin Tracking Only    Program: Medical Group  CPA in place:  Yes  Recommendation Provided To: Pharmacy: 1  Intervention Detail: New Rx: 1, reason: Needs Additional Therapy  Intervention Accepted By: Patient/Caregiver: 1  Gap Closed?: Yes   Time Spent (min): 5

## 2025-06-23 NOTE — PROGRESS NOTES
Pharmacy Progress Note - Diabetes Management       Assessment / Plan:   Diabetes Management:  Per ADA guidelines, Pt's A1c is not at goal of < 7%.  Pt's prandial control has not improved at all with the increased dose of Prandin.  His AGP report from the past 14 days shows time in target range 50%, average glucose 189 mg/dL, and GMI 7.8% which is worse than the prior visit.  He previously tolerated Trulicity well.  This was switched to Ozempic d/t availability issues.  The Ozempic was discontinued d/t a delayed allergic reaction leading to a rash.  Will switch pt from Prandin to Mounjaro 2.5mg weekly x4 weeks, then increase to 5mg weekly.  This will hopefully delay his need for prandial insulin.  Will decrease his Lantus to 18 units qam to hopefully avoid any potential hypoglycemia from the improved prandial control.  Will reassess with CGM data in 5 weeks.     Nutrition/Lifestyle Modifications:  - Educated pt on the importance of moderating carbohydrate intake. Reviewed sources of carbohydrates and method to help determine appropriate portion sizes (e.g., Diabetes Plate Method).  - Advised patient to avoid sugar-sweetened beverages and replace with water or diet/zero sugar option.  - Recommend ~30 minutes consistent, moderately intensive, exercise/day or ~150 minutes/week. Start small, stay consistent, and increase length and types of exercise, as tolerated.       Patient will return to clinic in 5 week(s) for follow up.        S/O: Mr. Varun Disla, a 79 y.o. male referred by Mignon Lopez MD,  has a past medical history of Arthritis, CRI (chronic renal insufficiency), Gout, Hypercholesteremia, Hypertension, Kidney stone, Other ill-defined conditions(799.89), Personal history of prostate cancer, Prostate cancer (HCC), and Splenic artery aneurysm.  Pt was seen today for diabetes management.  Patient's last A1c was:   Hemoglobin A1C   Date Value Ref Range Status   04/04/2025 8.3 (H) 4.2 - 5.6 % Final

## 2025-06-24 ENCOUNTER — PHARMACY VISIT (OUTPATIENT)
Facility: CLINIC | Age: 80
End: 2025-06-24

## 2025-06-24 DIAGNOSIS — Z79.4 TYPE 2 DIABETES MELLITUS WITH HYPERGLYCEMIA, WITH LONG-TERM CURRENT USE OF INSULIN (HCC): Primary | ICD-10-CM

## 2025-06-24 DIAGNOSIS — E11.65 TYPE 2 DIABETES MELLITUS WITH HYPERGLYCEMIA, WITH LONG-TERM CURRENT USE OF INSULIN (HCC): Primary | ICD-10-CM

## 2025-06-24 RX ORDER — INSULIN GLARGINE 100 [IU]/ML
18 INJECTION, SOLUTION SUBCUTANEOUS EVERY MORNING
Qty: 15 ML | Refills: 0 | Status: SHIPPED | OUTPATIENT
Start: 2025-06-24

## 2025-06-24 NOTE — PATIENT INSTRUCTIONS
- Decrease Lantus to 18 units every morning    - Start Mounjaro 2.5mg weekly for 4 weeks, then increase to 5mg weekly   * Call in to fill the 5mg dose in 3 weeks    - Stop Prandin

## 2025-07-07 ENCOUNTER — HOSPITAL ENCOUNTER (OUTPATIENT)
Age: 80
Discharge: HOME | End: 2025-07-07
Payer: MEDICARE

## 2025-07-07 DIAGNOSIS — E11.65 TYPE 2 DIABETES MELLITUS WITH HYPERGLYCEMIA, WITH LONG-TERM CURRENT USE OF INSULIN (HCC): ICD-10-CM

## 2025-07-07 DIAGNOSIS — E11.21 TYPE 2 DIABETES WITH NEPHROPATHY (HCC): ICD-10-CM

## 2025-07-07 DIAGNOSIS — Z79.4 TYPE 2 DIABETES MELLITUS WITH HYPERGLYCEMIA, WITH LONG-TERM CURRENT USE OF INSULIN (HCC): ICD-10-CM

## 2025-07-07 LAB
ALBUMIN SERPL-MCNC: 3.8 G/DL (ref 3.4–5)
ALBUMIN/GLOB SERPL: 1.2 (ref 0.8–1.7)
ALP SERPL-CCNC: 67 U/L (ref 45–117)
ALT SERPL-CCNC: 26 U/L (ref 10–50)
ANION GAP SERPL CALC-SCNC: 11 MMOL/L (ref 3–18)
AST SERPL-CCNC: 22 U/L (ref 10–38)
BASOPHILS # BLD: 0.04 K/UL (ref 0–0.1)
BASOPHILS NFR BLD: 0.7 % (ref 0–2)
BILIRUB SERPL-MCNC: 0.9 MG/DL (ref 0.2–1)
BUN SERPL-MCNC: 33 MG/DL (ref 6–23)
BUN/CREAT SERPL: 23 (ref 12–20)
CALCIUM SERPL-MCNC: 9.7 MG/DL (ref 8.5–10.1)
CHLORIDE SERPL-SCNC: 105 MMOL/L (ref 98–107)
CO2 SERPL-SCNC: 24 MMOL/L (ref 21–32)
CREAT SERPL-MCNC: 1.42 MG/DL (ref 0.6–1.3)
DIFFERENTIAL METHOD BLD: ABNORMAL
EOSINOPHIL # BLD: 0.55 K/UL (ref 0–0.4)
EOSINOPHIL NFR BLD: 10.2 % (ref 0–5)
ERYTHROCYTE [DISTWIDTH] IN BLOOD BY AUTOMATED COUNT: 12.7 % (ref 11.6–14.5)
EST. AVERAGE GLUCOSE BLD GHB EST-MCNC: 175 MG/DL
GLOBULIN SER CALC-MCNC: 3.1 G/DL (ref 2–4)
GLUCOSE SERPL-MCNC: 140 MG/DL (ref 74–108)
HBA1C MFR BLD: 7.7 % (ref 4.2–5.6)
HCT VFR BLD AUTO: 46.4 % (ref 36–48)
HGB BLD-MCNC: 15.1 G/DL (ref 13–16)
IMM GRANULOCYTES # BLD AUTO: 0.01 K/UL (ref 0–0.04)
IMM GRANULOCYTES NFR BLD AUTO: 0.2 % (ref 0–0.5)
LYMPHOCYTES # BLD: 1.29 K/UL (ref 0.9–3.6)
LYMPHOCYTES NFR BLD: 24 % (ref 21–52)
MCH RBC QN AUTO: 30 PG (ref 24–34)
MCHC RBC AUTO-ENTMCNC: 32.5 G/DL (ref 31–37)
MCV RBC AUTO: 92.2 FL (ref 78–100)
MONOCYTES # BLD: 0.46 K/UL (ref 0.05–1.2)
MONOCYTES NFR BLD: 8.6 % (ref 3–10)
NEUTS SEG # BLD: 3.03 K/UL (ref 1.8–8)
NEUTS SEG NFR BLD: 56.3 % (ref 40–73)
NRBC # BLD: 0 K/UL (ref 0–0.01)
NRBC BLD-RTO: 0 PER 100 WBC
PLATELET # BLD AUTO: 209 K/UL (ref 135–420)
PMV BLD AUTO: 10.1 FL (ref 9.2–11.8)
POTASSIUM SERPL-SCNC: 4.4 MMOL/L (ref 3.5–5.5)
PROT SERPL-MCNC: 6.9 G/DL (ref 6.4–8.2)
RBC # BLD AUTO: 5.03 M/UL (ref 4.35–5.65)
SODIUM SERPL-SCNC: 139 MMOL/L (ref 136–145)
WBC # BLD AUTO: 5.4 K/UL (ref 4.6–13.2)

## 2025-07-07 PROCEDURE — 36415 COLL VENOUS BLD VENIPUNCTURE: CPT

## 2025-07-07 PROCEDURE — 85025 COMPLETE CBC W/AUTO DIFF WBC: CPT

## 2025-07-07 PROCEDURE — 80053 COMPREHEN METABOLIC PANEL: CPT

## 2025-07-07 PROCEDURE — 83036 HEMOGLOBIN GLYCOSYLATED A1C: CPT

## 2025-07-09 ENCOUNTER — OFFICE VISIT (OUTPATIENT)
Facility: CLINIC | Age: 80
End: 2025-07-09

## 2025-07-09 VITALS
HEIGHT: 61 IN | WEIGHT: 152.8 LBS | TEMPERATURE: 97.8 F | DIASTOLIC BLOOD PRESSURE: 70 MMHG | HEART RATE: 106 BPM | OXYGEN SATURATION: 96 % | SYSTOLIC BLOOD PRESSURE: 120 MMHG | BODY MASS INDEX: 28.85 KG/M2 | RESPIRATION RATE: 16 BRPM

## 2025-07-09 DIAGNOSIS — E78.00 PURE HYPERCHOLESTEROLEMIA: ICD-10-CM

## 2025-07-09 DIAGNOSIS — I72.8 SPLENIC ARTERY ANEURYSM: ICD-10-CM

## 2025-07-09 DIAGNOSIS — E11.21 TYPE 2 DIABETES WITH NEPHROPATHY (HCC): Primary | ICD-10-CM

## 2025-07-09 DIAGNOSIS — N18.31 CHRONIC KIDNEY DISEASE, STAGE 3A (HCC): ICD-10-CM

## 2025-07-09 DIAGNOSIS — I10 ESSENTIAL HYPERTENSION: ICD-10-CM

## 2025-07-09 DIAGNOSIS — M10.9 GOUT WITHOUT TOPHUS: ICD-10-CM

## 2025-07-09 DIAGNOSIS — C61 PROSTATE CANCER (HCC): ICD-10-CM

## 2025-07-09 PROBLEM — K52.9 ACUTE COLITIS: Status: RESOLVED | Noted: 2025-01-20 | Resolved: 2025-07-09

## 2025-07-09 RX ORDER — SIMVASTATIN 40 MG
40 TABLET ORAL NIGHTLY
Qty: 90 TABLET | Refills: 3 | Status: SHIPPED | OUTPATIENT
Start: 2025-07-09

## 2025-07-09 RX ORDER — LISINOPRIL 20 MG/1
20 TABLET ORAL DAILY
Qty: 90 TABLET | Refills: 1 | Status: SHIPPED | OUTPATIENT
Start: 2025-07-09

## 2025-07-09 NOTE — PROGRESS NOTES
Have you been to the ER, urgent care clinic since your last visit?  Hospitalized since your last visit?   NO    Have you seen or consulted any other health care providers outside our system since your last visit?   NO           
change every 10 days, Disp: 9 each, Rfl: 3    Continuous Glucose  (DEXCOM G7 ) DAYNA, Use to monitor blood glucose continuously, Disp: 1 each, Rfl: 0    DROPLET PEN NEEDLES 32G X 4 MM MISC, Inject 1 each into the skin daily, Disp: 100 each, Rfl: 3    FREESTYLE LITE strip, Use to check blood glucose up to four times daily.  (Pt is now on insulin), Disp: 300 each, Rfl: 3    hydrocortisone 2.5 % cream, Apply topically 2 times daily, Disp: 30 g, Rfl: 1    colchicine (MITIGARE) 0.6 MG capsule, Take 1 capsule by mouth 2 times daily, Disp: 60 capsule, Rfl: 0    calcium carbonate 600 MG TABS tablet, Take 1 tablet by mouth 2 times daily, Disp: , Rfl:     FreeStyle Lancets MISC, Use to check blood glucose up to four times daily, Disp: 400 each, Rfl: 3    Alcohol Swabs (EASY TOUCH ALCOHOL PREP MEDIUM) 70 % PADS, Apply 1 swab  topically in the morning and at bedtime, Disp: 600 each, Rfl: 3    CALCIUM PO, Take by mouth, Disp: , Rfl:     Blood Glucose Monitoring Suppl (FREESTYLE FREEDOM LITE) w/Device KIT, , Disp: , Rfl:     Cholecalciferol 50 MCG (2000 UT) CAPS, Take 1 capsule by mouth daily, Disp: , Rfl:     acetaminophen (TYLENOL) 500 MG tablet, Take 2 tablets by mouth every 6 hours as needed, Disp: , Rfl:     aspirin 81 MG chewable tablet, Take 1 tablet by mouth daily, Disp: , Rfl:     Allergies   Allergen Reactions    Azithromycin Palpitations    Norvasc [Amlodipine] Itching and Other (comments)    Watermelon Other (comments)       Past Medical History:   Diagnosis Date    Arthritis     CRI (chronic renal insufficiency)     Diabetes mellitus type II 5/13/10    Gout     Hypercholesteremia     Hypertension     Other ill-defined conditions(799.89)     gout    Prostate cancer (HCC) 2008    remission    Splenic artery aneurysm (HCC) 2013    s/p stent dr silva prpaulo ff-up       Social History     Socioeconomic History    Marital status:      Spouse name: Not on file    Number of children: Not on file

## 2025-07-16 RX ORDER — SIMVASTATIN 40 MG
40 TABLET ORAL NIGHTLY
Qty: 90 TABLET | Refills: 3 | Status: SHIPPED | OUTPATIENT
Start: 2025-07-16

## 2025-07-29 NOTE — PROGRESS NOTES
update: Pt was last seen by me on 6/24/2025.  Per my prior note: Pt's A1c is not at goal of < 7%.  Pt's prandial control has not improved at all with the increased dose of Prandin.  His AGP report from the past 14 days shows time in target range 50%, average glucose 189 mg/dL, and GMI 7.8% which is worse than the prior visit.  He previously tolerated Trulicity well.  This was switched to Ozempic d/t availability issues.  The Ozempic was discontinued d/t a delayed allergic reaction leading to a rash.  Will switch pt from Prandin to Mounjaro 2.5mg weekly x4 weeks, then increase to 5mg weekly.  This will hopefully delay his need for prandial insulin.  Will decrease his Lantus to 18 units qam to hopefully avoid any potential hypoglycemia from the improved prandial control.  Will reassess with CGM data in 5 weeks.    Today:   Pt is tolerating the Mounjaro well at this time.  He is now on the 5mg dose with his second dose due Friday.  He denies any n/v/d/acid reflux.  He states that he has had some harder stools.  He endorses adequate hydration.  He has docusate at home.  Advised he could utilize this if needed and he verbalized understanding.    He continues to have cranberry juice and water in the morning with breakfast.  He is also having 2 slices of toast with white bread most of the time.  He also has coffee with a half tsp of regular sugar.  Encouraged to switch to zero sugar cranberry juice, sugar substitute, and wheat or keto bread.    Current anti-hyperglycemic regimen includes:    Key Antihyperglycemic Medications              Tirzepatide (MOUNJARO) 2.5 MG/0.5ML SOAJ pen (Not Taking) Inject 2.5 mg into the skin every 7 days    Tirzepatide (MOUNJARO) 5 MG/0.5ML SOAJ pen (Taking) Inject 1 pen  into the skin every 7 days    insulin glargine (LANTUS SOLOSTAR) 100 UNIT/ML injection pen Inject 18 Units into the skin every morning    JARDIANCE 25 MG tablet Take 1 tablet by mouth daily          Complete current

## 2025-07-30 ENCOUNTER — PHARMACY VISIT (OUTPATIENT)
Facility: CLINIC | Age: 80
End: 2025-07-30

## 2025-07-30 DIAGNOSIS — Z79.4 TYPE 2 DIABETES MELLITUS WITH HYPERGLYCEMIA, WITH LONG-TERM CURRENT USE OF INSULIN (HCC): ICD-10-CM

## 2025-07-30 DIAGNOSIS — E11.65 TYPE 2 DIABETES MELLITUS WITH HYPERGLYCEMIA, WITH LONG-TERM CURRENT USE OF INSULIN (HCC): ICD-10-CM

## 2025-07-30 RX ORDER — INSULIN GLARGINE 100 [IU]/ML
20 INJECTION, SOLUTION SUBCUTANEOUS EVERY MORNING
Qty: 15 ML | Refills: 0 | Status: SHIPPED | OUTPATIENT
Start: 2025-07-30

## 2025-08-11 ENCOUNTER — PATIENT MESSAGE (OUTPATIENT)
Facility: CLINIC | Age: 80
End: 2025-08-11

## (undated) DEVICE — SYR 50ML SLIP TIP NSAF LF STRL --

## (undated) DEVICE — FLUFF AND POLYMER UNDERPAD,EXTRA HEAVY: Brand: WINGS

## (undated) DEVICE — TRAP SPEC COLL POLYP POLYSTYR --

## (undated) DEVICE — SYR 20ML LL STRL LF --

## (undated) DEVICE — SYRINGE MED 25GA 3ML L5/8IN SUBQ PLAS W/ DETACH NDL SFTY

## (undated) DEVICE — CATHETER SUCT TR FL TIP 14FR W/ O CTRL

## (undated) DEVICE — SNARE POLYP M W27MMXL240CM OVL STIFF DISP CAPTIVATOR

## (undated) DEVICE — ENDOSCOPY PUMP TUBING/ CAP SET: Brand: ERBE

## (undated) DEVICE — CANNULA ORIG TL CLR W FOAM CUSHIONS AND 14FT SUPL TB 3 CHN

## (undated) DEVICE — CANNULA NSL AD TBNG L14FT STD PVC O2 CRV CONN NONFLARED NSL

## (undated) DEVICE — SYR 10ML LUER LOK 1/5ML GRAD --

## (undated) DEVICE — LINER SUCT CANSTR 3000CC PLAS SFT PRE ASSEMB W/OUT TBNG W/

## (undated) DEVICE — YANKAUER,SMOOTH HANDLE,HIGH CAPACITY: Brand: MEDLINE INDUSTRIES, INC.

## (undated) DEVICE — GOWN ISOL IMPERV UNIV, DISP, OPEN BACK, BLUE --

## (undated) DEVICE — SOLUTION IRRIG 1000ML H2O STRL BLT

## (undated) DEVICE — GAUZE,SPONGE,4"X4",16PLY,STRL,LF,10/TRAY: Brand: MEDLINE

## (undated) DEVICE — MEDI-VAC NON-CONDUCTIVE SUCTION TUBING: Brand: CARDINAL HEALTH